# Patient Record
Sex: FEMALE | Race: WHITE | NOT HISPANIC OR LATINO | Employment: OTHER | URBAN - NONMETROPOLITAN AREA
[De-identification: names, ages, dates, MRNs, and addresses within clinical notes are randomized per-mention and may not be internally consistent; named-entity substitution may affect disease eponyms.]

---

## 2018-01-04 ENCOUNTER — OFFICE VISIT CONVERTED (OUTPATIENT)
Dept: FAMILY MEDICINE CLINIC | Age: 73
End: 2018-01-04
Attending: FAMILY MEDICINE

## 2018-01-09 ENCOUNTER — CONVERSION ENCOUNTER (OUTPATIENT)
Dept: OTHER | Facility: HOSPITAL | Age: 73
End: 2018-01-09

## 2018-04-24 ENCOUNTER — OFFICE VISIT CONVERTED (OUTPATIENT)
Dept: FAMILY MEDICINE CLINIC | Age: 73
End: 2018-04-24
Attending: FAMILY MEDICINE

## 2018-05-24 ENCOUNTER — OFFICE VISIT CONVERTED (OUTPATIENT)
Dept: FAMILY MEDICINE CLINIC | Age: 73
End: 2018-05-24
Attending: NURSE PRACTITIONER

## 2018-06-02 ENCOUNTER — OFFICE VISIT CONVERTED (OUTPATIENT)
Dept: FAMILY MEDICINE CLINIC | Age: 73
End: 2018-06-02
Attending: NURSE PRACTITIONER

## 2018-06-08 ENCOUNTER — OFFICE VISIT CONVERTED (OUTPATIENT)
Dept: FAMILY MEDICINE CLINIC | Age: 73
End: 2018-06-08
Attending: NURSE PRACTITIONER

## 2018-07-24 ENCOUNTER — OFFICE VISIT CONVERTED (OUTPATIENT)
Dept: FAMILY MEDICINE CLINIC | Age: 73
End: 2018-07-24
Attending: FAMILY MEDICINE

## 2018-10-30 ENCOUNTER — OFFICE VISIT CONVERTED (OUTPATIENT)
Dept: FAMILY MEDICINE CLINIC | Age: 73
End: 2018-10-30
Attending: FAMILY MEDICINE

## 2018-12-13 ENCOUNTER — OFFICE VISIT CONVERTED (OUTPATIENT)
Dept: FAMILY MEDICINE CLINIC | Age: 73
End: 2018-12-13
Attending: FAMILY MEDICINE

## 2019-01-09 ENCOUNTER — HOSPITAL ENCOUNTER (OUTPATIENT)
Dept: OTHER | Facility: HOSPITAL | Age: 74
Discharge: HOME | End: 2019-01-09
Attending: FAMILY MEDICINE

## 2019-01-09 ENCOUNTER — OFFICE VISIT CONVERTED (OUTPATIENT)
Dept: FAMILY MEDICINE CLINIC | Age: 74
End: 2019-01-09
Attending: FAMILY MEDICINE

## 2019-01-14 LAB
7-AMINOCLONAZEPAM UR: <5 NG/ML
A-OH ALPRAZ UR CFM-MCNC: <5 NG/ML
ALPHA-HYDROXYMIDAZOLAM, URINE: <20 NG/ML
ALPRAZ UR QL: <5 NG/ML
CLONAZEPAM UR QL: <5 NG/ML
CONV CHOLRDIAZEPOXIDE, QN URINE: <20 NG/ML
CONV OXAZEPAM, (TEMAZEPAM) QUANT: 164 NG/ML
DIAZEPAM UR-MCNC: <20 NG/ML
LORAZEPAM UR-MCNC: <20 NG/ML
MIDAZOLAM URINE: <20 NG/ML
NORDIAZEPAM URINE: <20 NG/ML
TEMAZEPAM UR QL CFM: 2694 NG/ML

## 2019-01-23 ENCOUNTER — HOSPITAL ENCOUNTER (OUTPATIENT)
Dept: OTHER | Facility: HOSPITAL | Age: 74
Discharge: HOME | End: 2019-01-23
Attending: FAMILY MEDICINE

## 2019-01-26 LAB — GABAPENTIN UR-MCNC: 457.5 UG/ML

## 2019-02-21 ENCOUNTER — OFFICE VISIT CONVERTED (OUTPATIENT)
Dept: FAMILY MEDICINE CLINIC | Age: 74
End: 2019-02-21
Attending: FAMILY MEDICINE

## 2019-02-21 ENCOUNTER — HOSPITAL ENCOUNTER (OUTPATIENT)
Dept: OTHER | Facility: HOSPITAL | Age: 74
Discharge: HOME | End: 2019-02-21
Attending: FAMILY MEDICINE

## 2019-02-21 LAB
ALBUMIN SERPL-MCNC: 3.8 G/DL (ref 3.5–5)
ALBUMIN/GLOB SERPL: 1.2 {RATIO} (ref 1.4–2.6)
ALP SERPL-CCNC: 53 U/L (ref 43–160)
ALT SERPL-CCNC: <5 U/L (ref 10–40)
ANION GAP SERPL CALC-SCNC: 16 MMOL/L (ref 8–19)
AST SERPL-CCNC: 6 U/L (ref 15–50)
BILIRUB SERPL-MCNC: 0.2 MG/DL (ref 0.2–1.3)
BUN SERPL-MCNC: 10 MG/DL (ref 5–25)
BUN/CREAT SERPL: 12 {RATIO} (ref 6–20)
CALCIUM SERPL-MCNC: 9 MG/DL (ref 8.7–10.4)
CHLORIDE SERPL-SCNC: 105 MMOL/L (ref 99–111)
CONV CO2: 22 MMOL/L (ref 22–32)
CONV TOTAL PROTEIN: 7.1 G/DL (ref 6.3–8.2)
CREAT UR-MCNC: 0.83 MG/DL (ref 0.5–0.9)
ERYTHROCYTE [DISTWIDTH] IN BLOOD BY AUTOMATED COUNT: 12.4 % (ref 11.5–14.5)
GFR SERPLBLD BASED ON 1.73 SQ M-ARVRAT: >60 ML/MIN/{1.73_M2}
GLOBULIN UR ELPH-MCNC: 3.3 G/DL (ref 2–3.5)
GLUCOSE SERPL-MCNC: 96 MG/DL (ref 65–99)
HBA1C MFR BLD: 12.8 G/DL (ref 12–16)
HCT VFR BLD AUTO: 36.9 % (ref 37–47)
MCH RBC QN AUTO: 35.1 PG (ref 27–31)
MCHC RBC AUTO-ENTMCNC: 34.7 G/DL (ref 33–37)
MCV RBC AUTO: 101.1 FL (ref 81–99)
OSMOLALITY SERPL CALC.SUM OF ELEC: 289 MOSM/KG (ref 273–304)
PLATELET # BLD AUTO: 345 10*3/UL (ref 130–400)
PMV BLD AUTO: 9.6 FL (ref 7.4–10.4)
POTASSIUM SERPL-SCNC: 2.8 MMOL/L (ref 3.5–5.3)
RBC # BLD AUTO: 3.65 10*6/UL (ref 4.2–5.4)
SODIUM SERPL-SCNC: 140 MMOL/L (ref 135–147)
WBC # BLD AUTO: 5.97 10*3/UL (ref 4.8–10.8)

## 2019-02-25 LAB
C DIFF TOX B STL QL CT TISS CULT: NEGATIVE
CONV 027 TOXIN: NEGATIVE

## 2019-02-27 LAB
BACTERIA SPEC AEROBE CULT: ABNORMAL
BACTERIA SPEC AEROBE CULT: ABNORMAL

## 2019-03-01 ENCOUNTER — HOSPITAL ENCOUNTER (OUTPATIENT)
Dept: OTHER | Facility: HOSPITAL | Age: 74
Discharge: HOME | End: 2019-03-01
Attending: FAMILY MEDICINE

## 2019-03-01 LAB
ANION GAP SERPL CALC-SCNC: 18 MMOL/L (ref 8–19)
BUN SERPL-MCNC: 10 MG/DL (ref 5–25)
BUN/CREAT SERPL: 9 {RATIO} (ref 6–20)
CALCIUM SERPL-MCNC: 9.5 MG/DL (ref 8.7–10.4)
CHLORIDE SERPL-SCNC: 103 MMOL/L (ref 99–111)
CONV CO2: 24 MMOL/L (ref 22–32)
CREAT UR-MCNC: 1.06 MG/DL (ref 0.5–0.9)
GFR SERPLBLD BASED ON 1.73 SQ M-ARVRAT: 51 ML/MIN/{1.73_M2}
GLUCOSE SERPL-MCNC: 99 MG/DL (ref 65–99)
OSMOLALITY SERPL CALC.SUM OF ELEC: 289 MOSM/KG (ref 273–304)
POTASSIUM SERPL-SCNC: 4.6 MMOL/L (ref 3.5–5.3)
SODIUM SERPL-SCNC: 140 MMOL/L (ref 135–147)

## 2019-03-20 ENCOUNTER — HOSPITAL ENCOUNTER (OUTPATIENT)
Dept: OTHER | Facility: HOSPITAL | Age: 74
Discharge: HOME | End: 2019-03-20
Attending: FAMILY MEDICINE

## 2019-04-22 ENCOUNTER — HOSPITAL ENCOUNTER (OUTPATIENT)
Dept: OTHER | Facility: HOSPITAL | Age: 74
Discharge: HOME | End: 2019-04-22
Attending: FAMILY MEDICINE

## 2019-04-22 ENCOUNTER — OFFICE VISIT CONVERTED (OUTPATIENT)
Dept: FAMILY MEDICINE CLINIC | Age: 74
End: 2019-04-22
Attending: FAMILY MEDICINE

## 2019-05-07 ENCOUNTER — HOSPITAL ENCOUNTER (OUTPATIENT)
Dept: OTHER | Facility: HOSPITAL | Age: 74
Discharge: HOME | End: 2019-05-07
Attending: FAMILY MEDICINE

## 2019-05-08 LAB
ALBUMIN SERPL-MCNC: 4.1 G/DL (ref 3.5–5)
ALBUMIN/GLOB SERPL: 1.3 {RATIO} (ref 1.4–2.6)
ALP SERPL-CCNC: 61 U/L (ref 43–160)
ALT SERPL-CCNC: 6 U/L (ref 10–40)
ANION GAP SERPL CALC-SCNC: 16 MMOL/L (ref 8–19)
AST SERPL-CCNC: 8 U/L (ref 15–50)
BILIRUB SERPL-MCNC: 0.22 MG/DL (ref 0.2–1.3)
BUN SERPL-MCNC: 8 MG/DL (ref 5–25)
BUN/CREAT SERPL: 8 {RATIO} (ref 6–20)
CALCIUM SERPL-MCNC: 9 MG/DL (ref 8.7–10.4)
CHLORIDE SERPL-SCNC: 102 MMOL/L (ref 99–111)
CHOLEST SERPL-MCNC: 197 MG/DL (ref 107–200)
CHOLEST/HDLC SERPL: 4.2 {RATIO} (ref 3–6)
CONV CO2: 29 MMOL/L (ref 22–32)
CONV TOTAL PROTEIN: 7.2 G/DL (ref 6.3–8.2)
CREAT UR-MCNC: 0.99 MG/DL (ref 0.5–0.9)
GFR SERPLBLD BASED ON 1.73 SQ M-ARVRAT: 55 ML/MIN/{1.73_M2}
GLOBULIN UR ELPH-MCNC: 3.1 G/DL (ref 2–3.5)
GLUCOSE SERPL-MCNC: 89 MG/DL (ref 65–99)
HDLC SERPL-MCNC: 47 MG/DL (ref 40–60)
LDLC SERPL CALC-MCNC: 119 MG/DL (ref 70–100)
OSMOLALITY SERPL CALC.SUM OF ELEC: 294 MOSM/KG (ref 273–304)
POTASSIUM SERPL-SCNC: 3.6 MMOL/L (ref 3.5–5.3)
SODIUM SERPL-SCNC: 143 MMOL/L (ref 135–147)
TRIGL SERPL-MCNC: 153 MG/DL (ref 40–150)
TSH SERPL-ACNC: 1.52 M[IU]/L (ref 0.27–4.2)
VLDLC SERPL-MCNC: 31 MG/DL (ref 5–37)

## 2019-05-13 ENCOUNTER — HOSPITAL ENCOUNTER (OUTPATIENT)
Dept: OTHER | Facility: HOSPITAL | Age: 74
Discharge: HOME | End: 2019-05-13
Attending: FAMILY MEDICINE

## 2019-05-15 LAB
BACTERIA SPEC AEROBE CULT: ABNORMAL
BACTERIA SPEC AEROBE CULT: ABNORMAL

## 2019-07-08 ENCOUNTER — HOSPITAL ENCOUNTER (OUTPATIENT)
Dept: OTHER | Facility: HOSPITAL | Age: 74
Discharge: HOME | End: 2019-07-08
Attending: UROLOGY

## 2019-07-08 LAB
CREAT BLD-MCNC: 1 MG/DL (ref 0.6–1.4)
GFR SERPLBLD BASED ON 1.73 SQ M-ARVRAT: 55 ML/MIN/{1.73_M2}

## 2019-07-17 ENCOUNTER — OFFICE VISIT CONVERTED (OUTPATIENT)
Dept: FAMILY MEDICINE CLINIC | Age: 74
End: 2019-07-17
Attending: FAMILY MEDICINE

## 2019-08-28 ENCOUNTER — OFFICE VISIT CONVERTED (OUTPATIENT)
Dept: UROLOGY | Facility: CLINIC | Age: 74
End: 2019-08-28
Attending: UROLOGY

## 2019-10-22 ENCOUNTER — HOSPITAL ENCOUNTER (OUTPATIENT)
Dept: OTHER | Facility: HOSPITAL | Age: 74
Discharge: HOME | End: 2019-10-22
Attending: FAMILY MEDICINE

## 2019-10-22 ENCOUNTER — OFFICE VISIT CONVERTED (OUTPATIENT)
Dept: FAMILY MEDICINE CLINIC | Age: 74
End: 2019-10-22
Attending: FAMILY MEDICINE

## 2019-10-22 LAB
ALBUMIN SERPL-MCNC: 4.5 G/DL (ref 3.5–5)
ALBUMIN/GLOB SERPL: 1.7 {RATIO} (ref 1.4–2.6)
ALP SERPL-CCNC: 59 U/L (ref 43–160)
ALT SERPL-CCNC: 7 U/L (ref 10–40)
ANION GAP SERPL CALC-SCNC: 19 MMOL/L (ref 8–19)
AST SERPL-CCNC: 7 U/L (ref 15–50)
BILIRUB SERPL-MCNC: 0.32 MG/DL (ref 0.2–1.3)
BUN SERPL-MCNC: 10 MG/DL (ref 5–25)
BUN/CREAT SERPL: 11 {RATIO} (ref 6–20)
CALCIUM SERPL-MCNC: 9.7 MG/DL (ref 8.7–10.4)
CHLORIDE SERPL-SCNC: 99 MMOL/L (ref 99–111)
CHOLEST SERPL-MCNC: 210 MG/DL (ref 107–200)
CHOLEST/HDLC SERPL: 3.8 {RATIO} (ref 3–6)
CONV CO2: 24 MMOL/L (ref 22–32)
CONV TOTAL PROTEIN: 7.2 G/DL (ref 6.3–8.2)
CREAT UR-MCNC: 0.89 MG/DL (ref 0.5–0.9)
GFR SERPLBLD BASED ON 1.73 SQ M-ARVRAT: >60 ML/MIN/{1.73_M2}
GLOBULIN UR ELPH-MCNC: 2.7 G/DL (ref 2–3.5)
GLUCOSE SERPL-MCNC: 91 MG/DL (ref 65–99)
HDLC SERPL-MCNC: 55 MG/DL (ref 40–60)
LDLC SERPL CALC-MCNC: 122 MG/DL (ref 70–100)
OSMOLALITY SERPL CALC.SUM OF ELEC: 285 MOSM/KG (ref 273–304)
POTASSIUM SERPL-SCNC: 4.1 MMOL/L (ref 3.5–5.3)
SODIUM SERPL-SCNC: 138 MMOL/L (ref 135–147)
TRIGL SERPL-MCNC: 163 MG/DL (ref 40–150)
TSH SERPL-ACNC: 1.64 M[IU]/L (ref 0.27–4.2)
VLDLC SERPL-MCNC: 33 MG/DL (ref 5–37)

## 2019-10-28 ENCOUNTER — OFFICE VISIT CONVERTED (OUTPATIENT)
Dept: FAMILY MEDICINE CLINIC | Age: 74
End: 2019-10-28
Attending: FAMILY MEDICINE

## 2019-11-11 ENCOUNTER — OFFICE VISIT CONVERTED (OUTPATIENT)
Dept: FAMILY MEDICINE CLINIC | Age: 74
End: 2019-11-11
Attending: NURSE PRACTITIONER

## 2019-12-10 ENCOUNTER — OFFICE VISIT CONVERTED (OUTPATIENT)
Dept: FAMILY MEDICINE CLINIC | Age: 74
End: 2019-12-10
Attending: FAMILY MEDICINE

## 2019-12-16 ENCOUNTER — OFFICE VISIT CONVERTED (OUTPATIENT)
Dept: FAMILY MEDICINE CLINIC | Age: 74
End: 2019-12-16
Attending: FAMILY MEDICINE

## 2019-12-18 ENCOUNTER — HOSPITAL ENCOUNTER (OUTPATIENT)
Dept: OTHER | Facility: HOSPITAL | Age: 74
Discharge: HOME | End: 2019-12-18
Attending: FAMILY MEDICINE

## 2019-12-18 LAB
ANION GAP SERPL CALC-SCNC: 16 MMOL/L (ref 8–19)
BUN SERPL-MCNC: 19 MG/DL (ref 5–25)
BUN/CREAT SERPL: 18 {RATIO} (ref 6–20)
CALCIUM SERPL-MCNC: 8.8 MG/DL (ref 8.7–10.4)
CHLORIDE SERPL-SCNC: 93 MMOL/L (ref 99–111)
CONV CO2: 25 MMOL/L (ref 22–32)
CREAT UR-MCNC: 1.08 MG/DL (ref 0.5–0.9)
GFR SERPLBLD BASED ON 1.73 SQ M-ARVRAT: 50 ML/MIN/{1.73_M2}
GLUCOSE SERPL-MCNC: 94 MG/DL (ref 65–99)
OSMOLALITY SERPL CALC.SUM OF ELEC: 272 MOSM/KG (ref 273–304)
POTASSIUM SERPL-SCNC: 4.1 MMOL/L (ref 3.5–5.3)
SODIUM SERPL-SCNC: 130 MMOL/L (ref 135–147)

## 2019-12-23 ENCOUNTER — HOSPITAL ENCOUNTER (OUTPATIENT)
Dept: OTHER | Facility: HOSPITAL | Age: 74
Discharge: HOME | End: 2019-12-23
Attending: FAMILY MEDICINE

## 2019-12-30 ENCOUNTER — HOSPITAL ENCOUNTER (OUTPATIENT)
Dept: OTHER | Facility: HOSPITAL | Age: 74
Discharge: HOME | End: 2019-12-30
Attending: FAMILY MEDICINE

## 2020-01-15 ENCOUNTER — OFFICE VISIT CONVERTED (OUTPATIENT)
Dept: FAMILY MEDICINE CLINIC | Age: 75
End: 2020-01-15
Attending: FAMILY MEDICINE

## 2020-01-15 ENCOUNTER — HOSPITAL ENCOUNTER (OUTPATIENT)
Dept: OTHER | Facility: HOSPITAL | Age: 75
Discharge: HOME | End: 2020-01-15
Attending: FAMILY MEDICINE

## 2020-01-15 LAB
ALBUMIN SERPL-MCNC: 4.1 G/DL (ref 3.5–5)
ALBUMIN/GLOB SERPL: 1.5 {RATIO} (ref 1.4–2.6)
ALP SERPL-CCNC: 60 U/L (ref 43–160)
ALT SERPL-CCNC: 10 U/L (ref 10–40)
ANION GAP SERPL CALC-SCNC: 17 MMOL/L (ref 8–19)
AST SERPL-CCNC: 8 U/L (ref 15–50)
BASOPHILS # BLD AUTO: 0.07 10*3/UL (ref 0–0.2)
BASOPHILS NFR BLD AUTO: 0.9 % (ref 0–3)
BILIRUB SERPL-MCNC: <0.15 MG/DL (ref 0.2–1.3)
BNP SERPL-MCNC: 955 PG/ML (ref 0–1800)
BUN SERPL-MCNC: 13 MG/DL (ref 5–25)
BUN/CREAT SERPL: 14 {RATIO} (ref 6–20)
CALCIUM SERPL-MCNC: 9.2 MG/DL (ref 8.7–10.4)
CHLORIDE SERPL-SCNC: 102 MMOL/L (ref 99–111)
CONV ABS IMM GRAN: 0.04 10*3/UL (ref 0–0.2)
CONV CO2: 26 MMOL/L (ref 22–32)
CONV IMMATURE GRAN: 0.5 % (ref 0–1.8)
CONV TOTAL PROTEIN: 6.9 G/DL (ref 6.3–8.2)
CREAT UR-MCNC: 0.92 MG/DL (ref 0.5–0.9)
DEPRECATED RDW RBC AUTO: 52 FL (ref 36.4–46.3)
EOSINOPHIL # BLD AUTO: 0.25 10*3/UL (ref 0–0.7)
EOSINOPHIL # BLD AUTO: 3.4 % (ref 0–7)
ERYTHROCYTE [DISTWIDTH] IN BLOOD BY AUTOMATED COUNT: 13.5 % (ref 11.7–14.4)
GFR SERPLBLD BASED ON 1.73 SQ M-ARVRAT: >60 ML/MIN/{1.73_M2}
GLOBULIN UR ELPH-MCNC: 2.8 G/DL (ref 2–3.5)
GLUCOSE SERPL-MCNC: 93 MG/DL (ref 65–99)
HCT VFR BLD AUTO: 36 % (ref 37–47)
HGB BLD-MCNC: 11.7 G/DL (ref 12–16)
LYMPHOCYTES # BLD AUTO: 2.73 10*3/UL (ref 1–5)
LYMPHOCYTES NFR BLD AUTO: 36.6 % (ref 20–45)
MCH RBC QN AUTO: 34.2 PG (ref 27–31)
MCHC RBC AUTO-ENTMCNC: 32.5 G/DL (ref 33–37)
MCV RBC AUTO: 105.3 FL (ref 81–99)
MONOCYTES # BLD AUTO: 0.8 10*3/UL (ref 0.2–1.2)
MONOCYTES NFR BLD AUTO: 10.7 % (ref 3–10)
NEUTROPHILS # BLD AUTO: 3.56 10*3/UL (ref 2–8)
NEUTROPHILS NFR BLD AUTO: 47.9 % (ref 30–85)
NRBC CBCN: 0 % (ref 0–0.7)
OSMOLALITY SERPL CALC.SUM OF ELEC: 290 MOSM/KG (ref 273–304)
PLATELET # BLD AUTO: 316 10*3/UL (ref 130–400)
PMV BLD AUTO: 10.5 FL (ref 9.4–12.3)
POTASSIUM SERPL-SCNC: 4.6 MMOL/L (ref 3.5–5.3)
RBC # BLD AUTO: 3.42 10*6/UL (ref 4.2–5.4)
SODIUM SERPL-SCNC: 140 MMOL/L (ref 135–147)
WBC # BLD AUTO: 7.45 10*3/UL (ref 4.8–10.8)

## 2020-02-20 ENCOUNTER — HOSPITAL ENCOUNTER (OUTPATIENT)
Dept: OTHER | Facility: HOSPITAL | Age: 75
Discharge: HOME | End: 2020-02-20
Attending: FAMILY MEDICINE

## 2020-04-01 ENCOUNTER — OFFICE VISIT CONVERTED (OUTPATIENT)
Dept: FAMILY MEDICINE CLINIC | Age: 75
End: 2020-04-01
Attending: FAMILY MEDICINE

## 2020-04-02 ENCOUNTER — HOSPITAL ENCOUNTER (OUTPATIENT)
Dept: OTHER | Facility: HOSPITAL | Age: 75
Discharge: HOME | End: 2020-04-02
Attending: FAMILY MEDICINE

## 2020-04-17 ENCOUNTER — OFFICE VISIT CONVERTED (OUTPATIENT)
Dept: FAMILY MEDICINE CLINIC | Age: 75
End: 2020-04-17
Attending: NURSE PRACTITIONER

## 2020-04-28 ENCOUNTER — OFFICE VISIT CONVERTED (OUTPATIENT)
Dept: FAMILY MEDICINE CLINIC | Age: 75
End: 2020-04-28
Attending: FAMILY MEDICINE

## 2020-07-16 ENCOUNTER — OFFICE VISIT CONVERTED (OUTPATIENT)
Dept: FAMILY MEDICINE CLINIC | Age: 75
End: 2020-07-16
Attending: FAMILY MEDICINE

## 2020-07-16 ENCOUNTER — HOSPITAL ENCOUNTER (OUTPATIENT)
Dept: OTHER | Facility: HOSPITAL | Age: 75
Discharge: HOME | End: 2020-07-16
Attending: FAMILY MEDICINE

## 2020-07-16 LAB
ALBUMIN SERPL-MCNC: 4 G/DL (ref 3.5–5)
ALBUMIN/GLOB SERPL: 1.4 {RATIO} (ref 1.4–2.6)
ALP SERPL-CCNC: 49 U/L (ref 43–160)
ALT SERPL-CCNC: 7 U/L (ref 10–40)
ANION GAP SERPL CALC-SCNC: 20 MMOL/L (ref 8–19)
AST SERPL-CCNC: 8 U/L (ref 15–50)
BILIRUB SERPL-MCNC: 0.41 MG/DL (ref 0.2–1.3)
BUN SERPL-MCNC: 11 MG/DL (ref 5–25)
BUN/CREAT SERPL: 9 {RATIO} (ref 6–20)
CALCIUM SERPL-MCNC: 9.4 MG/DL (ref 8.7–10.4)
CHLORIDE SERPL-SCNC: 88 MMOL/L (ref 99–111)
CHOLEST SERPL-MCNC: 204 MG/DL (ref 107–200)
CHOLEST/HDLC SERPL: 4.4 {RATIO} (ref 3–6)
CONV CO2: 25 MMOL/L (ref 22–32)
CONV TOTAL PROTEIN: 6.9 G/DL (ref 6.3–8.2)
CREAT UR-MCNC: 1.22 MG/DL (ref 0.5–0.9)
GFR SERPLBLD BASED ON 1.73 SQ M-ARVRAT: 43 ML/MIN/{1.73_M2}
GLOBULIN UR ELPH-MCNC: 2.9 G/DL (ref 2–3.5)
GLUCOSE SERPL-MCNC: 100 MG/DL (ref 65–99)
HDLC SERPL-MCNC: 46 MG/DL (ref 40–60)
LDLC SERPL CALC-MCNC: 122 MG/DL (ref 70–100)
OSMOLALITY SERPL CALC.SUM OF ELEC: 267 MOSM/KG (ref 273–304)
POTASSIUM SERPL-SCNC: 3.6 MMOL/L (ref 3.5–5.3)
SODIUM SERPL-SCNC: 129 MMOL/L (ref 135–147)
T4 FREE SERPL-MCNC: 1.4 NG/DL (ref 0.9–1.8)
TRIGL SERPL-MCNC: 178 MG/DL (ref 40–150)
TSH SERPL-ACNC: 3.09 M[IU]/L (ref 0.27–4.2)
VLDLC SERPL-MCNC: 36 MG/DL (ref 5–37)

## 2020-07-23 ENCOUNTER — HOSPITAL ENCOUNTER (OUTPATIENT)
Dept: OTHER | Facility: HOSPITAL | Age: 75
Discharge: HOME | End: 2020-07-23
Attending: FAMILY MEDICINE

## 2020-07-23 LAB
ANION GAP SERPL CALC-SCNC: 19 MMOL/L (ref 8–19)
BUN SERPL-MCNC: 10 MG/DL (ref 5–25)
BUN/CREAT SERPL: 9 {RATIO} (ref 6–20)
CALCIUM SERPL-MCNC: 9.3 MG/DL (ref 8.7–10.4)
CHLORIDE SERPL-SCNC: 96 MMOL/L (ref 99–111)
CONV CO2: 26 MMOL/L (ref 22–32)
CREAT UR-MCNC: 1.07 MG/DL (ref 0.5–0.9)
GFR SERPLBLD BASED ON 1.73 SQ M-ARVRAT: 50 ML/MIN/{1.73_M2}
GLUCOSE SERPL-MCNC: 97 MG/DL (ref 65–99)
OSMOLALITY SERPL CALC.SUM OF ELEC: 281 MOSM/KG (ref 273–304)
OSMOLALITY SERPL: 277 MOSM/KG (ref 280–295)
OSMOLALITY UR: 235 MOSM/KG (ref 50–1400)
POTASSIUM SERPL-SCNC: 4.5 MMOL/L (ref 3.5–5.3)
SODIUM SERPL-SCNC: 136 MMOL/L (ref 135–147)
SODIUM UR-SCNC: 57 MMOL/L

## 2020-08-13 ENCOUNTER — OFFICE VISIT CONVERTED (OUTPATIENT)
Dept: FAMILY MEDICINE CLINIC | Age: 75
End: 2020-08-13
Attending: NURSE PRACTITIONER

## 2020-08-22 ENCOUNTER — HOSPITAL ENCOUNTER (OUTPATIENT)
Dept: OTHER | Facility: HOSPITAL | Age: 75
Discharge: HOME | End: 2020-08-22
Attending: FAMILY MEDICINE

## 2020-08-22 LAB
ANION GAP SERPL CALC-SCNC: 21 MMOL/L (ref 8–19)
BUN SERPL-MCNC: 12 MG/DL (ref 5–25)
BUN/CREAT SERPL: 9 {RATIO} (ref 6–20)
CALCIUM SERPL-MCNC: 9.4 MG/DL (ref 8.7–10.4)
CHLORIDE SERPL-SCNC: 98 MMOL/L (ref 99–111)
CONV CO2: 23 MMOL/L (ref 22–32)
CREAT UR-MCNC: 1.36 MG/DL (ref 0.5–0.9)
GFR SERPLBLD BASED ON 1.73 SQ M-ARVRAT: 37 ML/MIN/{1.73_M2}
GLUCOSE SERPL-MCNC: 114 MG/DL (ref 65–99)
OSMOLALITY SERPL CALC.SUM OF ELEC: 287 MOSM/KG (ref 273–304)
POTASSIUM SERPL-SCNC: 3.7 MMOL/L (ref 3.5–5.3)
SODIUM SERPL-SCNC: 138 MMOL/L (ref 135–147)

## 2020-08-25 ENCOUNTER — OFFICE VISIT CONVERTED (OUTPATIENT)
Dept: FAMILY MEDICINE CLINIC | Age: 75
End: 2020-08-25
Attending: FAMILY MEDICINE

## 2020-09-08 ENCOUNTER — HOSPITAL ENCOUNTER (OUTPATIENT)
Dept: OTHER | Facility: HOSPITAL | Age: 75
Discharge: HOME | End: 2020-09-08
Attending: FAMILY MEDICINE

## 2020-09-08 ENCOUNTER — OFFICE VISIT CONVERTED (OUTPATIENT)
Dept: FAMILY MEDICINE CLINIC | Age: 75
End: 2020-09-08
Attending: FAMILY MEDICINE

## 2020-09-08 LAB
ALBUMIN SERPL-MCNC: 4.3 G/DL (ref 3.5–5)
ALBUMIN/GLOB SERPL: 1.3 {RATIO} (ref 1.4–2.6)
ALP SERPL-CCNC: 61 U/L (ref 43–160)
ALT SERPL-CCNC: 8 U/L (ref 10–40)
ANION GAP SERPL CALC-SCNC: 21 MMOL/L (ref 8–19)
AST SERPL-CCNC: 9 U/L (ref 15–50)
BILIRUB SERPL-MCNC: 0.3 MG/DL (ref 0.2–1.3)
BUN SERPL-MCNC: 17 MG/DL (ref 5–25)
BUN/CREAT SERPL: 11 {RATIO} (ref 6–20)
CALCIUM SERPL-MCNC: 9.9 MG/DL (ref 8.7–10.4)
CHLORIDE SERPL-SCNC: 99 MMOL/L (ref 99–111)
CONV CO2: 23 MMOL/L (ref 22–32)
CONV TOTAL PROTEIN: 7.7 G/DL (ref 6.3–8.2)
CREAT UR-MCNC: 1.6 MG/DL (ref 0.5–0.9)
ERYTHROCYTE [DISTWIDTH] IN BLOOD BY AUTOMATED COUNT: 13 % (ref 11.5–14.5)
ERYTHROCYTE [SEDIMENTATION RATE] IN BLOOD: 35 MM/H (ref 0–30)
GFR SERPLBLD BASED ON 1.73 SQ M-ARVRAT: 31 ML/MIN/{1.73_M2}
GLOBULIN UR ELPH-MCNC: 3.4 G/DL (ref 2–3.5)
GLUCOSE SERPL-MCNC: 116 MG/DL (ref 65–99)
HBA1C MFR BLD: 15 G/DL (ref 12–16)
HCT VFR BLD AUTO: 44.3 % (ref 37–47)
MCH RBC QN AUTO: 33.8 PG (ref 27–31)
MCHC RBC AUTO-ENTMCNC: 33.9 G/DL (ref 33–37)
MCV RBC AUTO: 99.8 FL (ref 81–99)
OSMOLALITY SERPL CALC.SUM OF ELEC: 291 MOSM/KG (ref 273–304)
PLATELET # BLD AUTO: 299 10*3/UL (ref 130–400)
PMV BLD AUTO: 10.3 FL (ref 7.4–10.4)
POTASSIUM SERPL-SCNC: 3.6 MMOL/L (ref 3.5–5.3)
RBC # BLD AUTO: 4.44 10*6/UL (ref 4.2–5.4)
SODIUM SERPL-SCNC: 139 MMOL/L (ref 135–147)
TSH SERPL-ACNC: 0.45 M[IU]/L (ref 0.27–4.2)
WBC # BLD AUTO: 6.93 10*3/UL (ref 4.8–10.8)

## 2020-09-09 LAB
C DIFF TOX B STL QL CT TISS CULT: NEGATIVE
CONV 027 TOXIN: NEGATIVE

## 2020-09-11 LAB — BACTERIA SPEC AEROBE CULT: NORMAL

## 2020-09-22 ENCOUNTER — OFFICE VISIT CONVERTED (OUTPATIENT)
Dept: FAMILY MEDICINE CLINIC | Age: 75
End: 2020-09-22
Attending: FAMILY MEDICINE

## 2020-09-22 ENCOUNTER — HOSPITAL ENCOUNTER (OUTPATIENT)
Dept: OTHER | Facility: HOSPITAL | Age: 75
Discharge: HOME | End: 2020-09-22
Attending: FAMILY MEDICINE

## 2020-09-22 LAB
ANION GAP SERPL CALC-SCNC: 17 MMOL/L (ref 8–19)
BUN SERPL-MCNC: 14 MG/DL (ref 5–25)
BUN/CREAT SERPL: 12 {RATIO} (ref 6–20)
CALCIUM SERPL-MCNC: 8.7 MG/DL (ref 8.7–10.4)
CHLORIDE SERPL-SCNC: 103 MMOL/L (ref 99–111)
CONV CO2: 18 MMOL/L (ref 22–32)
CREAT UR-MCNC: 1.16 MG/DL (ref 0.5–0.9)
ERYTHROCYTE [DISTWIDTH] IN BLOOD BY AUTOMATED COUNT: 13.2 % (ref 11.5–14.5)
GFR SERPLBLD BASED ON 1.73 SQ M-ARVRAT: 45 ML/MIN/{1.73_M2}
GLUCOSE SERPL-MCNC: 96 MG/DL (ref 65–99)
HBA1C MFR BLD: 13.2 G/DL (ref 12–16)
HCT VFR BLD AUTO: 36.8 % (ref 37–47)
MCH RBC QN AUTO: 33.5 PG (ref 27–31)
MCHC RBC AUTO-ENTMCNC: 35.9 G/DL (ref 33–37)
MCV RBC AUTO: 93.4 FL (ref 81–99)
OSMOLALITY SERPL CALC.SUM OF ELEC: 280 MOSM/KG (ref 273–304)
PLATELET # BLD AUTO: 387 10*3/UL (ref 130–400)
PMV BLD AUTO: 10.5 FL (ref 7.4–10.4)
POTASSIUM SERPL-SCNC: 2.8 MMOL/L (ref 3.5–5.3)
RBC # BLD AUTO: 3.94 10*6/UL (ref 4.2–5.4)
SODIUM SERPL-SCNC: 135 MMOL/L (ref 135–147)
WBC # BLD AUTO: 8.78 10*3/UL (ref 4.8–10.8)

## 2020-09-28 ENCOUNTER — HOSPITAL ENCOUNTER (OUTPATIENT)
Dept: OTHER | Facility: HOSPITAL | Age: 75
Discharge: HOME | End: 2020-09-28
Attending: NURSE PRACTITIONER

## 2020-09-28 ENCOUNTER — OFFICE VISIT CONVERTED (OUTPATIENT)
Dept: FAMILY MEDICINE CLINIC | Age: 75
End: 2020-09-28
Attending: NURSE PRACTITIONER

## 2020-09-28 LAB
ALBUMIN SERPL-MCNC: 3.9 G/DL (ref 3.5–5)
ALBUMIN/GLOB SERPL: 1.2 {RATIO} (ref 1.4–2.6)
ALP SERPL-CCNC: 66 U/L (ref 43–160)
ALT SERPL-CCNC: 8 U/L (ref 10–40)
ANION GAP SERPL CALC-SCNC: 15 MMOL/L (ref 8–19)
AST SERPL-CCNC: 8 U/L (ref 15–50)
BASOPHILS # BLD MANUAL: 0.04 10*3/UL (ref 0–0.2)
BASOPHILS NFR BLD MANUAL: 0.5 % (ref 0–3)
BILIRUB SERPL-MCNC: 0.25 MG/DL (ref 0.2–1.3)
BUN SERPL-MCNC: 14 MG/DL (ref 5–25)
BUN/CREAT SERPL: 11 {RATIO} (ref 6–20)
CALCIUM SERPL-MCNC: 9.2 MG/DL (ref 8.7–10.4)
CHLORIDE SERPL-SCNC: 102 MMOL/L (ref 99–111)
CONV CO2: 24 MMOL/L (ref 22–32)
CONV TOTAL PROTEIN: 7.1 G/DL (ref 6.3–8.2)
CREAT UR-MCNC: 1.32 MG/DL (ref 0.5–0.9)
DEPRECATED RDW RBC AUTO: 44.7 FL
EOSINOPHIL # BLD MANUAL: 0.06 10*3/UL (ref 0–0.7)
EOSINOPHIL NFR BLD MANUAL: 0.8 % (ref 0–7)
ERYTHROCYTE [DISTWIDTH] IN BLOOD BY AUTOMATED COUNT: 13.1 % (ref 11.5–14.5)
GFR SERPLBLD BASED ON 1.73 SQ M-ARVRAT: 39 ML/MIN/{1.73_M2}
GLOBULIN UR ELPH-MCNC: 3.2 G/DL (ref 2–3.5)
GLUCOSE SERPL-MCNC: 110 MG/DL (ref 65–99)
GRANS (ABSOLUTE): 4.6 10*3/UL (ref 2–8)
GRANS: 63 % (ref 30–85)
HBA1C MFR BLD: 14 G/DL (ref 12–16)
HCT VFR BLD AUTO: 39.3 % (ref 37–47)
IMM GRANULOCYTES # BLD: 0.01 10*3/UL (ref 0–0.54)
IMM GRANULOCYTES NFR BLD: 0.1 % (ref 0–0.43)
LYMPHOCYTES # BLD MANUAL: 1.82 10*3/UL (ref 1–5)
LYMPHOCYTES NFR BLD MANUAL: 10.7 % (ref 3–10)
MCH RBC QN AUTO: 33 PG (ref 27–31)
MCHC RBC AUTO-ENTMCNC: 35.6 G/DL (ref 33–37)
MCV RBC AUTO: 92.7 FL (ref 81–99)
MONOCYTES # BLD AUTO: 0.78 10*3/UL (ref 0.2–1.2)
OSMOLALITY SERPL CALC.SUM OF ELEC: 287 MOSM/KG (ref 273–304)
PLATELET # BLD AUTO: 427 10*3/UL (ref 130–400)
PMV BLD AUTO: 9.9 FL (ref 7.4–10.4)
POTASSIUM SERPL-SCNC: 2.6 MMOL/L (ref 3.5–5.3)
RBC # BLD AUTO: 4.24 10*6/UL (ref 4.2–5.4)
SODIUM SERPL-SCNC: 138 MMOL/L (ref 135–147)
TSH SERPL-ACNC: 0.39 M[IU]/L (ref 0.27–4.2)
VARIANT LYMPHS NFR BLD MANUAL: 24.9 % (ref 20–45)
WBC # BLD AUTO: 7.31 10*3/UL (ref 4.8–10.8)

## 2020-09-30 LAB — SARS-COV-2 RNA SPEC QL NAA+PROBE: NOT DETECTED

## 2020-10-02 ENCOUNTER — HOSPITAL ENCOUNTER (OUTPATIENT)
Dept: OTHER | Facility: HOSPITAL | Age: 75
Discharge: HOME | End: 2020-10-02
Attending: FAMILY MEDICINE

## 2020-10-02 LAB
ALBUMIN SERPL-MCNC: 3.8 G/DL (ref 3.5–5)
ALBUMIN/GLOB SERPL: 1.2 {RATIO} (ref 1.4–2.6)
ALP SERPL-CCNC: 77 U/L (ref 43–160)
ALT SERPL-CCNC: 20 U/L (ref 10–40)
ANION GAP SERPL CALC-SCNC: 16 MMOL/L (ref 8–19)
AST SERPL-CCNC: 10 U/L (ref 15–50)
BILIRUB SERPL-MCNC: 0.18 MG/DL (ref 0.2–1.3)
BUN SERPL-MCNC: 11 MG/DL (ref 5–25)
BUN/CREAT SERPL: 9 {RATIO} (ref 6–20)
CALCIUM SERPL-MCNC: 9.7 MG/DL (ref 8.7–10.4)
CHLORIDE SERPL-SCNC: 109 MMOL/L (ref 99–111)
CONV CO2: 21 MMOL/L (ref 22–32)
CONV TOTAL PROTEIN: 6.9 G/DL (ref 6.3–8.2)
CREAT UR-MCNC: 1.27 MG/DL (ref 0.5–0.9)
GFR SERPLBLD BASED ON 1.73 SQ M-ARVRAT: 40 ML/MIN/{1.73_M2}
GLOBULIN UR ELPH-MCNC: 3.1 G/DL (ref 2–3.5)
GLUCOSE SERPL-MCNC: 99 MG/DL (ref 65–99)
OSMOLALITY SERPL CALC.SUM OF ELEC: 291 MOSM/KG (ref 273–304)
POTASSIUM SERPL-SCNC: 4.7 MMOL/L (ref 3.5–5.3)
SODIUM SERPL-SCNC: 141 MMOL/L (ref 135–147)

## 2020-10-15 ENCOUNTER — OFFICE VISIT CONVERTED (OUTPATIENT)
Dept: FAMILY MEDICINE CLINIC | Age: 75
End: 2020-10-15
Attending: FAMILY MEDICINE

## 2020-11-05 ENCOUNTER — OFFICE VISIT CONVERTED (OUTPATIENT)
Dept: FAMILY MEDICINE CLINIC | Age: 75
End: 2020-11-05
Attending: FAMILY MEDICINE

## 2021-01-22 ENCOUNTER — HOSPITAL ENCOUNTER (OUTPATIENT)
Dept: OTHER | Facility: HOSPITAL | Age: 76
Discharge: HOME | End: 2021-01-22
Attending: NURSE PRACTITIONER

## 2021-01-22 ENCOUNTER — OFFICE VISIT CONVERTED (OUTPATIENT)
Dept: FAMILY MEDICINE CLINIC | Age: 76
End: 2021-01-22
Attending: NURSE PRACTITIONER

## 2021-01-22 LAB
ALBUMIN SERPL-MCNC: 3.8 G/DL (ref 3.5–5)
ALBUMIN/GLOB SERPL: 1.2 {RATIO} (ref 1.4–2.6)
ALP SERPL-CCNC: 64 U/L (ref 43–160)
ALT SERPL-CCNC: <5 U/L (ref 10–40)
ANION GAP SERPL CALC-SCNC: 15 MMOL/L (ref 8–19)
AST SERPL-CCNC: 7 U/L (ref 15–50)
BASOPHILS # BLD MANUAL: 0.03 10*3/UL (ref 0–0.2)
BASOPHILS NFR BLD MANUAL: 0.6 % (ref 0–3)
BILIRUB SERPL-MCNC: <0.15 MG/DL (ref 0.2–1.3)
BUN SERPL-MCNC: 12 MG/DL (ref 5–25)
BUN/CREAT SERPL: 10 {RATIO} (ref 6–20)
CALCIUM SERPL-MCNC: 9.4 MG/DL (ref 8.7–10.4)
CHLORIDE SERPL-SCNC: 100 MMOL/L (ref 99–111)
CONV CO2: 24 MMOL/L (ref 22–32)
CONV TOTAL PROTEIN: 7.1 G/DL (ref 6.3–8.2)
CREAT UR-MCNC: 1.17 MG/DL (ref 0.5–0.9)
DEPRECATED RDW RBC AUTO: 48.5 FL
EOSINOPHIL # BLD MANUAL: 0.11 10*3/UL (ref 0–0.7)
EOSINOPHIL NFR BLD MANUAL: 2.1 % (ref 0–7)
ERYTHROCYTE [DISTWIDTH] IN BLOOD BY AUTOMATED COUNT: 12.7 % (ref 11.5–14.5)
GFR SERPLBLD BASED ON 1.73 SQ M-ARVRAT: 45 ML/MIN/{1.73_M2}
GLOBULIN UR ELPH-MCNC: 3.3 G/DL (ref 2–3.5)
GLUCOSE SERPL-MCNC: 98 MG/DL (ref 65–99)
GRANS (ABSOLUTE): 2.83 10*3/UL (ref 2–8)
GRANS: 52.9 % (ref 30–85)
HBA1C MFR BLD: 13.1 G/DL (ref 12–16)
HCT VFR BLD AUTO: 40.5 % (ref 37–47)
IMM GRANULOCYTES # BLD: 0.01 10*3/UL (ref 0–0.54)
IMM GRANULOCYTES NFR BLD: 0.2 % (ref 0–0.43)
LYMPHOCYTES # BLD MANUAL: 1.86 10*3/UL (ref 1–5)
LYMPHOCYTES NFR BLD MANUAL: 9.4 % (ref 3–10)
MCH RBC QN AUTO: 33.2 PG (ref 27–31)
MCHC RBC AUTO-ENTMCNC: 32.3 G/DL (ref 33–37)
MCV RBC AUTO: 102.8 FL (ref 81–99)
MONOCYTES # BLD AUTO: 0.5 10*3/UL (ref 0.2–1.2)
OSMOLALITY SERPL CALC.SUM OF ELEC: 280 MOSM/KG (ref 273–304)
PLATELET # BLD AUTO: 368 10*3/UL (ref 130–400)
PMV BLD AUTO: 9.9 FL (ref 7.4–10.4)
POTASSIUM SERPL-SCNC: 4.4 MMOL/L (ref 3.5–5.3)
RBC # BLD AUTO: 3.94 10*6/UL (ref 4.2–5.4)
SODIUM SERPL-SCNC: 135 MMOL/L (ref 135–147)
TSH SERPL-ACNC: 0.51 M[IU]/L (ref 0.27–4.2)
VARIANT LYMPHS NFR BLD MANUAL: 34.8 % (ref 20–45)
WBC # BLD AUTO: 5.34 10*3/UL (ref 4.8–10.8)

## 2021-05-15 VITALS
WEIGHT: 132 LBS | HEIGHT: 62 IN | BODY MASS INDEX: 24.29 KG/M2 | SYSTOLIC BLOOD PRESSURE: 160 MMHG | DIASTOLIC BLOOD PRESSURE: 90 MMHG

## 2021-05-18 ENCOUNTER — OFFICE VISIT CONVERTED (OUTPATIENT)
Dept: FAMILY MEDICINE CLINIC | Age: 76
End: 2021-05-18
Attending: FAMILY MEDICINE

## 2021-05-18 ENCOUNTER — HOSPITAL ENCOUNTER (OUTPATIENT)
Dept: OTHER | Facility: HOSPITAL | Age: 76
Discharge: HOME | End: 2021-05-18
Attending: FAMILY MEDICINE

## 2021-05-18 LAB
ALBUMIN SERPL-MCNC: 4 G/DL (ref 3.5–5)
ALBUMIN/GLOB SERPL: 1.3 {RATIO} (ref 1.4–2.6)
ALP SERPL-CCNC: 60 U/L (ref 43–160)
ALT SERPL-CCNC: <5 U/L (ref 10–40)
ANION GAP SERPL CALC-SCNC: 16 MMOL/L (ref 8–19)
AST SERPL-CCNC: 7 U/L (ref 15–50)
BILIRUB SERPL-MCNC: <0.15 MG/DL (ref 0.2–1.3)
BUN SERPL-MCNC: 16 MG/DL (ref 5–25)
BUN/CREAT SERPL: 14 {RATIO} (ref 6–20)
CALCIUM SERPL-MCNC: 9.3 MG/DL (ref 8.7–10.4)
CHLORIDE SERPL-SCNC: 101 MMOL/L (ref 99–111)
CHOLEST SERPL-MCNC: 212 MG/DL (ref 107–200)
CHOLEST/HDLC SERPL: 4.2 {RATIO} (ref 3–6)
CONV CO2: 27 MMOL/L (ref 22–32)
CONV TOTAL PROTEIN: 7.2 G/DL (ref 6.3–8.2)
CREAT UR-MCNC: 1.11 MG/DL (ref 0.5–0.9)
GFR SERPLBLD BASED ON 1.73 SQ M-ARVRAT: 47 ML/MIN/{1.73_M2}
GLOBULIN UR ELPH-MCNC: 3.2 G/DL (ref 2–3.5)
GLUCOSE SERPL-MCNC: 90 MG/DL (ref 65–99)
HDLC SERPL-MCNC: 51 MG/DL (ref 40–60)
LDLC SERPL CALC-MCNC: 139 MG/DL (ref 70–100)
OSMOLALITY SERPL CALC.SUM OF ELEC: 289 MOSM/KG (ref 273–304)
POTASSIUM SERPL-SCNC: 4.9 MMOL/L (ref 3.5–5.3)
SODIUM SERPL-SCNC: 139 MMOL/L (ref 135–147)
T4 FREE SERPL-MCNC: 1.8 NG/DL (ref 0.9–1.8)
TRIGL SERPL-MCNC: 108 MG/DL (ref 40–150)
TSH SERPL-ACNC: 0.26 M[IU]/L (ref 0.27–4.2)
VLDLC SERPL-MCNC: 22 MG/DL (ref 5–37)

## 2021-05-18 NOTE — PROGRESS NOTES
Amada Bentley  1945     Office/Outpatient Visit    Visit Date: Tue, Aug 25, 2020 02:26 pm    Provider: Maricarmen Harrell MD (Assistant: Meryl Sidhu MA)    Location: [facility]        Electronically signed by Maricarmen Harrell MD on  08/25/2020 04:57:06 PM                             Subjective:        CC: pt states she did not take cephalexin and is not taking losartan and metoprololBP and LE edema follow up    HPI:           Amada Plaza presents with essential (primary) hypertension.  Her current cardiac medication regimen includes an ACE inhibitor.  Compliance with treatment has been good; she takes her medication as directed, maintains her diet and exercise regimen, and follows up as directed.  She is tolerating the medication well without side effects.  Amada Plaza does not check her blood pressure other than at her clinic appointments.  75-year-old female presenting to clinic with family member complaining of a right lower extremity edema and redness with warmth and she was seen in our office and started on lasix which has really helped. BP yesterday 127/72 abd tidat 123/74      acute onset increased knee pain B and L shoulder pain that radiates down her arm and her pain is making her feel like she is weak and cant do anything she enjoys to do, and she cant sleep on her left side, she is in the middle of a move from living by herself to moving in with her daughter.  Her BP have been high but she is finally back under control.  LE edema is now minimal.   She blames the acute LE swelling on the abx treatment cephalexin for her cellulitis.      chronic allergies and she is starting to use her nasal spray again and this is helpful    ROS:     CONSTITUTIONAL:  Negative for chills and fever.      CARDIOVASCULAR:  Negative for chest pain, dizziness and palpitations.      RESPIRATORY:  Positive for chronic cough.   Negative for dyspnea or frequent wheezing.      GASTROINTESTINAL:  Negative for abdominal  pain, constipation, diarrhea, hematochezia, melena, nausea and vomiting.      MUSCULOSKELETAL:  Positive for arthralgias, back pain, joint stiffness, limb pain and myalgias.      INTEGUMENTARY/BREAST:  Negative for rash.      NEUROLOGICAL:  Negative for dizziness, headaches and memory loss.      PSYCHIATRIC:  Negative for crying spells, feelings of stress, mood swings, sadness and suicidal thoughts.          Past Medical History / Family History / Social History:         Last Reviewed on 2020 03:14 PM by Maricarmen Harrell    Past Medical History:                 PAST MEDICAL HISTORY         COPD     hiatal hernia     Pernicious Anemia     Depression: with loss of daughter.;     LBP, nicotine dependencs, HTN, hypothyroid, anxiety, hypercholesterolemia         GYNECOLOGICAL HISTORY:             CURRENT MEDICAL PROVIDERS:    Gastroenterologist: MANI    Ophthalmologist: KACI    Orthopedist: CELIA    Urologist: BRITTNEE         PREVENTIVE HEALTH MAINTENANCE             BONE DENSITY: was last done  with normal results     COLORECTAL CANCER SCREENING: declines colorectal cancer screening, understands reason for testing; 3-18-03     EYE EXAM: was last done 3/2019     MAMMOGRAM: Done within last 2 years and results in are chart was last done 2019 with normal results     PAP SMEAR: was last done Hysterectomy         PAST MEDICAL HISTORY                 ADVANCED DIRECTIVES: None         Surgical History: Abnormal mammogram in March s/p breast bx-everything was ok  2007         Cataract Removal: bilateral;     Appendectomy    Cholecystectomy: laparoscopic;     Hysterectomy: , ;     Foot surgery on both feet secondary bone spurs;     right ear surgery;         Family History:         Positive for Coronary Artery Disease, Hyperlipidemia ( mother ), Hypertension ( mother; brother; sister ), Myocardial Infarction ( mother; brother; sister ) and Sudden Cardiac Death ( brother ).       Positive for Breast Cancer ( daughter ) and Lung Cancer ( father -- , no h/o smoking ).      Positive for COPD ( father -- emphysema; brother ).      Positive for Renal Failure ( niece ).      Positive for Type 1 Diabetes ( mother; brother; sister ).      Son(s): 1 ;   from MVA     Daughter(s): 1 ;  Breast Cancer         Social History:     Occupation: Unemployed     Marital Status:      Children: 3 children         Tobacco/Alcohol/Supplements:     Last Reviewed on 2020 02:36 PM by Meryl Sidhu    Tobacco: Current Smoker: She currently smokes every day, 1 pack per day.  Non-drinker         Substance Abuse History:     Last Reviewed on 2018 11:51 AM by Radha Maddox        Mental Health History:     Last Reviewed on 2018 11:51 AM by Radha Maddox        Communicable Diseases (eg STDs):     Last Reviewed on 2018 11:51 AM by Radha Maddox        Allergies:     Last Reviewed on 2020 02:36 PM by Meryl Sidhu    Pravastatin:   (Adverse Reaction)    Crestor:      Niaspan:   (Adverse Reaction)    Bentyl: Confusion     Fentanyl: itching,shortness of breath  (Adverse Reaction)    TAPE:      BAND AID:      Zocor: pain  (Adverse Reaction)    Morphine Sulfate:          Current Medications:     Last Reviewed on 2020 02:36 PM by Meryl Sidhu    ProAir HFA     cetirizine 10 mg oral tablet [1 tab daily]    Gabapentin 600 mg oral tablet [1 po tid]    HYDROcodone-acetaminophen 7.5-325 mg oral tablet [One PO TID PRN]    Singulair  10 mg oral tablet [Take 1 tablet(s) by mouth each evening]    busPIRone 15 mg oral tablet [1 PO BID]    Restasis 0.05% Ophthalmic Emulsion [Instill 1 drop(s) to each eye bid]    Synthroid 88 mcg oral tablet [Take 1 tablet(s) by mouth daily]    Paroxetine HCl 10 mg oral tablet [1 tab daily ]    metoprolol succinate 100 mg oral Tablet, Extended Release 24 hr [take 1 tablet (100 mg) by oral route once daily]    traZODone 50 mg oral  tablet [take 1 tablet (50 mg) by oral route every hs]    omeprazole 20 mg oral capsule,delayed release (enteric coated) [take 1 capsule (20 mg) by oral route once daily, try to take it QOD]    losartan 100 mg oral tablet [take 1 tablet (100 mg) by oral route once daily]    Breo Ellipta 200-25 mcg/dose Inhalation Blister, With Inhalation Device [inhale 1 puff by inhalation route once daily at the same time each day]    INCRUSE ELPT INH 62.5MCG     umeclidinium 62.5 mcg/actuation Inhalation Blister, With Inhalation Device [inhale 1 puff (62.5 mcg) by inhalation route once daily at the same time each day]    cephALEXin 500 mg oral capsule [take 1 capsule (500 mg) by oral route 3 times per day]    amLODIPine 10 mg oral tablet [take 1 tablet (10 mg) by oral route once daily]    Lasix 40 mg oral tablet [take 1 tablet (40 mg) by oral route daily]    potassium chloride 20 mEq oral tablet, extended release [take 1 tablet (20 meq) by oral route once a day]        Objective:        Vitals:         Current: 8/25/2020 2:38:05 PM    Ht:  5 ft, 2.25 in;  Wt: 127.6 lbs;  BMI: 23.2T: 97.3 F (temporal);  BP: 127/59 mm Hg (right arm, sitting);  P: 122 bpm (right arm (BP Cuff), sitting);  sCr: 1.36 mg/dL;  GFR: 31.75        Repeat:     2:38:41 PM  P:   117bpm (finger clip, sitting)     Exams:     PHYSICAL EXAM:     GENERAL: vital signs recorded - well developed, well nourished;  no apparent distress;     EYES: extraocular movements intact; conjunctiva and cornea are normal; PERRLA;     E/N/T: EARS:  normal external auditory canals and tympanic membranes;  grossly normal hearing; OROPHARYNX:  normal mucosa, dentition, gingiva, and posterior pharynx;     NECK: range of motion is normal; thyroid is non-palpable; supple, no LAD;     RESPIRATORY: normal respiratory rate and pattern with no distress; normal breath sounds with no rales, rhonchi, wheezes or rubs;     CARDIOVASCULAR: normal rate; rhythm is regular;  no systolic murmur; no  edema;     GASTROINTESTINAL: nontender, nondistended; no hepatosplenomegaly or masses; no bruits;     MUSCULOSKELETAL: gait: affected by a limp and slowed;     NEUROLOGIC: mental status: alert and oriented x 3; cranial nerves II-XII grossly intact; Reflexes: knee jerks: 2+;     PSYCHIATRIC:  appropriate affect and demeanor; normal speech pattern; grossly normal memory;         Procedures:     Pain in left knee    1. Kenalog 40 mg given IM in the right hip; administered by ;  lot number [id number]; expires 10/2021             Assessment:         I10   Essential (primary) hypertension       M25.562   Pain in left knee       M25.561   Pain in right knee       M25.512   Pain in left shoulder       T78.49XD   Other allergy, subsequent encounter           ORDERS:         Meds Prescribed:       [Recorded] DULoxetine 60 mg oral capsule,delayed release (enteric coated) [take 1 capsule (60 mg) by oral route once daily]       [Refilled] Lasix 40 mg oral tablet [take 1 tablet (40 mg) by oral route daily], #90 (ninety) tablets, Refills: 0 (zero)       [Refilled] potassium chloride 20 mEq oral tablet, extended release [take 1 tablet (20 meq) by oral route once a day], #90 (ninety) tablets, Refills: 0 (zero)       [Refilled] DULoxetine 60 mg oral capsule,delayed release (enteric coated) [take 1 capsule (60 mg) by oral route once daily], #90 (ninety) capsules, Refills: 0 (zero)         Other Orders:       [id number]  Therapeutic injection  (In-House)              Kenalog, per 10 mg  (x4)                  Plan:         Essential (primary) hypertensionunder great control again, go back down on lasix 40 mg daily        Pain in left kneewill treat her with steroid injection and start her on osteochondroitin/glucosamin, and cymbalta (stop paxil)    Steroids Kenalog 40 mg 1 ml           Prescriptions:       [Recorded] DULoxetine 60 mg oral capsule,delayed release (enteric coated) [take 1 capsule (60 mg) by oral route once daily]        [Refilled] Lasix 40 mg oral tablet [take 1 tablet (40 mg) by oral route daily], #90 (ninety) tablets, Refills: 0 (zero)       [Refilled] potassium chloride 20 mEq oral tablet, extended release [take 1 tablet (20 meq) by oral route once a day], #90 (ninety) tablets, Refills: 0 (zero)       [Refilled] DULoxetine 60 mg oral capsule,delayed release (enteric coated) [take 1 capsule (60 mg) by oral route once daily], #90 (ninety) capsules, Refills: 0 (zero)           Orders:       [id number]  Therapeutic injection  (In-House)              Kenalog, per 10 mg  (x4)          Pain in right kneewill treat her with steroid injection and start her on osteochondroitin/glucosamin, and cymbalta (stop paxil)        Pain in left shoulderwill treat her with steroid injection and start her on osteochondroitin/glucosamin, and cymbalta (stop paxil)        Other allergy, subsequent encounterwill treat with kenolog 40 mg IM and have her continue her cetirizine and nasal spray            Charge Capture:         Primary Diagnosis:     I10  Essential (primary) hypertension           Orders:      [id number]  Office/outpatient visit; established patient, level 4  (In-House)              M25.562  Pain in left knee           Orders:      [id number]  Therapeutic injection  (In-House)              Kenalog, per 10 mg  (x4)          M25.561  Pain in right knee     M25.512  Pain in left shoulder     T78.49XD  Other allergy, subsequent encounter         ADDENDUMS:      ____________________________________    Addendum: 08/31/2020 10:59 AM - Maricarmen Harrell        Remove: T78.49XD  Other allergy, subsequent encounter     Add: J30.9 Other seasonal allergic rhinitis. [facility]                Addendum: 08/31/2020 10:59 AM - Maricarmen Harrell        Remove: T78.49XD  Other allergy, subsequent encounter     Add: J30.9 Other seasonal allergic rhinitis  [facility]

## 2021-05-18 NOTE — PROGRESS NOTES
Amada Bentley  1945     Office/Outpatient Visit    Visit Date: Thu, Aug 13, 2020 03:00 pm    Provider: Lacey Le N.P. (Assistant: Aneta Han, )    Location: [facility]        Electronically signed by Lacey Le N.P. on  2020 07:21:19 AM                             Subjective:        CC: Amada Plaza is a 75 year old White female.  right foot swelling;         HPI: 75-year-old female presenting to clinic with family member complaining of a right lower extremity edema and redness with warmth.  Patient states she has history of cellulitis in right lower extremity.  She states that she was treated approximately at the beginning of the year.  She cannot recall which antibiotic she was on.  Patient has no other acute complaints at this time.  She has been elevating leg and it has not helped with edema.    ROS:     CONSTITUTIONAL:  Negative for fatigue, fever and night sweats.      CARDIOVASCULAR:  Negative for chest pain and palpitations.      RESPIRATORY:  Negative for recent cough and dyspnea.      GASTROINTESTINAL:  Negative for abdominal pain, diarrhea, nausea and vomiting.      MUSCULOSKELETAL:  Negative for myalgias.      INTEGUMENTARY/BREAST:  See HPI     NEUROLOGICAL:  Negative for dizziness, paresthesias and weakness.          Past Medical History / Family History / Social History:         Last Reviewed on 2020 03:23 PM by Lacey Le    Past Medical History:                 PAST MEDICAL HISTORY         COPD     hiatal hernia     Pernicious Anemia     Depression: with loss of daughter.;     LBP, nicotine dependencs, HTN, hypothyroid, anxiety, hypercholesterolemia         GYNECOLOGICAL HISTORY:             CURRENT MEDICAL PROVIDERS:    Gastroenterologist: MANI    Ophthalmologist: KACI    Orthopedist: CELIA    Urologist: BRITTNEE         PREVENTIVE HEALTH MAINTENANCE             BONE DENSITY: was last done  with normal results     COLORECTAL  CANCER SCREENING: declines colorectal cancer screening, understands reason for testing; 3-18-03     EYE EXAM: was last done 3/2019     MAMMOGRAM: Done within last 2 years and results in are chart was last done 2019 with normal results     PAP SMEAR: was last done Hysterectomy         PAST MEDICAL HISTORY                 ADVANCED DIRECTIVES: None         Surgical History: Abnormal mammogram in March s/p breast bx-everything was ok  2007         Cataract Removal: bilateral;     Appendectomy    Cholecystectomy: laparoscopic;     Hysterectomy: , ;     Foot surgery on both feet secondary bone spurs;     right ear surgery;         Family History:         Positive for Coronary Artery Disease, Hyperlipidemia ( mother ), Hypertension ( mother; brother; sister ), Myocardial Infarction ( mother; brother; sister ) and Sudden Cardiac Death ( brother ).      Positive for Breast Cancer ( daughter ) and Lung Cancer ( father -- , no h/o smoking ).      Positive for COPD ( father -- emphysema; brother ).      Positive for Renal Failure ( niece ).      Positive for Type 1 Diabetes ( mother; brother; sister ).      Son(s): 1 ;   from MVA     Daughter(s): 1 ;  Breast Cancer         Social History:     Occupation: Unemployed     Marital Status:      Children: 3 children         Tobacco/Alcohol/Supplements:     Last Reviewed on 2020 03:23 PM by Lacey Le    Tobacco: Current Smoker: She currently smokes every day, 1 pack per day.  Non-drinker         Substance Abuse History:     Last Reviewed on 2018 11:51 AM by Radha Maddox        Mental Health History:     Last Reviewed on 2018 11:51 AM by Radha Maddox        Communicable Diseases (eg STDs):     Last Reviewed on 2018 11:51 AM by Radha Maddox        Immunizations:     Prevnar 13 (Pneumococcal PCV 13) 11/3/2015    Fluzone (3 + years dose) 10/22/2008    Fluzone (3 + years dose) 2011     Fluzone High-Dose pf (>=65 yr) 10/23/2013    Fluzone High-Dose pf (>=65 yr) 11/3/2015    Fluzone High-Dose pf (>=65 yr) 10/17/2016    Fluzone High-Dose pf (>=65 yr) 10/30/2018    Fluzone High-Dose pf (>=65 yr) 10/22/2019    Influenza High-Dose Virus Vaccine pf (>=65 yr) 11/1/2017    PNEUMOVAX 23 (Pneumococcal PPV23) 1/4/2018    Zostavax (Zoster live) 10/2/2014        Allergies:     Last Reviewed on 8/13/2020 03:23 PM by Lacey Le    Pravastatin:   (Adverse Reaction)    Crestor:      Niaspan:   (Adverse Reaction)    Bentyl: Confusion     Fentanyl: itching,shortness of breath  (Adverse Reaction)    TAPE:      BAND AID:      Zocor: pain  (Adverse Reaction)    Morphine Sulfate:          Current Medications:     Last Reviewed on 8/13/2020 03:23 PM by Lacey Le    ProAir HFA     cetirizine 10 mg oral tablet [1 tab daily]    HYDROcodone-acetaminophen 7.5-325 mg oral tablet [One PO TID PRN]    Gabapentin 600 mg oral tablet [1 po tid]    Singulair  10 mg oral tablet [Take 1 tablet(s) by mouth each evening]    busPIRone 15 mg oral tablet [1 PO BID]    Restasis 0.05% Ophthalmic Emulsion [Instill 1 drop(s) to each eye bid]    Synthroid 88 mcg oral tablet [Take 1 tablet(s) by mouth daily]    Paroxetine HCl 10 mg oral tablet [1 tab daily ]    metoprolol succinate 100 mg oral Tablet, Extended Release 24 hr [take 1 tablet (100 mg) by oral route once daily]    traZODone 50 mg oral tablet [take 1 tablet (50 mg) by oral route every hs]    omeprazole 20 mg oral capsule,delayed release (enteric coated) [take 1 capsule (20 mg) by oral route once daily, try to take it QOD]    losartan 100 mg oral tablet [take 1 tablet (100 mg) by oral route once daily]    amLODIPine 5 mg oral tablet [take 1 tablet (5 mg) by oral route once daily]    Breo Ellipta 200-25 mcg/dose Inhalation Blister, With Inhalation Device [inhale 1 puff by inhalation route once daily at the same time each day]    INCRUSE ELPT INH 62.5MCG     furosemide 20 mg  oral tablet [take 1 tablet (20 mg) by oral route 1 times per day]    potassium chloride 10 mEq oral tablet, extended release [take 1 tablet (10 meq) by oral route 1 times per day with food times 7 days]    umeclidinium 62.5 mcg/actuation Inhalation Blister, With Inhalation Device [inhale 1 puff (62.5 mcg) by inhalation route once daily at the same time each day]        Objective:        Vitals:         Current: 8/13/2020 3:07:30 PM    Ht:  5 ft, 2.25 in;  Wt: 135.2 lbs;  BMI: 24.5T: 98.2 F (temporal);  BP: 171/48 mm Hg (right arm, sitting);  P: 58 bpm (right arm (BP Cuff), sitting);  sCr: 1.07 mg/dL;  GFR: 41.35        Exams:     PHYSICAL EXAM:     GENERAL: vital signs recorded - well developed, well nourished;  well groomed;  no apparent distress;     EYES: extraocular movements intact; conjunctiva and cornea are normal; PERRLA;     RESPIRATORY: normal respiratory rate and pattern with no distress; normal breath sounds with no rales, rhonchi, wheezes or rubs;     CARDIOVASCULAR: normal rate; rhythm is regular;  no systolic murmur; Nonpitting to right foot/ankle.  No edema to left lower extremity edema;     BREAST/INTEGUMENT: Erythema to right foot and ankle.  Warmth noted as well.  No drainage noted.;     MUSCULOSKELETAL: gait: Wheelchair;  pain with range of motion in: right ankle flexion and extension;  normal overall tone     NEUROLOGIC: mental status: alert and oriented x 3; Reflexes: brachioradialis: 2+; knee jerks: 2+;     PSYCHIATRIC:  appropriate affect and demeanor; normal speech pattern; grossly normal memory;         Assessment:         L03.115   Cellulitis of right lower limb           ORDERS:         Meds Prescribed:       [New Rx] cephALEXin 500 mg oral capsule [take 1 capsule (500 mg) by oral route 3 times per day], #30 (thirty) capsules, Refills: 0 (zero)                 Plan:         Cellulitis of right lower limbComplete prescribed antibiotic.  Patient to notify clinic with any acute concerns or  issues.  Patient is to elevate Right lower extremity. Monitor for worsening signs of infection.  Patient and family member both verbalized understanding and has no further questions upon discharge.          Prescriptions:       [New Rx] cephALEXin 500 mg oral capsule [take 1 capsule (500 mg) by oral route 3 times per day], #30 (thirty) capsules, Refills: 0 (zero)             Charge Capture:         Primary Diagnosis:     L03.115  Cellulitis of right lower limb           Orders:      [id number]  Office/outpatient visit; established patient, level 3  (In-House)

## 2021-05-18 NOTE — PROGRESS NOTES
Amada Bentley  1945     Office/Outpatient Visit    Visit Date:  05:02 pm    Provider: Noemy Jean N.P. (Assistant: Venus Morrison MA)    Location: [facility]        Electronically signed by Noemy Jean N.P. on  2019 06:51:59 AM                             Subjective:        CC: Amada Plaza is a 74 year old White female.  pt says her bp has been fluctuating, says her bp meds have been making her feel sick;         HPI:           Patient presents with essential (primary) hypertension.  Her current cardiac medication regimen includes a diuretic ( Hydrochlorothiazide (HCTZ) ), a beta-blocker ( Toprol-XL ), an ACE inhibitor ( Lotensin ), and an adrenergic inhibitor ( Catapres ).  Review of her blood pressure log reveals systolics in the 119-191 and diastolics in the 55-88.  She has been experiencing possible adverse medication effects, including headache and hot.  She says she was on ACE 20 mg and that rx was doubled then the HCTZ and beta blocker was added.  She was given the clonidine to take prn but she as not taken it much.Compliance with treatment has been good; she takes her medication as directed.      ROS:     CONSTITUTIONAL:  Positive for fatigue.      CARDIOVASCULAR:  Negative for chest pain.      RESPIRATORY:  Negative for cough, dyspnea, and hemoptysis.      NEUROLOGICAL:  Negative for weakness.          Past Medical History / Family History / Social History:         Last Reviewed on 2019 05:30 PM by Noemy Jean    Past Medical History:                 PAST MEDICAL HISTORY         COPD     hiatal hernia     Pernicious Anemia     Depression: with loss of daughter.;         GYNECOLOGICAL HISTORY:             CURRENT MEDICAL PROVIDERS:    Gastroenterologist: MANI    Ophthalmologist: KACI    Orthopedist: CELIA    Urologist: BRITTNEE         PREVENTIVE HEALTH MAINTENANCE             BONE DENSITY: was last done  with normal  results     COLORECTAL CANCER SCREENING: declines colorectal cancer screening, understands reason for testing; 3-18-03     EYE EXAM: was last done 3/2019     MAMMOGRAM: Done within last 2 years and results in are chart was last done 2019 with normal results     PAP SMEAR: was last done Hysterectomy         PAST MEDICAL HISTORY                 ADVANCED DIRECTIVES: None         Surgical History: Abnormal mammogram in March s/p breast bx-everything was ok  2007         Cataract Removal: bilateral;     Appendectomy    Cholecystectomy: laparoscopic;     Hysterectomy: , ;     Foot surgery on both feet secondary bone spurs;     right ear surgery;         Family History:         Positive for Coronary Artery Disease, Hyperlipidemia ( mother ), Hypertension ( mother; brother; sister ), Myocardial Infarction ( mother; brother; sister ) and Sudden Cardiac Death ( brother ).      Positive for Breast Cancer ( daughter ) and Lung Cancer ( father -- , no h/o smoking ).      Positive for COPD ( father -- emphysema; brother ).      Positive for Renal Failure ( niece ).      Positive for Type 1 Diabetes ( mother; brother; sister ).      Son(s): 1 ;   from MVA     Daughter(s): 1 ;  Breast Cancer         Social History:     Occupation: Unemployed     Marital Status:      Children: 3 children         Immunizations:     Prevnar 13 (Pneumococcal PCV 13) 11/3/2015    Fluzone (3 + years dose) 10/22/2008    Fluzone (3 + years dose) 2011    Fluzone High-Dose pf (>=65 yr) 10/23/2013    Fluzone High-Dose pf (>=65 yr) 11/3/2015    Fluzone High-Dose pf (>=65 yr) 10/17/2016    Fluzone High-Dose pf (>=65 yr) 10/30/2018    Fluzone High-Dose pf (>=65 yr) 10/22/2019    Influenza High-Dose Virus Vaccine pf (>=65 yr) 2017    PNEUMOVAX 23 (Pneumococcal PPV23) 2018    Zostavax (Zoster live) 10/2/2014        Allergies:     Last Reviewed on 10/28/2019 12:41 PM by Conchis Gardiner     Pravastatin:   (Adverse Reaction)    Crestor:      Niaspan:   (Adverse Reaction)    Bentyl: confusion     Fentanyl: itching, shortness of breath  (Adverse Reaction)    Zocor: pain  (Adverse Reaction)    Morphine Sulfate:          Current Medications: I reviewed Rx's today 11-11-19/ab    Last Reviewed on 10/28/2019 12:42 PM by Conchis Gardiner    Spiriva HandiHaler 18mcg/1capsule Capsules for Inhalation [Inhale contents of 1 capsule(s) by Rotahaler by mouth daily thru inhaler prn]    Cetirizine HCl 10mg Tablet [1 tab daily]    Spiriva HandiHaler 18mcg/1capsule Capsules for Inhalation [Inhale contents of 1 capsule(s) by inhaler device by mouth daily thru inhaler.]    Advair Diskus 250mcg/50mcg per 1blister Inhalation Powder [Inhale 1 puff(s) bid]    Gabapentin 600mg Tablet [1 po tid]    Hydrocodone/Acetaminophen 7.5mg/325mg Tablet [One PO TID PRN]    Benazepril HCl 40mg Tablet [Take 1 tablet(s) by mouth daily]    Singulair  10mg Tablet [Take 1 tablet(s) by mouth each evening]    Buspirone HCl 15mg Tablet [1 PO BID]    Diclofenac Sodium 1% Topical Gel [Apply 2 gm(s) to affected area qid to upper extremities.]    Restasis 0.05% Ophthalmic Emulsion [Instill 1 drop(s) to each eye bid]    Naprosyn 500mg Tablet [Take 1 tablet(s) by mouth  daily]    Synthroid 0.088mg Tablet [Take 1 tablet(s) by mouth daily]    ProAir HFA 90mcg/1actuation Oral Inhaler [1 puff daily as needed]    Paroxetine HCl 10mg Tablet [1 tab daily ]    Pepcid 40mg Tablet [Take 1 tablet(s) by mouth bid]    Metoprolol Succinate/Hydrochlorothiazide 25mg/12.5mg Tablets, Extended Release [Take 1 tablet(s) by mouth daily]    Clonidine HCl 0.1mg Tablet [1 po daily prn BP > 180/110, go to ER if BP does not improve in 45-60 min or if signs MI or stroke (facial droop/slurred speech/weakness)]    HYDROCHLOROT CAP 12.5MG        Objective:        Vitals:         Current: 11/11/2019 5:09:45 PM    Ht:  5 ft, 3.75 in;  Wt: 138 lbs;  BMI: 23.9T: 98.3 F (oral);  BP:  206/70 mm Hg (right arm, sitting);  P: 66 bpm (right arm (BP Cuff), sitting);  sCr: 0.89 mg/dL;  GFR: 51.80        Repeat:     5:18:31 PM  BP:   192/82mm Hg (right arm, sitting) 5:48:54 PM  BP:   182/74mm Hg (right arm, sitting)     Exams:     PHYSICAL EXAM:     GENERAL: vital signs recorded - well developed, well nourished;  no apparent distress;     NECK: carotid exam reveals bilateral bruits; R>L    RESPIRATORY: normal respiratory rate and pattern with no distress; normal breath sounds with no rales, rhonchi, wheezes or rubs;     CARDIOVASCULAR: normal rate; rhythm is regular;  a systolic murmur is noted: it is Character soft;  no edema;     PSYCHIATRIC:  appropriate affect and demeanor; normal speech pattern; grossly normal memory;         Assessment:         I10   Essential (primary) hypertension       R01.1   Cardiac murmur, unspecified           Plan:         Essential (primary) hypertensiongave her a log sheet to record her BP, to take her rx as Dr. Harrell recommended, come in with her BP monitor and recheck her monitor with our BP /reviewed her labs from 10-22-19and will consult with Dr. Harrell with any further recommendations        RECOMMENDATIONS given include: perform routine monitoring of blood pressure with home blood pressure cuff.      FOLLOW-UP: recheck BP         Cardiac murmur, unspecifiedreviewed carotid ultrasound 5-7-19 and ECHO 3-29-17            Patient Recommendations:        For  Essential (primary) hypertension:    Begin monitoring your blood pressure by brief nurse visits at our office, a home blood pressure monitor, or by checking on the machines in pharmacies or stores.  Keep a log of the readings.              Charge Capture:         Primary Diagnosis:     I10  Essential (primary) hypertension           Orders:      [id number]  Office/outpatient visit; established patient, level 3  (In-House)              R01.1  Cardiac murmur, unspecified

## 2021-05-18 NOTE — PROGRESS NOTES
Amada Bentley  1945     Office/Outpatient Visit    Visit Date: Mon, Dec 16, 2019 10:58 am    Provider: Maricarmen Harrell MD (Assistant: Conchis Gardiner MA)    Location: [facility]        Electronically signed by Maricarmen Harrell MD on  12/18/2019 01:36:47 PM                             Subjective:        CC: (PT WOULD LIKE SOMETHING FOR NASAL DRYNESS)    HPI:       she has signifant nasal congestion, she is on zyrtec for allergies.  No cough/fever      she is in today concerned about the abrasion on her L arm that is red and she is worried about infection, she is using topical mupiricin onset last Friday.       She didnt sleep all night last night, happened when she took a pain pill and trazodone.  I advise she not do this anymore-she is not to take them together.           Essential (primary) hypertension details; her current cardiac medication regimen includes an ACE inhibitor.  Compliance with treatment has been good; she takes her medication as directed, maintains her diet and exercise regimen, and follows up as directed.  She is tolerating the medication well without side effects.  Amada Plaza does not check her blood pressure other than at her clinic appointments.      ROS:     CONSTITUTIONAL:  Negative for chills and fever.      EYES:  Negative for blurred vision.      E/N/T:  Positive for frequent rhinorrhea.   Negative for ear pain.      CARDIOVASCULAR:  Negative for chest pain, dizziness and palpitations.      RESPIRATORY:  Positive for chronic cough.   Negative for dyspnea or frequent wheezing.      GASTROINTESTINAL:  Negative for abdominal pain, constipation, diarrhea, hematochezia, melena, nausea and vomiting.      MUSCULOSKELETAL:  Positive for arthralgias and back pain.      INTEGUMENTARY/BREAST:  Positive for bruising on arms.      NEUROLOGICAL:  Positive for weakness ( generalized ).   Negative for dizziness, headaches or memory loss.      PSYCHIATRIC:  Positive for insomnia.    Negative for crying spells, feelings of stress, sadness or suicidal thoughts.          Past Medical History / Family History / Social History:         Last Reviewed on 2019 11:49 AM by Maricarmen Harrell    Past Medical History:                 PAST MEDICAL HISTORY         COPD     hiatal hernia     Pernicious Anemia     Depression: with loss of daughter.;         GYNECOLOGICAL HISTORY:             CURRENT MEDICAL PROVIDERS:    Gastroenterologist: MANI    Ophthalmologist: KACI    Orthopedist: CELIA    Urologist: BRITTNEE         PREVENTIVE HEALTH MAINTENANCE             BONE DENSITY: was last done  with normal results     COLORECTAL CANCER SCREENING: declines colorectal cancer screening, understands reason for testing; 3-18-03     EYE EXAM: was last done 3/2019     MAMMOGRAM: Done within last 2 years and results in are chart was last done 2019 with normal results     PAP SMEAR: was last done Hysterectomy         PAST MEDICAL HISTORY                 ADVANCED DIRECTIVES: None         Surgical History: Abnormal mammogram in March s/p breast bx-everything was ok  2007         Cataract Removal: bilateral;     Appendectomy    Cholecystectomy: laparoscopic;     Hysterectomy: 1987;     Foot surgery on both feet secondary bone spurs;     right ear surgery;         Family History:         Positive for Coronary Artery Disease, Hyperlipidemia ( mother ), Hypertension ( mother; brother; sister ), Myocardial Infarction ( mother; brother; sister ) and Sudden Cardiac Death ( brother ).      Positive for Breast Cancer ( daughter ) and Lung Cancer ( father -- , no h/o smoking ).      Positive for COPD ( father -- emphysema; brother ).      Positive for Renal Failure ( niece ).      Positive for Type 1 Diabetes ( mother; brother; sister ).      Son(s): 1 ;   from MVA     Daughter(s): 1 ;  Breast Cancer         Social History:     Occupation: Unemployed     Marital  Status:      Children: 3 children         Tobacco/Alcohol/Supplements:     Last Reviewed on 12/16/2019 11:07 AM by Conchis Gardiner    Tobacco: She has a past history of cigarette smoking; quit date:  11/26/19.  Non-drinker         Substance Abuse History:     Last Reviewed on 6/08/2018 11:51 AM by Radha Maddox        Mental Health History:     Last Reviewed on 6/08/2018 11:51 AM by Radha Maddox        Communicable Diseases (eg STDs):     Last Reviewed on 6/08/2018 11:51 AM by Radha Maddox        Allergies:     Last Reviewed on 12/10/2019 12:10 PM by Conchis Gardiner    Pravastatin:   (Adverse Reaction)    Crestor:      Niaspan:   (Adverse Reaction)    Bentyl: confusion     Fentanyl: itching,shortness of breath  (Adverse Reaction)    Zocor: pain  (Adverse Reaction)    Morphine Sulfate:          Current Medications:     Last Reviewed on 12/16/2019 11:13 AM by Conchis Gardiner    hydroCHLOROthiazide 12.5 mg oral tablet [take 1 tablet (12.5 mg) by oral route 1 time per day]    amLODIPine 5 mg oral tablet [take 1 tablet (5 mg) by oral route once daily]    losartan 100 mg oral tablet [take 1 tablet (100 mg) by oral route once daily]    HYDROcodone-acetaminophen 7.5-325 mg oral tablet [One PO TID PRN]    cetirizine 10 mg oral tablet [1 tab daily]    Spiriva with HandiHaler 18mcg/1capsule Capsules for Inhalation [Inhale contents of 1 capsule(s) by inhaler device by mouth bid thru inhaler.]    Gabapentin 600 mg oral tablet [1 po tid]    Singulair  10mg Tablet [Take 1 tablet(s) by mouth each evening]    Buspirone HCl 15mg Tablet [1 PO BID]    Restasis 0.05% Ophthalmic Emulsion [Instill 1 drop(s) to each eye bid]    Synthroid 0.088mg Tablet [Take 1 tablet(s) by mouth daily]    ProAir HFA 90mcg/1actuation Oral Inhaler [1 puff daily as needed]    Paroxetine HCl 10 mg oral tablet [1 tab daily ]    Pepcid 40 mg oral tablet [Take 1 tablet(s) by mouth bid]    Clonidine HCl 0.1 mg oral tablet [1 po daily prn  BP > 180/110, go to ER if BP does not improve in 45-60 min or if signs MI or stroke (facial droop/slurred speech/weakness)]    predniSONE 20 mg oral tablet [2 po daily for 5 days]    Breo Ellipta 200-25 mcg/dose Inhalation Blister, With Inhalation Device [inhale 1 puff by inhalation route once daily at the same time each day]    metoprolol succinate 100 mg oral Tablet, Extended Release 24 hr [take 1 tablet (100 mg) by oral route once daily]    traZODone 50 mg oral tablet [take 1 tablet (50 mg) by oral route every hs]    mupirocin 2 % Topical Ointment [apply a small amount to the affected area by topical route 3 times per day]        Objective:        Vitals:         Current: 12/16/2019 11:17:41 AM    Ht:  5 ft, 2.25 in;  Wt: 136 lbs;  BMI: 24.7T: 97.9 F (oral);  BP: 166/51 mm Hg (right arm, sitting);  P: 67 bpm (right arm (BP Cuff), sitting);  sCr: 0.89 mg/dL;  GFR: 50.60        Repeat:     11:53:55 AM  BP:   160/48mm Hg (right arm, sitting, hr  66)     Exams:     PHYSICAL EXAM:     GENERAL: vital signs recorded - well developed, well nourished;  no apparent distress;     E/N/T: OROPHARYNX:  normal mucosa, dentition, gingiva, and posterior pharynx;     RESPIRATORY: normal respiratory rate and pattern with no distress; normal breath sounds with no rales, rhonchi, wheezes or rubs;     CARDIOVASCULAR: normal rate; rhythm is regular;  no systolic murmur; no edema;     BREAST/INTEGUMENT: 3 cm x 1 cm abrasion with mild erythema on edges-healing nicely;     MUSCULOSKELETAL: gait: affected by a limp and slowed;     NEUROLOGIC: mental status: alert and oriented x 3; cranial nerves II-XII grossly intact; Reflexes: knee jerks: 2+;         Assessment:         T78.49XS   Other allergy, sequela       S00.01XA   Abrasion of scalp, initial encounter       F51.01   Primary insomnia       I10   Essential (primary) hypertension           ORDERS:         Meds Prescribed:       [Recorded] fluticasone propionate 50 mcg/actuation  Intranasal Spray, Suspension [inhale 1 spray (50 mcg) in each nostril by intranasal route 2 times per day]       [Refilled] fluticasone propionate 50 mcg/actuation Intranasal Spray, Suspension [inhale 1 spray (50 mcg) in each nostril by intranasal route 2 times per day], #1 (one) each, Refills: 0 (zero)                 Plan:         Other allergy, sequelaadd flonase NS and NS spray          Prescriptions:       [Recorded] fluticasone propionate 50 mcg/actuation Intranasal Spray, Suspension [inhale 1 spray (50 mcg) in each nostril by intranasal route 2 times per day]       [Refilled] fluticasone propionate 50 mcg/actuation Intranasal Spray, Suspension [inhale 1 spray (50 mcg) in each nostril by intranasal route 2 times per day], #1 (one) each, Refills: 0 (zero)         Abrasion of scalp, initial encountercont topical bactroban and keep covered.         Primary insomniadont take pain pill with her trazodone,, she is to call if she has ongoing issues        Essential (primary) hypertensioncheck home BP's, she didnt sleep well last night.            Charge Capture:         Primary Diagnosis:     T78.49XS  Other allergy, sequela           Orders:      [id number]  Office/outpatient visit; established patient, level 4  (In-House)              S00.01XA  Abrasion of scalp, initial encounter     F51.01  Primary insomnia     I10  Essential (primary) hypertension         ADDENDUMS:      ____________________________________    Addendum: 02/05/2020 12:12 PM - One, Team         Visit Note Faxed to:        User Entered Recipient; Number [phone]

## 2021-05-18 NOTE — PROGRESS NOTES
Amada Bentley  1945     Office/Outpatient Visit    Visit Date: Tue, Sep 22, 2020 01:32 pm    Provider: Maricarmen Harrell MD (Assistant: Venus Morrison MA)    Location: [facility]        Electronically signed by Maricarmen Harrell MD on  09/24/2020 10:37:25 AM                             Subjective:        CC: Amada Plaza is a 75 year old White female.  She is here today following a transition of care from the emergency department ( [facility] on 9-14-20 for diarrhea ).  PT SAYS SHE WAS TAKEN OFF METOPROLOL; PTS BOTTLE OF AMLODIPINE SAYS 5MG DAILY; ongoing diarrhea        HPI:       Amada Plaza is in today for reevaluation of diarrhea.  She has had loose stool for the last 4 weeks. She has lost 20 #'s.   She has been having several bowel movements per day and they are watery.  Prior to this her BM were normal. She has not been passing blood, no melena.  NO covid exposure.  She has no energy.   She is not having any fever or chills, abdominal or pelvic pain, N/V  Amada Plaza has not recently been taking antibiotics.  She is not aware of history of C. diff previously. She has not had recent colonoscopy. NO recent travel, no eating out, no well water.  Amada Plaza was seen in the ER on 9/14 for diarrhea, and the ER placed her on Kcl 20 meq daily,  but she was already on the KCl 20 meq daily. She states her stools are watery and she has about 3-4 BM a day.  Stool studies positive for white blood cells, neg for c diff/O and P, and normal stool culture.  Normal WBC, normal hgb/HCT.      Abd surgeries:appendectomy, hysterectomy, cholecystectomy.  she is on glucosamine/osteochondroitin and started this med 2 weeks prior to seeing DR Morse, about the same time she started having the diarrhea.      Amada Plaza is having a mild noise in her R ear when she lays down and then when she sits up she feels dizzy, and she has to sit on edge of bed for a few seconds until this passes          Essential (primary) hypertension  details; her current cardiac medication regimen includes an ACE inhibitor.  Compliance with treatment has been good; she takes her medication as directed, maintains her diet and exercise regimen, and follows up as directed.  She is tolerating the medication well without side effects.  Amada Plaza does not check her blood pressure other than at her clinic appointments.      ROS:     CONSTITUTIONAL:  Negative for chills and fever.      CARDIOVASCULAR:  Negative for chest pain, dizziness and palpitations.      RESPIRATORY:  Positive for chronic cough.   Negative for dyspnea or frequent wheezing.      GASTROINTESTINAL:  Positive for diarrhea.   Negative for abdominal pain, constipation, hematochezia, melena, nausea or vomiting.      INTEGUMENTARY/BREAST:  Negative for rash.      NEUROLOGICAL:  Negative for dizziness, headaches and memory loss.      PSYCHIATRIC:  Negative for crying spells, feelings of stress, mood swings, sadness and suicidal thoughts.          Past Medical History / Family History / Social History:         Last Reviewed on 2020 02:09 PM by Maricarmen Harrell    Past Medical History:                 PAST MEDICAL HISTORY         COPD     hiatal hernia     Pernicious Anemia     Depression: with loss of daughter.;     LBP, nicotine dependencs, HTN, hypothyroid, anxiety, hypercholesterolemia         GYNECOLOGICAL HISTORY:             CURRENT MEDICAL PROVIDERS:    Gastroenterologist: MANI    Ophthalmologist: KACI    Orthopedist: CELIA    Urologist: BRITTNEE         PREVENTIVE HEALTH MAINTENANCE             BONE DENSITY: was last done  with normal results     COLORECTAL CANCER SCREENING: declines colorectal cancer screening, understands reason for testing; 3-18-03     EYE EXAM: was last done 3/2019     MAMMOGRAM: Done within last 2 years and results in are chart was last done 20 with normal results     PAP SMEAR: was last done Hysterectomy         PAST MEDICAL HISTORY                  ADVANCED DIRECTIVES: None         Surgical History: Abnormal mammogram in March s/p breast bx-everything was ok  2007         Cataract Removal: bilateral;     Appendectomy    Cholecystectomy: laparoscopic;     Hysterectomy: , ;     Foot surgery on both feet secondary bone spurs;     right ear surgery;         Family History:         Positive for Coronary Artery Disease, Hyperlipidemia ( mother ), Hypertension ( mother; brother; sister ), Myocardial Infarction ( mother; brother; sister ) and Sudden Cardiac Death ( brother ).      Positive for Breast Cancer ( daughter ) and Lung Cancer ( father -- , no h/o smoking ).      Positive for COPD ( father -- emphysema; brother ).      Positive for Renal Failure ( niece ).      Positive for Type 1 Diabetes ( mother; brother; sister ).      Son(s): 1 ;   from MVA     Daughter(s): 1 ;  Breast Cancer         Social History:     Occupation: Unemployed     Marital Status:      Children: 3 children         Tobacco/Alcohol/Supplements:     Last Reviewed on 2020 01:40 PM by Venus Morrison    Tobacco: She has a past history of cigarette smoking; quit date:  aug, 27th 2020.  Non-drinker         Substance Abuse History:     Last Reviewed on 2020 01:19 PM by Osmar Morse        Mental Health History:     Last Reviewed on 2020 01:19 PM by Osmar Morse        Communicable Diseases (eg STDs):     Last Reviewed on 2020 01:19 PM by Osmar Morse        Allergies:     Last Reviewed on 2020 01:19 PM by Osmar Morse    Pravastatin:   (Adverse Reaction)    Crestor:      Niaspan:   (Adverse Reaction)    Bentyl: Confusion     Fentanyl: itching,shortness of breath  (Adverse Reaction)    TAPE:      BAND AID:      Zocor: pain  (Adverse Reaction)    Morphine Sulfate:          Current Medications:     Last Reviewed on 2020 01:19 PM by Osmar Morse    ProAir HFA      Glucosamine Chondroitin     Gabapentin 600 mg oral tablet [1 po tid]    HYDROcodone-acetaminophen 7.5-325 mg oral tablet [One PO TID PRN]    cetirizine 10 mg oral tablet [1 tab daily]    Singulair  10 mg oral tablet [Take 1 tablet(s) by mouth each evening]    busPIRone 15 mg oral tablet [1 PO BID]    Restasis 0.05% Ophthalmic Emulsion [Instill 1 drop(s) to each eye bid]    Synthroid 88 mcg oral tablet [Take 1 tablet(s) by mouth daily]    metoprolol succinate 100 mg oral Tablet, Extended Release 24 hr [take 1 tablet (100 mg) by oral route once daily]    traZODone 50 mg oral tablet [take 1 tablet (50 mg) by oral route every hs]    omeprazole 20 mg oral capsule,delayed release (enteric coated) [take 1 capsule (20 mg) by oral route once daily, try to take it QOD]    losartan 100 mg oral tablet [take 1 tablet (100 mg) by oral route once daily]    Breo Ellipta 200-25 mcg/dose Inhalation Blister, With Inhalation Device [inhale 1 puff by inhalation route once daily at the same time each day]    INCRUSE ELPT INH 62.5MCG     umeclidinium 62.5 mcg/actuation Inhalation Blister, With Inhalation Device [inhale 1 puff (62.5 mcg) by inhalation route once daily at the same time each day]    amLODIPine 10 mg oral tablet [take 1 tablet (10 mg) by oral route once daily]    potassium chloride 20 mEq oral tablet, extended release [take 1 tablet (20 meq) by oral route once a day]    DULoxetine 60 mg oral capsule,delayed release (enteric coated) [take 1 capsule (60 mg) by oral route once daily]    furosemide 20 mg oral tablet [take 1 tablet (20 mg) by oral route once daily]    colestipoL 1 gram oral tablet [take 1 tablet (1 gram) by oral route 2 times per day swallowing whole with any liquid. Do not crush, chew and/or divide.]        Objective:        Vitals:         Current: 9/22/2020 1:41:40 PM    Ht:  5 ft, 2.25 in;  Wt: 120.2 lbs;  BMI: 21.8T: 97.4 F (temporal);  BP: 109/65 mm Hg (right arm, sitting);  P: 106 bpm (right arm (BP Cuff),  sitting);  sCr: 1.6 mg/dL;  GFR: 26.31        Repeat:     2:28:1 PM  BP:   116/68mm Hg (right arm, lying) 2:29:30 PM  BP:   128/76mm Hg (right arm, sitting) 2:31:12 PM  BP:   111/69mm Hg (right arm, standing) 2:28:18 PM  P:   97bpm (right arm (BP Cuff), lying) 2:29:40 PM  P:   104bpm (right arm (BP Cuff), sitting) 2:31:42 PM  P:   107bpm (right arm (BP Cuff), standing)     Exams:     PHYSICAL EXAM:     GENERAL: vital signs recorded - well developed, well nourished;  no apparent distress;     EYES: extraocular movements intact; conjunctiva and cornea are normal; PERRLA;     E/N/T: EARS:  normal external auditory canals and tympanic membranes;  grossly normal hearing; OROPHARYNX: tongue coated with thrush;  posterior pharynx, including tonsils, tongue, and uvula are normal;     NECK: range of motion is normal; thyroid is non-palpable; supple, no LAD;     RESPIRATORY: normal respiratory rate and pattern with no distress; normal breath sounds with no rales, rhonchi, wheezes or rubs;     CARDIOVASCULAR: normal rate; rhythm is regular;  no systolic murmur; no edema;     GASTROINTESTINAL: nontender, nondistended; no hepatosplenomegaly or masses; no bruits;     MUSCULOSKELETAL: gait: affected by a limp and slowed;     NEUROLOGIC: mental status: alert and oriented x 3; cranial nerves II-XII grossly intact; Reflexes: knee jerks: 2+;     PSYCHIATRIC:  appropriate affect and demeanor; normal speech pattern; grossly normal memory;         Assessment:         R19.7   Diarrhea, unspecified       R13.12   Dysphagia, oropharyngeal phase       R42   Dizziness and giddiness       I10   Essential (primary) hypertension           ORDERS:         Lab Orders:       ORTHO  Orthostatic blood pressure  (In-House)            [id number]  [facility] Basic Metabolic Panel  (Send-Out)            [id number]  [address] CBC w/o diff  (Send-Out)              Other Orders:       [id number]  Barium swallow test ordered (GERD)  (Send-Out)                      Plan:          Diarrhea, unspecifiedshe is on glucosamine/osteochondroitin and started this med 2 weeks prior to seeing DR Morse, about the same time she started having the diarrhea. so she is to STOP GLUCOSAMINE/CHONDROITIN, and if no improvement I will check her for colonoscopy with Dr Grider    LABORATORY:  Labs ordered to be performed today include basic metabolic panel and CBC W/O DIFF.            Orders:       [id number]  [facility] Basic Metabolic Panel  (Send-Out)            [id number]  BDCB2 - [facility] CBC w/o diff  (Send-Out)              Dysphagia, oropharyngeal phasewill set her up for barium swallow study-she may need an egd, food and pills are getting stuck          Orders:       [id number]  Barium swallow test ordered (GERD)  (Send-Out)              Dizziness and giddinessBP running low, stop amlodipine          Orders:       ORTHO  Orthostatic blood pressure  (In-House)              Essential (primary) hypertensionlow today-stop amlodipine            Charge Capture:         Primary Diagnosis:     R19.7  Diarrhea, unspecified           Orders:      [id number]  Office/outpatient visit; established patient, level 4  (In-House)              R13.12  Dysphagia, oropharyngeal phase     R42  Dizziness and giddiness           Orders:      ORTHO  Orthostatic blood pressure  (In-House)              I10  Essential (primary) hypertension

## 2021-05-18 NOTE — PROGRESS NOTES
"Amada Bentley  1945     Office/Outpatient Visit    Visit Date:  12:59 pm    Provider: Kanika Linton N.P. (Assistant: Anne Marie Saldana MA)    Location: [facility]        Electronically signed by Kanika Linton N.P. on  2020 01:41:33 PM                             Subjective:        CC: Amada Plaza is a 75 year old White female.  Patient presents today with complaints of swelling in right foot X \"long time\";         HPI: Amada Plaza presents with c/o right lower extremity swelling. She reports that this has been going on for the past 1-2 months. She saw Dr Harrell on 2020 and had complained of some swelling at that time. Venous doppler was performed which was negative for DVT. She denies increased shortness of breath. She notes that it it worse at the end of the day and is tender.    ROS:     CONSTITUTIONAL:  Negative for chills and fever.      EYES:  Negative for eye drainage and eye pain.      E/N/T:  Negative for ear pain and sore throat.      CARDIOVASCULAR:  Positive for pedal edema ( right ).   Negative for chest pain or palpitations.      RESPIRATORY:  Negative for dyspnea and frequent wheezing.          Past Medical History / Family History / Social History:         Last Reviewed on 2020 02:24 PM by Maricarmen Harrell    Past Medical History:                 PAST MEDICAL HISTORY         COPD     hiatal hernia     Pernicious Anemia     Depression: with loss of daughter.;         GYNECOLOGICAL HISTORY:             CURRENT MEDICAL PROVIDERS:    Gastroenterologist: MANI    Ophthalmologist: KACI    Orthopedist: CELIA    Urologist: BRITTNEE         PREVENTIVE HEALTH MAINTENANCE             BONE DENSITY: was last done  with normal results     COLORECTAL CANCER SCREENING: declines colorectal cancer screening, understands reason for testing; 3-18-03     EYE EXAM: was last done 3/2019     MAMMOGRAM: Done within last 2 years and results in are chart was last done " 2019 with normal results     PAP SMEAR: was last done Hysterectomy         PAST MEDICAL HISTORY                 ADVANCED DIRECTIVES: None         Surgical History: Abnormal mammogram in March s/p breast bx-everything was ok  2007         Cataract Removal: bilateral;     Appendectomy    Cholecystectomy: laparoscopic;     Hysterectomy: , ;     Foot surgery on both feet secondary bone spurs;     right ear surgery;         Family History:         Positive for Coronary Artery Disease, Hyperlipidemia ( mother ), Hypertension ( mother; brother; sister ), Myocardial Infarction ( mother; brother; sister ) and Sudden Cardiac Death ( brother ).      Positive for Breast Cancer ( daughter ) and Lung Cancer ( father -- , no h/o smoking ).      Positive for COPD ( father -- emphysema; brother ).      Positive for Renal Failure ( niece ).      Positive for Type 1 Diabetes ( mother; brother; sister ).      Son(s): 1 ;   from MVA     Daughter(s): 1 ;  Breast Cancer         Social History:     Occupation: Unemployed     Marital Status:      Children: 3 children         Tobacco/Alcohol/Supplements:     Last Reviewed on 1/15/2020 03:12 PM by Conchis Gardiner    Tobacco: She has a past history of cigarette smoking; quit date:  19.  Non-drinker         Substance Abuse History:     Last Reviewed on 2018 11:51 AM by Radha Maddox        Mental Health History:     Last Reviewed on 2018 11:51 AM by Radha Maddox        Communicable Diseases (eg STDs):     Last Reviewed on 2018 11:51 AM by Radha Maddox        Current Problems:     Last Reviewed on 2018 11:51 AM by Radha Maddox    Other specified hypothyroidism    Low back pain    Essential (primary) hypertension    Pure hypercholesterolemia    Long term (current) use of opiate analgesic    History of falling    Generalized anxiety disorder    Chronic obstructive pulmonary disease with (acute)  exacerbation    Constipation, unspecified    Other long term (current) drug therapy    Gastro-esophageal reflux disease without esophagitis    Disorientation, unspecified    Major depressive disorder, recurrent, moderate    Pain in right hip    Primary insomnia    Pure hypercholesterolemia, unspecified    Other specified disorders of kidney and ureter    Cardiac murmur, unspecified    Chronic kidney disease, stage 2 (mild)    Encounter for screening mammogram for malignant neoplasm of breast    Unspecified fall, initial encounter    Cellulitis of right upper limb    Nicotine dependence, other tobacco product, uncomplicated    Diarrhea, unspecified    Encounter for follow-up examination after completed treatment for conditions other than malignant neoplasm    Contusion of left upper arm, subsequent encounter    Muscle weakness (generalized)    Other insomnia    Hypokalemia    Influenza due to identified novel influenza A virus with pneumonia    Other allergy, sequela    Abrasion of scalp, initial encounter    Other pneumonia, unspecified organism    Influenza due to other identified influenza virus with unspecified type of pneumonia    Acute nasopharyngitis [common cold]    Candidal stomatitis    Shortness of breath    Effusion, right foot    Effusion, left foot    Encounter for screening for depression    Localized swelling, mass and lump, right lower limb    Encounter for other screening for malignant neoplasm of breast        Immunizations:     Prevnar 13 (Pneumococcal PCV 13) 11/3/2015    Fluzone (3 + years dose) 10/22/2008    Fluzone (3 + years dose) 9/23/2011    Fluzone High-Dose pf (>=65 yr) 10/23/2013    Fluzone High-Dose pf (>=65 yr) 11/3/2015    Fluzone High-Dose pf (>=65 yr) 10/17/2016    Fluzone High-Dose pf (>=65 yr) 10/30/2018    Fluzone High-Dose pf (>=65 yr) 10/22/2019    Influenza High-Dose Virus Vaccine pf (>=65 yr) 11/1/2017    PNEUMOVAX 23 (Pneumococcal PPV23) 1/4/2018    Zostavax (Zoster live)  10/2/2014        Allergies:     Last Reviewed on 4/17/2020 01:02 PM by Anne Marie Saldana    Pravastatin:   (Adverse Reaction)    Crestor:      Niaspan:   (Adverse Reaction)    Bentyl: Confusion     Fentanyl: itching,shortness of breath  (Adverse Reaction)    TAPE:      BAND AID:      Zocor: pain  (Adverse Reaction)    Morphine Sulfate:          Current Medications:     Last Reviewed on 4/17/2020 01:02 PM by Anne Marie Saldana    Gabapentin 600 mg oral tablet [1 po tid]    HYDROcodone-acetaminophen 7.5-325 mg oral tablet [One PO TID PRN]    Spiriva with HandiHaler 18 mcg Inhalation Capsule, With Inhalation Device [Inhale contents of 1 capsule(s) by inhaler device by mouth once daily thru inhaler.]    cetirizine 10 mg oral tablet [1 tab daily]    busPIRone 15 mg oral tablet [1 PO BID]    Singulair  10 mg oral tablet [Take 1 tablet(s) by mouth each evening]    Restasis 0.05% Ophthalmic Emulsion [Instill 1 drop(s) to each eye bid]    Synthroid 88 mcg oral tablet [Take 1 tablet(s) by mouth daily]    ProAir HFA 90mcg/1actuation Oral Inhaler [1 puff daily as needed]    Paroxetine HCl 10 mg oral tablet [1 tab daily ]    metoprolol succinate 100 mg oral Tablet, Extended Release 24 hr [take 1 tablet (100 mg) by oral route once daily]    traZODone 50 mg oral tablet [take 1 tablet (50 mg) by oral route every hs]    mupirocin 2 % Topical Ointment [apply a small amount to the affected area by topical route 3 times per day]    fluticasone propionate 50 mcg/actuation Intranasal Spray, Suspension [inhale 1 spray (50 mcg) in each nostril by intranasal route 2 times per day]    omeprazole 20 mg oral capsule,delayed release (enteric coated) [take 1 capsule (20 mg) by oral route once daily]    amLODIPine 5 mg oral tablet [take 1 tablet (5 mg) by oral route once daily]    losartan 100 mg oral tablet [take 1 tablet (100 mg) by oral route once daily]    Breo Ellipta 200-25 mcg/dose Inhalation Blister, With Inhalation Device [inhale 1 puff by  inhalation route once daily at the same time each day]    nystatin 100,000 unit/mL oral Suspension [take 10 milliliters (400,000 unit) by oral route 4 times per day]    hydroCHLOROthiazide 12.5 mg oral tablet [take 1 tablet (12.5 mg) by oral route 1 time per day]    umeclidinium 62.5 mcg/actuation Inhalation Blister, With Inhalation Device [inhale 1 puff (62.5 mcg) by inhalation route once daily at the same time each day]        Objective:        Vitals:         Historical:     1/15/2020  BP:   149/43 mm Hg ( (right arm, , sitting, );) 12/16/2019  BP:   160/48 mm Hg ( (right arm, , sitting, );) 1/15/2020  P:   54bpm ( (right arm (BP Cuff), , sitting, );) 1/15/2020  Wt:   141lbs    Current: 4/17/2020 1:03:09 PM    Ht:  5 ft, 2.25 in;  Wt: 144.8 lbs;  BMI: 26.3T: 97.5 F (oral);  BP: 149/63 mm Hg (right arm, sitting);  P: 69 bpm (right arm (BP Cuff), sitting);  sCr: 0.92 mg/dL;  GFR: 49.52        Repeat:     1:4:46 PM  BP:   190/71mm Hg (left arm, sitting, HR: 65)     Exams:     PHYSICAL EXAM:     GENERAL: well developed, well nourished;  no apparent distress;     RESPIRATORY: normal respiratory rate and pattern with no distress; normal breath sounds with no rales, rhonchi, wheezes or rubs;     CARDIOVASCULAR: normal rate; rhythm is regular;     BREAST/INTEGUMENT: mild right LE edema noted, increased redness to that lower extremity as well consistent with cellulitis;     MUSCULOSKELETAL: gait: uses a walker;     NEUROLOGIC: mental status: alert and oriented x 3;     PSYCHIATRIC: appropriate affect and demeanor;         Assessment:         L03.115   Cellulitis of right lower limb           ORDERS:         Meds Prescribed:       [New Rx] doxycycline monohydrate 100 mg oral tablet [take 1 tablet (100 mg) by oral route 2 times per day], #20 (twenty) tablets, Refills: 0 (zero)                 Plan:         Cellulitis of right lower limbVenous doppler was negative for DVT. With increased redness will treat for cellulitis  with antibiotics. Advised to keep that extremity elevated. She has appointment scheduled later this month with her PCP and can be reassessed. Also discussed differential diagnosis of venous insufficiency. ER precautions were given.           Prescriptions:       [New Rx] doxycycline monohydrate 100 mg oral tablet [take 1 tablet (100 mg) by oral route 2 times per day], #20 (twenty) tablets, Refills: 0 (zero)             Charge Capture:         Primary Diagnosis:     L03.115  Cellulitis of right lower limb           Orders:      [id number]  Office/outpatient visit; established patient, level 3  (In-House)

## 2021-05-18 NOTE — PROGRESS NOTES
Amada Bentley  1945     Office/Outpatient Visit    Visit Date: Thu, Oct 15, 2020 12:42 pm    Provider: Maricarmen Harrell MD (Assistant: Meryl Sidhu MA)    Location: [facility]        Electronically signed by Maricarmen Harrell MD on  10/16/2020 12:24:03 PM                             Subjective:        CC: pt states she is not taking metoprolol, losartan, amlodipine, furosemidemedicare wellness exam    HPI:           Amada Plaza is here for a Medicare wellness visit.          Self-Assessment of Health: She rates her health as poor. She rates her confidence of being able to control/manage most of her health problems as somewhat confident. Her physical/emotional health has limited her social activites moderately.  A review of possible cognitive impairment was performed and the following was noted: she is not having trouble driving;  memory changes are noted;  she is not having trouble with her finances A review of functional ability and level of safety was performed and the following was noted: Bathing ( Performs with assistance ) Falls Risk: Has fallen 2 or more times or had one fall with injury in the past year.  In regard to hearing, she reports having trouble hearing the TV/radio when others do not and having to strain to hear or understand conversations, but not wearing hearing aid(s).  Concerning home safety, she reports that at home she DOES have adequate lighting, handrails on stairs and functioning smoke alarms, but not a skid resistant shower/tub, grab bars in the bath or absence of throw rugs.  Physical Activity: She exercises but less than 20 minutes 3 days per week; Type of diet patient normally eats is described as well-balanced with fruits and vegetables Tobacco: She has a past history of cigarette smoking; quit date:  8/27/20.  Preventative Health updated today           PHQ-9 Depression Screening: Completed form scanned and in chart; Total Score 5         Amada Plaza has ongoing diarrhea  with wt loss, she has seen general surgeon and is now scheduled for a swallow study and a colonoscopy.              Essential (primary) hypertension details; she is not currently taking an antihypertensive.  Compliance with treatment has been good; she takes her medication as directed, maintains her diet and exercise regimen, and follows up as directed.  She is tolerating the medication well without side effects.  Amada Plaza does not check her blood pressure other than at her clinic appointments.        chronic LBP and knee pain B and L shoulder pain that radiates down her arm her pain is daily and she is on gabapentin and hydrocodone prn and this helps her to be functional, she is weak in her LE and trying to walk more to strengthen her LE.  No signs of abuse or diversion    ROS:     CONSTITUTIONAL:  Negative for chills and fever.      E/N/T:  Positive for nasal congestion.   Negative for ear pain or sore throat.      CARDIOVASCULAR:  Negative for chest pain, dizziness and pedal edema.      RESPIRATORY:  Negative for recent cough and dyspnea.      GASTROINTESTINAL:  Positive for diarrhea.   Negative for abdominal pain, hematochezia, melena, nausea or vomiting.      MUSCULOSKELETAL:  Negative for arthralgias, back pain and myalgias.      INTEGUMENTARY/BREAST:  Negative for rash.      NEUROLOGICAL:  Positive for dizziness and weakness.   Negative for fainting, headaches or paresthesias.      PSYCHIATRIC:  Positive for anxiety.   Negative for depression or suicidal thoughts.          Past Medical History / Family History / Social History:         Last Reviewed on 10/15/2020 01:32 PM by Maricarmen Harrell    Past Medical History:                 PAST MEDICAL HISTORY         COPD     hiatal hernia     Pernicious Anemia     Depression: with loss of daughter.;     LBP, nicotine dependencs, HTN, hypothyroid, anxiety, hypercholesterolemia         GYNECOLOGICAL HISTORY:             CURRENT MEDICAL PROVIDERS:     Gastroenterologist: Aston    Orthopedist: CELIA    Urologist: BRITTNEE         PREVENTIVE HEALTH MAINTENANCE             BONE DENSITY: was last done  with normal results     COLORECTAL CANCER SCREENING: Up to date (colonoscopy q10y; sigmoidoscopy q5y; Cologuard q3y) was last done 2013; gastritis     EYE EXAM: was last done 3/2019     MAMMOGRAM: Done within last 2 years and results in are chart was last done 20 with normal results     PAP SMEAR: was last done Hysterectomy         PAST MEDICAL HISTORY                 ADVANCED DIRECTIVES: None         Surgical History: Abnormal mammogram in March s/p breast bx-everything was ok  2007         Cataract Removal: bilateral;     Appendectomy    Cholecystectomy: laparoscopic;     Hysterectomy: , ;     Foot surgery on both feet secondary bone spurs;     right ear surgery;         Family History:         Positive for Coronary Artery Disease, Hyperlipidemia ( mother ), Hypertension ( mother; brother; sister ), Myocardial Infarction ( mother; brother; sister ) and Sudden Cardiac Death ( brother ).      Positive for Breast Cancer ( daughter ) and Lung Cancer ( father -- , no h/o smoking ).      Positive for COPD ( father -- emphysema; brother ).      Positive for Renal Failure ( niece ).      Positive for Type 1 Diabetes ( mother; brother; sister ).      Son(s): 1 ;   from MVA     Daughter(s): 1 ;  Breast Cancer         Social History:     Occupation: Unemployed     Marital Status:      Children: 3 children         Tobacco/Alcohol/Supplements:     Last Reviewed on 10/15/2020 12:50 PM by Meryl Sidhu    Tobacco: She has a past history of cigarette smoking; quit date:  aug, 27th 2020.  Non-drinker         Substance Abuse History:     Last Reviewed on 2020 01:19 PM by Osmar Morse        Mental Health History:     Last Reviewed on 2020 01:19 PM by Osmar Morse        Communicable Diseases  (eg STDs):     Last Reviewed on 9/08/2020 01:19 PM by Osmar Morse        Allergies:     Last Reviewed on 10/15/2020 12:43 PM by Meryl Sidhu    Pravastatin:   (Adverse Reaction)    Crestor:      Niaspan:   (Adverse Reaction)    Bentyl: Confusion     Fentanyl: itching,shortness of breath  (Adverse Reaction)    TAPE:      BAND AID:      Zocor: pain  (Adverse Reaction)    Morphine Sulfate:          Current Medications:     Last Reviewed on 10/15/2020 12:50 PM by Meryl Sidhu    ProAir HFA     Glucosamine Chondroitin     Gabapentin 600 mg oral tablet [1 po tid]    HYDROcodone-acetaminophen 7.5-325 mg oral tablet [One PO TID PRN]    cetirizine 10 mg oral tablet [1 tab daily]    busPIRone 15 mg oral tablet [1 PO BID]    Singulair  10 mg oral tablet [Take 1 tablet(s) by mouth each evening]    Restasis 0.05% Ophthalmic Emulsion [Instill 1 drop(s) to each eye bid]    Synthroid 88 mcg oral tablet [Take 1 tablet(s) by mouth daily]    traZODone 50 mg oral tablet [take 1 tablet (50 mg) by oral route every hs]    omeprazole 20 mg oral capsule,delayed release (enteric coated) [take 1 capsule (20 mg) by oral route once daily, try to take it QOD]    Breo Ellipta 200-25 mcg/dose Inhalation Blister, With Inhalation Device [inhale 1 puff by inhalation route once daily at the same time each day]    INCRUSE ELPT INH 62.5MCG     umeclidinium 62.5 mcg/actuation Inhalation Blister, With Inhalation Device [inhale 1 puff (62.5 mcg) by inhalation route once daily at the same time each day]    potassium chloride 20 mEq oral tablet, extended release [take 1 tablet (20 meq) by oral route once a day]    DULoxetine 60 mg oral capsule,delayed release (enteric coated) [take 1 capsule (60 mg) by oral route once daily]    colestipoL 1 gram oral tablet [take 1 tablet (1 gram) by oral route 2 times per day swallowing whole with any liquid. Do not crush, chew and/or divide.]    Lactinex 1 million cell oral tablet,chewable [2 tabs with meals  up to 3 times per day]        Objective:        Vitals:         Current: 10/15/2020 12:52:40 PM    Ht:  5 ft, 2.25 in;  Wt: 119.4 lbs;  BMI: 21.7T: 97.2 F (temporal);  BP: 130/54 mm Hg (right arm, sitting);  P: 88 bpm (right arm (BP Cuff), sitting);  sCr: 1.27 mg/dL;  GFR: 33.05        Exams:     PHYSICAL EXAM:     GENERAL: vital signs recorded - well developed, well nourished;  no apparent distress;     EYES: extraocular movements intact; PERRL;     E/N/T: EARS: external auditory canal normal;  bilateral TMs are normal;  NOSE:  normal nasal mucosa, septum, turbinates, and sinuses; OROPHARYNX:  normal mucosa, dentition, gingiva, and posterior pharynx;     NECK: range of motion is normal;     RESPIRATORY: normal appearance and symmetric expansion of chest wall; normal respiratory rate and pattern with no distress; normal breath sounds with no rales, rhonchi, wheezes or rubs;     CARDIOVASCULAR: normal rate; rhythm is regular;  no edema;     GASTROINTESTINAL: nontender; normal bowel sounds; no organomegaly;     BREAST/INTEGUMENT: BREASTS: breast exam is normal without masses, skin changes, or nipple discharge;     MUSCULOSKELETAL: normal gait; normal range of motion of all major muscle groups; no limb or joint pain with range of motion;     NEUROLOGIC: mental status: alert and oriented x 3;     PSYCHIATRIC: appropriate affect and demeanor; normal speech pattern; normal thought and perception;         Lab/Test Results:         Amphetamines Screen, Urin: Negative (10/15/2020),     BAR-Barbiturates Screen, Urin: Negative (10/15/2020),     Buprenorphine: Negative (10/15/2020),     BZO-Benzodiazepines Screen,Ur: Negative (10/15/2020),     Cocaine(Metab.)Screen, Ur: Negative (10/15/2020),     MDMA-Ecstasy: Negative (10/15/2020),     Met-Methamphetamine: Negative (10/15/2020),     MTD-Methadone Screen, Urine: Negative (10/15/2020),     Opiate Screen, Urine: Positive (10/15/2020),     OXY-Oxycodone: Negative (10/15/2020),      PCP-Phencyclidine Screen, Uri: Negative (10/15/2020),     THC Cannabinoids Screen, Urin: Negative (10/15/2020),     Urine temperature: confirmed (10/15/2020),     Date and time of last pill: Hydrocodone and Gabapentin 10/15/2020 0930 (10/15/2020),     Performed by: atc (10/15/2020),     Collection Time: 1401 (10/15/2020),             Assessment:         Z00.00   Encounter for general adult medical examination without abnormal findings       Z13.31   Encounter for screening for depression       R19.7   Diarrhea, unspecified       E87.6   Hypokalemia       I10   Essential (primary) hypertension       M54.5   Low back pain       Z79.899   Other long term (current) drug therapy           ORDERS:         Meds Prescribed:       [Refilled] Gabapentin 600 mg oral tablet [1 po tid], #90 (ninety) tablets, Refills: 2 (two)       [Refilled] HYDROcodone-acetaminophen 7.5-325 mg oral tablet [One PO TID PRN], #90 (ninety) tablets, Refills: 0 (zero)       [Refilled] busPIRone 15 mg oral tablet [1 PO BID], #180 (one hundred and eighty) tablets, Refills: 0 (zero)       [Refilled] Singulair  10 mg oral tablet [Take 1 tablet(s) by mouth each evening], #90 (ninety) tablets, Refills: 1 (one)         Lab Orders:       [id number]  Drug test prsmv read direct optical obs pr date  (In-House)              Procedures Ordered:         Annual wellness visit, includes a PPPS, subsequent visit  (In-House)              Other Orders:         Depression screen positive and follow up plan documented  (In-House)            1101F  Pt screen for fall risk; document no falls in past year or only 1 fall w/o injury in past year (NATASHA)  (In-House)                      Plan:         Encounter for general adult medical examination without abnormal findingsMEDICARE WELLNESS EXAM-SHE IS UTD on MAMMOGRAM, DUE FOR COLONOSCOPY AND DEXA, DONE WITH PAP AND PELVIC EXAM, UTD ON SHINGLES/FLU/PREVNAR/PNEUMOVAX, DEFERS TETANUS, NO FALLS IN PAST 6 MONTHS-USING A  ROLLATOR, NO MEMORY ISSUES,  DEPRESSION IS WELL CONTROLLED, SHE LIVES WITH HER DAUGHTER,  SHE IS NO LONGER ABLE TO DRIVE BUT SHE CAN PERFORM ADL'S/FINANCES INDEPENDENTLY, HEARING IS POOR IN R EAR-H/O SURGERY-SHE STATES SHE CANT AFFORD HEARING AIDS, SHE WAS GIVEN A HA ON  A LIVING WILL/SAFETY HA AND A PREV SERVICES HANDOUT AND SAFETY HANDOUT WERE GIVEN TO HER.  CURRENT DOCTOR LIST UPDATED.  SAFETY ISSUES REVIEWED WITH HER. RECOMMEND YEARLY EYE EXAMS    ADVANCED DIRECTIVES: None               Orders:         Annual wellness visit, includes a PPPS, subsequent visit  (In-House)              Encounter for screening for depression    MIPS PHQ-9 Depression Screening: Completed form scanned and in chart; Total Score 5 Positive Depression Screen: Stable on medications. No suicidal ideation.            Orders:         Depression screen positive and follow up plan documented  (In-House)            1101F  Pt screen for fall risk; document no falls in past year or only 1 fall w/o injury in past year (NATASHA)  (In-House)              Diarrhea, unspecifiedimproving on colestipol, colonoscopy exam pending        Hypokalemiacont KCL 20 meq bid as long as she has diarrhea        Essential (primary) hypertensionstable off her meds          Prescriptions:       [Refilled] busPIRone 15 mg oral tablet [1 PO BID], #180 (one hundred and eighty) tablets, Refills: 0 (zero)       [Refilled] Singulair  10 mg oral tablet [Take 1 tablet(s) by mouth each evening], #90 (ninety) tablets, Refills: 1 (one)         Low back painstable on pain meds        Other long term (current) drug therapy        RECOMMENDATIONS given include: darline reviewed, drug screen performed and appropriate, consent is reviewed and signed and on the chart, she is aware of risk of addiction on this medication and understands that she will need to follow up for a review every 3 months and her medications will be adjusted or decreased as deemed appropriate at each visit.  " No personal history of drug or alcohol abuse.  No concerns about diversion or abuse.  She denies side effects related to the medication.  She is  aware that she may be called in for pill counts..            Prescriptions:       [Refilled] Gabapentin 600 mg oral tablet [1 po tid], #90 (ninety) tablets, Refills: 2 (two)       [Refilled] HYDROcodone-acetaminophen 7.5-325 mg oral tablet [One PO TID PRN], #90 (ninety) tablets, Refills: 0 (zero)           Orders:       [id number]  Drug test prsmv read direct optical obs pr date  (In-House)                  Charge Capture:         Primary Diagnosis:     Z00.00  Encounter for general adult medical examination without abnormal findings           Orders:      [id number]  Preventive medicine, established patient, age 65+ years  (In-House)              Annual wellness visit, includes a PPPS, subsequent visit  (In-House)              Z13.31  Encounter for screening for depression           Orders:      [id number]  Office/outpatient visit; established patient, level 4  (In-House)              Depression screen positive and follow up plan documented  (In-House)            1101F  Pt screen for fall risk; document no falls in past year or only 1 fall w/o injury in past year (NATASHA)  (In-House)              R19.7  Diarrhea, unspecified     E87.6  Hypokalemia     I10  Essential (primary) hypertension     M54.5  Low back pain     Z79.899  Other long term (current) drug therapy           Orders:      [id number]  Drug test prsmv read direct optical obs pr date  (In-House)                  ADDENDUMS:      ____________________________________    Addendum: 10/20/2020 03:12 PM - Maricarmen Harrell            HPI states:     she is not currently taking an antihypertensive.  Compliance with treatment has     been good; ADDENDUM-PLEASE REMOVE \"she takes her medication as directed\"    AND CONTINUE she maintains her diet and exercise regimen, and follows up as directed, avoids salt.  She is tolerating " the medication well without side effects M

## 2021-05-18 NOTE — PROGRESS NOTES
Amada Bentley. 1945     Office/Outpatient Visit    Visit Date: Tue, Oct 22, 2019 03:57 pm    Provider: Maricarmen Harrell MD (Assistant: Meryl Sidhu MA)    Location: [facility]        Electronically signed by Maricarmen Harrell MD on  10/23/2019 09:29:41 AM                             SUBJECTIVE:        HPI:         With regard to the acquired hypothyroidism, she is currently taking Synthroid, 88 mcg daily.  TSH was last checked 6 months ago.  The result was reported as normal.  She denies any related symptoms.  She reports no symptoms suggestive of adverse medication effect.          Hypercholesterolemia details; date of diagnosis 2005.  Current treatment includes OFF COLESTIPOL due to constipation and a low cholesterol/low fat diet.  Compliance with treatment has been good; she takes her medication as directed, maintains her low cholesterol diet, and follows up as directed.  Most recent lab tests include TSH:  1.520 (mIU/L) (05/07/2019), Creatinine, Serum:  0.99 (mg/dl) (05/07/2019), Glom Filt Rate, Est:  55 (ml/min/1.73m2) (05/07/2019), Alkaline Phosphatase, Serum:  61 (U/L) (05/07/2019), ALT (SGPT):  6 (U/L) (05/07/2019), AST (SGOT):  8 (U/L) (05/07/2019), Total Cholesterol:  197 (mg/dL) (05/07/2019), HDL:  47 (mg/dL) (05/07/2019), Triglycerides:  153 (mg/dL) (05/07/2019), LDL:  119 (mg/dL) (05/07/2019).          In regard to the hypertension, her current cardiac medication regimen includes an ACE inhibitor.  Amada Plaza does not check her blood pressure other than at her clinic appointments.  She is tolerating the medication well without side effects.  Compliance with treatment has been good; she takes her medication as directed, maintains her diet and exercise regimen, and follows up as directed.      Denae is in for F2F for her chronic pain med, she is on gabapentin and hydrocodone 7.5 mg tid prn pain, which is daily and moderate severity.  Pain is radicular down R LE and she is functional on  medication.   MRI showed severe canal stenosis and bilateral foraminal narrowing. She has seen neurosurgeon Dr Swartz and s/p back surgery of L spine in past and told her she needed surgery again and she declines surgery. She also uses topical voltaren     ROS:     CONSTITUTIONAL:  Negative for chills and fever.      EYES:  Negative for blurred vision.      CARDIOVASCULAR:  Negative for chest pain, dizziness and palpitations.      RESPIRATORY:  Positive for chronic cough.   Negative for dyspnea or frequent wheezing.      GASTROINTESTINAL:  Negative for abdominal pain, constipation, diarrhea, hematochezia, melena, nausea and vomiting.      NEUROLOGICAL:  Positive for headaches.   Negative for memory loss, paresthesias, seizures or weakness.      PSYCHIATRIC:  Positive for going to bed too late.   Negative for crying spells, feelings of stress, sadness or suicidal thoughts.          PMH/FMH/SH:     Last Reviewed on 10/22/2019 04:36 PM by Maricarmen Harrell    Past Medical History:                 PAST MEDICAL HISTORY         COPD     hiatal hernia     Pernicious Anemia     Depression: with loss of daughter.;         GYNECOLOGICAL HISTORY:             CURRENT MEDICAL PROVIDERS:    Gastroenterologist: MANI    Ophthalmologist: KACI    Orthopedist: CELIA    Urologist: BRITTNEE         PREVENTIVE HEALTH MAINTENANCE             BONE DENSITY: was last done  with normal results     COLORECTAL CANCER SCREENING: declines colorectal cancer screening, understands reason for testing; 3-18-03     EYE EXAM: was last done 3/2019     MAMMOGRAM: Done within last 2 years and results in are chart was last done 2019 with normal results     PAP SMEAR: was last done Hysterectomy         PAST MEDICAL HISTORY                 ADVANCED DIRECTIVES: None         Surgical History: Abnormal mammogram in March s/p breast bx-everything was ok  2007         Cataract Removal: bilateral;      Appendectomy     Cholecystectomy: laparoscopic;     Hysterectomy: , ;     Foot surgery on both feet secondary bone spurs;      right ear surgery;         Family History:         Positive for Coronary Artery Disease, Hyperlipidemia ( mother ), Hypertension ( mother; brother; sister ), Myocardial Infarction ( mother; brother; sister ) and Sudden Cardiac Death ( brother ).      Positive for Breast Cancer ( daughter ) and Lung Cancer ( father -- , no h/o smoking ).      Positive for COPD ( father -- emphysema; brother ).      Positive for Renal Failure ( niece ).      Positive for Type 1 Diabetes ( mother; brother; sister ).      Son(s): 1 ;   from MVA     Daughter(s): 1 ;  Breast Cancer         Social History:     Occupation: Unemployed     Marital Status:      Children: 3 children         Tobacco/Alcohol/Supplements:     Last Reviewed on 10/22/2019 04:37 PM by Maricarmen Harrell    Tobacco: Current Smoker: She currently smokes every day, 1-1/2 packs per day.  Non-drinker         Substance Abuse History:     Last Reviewed on 2018 11:51 AM by Radha Maddox        Mental Health History:     Last Reviewed on 2018 11:51 AM by Radha Maddox        Communicable Diseases (eg STDs):     Last Reviewed on 2018 11:51 AM by Radha Maddox            Allergies:     Last Reviewed on 2019 11:48 AM by Conchis Gardiner    Pravastatin: (Adverse Reaction)    Crestor:    Niaspan: (Adverse Reaction)    Bentyl: confusion    Fentanyl: itching, shortness of breath (Adverse Reaction)    Zocor: pain (Adverse Reaction)    Morphine Sulfate:        Current Medications:     Last Reviewed on 2019 11:53 AM by Conchis Gardiner    Restasis 0.05% Ophthalmic Emulsion Instill 1 drop(s) to each eye bid     Advair Diskus 250mcg/50mcg per 1blister Inhalation Powder Inhale 1 puff(s) bid     Benazepril HCl 20mg Tablet Take 1 tablet(s) by mouth daily     Singulair  10mg Tablet Take 1 tablet(s) by  mouth each evening     Hydrocodone/Acetaminophen 7.5mg/325mg Tablet One PO TID PRN     Gabapentin 600mg Tablet 1 po tid     Spiriva HandiHaler 18mcg/1capsule Capsules for Inhalation Inhale contents of 1 capsule(s) by inhaler device by mouth daily thru inhaler.     Aspirin (ASA) 81mg Tablets, Enteric Coated Take 1 tablet(s) by mouth qam     Cetirizine HCl 10mg Tablet 1 tab daily     Paroxetine HCl 10mg Tablet 1 tab daily     Ranitidine 150mg Tablet 1 tab bid     Synthroid 0.088mg Tablet Take 1 tablet(s) by mouth daily     Buspirone HCl 15mg Tablet 1 PO BID     Naprosyn 500mg Tablet Take 1 tablet(s) by mouth  daily     ProAir HFA 90mcg/1actuation Oral Inhaler 1 puff daily as needed     Bepreve 1.5% Ophthalmic Solution Instill 1 drop(s) to affected eye(s) bid         OBJECTIVE:        Vitals:         Current: 10/22/2019 4:18:57 PM    Ht:  5 ft, 3.75 in;  Wt: 134.6 lbs;  BMI: 23.3    T: 98.1 F (oral);  BP: 187/71 mm Hg (right arm, sitting);  P: 89 bpm (right arm (BP Cuff), sitting);  sCr: 0.99 mg/dL;  GFR: 46.08        Repeat:     4:19:12 PM     BP:   182/69mm Hg (right arm, sitting, P-78)         Exams:     PHYSICAL EXAM:     GENERAL: vital signs recorded - well developed, well nourished;  no apparent distress;     EYES: extraocular movements intact; conjunctiva and cornea are normal; PERRLA;     E/N/T:  normal EACs, TMs, nasal/oral mucosa, teeth, gingiva, and oropharynx;     NECK: range of motion is normal; thyroid is non-palpable; supple, no LAD;     RESPIRATORY: normal respiratory rate and pattern with no distress; normal breath sounds with no rales, rhonchi, wheezes or rubs;     CARDIOVASCULAR: normal rate; rhythm is regular;  no systolic murmur; no edema;     GASTROINTESTINAL: nontender, nondistended; no hepatosplenomegaly or masses; no bruits;     MUSCULOSKELETAL: gait: affected by a limp and slowed;     NEUROLOGIC: mental status: alert and oriented x 3; cranial nerves II-XII grossly intact; Reflexes: knee jerks:  2+;     PSYCHIATRIC:  appropriate affect and demeanor; normal speech pattern; grossly normal memory;         Lab/Test Results:             Amphetamines Screen, Urin:  Negative (10/22/2019),     BAR-Barbiturates Screen, Urin:  Negative (10/22/2019),     Buprenorphine:  Negative (10/22/2019),     BZO-Benzodiazepines Screen,Ur:  Negative (10/22/2019),     Cocaine(Metab.)Screen, Ur:  Negative (10/22/2019),     MDMA-Ecstasy:  Negative (10/22/2019),     Met-Methamphetamine:  Negative (10/22/2019),     MTD-Methadone Screen, Urine:  Negative (10/22/2019),     Opiate Screen, Urine:  Positive (10/22/2019),     OXY-Oxycodone:  Negative (10/22/2019),     PCP-Phencyclidine Screen, Uri:  Negative (10/22/2019),     THC Cannabinoids Screen, Urin:  Negative (10/22/2019),     Urine temperature:  confirmed (10/22/2019),     Date and time of last pill:  hydrocodone & gabapentin 10/22/19 @ 1600  /mnp (10/22/2019),     Performed by:  mnp (10/22/2019),     Collection Time:  1730 (10/22/2019),             Procedures:     Vaccination against other viral diseases, Influenza     1. Influenza high dose 0.5 ml unit dose, ABN signed given IM in the right upper arm; administered by ael;  lot number [id number]; expires 5-26-20 Regarding contraindications to an Influenza vaccine: Denies moderate/severe illness with/without fever; serious reaction to eggs, egg proteins, gentamicin, gelatin, arginine, neomycin or polymixin; serious reaction after recieving previous influenza vaccines; and history of Guillain-Ubly Syndrome.          Chronic low back pain     1. Kenalog 40 mg given IM in the right hip; administered by mnp;  lot number [id number]; expires 4/2021             ASSESSMENT           V04.81   Z23  Vaccination against other viral diseases, Influenza              DDx:     491.21   J44.1  Decompensated chronic obstructive pulmonary disease (COPD)              DDx:     300.02   F41.1  Generalized anxiety disorder              DDx:     307.42   F51.01   Chronic insomnia              DDx:     244.8   E03.8  Acquired hypothyroidism              DDx:     272.0   E78.00  Hypercholesterolemia              DDx:     585.2   N18.2  Chronic renal insufficiency, stage 2              DDx:     401.1   I10  Hypertension              DDx:     530.81   K21.9  GERD              DDx:     724.2   M54.5  Chronic low back pain              DDx:     V58.69   Z79.899  Use of high risk medications              DDx:         ORDERS:         Meds Prescribed:       Refill of: Benazepril HCl 40mg Tablet Take 1 tablet(s) by mouth bid  #180 (One [place] and Eighty) tablet(s) Refills: 1       Refill of: Hydrocodone/Acetaminophen 7.5mg/325mg Tablet One PO TID PRN  #90 (Ninety) tablet(s) Refills: 0       Refill of: Gabapentin 600mg Tablet 1 po tid  #90 (Ninety) tablet(s) Refills: 2       Pepcid (Famotidine)  40mg Tablet Take 1 tablet(s) by mouth bid  #180 (One [place] and Eighty) tablet(s) Refills: 1         Lab Orders:       [id number]  TSH - [facility] TSH  (Send-Out)         36062  HTNLP - [facility] CMP AND LIPID: [id number]  (Send-Out)         33357  Drug test prsmv read direct optical obs pr date  (In-House)           Procedures Ordered:       [id number]  Fluzone High Dose  (In-House)         REFER  Referral to Specialist or Other Facility  (Send-Out)           Other Orders:       [id number]  Therapeutic injection  (In-House)           Administration of influenza virus vaccine (x1)         Kenalog, per 10 mg (x4)                 PLAN:          Vaccination against other viral diseases, Influenza           Orders:       [id number]  Fluzone High Dose  (In-House)                     Administration of influenza virus vaccine (x1)          Decompensated chronic obstructive pulmonary disease (COPD) stable, doing well          Generalized anxiety disorder stable          Chronic insomnia stable          Acquired hypothyroidism     LABORATORY:  Labs ordered to be performed today include TSH.            Orders:        [id number]  TSH - [facility] TSH  (Send-Out)            Hypercholesterolemia     LABORATORY:  Labs ordered to be performed today include HTN/Lipid Panel: CMP, Lipid.            Orders:       [id number]  HTN - [facility] CMP AND LIPID: [id number]  (Send-Out)            Chronic renal insufficiency, stage 2 due for labs          Hypertension high  will increase her benazapril to 40 mg           Prescriptions:       Refill of: Benazepril HCl 40mg Tablet Take 1 tablet(s) by mouth bid  #180 (One [place] and Eighty) tablet(s) Refills: 1       Refill of: Hydrocodone/Acetaminophen 7.5mg/325mg Tablet One PO TID PRN  #90 (Ninety) tablet(s) Refills: 0       Refill of: Gabapentin 600mg Tablet 1 po tid  #90 (Ninety) tablet(s) Refills: 2          GERD will change zantac to pepcid           Prescriptions:       Pepcid (Famotidine)  40mg Tablet Take 1 tablet(s) by mouth bid  #180 (One [place] and Eighty) tablet(s) Refills: 1          Chronic low back pain stable and functional on medication         REFERRALS:  Referral initiated to a chronic pain specialist ( [facility] ).  Steroids Kenalog 40 mg 1 ml           Orders:       REFER  Referral to Specialist or Other Facility  (Send-Out)         50772  Therapeutic injection  (In-House)                     Kenalog, per 10 mg (x4)          Use of high risk medications         RECOMMENDATIONS given include: darline reviewed, drug screen performed and appropriate, consent is reviewed and signed and on the chart, she is aware of risk of addiction on this medication and understands that she will need to follow up for a review every 3 months and her medications will be adjusted or decreased as deemed appropriate at each visit.  No personal history of drug or alcohol abuse.  No concerns about diversion or abuse.  She denies side effects related to the medication.  She is  aware that she may be called in for pill counts..            Orders:       58384  Drug test prsmv read direct optical obs  pr date  (In-House)               CHARGE CAPTURE           **Please note: ICD descriptions below are intended for billing purposes only and may not represent clinical diagnoses**        Primary Diagnosis:         V04.81 Vaccination against other viral diseases, Influenza            Z23    Encounter for immunization              Orders:          76045   Office/outpatient visit; established patient, level 4  (In-House)             20853   Fluzone High Dose  (In-House)                                           Administration of influenza virus vaccine (x1)         491.21 Decompensated chronic obstructive pulmonary disease (COPD)            J44.1    Chronic obstructive pulmonary disease with (acute) exacerbation    300.02 Generalized anxiety disorder            F41.1    Generalized anxiety disorder    307.42 Chronic insomnia            F51.01    Primary insomnia    244.8 Acquired hypothyroidism            E03.8    Other specified hypothyroidism    272.0 Hypercholesterolemia            E78.00    Pure hypercholesterolemia, unspecified    585.2 Chronic renal insufficiency, stage 2            N18.2    Chronic kidney disease, stage 2 (mild)    401.1 Hypertension            I10    Essential (primary) hypertension    530.81 GERD            K21.9    Gastro-esophageal reflux disease without esophagitis    724.2 Chronic low back pain            M54.5    Low back pain              Orders:          69816   Therapeutic injection  (In-House)                                           Kenalog, per 10 mg (x4)         V58.69 Use of high risk medications            Z79.899    Other long term (current) drug therapy              Orders:          78015   Drug test prsmv read direct optical obs pr date  (In-House)

## 2021-05-18 NOTE — PROGRESS NOTES
"Amada Bentley. 1945     Office/Outpatient Visit    Visit Date: Thu, Dec 13, 2018 09:35 am    Provider: Maricarmen Harrell MD (Assistant: Conchis Gardiner MA)    Location: [facility]        Electronically signed by Maricarmen Harrell MD on  12/20/2018 05:49:31 PM                             SUBJECTIVE:        CC: (PT WAS SWITCH OFF OF RESTORIL)     Amada Plaza is a 73 year old White female.  Pt was recently switched on medication, she has not been her self, has had diarrhea & \"out of body experiences\";         HPI:         Amada Plaza presents with hypertension.  Her current cardiac medication regimen includes an ACE inhibitor.  Amada Plaza does not check her blood pressure other than at her clinic appointments.  She is tolerating the medication well without side effects.  Compliance with treatment has been good; she takes her medication as directed, maintains her diet and exercise regimen, and follows up as directed.      Amada Plaza is in today and states she feels miserable with her nervousness and anxiety, she is having nightmares on the trazodone 100 mg, she is having thumping in her ears and shoulder pain and not sleeping well since I weaned her off the temazepam.  She states she is tolerating weaning down on the hydrocodone to tid dosing with tylenol in middle of the day.  She is falling and so I emphasized risks of benzo's rehan with pain meds, she is willing to try a lower dose of pain meds and wants to restart her night meds so she can sleep again.  She is also trying to quit smoking and is willing to restart the nicotine patch     ROS:     CONSTITUTIONAL:  Negative for chills and fever.      EYES:  Negative for blurred vision.      E/N/T:  Positive for ear pain and nasal congestion.   Negative for frequent rhinorrhea or sore throat.      CARDIOVASCULAR:  Negative for chest pain, palpitations, tachycardia, orthopnea, and edema.      RESPIRATORY:  Negative for dyspnea and frequent wheezing.      NEUROLOGICAL:  Positive " for headaches.   Negative for memory loss, paresthesias, seizures or weakness.      PSYCHIATRIC:  Negative for anxiety, crying spells and feelings of stress.          PMH/FMH/SH:     Last Reviewed on 2018 10:13 AM by Maricarmen Harrell    Past Medical History:                 PAST MEDICAL HISTORY         COPD     hiatal hernia     2 bulding disc and a pinched nerve     Pernicious Anemia     Depression: with loss of daughter.;         GYNECOLOGICAL HISTORY:             PREVENTIVE HEALTH MAINTENANCE             BONE DENSITY: was last done  with normal results     COLORECTAL CANCER SCREENING: declines colorectal cancer screening, understands reason for testing; 3-18-03     EYE EXAM: was last done summer 2017     MAMMOGRAM: Done within last 2 years and results in are chart was last done 1-10-18 with normal results         PAST MEDICAL HISTORY                 ADVANCED DIRECTIVES: None         Surgical History: Abnormal mammogram in March s/p breast bx-everything was ok  2007         Cataract Removal: bilateral;      Appendectomy    Cholecystectomy: laparoscopic;     Hysterectomy: , ;     Foot surgery on both feet secondary bone spurs;      right ear surgery;         Family History:         Positive for Coronary Artery Disease, Hyperlipidemia ( mother ), Hypertension ( mother; brother; sister ), Myocardial Infarction ( mother; brother; sister ) and Sudden Cardiac Death ( brother ).      Positive for Breast Cancer ( daughter ) and Lung Cancer ( father -- , no h/o smoking ).      Positive for COPD ( father -- emphysema; brother ).      Positive for Renal Failure ( niece ).      Positive for Type 1 Diabetes ( mother; brother; sister ).      Son(s): 1 ;   from MVA     Daughter(s): 1 ;  Breast Cancer         Social History:     Occupation: Unemployed     Marital Status:      Children: 3 children         Tobacco/Alcohol/Supplements:     Last Reviewed on  12/13/2018 10:13 AM by Maricarmen Harrell    Tobacco: Current Smoker: She currently smokes every day, 1 pack per day.  Non-drinker         Substance Abuse History:     Last Reviewed on 6/08/2018 11:51 AM by Radha Maddox        Mental Health History:     Last Reviewed on 6/08/2018 11:51 AM by Radha Maddox        Communicable Diseases (eg STDs):     Last Reviewed on 6/08/2018 11:51 AM by Radha Maddox            Allergies:     Last Reviewed on 10/30/2018 11:44 AM by Tomeka Thorne    Pravastatin: (Adverse Reaction)    Crestor:    Niaspan: (Adverse Reaction)    Bentyl: confusion    Fentanyl: itching, shortness of breath (Adverse Reaction)    Zocor: pain (Adverse Reaction)    Morphine Sulfate:        Current Medications:     Last Reviewed on 10/30/2018 11:53 AM by Tomeka Thorne    Naprosyn 500mg Tablet Take 1 tablet(s) by mouth  daily     Buspirone HCl 15mg Tablet Take 1 tablet(s) by mouth bid     Cetirizine HCl 10mg Tablet 1 tab daily     Docusate Sodium 100mg Capsules 1 bid     Spiriva HandiHaler 18mcg/1capsule Capsules for Inhalation Inhale contents of 1 capsule(s) by Rotahaler by mouth daily thru inhaler prn     Restasis 0.05% Ophthalmic Emulsion Instill 1 drop(s) to each eye bid     Aspirin (ASA) 81mg Tablets, Enteric Coated Take 1 tablet(s) by mouth qam     Benazepril HCl 10mg Tablet Take 1 tablet(s) by mouth daily     Ranitidine 150mg Tablet 1 tab bid     Singulair  10mg Tablet Take 1 tablet(s) by mouth each evening     Synthroid 0.088mg Tablet Take 1 tablet(s) by mouth daily     Advair Diskus 250mcg/50mcg per 1blister Inhalation Powder Inhale 1 puff(s) bid     Gabapentin 300mg Capsules 1 capsule qid     Trazodone HCl 50mg Tablet 1 - 2  @ HS prn     Alrex 0.2% Ophthalmic Suspension 2 gtts BID     Bepreve 1.5% Ophthalmic Solution Instill 1 drop(s) to affected eye(s) bid     ProAir HFA 90mcg/1actuation Oral Inhaler 1 puff daily as needed         OBJECTIVE:        Vitals:         Current:  12/13/2018 9:44:43 AM    Ht:  5 ft, 3.75 in;  Wt: 132.8 lbs;  BMI: 23.0    T: 98.1 F (oral);  BP: 157/78 mm Hg (left arm, standing);  P: 93 bpm (left arm (BP Cuff), sitting);  sCr: 0.88 mg/dL;  GFR: 52.32    O2 Sat: 96 % (room air)        Exams:     PHYSICAL EXAM:     GENERAL: vital signs recorded - well developed, well nourished;  no apparent distress;     EYES: extraocular movements intact; conjunctiva and cornea are normal; PERRLA;     E/N/T: EARS: left TM is normal and the right TM is has fluid behind it, red, and retracted;  OROPHARYNX:  normal mucosa, dentition, gingiva, and posterior pharynx;     NECK: range of motion is normal; thyroid is non-palpable; supple, no LAD;     RESPIRATORY: CTA B WITHOUT WHEEZING/RALES OR RHONCHI, NORMAL RESP. EFFORT     CARDIOVASCULAR: normal rate; rhythm is regular;  no systolic murmur; no edema;     MUSCULOSKELETAL: gait: affected by a limp and slowed;     NEUROLOGIC: mental status: alert and oriented x 3; cranial nerves II-XII grossly intact; Reflexes: knee jerks: 2+;     PSYCHIATRIC:  appropriate affect and demeanor; normal speech pattern; grossly normal memory;         ASSESSMENT           401.1   I10  Hypertension              DDx:     Persistent insomnia    307.42   F51.01  Chronic insomnia              DDx:     300.02   F41.1  Generalized anxiety disorder              DDx:     305.1   F17.290  Tobacco dependence              DDx:     724.2   M54.5  Chronic low back pain              DDx:     381.01   H65.01  Acute serous otitis media              DDx:         ORDERS:         Meds Prescribed:       Refill of: Advair Diskus (Fluticasone/Salmeterol) 250mcg/50mcg per 1blister Inhalation Powder Inhale 1 puff(s) bid  #1 (One) device Refills: 5       Refill of: Spiriva HandiHaler (Tiotropium Bromide) 18mcg/1capsule Capsules for Inhalation Inhale contents of 1 capsule(s) by inhaler device by mouth daily thru inhaler.  #30 (Thirty) capsule(s) Refills: 5       Refill of: ProAir HFA  (Albuterol) 90mcg/1actuation Oral Inhaler 1 puff daily as needed  #8.5 (Eight point Five) gm Refills: 5       Refill of: Hydrocodone/Acetaminophen 7.5mg/325mg Tablet 1/2-1 PO BID PRN  #60 (Sixty) tablet(s) Refills: 0       Refill of: Restoril (Temazepam) 15mg Capsules Take 1 capsule QHS  #30 (Thirty) capsule(s) Refills: 0       Cefdinir 300mg Capsules 1 bid  #14 (Fourteen) capsule(s) Refills: 0                 PLAN:          Hypertension due to her anxiety          Chronic insomnia ok to restart temazepam, we are working to wean her down on her hydrocodone           Prescriptions:       Refill of: Advair Diskus (Fluticasone/Salmeterol) 250mcg/50mcg per 1blister Inhalation Powder Inhale 1 puff(s) bid  #1 (One) device Refills: 5       Refill of: Spiriva HandiHaler (Tiotropium Bromide) 18mcg/1capsule Capsules for Inhalation Inhale contents of 1 capsule(s) by inhaler device by mouth daily thru inhaler.  #30 (Thirty) capsule(s) Refills: 5       Refill of: ProAir HFA (Albuterol) 90mcg/1actuation Oral Inhaler 1 puff daily as needed  #8.5 (Eight point Five) gm Refills: 5       Refill of: Hydrocodone/Acetaminophen 7.5mg/325mg Tablet 1/2-1 PO BID PRN  #60 (Sixty) tablet(s) Refills: 0       Refill of: Restoril (Temazepam) 15mg Capsules Take 1 capsule QHS  #30 (Thirty) capsule(s) Refills: 0          Generalized anxiety disorder restart temazepam          Tobacco dependence restart the nicotine patch          Chronic low back pain she uses a rollator-given order for handicap sticker          Acute serous otitis media           Prescriptions:       Cefdinir 300mg Capsules 1 bid  #14 (Fourteen) capsule(s) Refills: 0             CHARGE CAPTURE           **Please note: ICD descriptions below are intended for billing purposes only and may not represent clinical diagnoses**        Primary Diagnosis:         401.1 Hypertension            I10    Essential (primary) hypertension              Orders:          [id number]   Office/outpatient  visit; established patient, level 4  (In-House)           Persistent insomnia    307.42 Chronic insomnia            F51.01    Primary insomnia    300.02 Generalized anxiety disorder            F41.1    Generalized anxiety disorder    305.1 Tobacco dependence            F17.290    Nicotine dependence, other tobacco product, uncomplicated    724.2 Chronic low back pain            M54.5    Low back pain    381.01 Acute serous otitis media            H65.01    Acute serous otitis media, right ear

## 2021-05-18 NOTE — PROGRESS NOTES
Amada Bentley. 1945     Office/Outpatient Visit    Visit Date: Tue, Jul 24, 2018 02:39 pm    Provider: Maricarmen Harrell MD (Assistant: Milana Ramos MA)    Location: [facility]        Electronically signed by Maricarmen Harrell MD on  07/27/2018 10:41:32 AM                             SUBJECTIVE:        CC: LBP         HPI:     Denae is following up for her face to face for her chronic  pain meds hydrocodone and gabapentin  for her R hip pain and chronic LBP-radicular down R LE.   MRI showed severe canal stenosis and bilateral foraminal narrowing. She has seen neurosurgeon Dr Swartz and s/p back surgery of L spine in past and told her she needed surgery again and she is not willing at this time and she is scared about the major surgery involving rods.  She is  a smoker and I once again counseled her on quitting and how this could decrease her pain level.  She is functional on gabapentin and hydrocodone.  Tolerates meds well, denies SE of confusion, weakness or sedation on meds.  No sign of diversion or abuse. she has failed the fentanyl pain patch         With regard to the acquired hypothyroidism, she is currently taking Synthroid, 88 mcg daily.  TSH was last checked 6 months ago.  The result was reported as normal.  She denies any related symptoms.  She reports no symptoms suggestive of adverse medication effect.          Concerning hypercholesterolemia, date of diagnosis 2005.  Current treatment includes OFF COLESTIPOL due to constipation and a low cholesterol/low fat diet.  Compliance with treatment has been good; she takes her medication as directed, maintains her low cholesterol diet, and follows up as directed.  She denies experiencing any hypercholesterolemia related symptoms.  Most recent lab tests include Total Cholesterol:  178 (mg/dL) (01/25/2018), HDL:  58 (mg/dL) (01/25/2018), Triglycerides:  81 (mg/dL) (01/25/2018), LDL:  104 (mg/dL) (01/25/2018), TSH:  1.470 (mIU/L) (01/25/2018),  Creatinine, Serum:  0.88 (mg/dl) (2018), Glom Filt Rate, Est:  >60 (ml/min/1.73m2) (2018), Alkaline Phosphatase, Serum:  57 (U/L) (2018), ALT (SGPT):  8 (U/L) (2018), AST (SGOT):  8 (U/L) (2018).          Additionally, she presents with history of hypertension.  her current cardiac medication regimen includes an ACE inhibitor.  Amada Plaza does not check her blood pressure other than at her clinic appointments.  She is tolerating the medication well without side effects.  Compliance with treatment has been good; she takes her medication as directed, maintains her diet and exercise regimen, and follows up as directed.      ROS:     CONSTITUTIONAL:  Positive for fatigue.   Negative for chills or fever.      EYES:  Negative for blurred vision.      CARDIOVASCULAR:  Negative for chest pain, palpitations, tachycardia, orthopnea, and edema.      RESPIRATORY:  Negative for dyspnea and frequent wheezing.      GASTROINTESTINAL:  Positive for constipation ( better back on colace ).   Negative for abdominal pain, diarrhea, nausea or vomiting.      INTEGUMENTARY/BREAST:  Negative for rash.      NEUROLOGICAL:  Positive for headaches.   Negative for memory loss, paresthesias, seizures or weakness.      PSYCHIATRIC:  Negative for anxiety, crying spells and feelings of stress.          PMH/FMH/SH:     Last Reviewed on 2018 03:09 PM by Maricarmen Harrell    Past Medical History:                 PAST MEDICAL HISTORY         COPD     hiatal hernia     2 bulding disc and a pinched nerve     Pernicious Anemia     Depression: with loss of daughter.;         GYNECOLOGICAL HISTORY:             PREVENTIVE HEALTH MAINTENANCE             BONE DENSITY: was last done  with normal results     COLORECTAL CANCER SCREENING: declines colorectal cancer screening, understands reason for testing; 3-18-03     EYE EXAM: was last done summer 2017     MAMMOGRAM: Done within last 2 years and results in are chart was  last done 1-10-18 with normal results         PAST MEDICAL HISTORY                 ADVANCED DIRECTIVES: None         Surgical History: Abnormal mammogram in March s/p breast bx-everything was ok  2007         Cataract Removal: bilateral;      Appendectomy    Cholecystectomy: laparoscopic;     Hysterectomy: , ;     Foot surgery on both feet secondary bone spurs;      right ear surgery;         Family History:         Positive for Coronary Artery Disease, Hyperlipidemia ( mother ), Hypertension ( mother; brother; sister ), Myocardial Infarction ( mother; brother; sister ) and Sudden Cardiac Death ( brother ).      Positive for Breast Cancer ( daughter ) and Lung Cancer ( father -- , no h/o smoking ).      Positive for COPD ( father -- emphysema; brother ).      Positive for Renal Failure ( niece ).      Positive for Type 1 Diabetes ( mother; brother; sister ).      Son(s): 1 ;   from MVA     Daughter(s): 1 ;  Breast Cancer         Social History:     Occupation: Unemployed     Marital Status:      Children: 3 children         Tobacco/Alcohol/Supplements:     Last Reviewed on 2018 03:09 PM by Maricarmen Harrell    Tobacco: Current Smoker: She currently smokes every day, 1 pack per day; 1-1/2 packs per day.  Non-drinker         Substance Abuse History:     Last Reviewed on 2018 11:51 AM by Radha Maddox        Mental Health History:     Last Reviewed on 2018 11:51 AM by Radha Maddox        Communicable Diseases (eg STDs):     Last Reviewed on 2018 11:51 AM by Radha Maddox            Allergies:     Last Reviewed on 2018 02:45 PM by Milana Ramos    Pravastatin: (Adverse Reaction)    Crestor:    Niaspan: (Adverse Reaction)    Bentyl: confusion    Fentanyl: itching, shortness of breath (Adverse Reaction)    Zocor: pain (Adverse Reaction)    Morphine Sulfate:        Current Medications:     Last Reviewed on 2018 02:50 PM by Rachel  Milana    Buspirone HCl 15mg Tablet Take 1 tablet(s) by mouth bid     Cetirizine HCl 10mg Tablet 1 tab daily     Citalopram Hydrobromide 40mg Tablet 1 tab daily     Advair Diskus 250mcg/50mcg per 1blister Inhalation Powder Inhale 1 puff(s) bid     Spiriva HandiHaler 18mcg/1capsule Capsules for Inhalation Inhale contents of 1 capsule(s) by Rotahaler by mouth daily thru inhaler prn     Gabapentin 600mg Tablet 1 tab QID     Hydroxyzine HCl 25mg Tablet 1-2 tablets qhs     Restasis 0.05% Ophthalmic Emulsion Instill 1 drop(s) to each eye bid     Aspirin (ASA) 81mg Tablets, Enteric Coated Take 1 tablet(s) by mouth qam     Benazepril HCl 10mg Tablet Take 1 tablet(s) by mouth daily     Naprosyn 250mg Tablet 1 a day prn     Ranitidine 150mg Tablet 1 tab bid     Singulair  10mg Tablet Take 1 tablet(s) by mouth each evening     Synthroid 0.088mg Tablet Take 1 tablet(s) by mouth daily     Hydrocodone/Acetaminophen 7.5mg/325mg Tablet 1 tab PO QID prn pain     Docusate Sodium 100mg Capsules 1 bid     Restoril 30mg Capsules Take 1 capsule(s) by mouth at bedtime prn for insomnia     ProAir HFA 90mcg/1actuation Oral Inhaler 1 puff daily as needed     Livalo 1mg Tablet Take 1 tablet(s) by mouth daily         OBJECTIVE:        Vitals:         Current: 7/24/2018 2:43:14 PM    Ht:  5 ft, 3.75 in;  Wt: 132.5 lbs;  BMI: 22.9    T: 97.7 F (oral);  BP: 154/66 mm Hg (left arm, standing);  P: 66 bpm (left arm (BP Cuff), sitting);  sCr: 0.88 mg/dL;  GFR: 52.27        Exams:     PHYSICAL EXAM:     GENERAL: vital signs recorded - well developed, well nourished;  no apparent distress;     EYES: extraocular movements intact; conjunctiva and cornea are normal; PERRLA;     E/N/T: OROPHARYNX:  normal mucosa, dentition, gingiva, and posterior pharynx;     NECK: range of motion is normal; thyroid is non-palpable; supple, no LAD;     RESPIRATORY: CTA B WITHOUT WHEEZING/RALES OR RHONCHI, NORMAL RESP. EFFORT     CARDIOVASCULAR: normal rate; rhythm is  regular;  no systolic murmur; no edema;     MUSCULOSKELETAL: gait: affected by a limp and slowed;     NEUROLOGIC: mental status: alert and oriented x 3; cranial nerves II-XII grossly intact; Reflexes: knee jerks: 2+;     PSYCHIATRIC:  appropriate affect and demeanor; normal speech pattern; grossly normal memory;         Lab/Test Results:             Amphetamines Screen, Urin:  Negative (07/24/2018),     BAR-Barbiturates Screen, Urin:  Negative (07/24/2018),     Buprenorphine:  Negative (07/24/2018),     BZO-Benzodiazepines Screen,Ur:  Positive (07/24/2018),     Cocaine(Metab.)Screen, Ur:  Negative (07/24/2018),     MDMA-Ecstasy:  Negative (07/24/2018),     Met-Methamphetamine:  Negative (07/24/2018),     MTD-Methadone Screen, Urine:  Negative (07/24/2018),     Opiate Screen, Urine:  Positive (07/24/2018),     OXY-Oxycodone:  Negative (07/24/2018),     PCP-Phencyclidine Screen, Uri:  Negative (07/24/2018),     THC Cannabinoids Screen, Urin:  Negative (07/24/2018),     Urine temperature:  confirmed (07/24/2018),     Date and time of last pill:  Gabapentin 7-24-18 @ 2:30pm, Hydrocodone 7-24-18 @ 3:00pm (07/24/2018),     Performed by:   (07/24/2018),     Collection Time:  1533 (07/24/2018),             ASSESSMENT           724.2   M54.5  Low back pain              DDx:     719.45   M25.551  Hip pain              DDx:     V58.69   Z79.891   Z79.899  Patient visit for long term (current) use of other drugs              DDx:     244.8   E03.8  Acquired hypothyroidism              DDx:     272.0   E78.00  Hypercholesterolemia              DDx:     585.2   N18.2  Chronic renal insufficiency, stage 2              DDx:     401.1   I10  Hypertension              DDx:         ORDERS:         Meds Prescribed:       Refill of: Gabapentin 600mg Tablet 1 tab QID  #120 (One [place] and Twenty) tablet(s) Refills: 2       Refill of: Aspirin (ASA) 81mg Tablets, Enteric Coated Take 1 tablet(s) by mouth qam  #100 (One [place]) tablet(s)  Refills: 1       Refill of: Benazepril HCl 10mg Tablet Take 1 tablet(s) by mouth daily  #90 (Ninety) tablet(s) Refills: 1       Refill of: Ranitidine 150mg Tablet 1 tab bid  #180 (One [place] and [place]) tablet(s) Refills: 1       Refill of: Singulair  (Montelukast Sodium) 10mg Tablet Take 1 tablet(s) by mouth each evening  #90 (Ninety) tablet(s) Refills: 1       Refill of: Synthroid (Levothyroxine Sodium) 0.088mg Tablet Take 1 tablet(s) by mouth daily  #90 (Ninety) tablet(s) Refills: 1       Refill of: Livalo (Pitavastatin) 1mg Tablet Take 1 tablet(s) by mouth daily  #90 (Ninety) tablet(s) Refills: 1         Lab Orders:       [id number]  Drug test prsmv read direct optical obs pr date  (In-House)         26365  TSH - [facility] TSH  (Send-Out)         50391  LPDP [facility] Lipid Panel  (Send-Out)         66044  NEURU [facility] [place] CONFIRMATION  (Send-Out)                   PLAN:          Low back pain stable on meds, didnt tolerate long acting pain meds           Prescriptions:       Refill of: Gabapentin 600mg Tablet 1 tab QID  #120 (One [place] [address] Twenty) tablet(s) Refills: 2       Refill of: Aspirin (ASA) 81mg Tablets, Enteric Coated Take 1 tablet(s) by mouth qam  #100 (One [place]) tablet(s) Refills: 1       Refill of: Benazepril HCl 10mg Tablet Take 1 tablet(s) by mouth daily  #90 (Ninety) tablet(s) Refills: 1       Refill of: Ranitidine 150mg Tablet 1 tab bid  #180 (One [place] [address]) tablet(s) Refills: 1       Refill of: Singulair  (Montelukast Sodium) 10mg Tablet Take 1 tablet(s) by mouth each evening  #90 (Ninety) tablet(s) Refills: 1       Refill of: Synthroid (Levothyroxine Sodium) 0.088mg Tablet Take 1 tablet(s) by mouth daily  #90 (Ninety) tablet(s) Refills: 1       Refill of: Livalo (Pitavastatin) 1mg Tablet Take 1 tablet(s) by mouth daily  #90 (Ninety) tablet(s) Refills: 1          Hip pain  stable on meds          Patient visit for long term (current) use of other drugs     LABORATORY:  Labs ordered to be  performed today include Drug Screen Urine [facility] Confirmation [place].      RECOMMENDATIONS given include: darline reviewed, drug screen performed and appropriate, consent is reviewed and signed and on the chart, she is aware of risk of addiction on this medication and understands that she will need to follow up for a review every 3 months and her medications will be adjusted or decreased as deemed appropriate at each visit.  No personal history of drug or alcohol abuse.  No concerns about diversion or abuse.  She denies side effects related to the medication.  She is  aware that she may be called in for pill counts..            Orders:       [id number]  Drug test prsmv read direct optical obs pr date  (In-House)         36115  NEURU - [facility] [place] CONFIRMATION  (Send-Out)            Acquired hypothyroidism due for labs, stable on meds     LABORATORY:  Labs ordered to be performed today include TSH.            Orders:       [id number]  TSH - [facility] TSH  (Send-Out)            Hypercholesterolemia due for labs, stable on meds     LABORATORY:  Labs ordered to be performed today include lipid panel.            Orders:       [id number]  LPDP - [facility] Lipid Panel  (Send-Out)            Chronic renal insufficiency, stage 2 stable, CMP normal Cr in June 20 2018          Hypertension stable on meds             CHARGE CAPTURE           **Please note: ICD descriptions below are intended for billing purposes only and may not represent clinical diagnoses**        Primary Diagnosis:         724.2 Low back pain            M54.5    Low back pain              Orders:          [id number]   Office/outpatient visit; established patient, level 4  (In-House)           719.45 Hip pain            M25.551    Pain in right hip    V58.69 Patient visit for long term (current) use of other drugs            Z79.891    Long term (current) use of opiate analgesic           Z79.899    Other long term (current) drug therapy              Orders:          [id number]   Drug test  prsmv read direct optical obs pr date  (In-House)           244.8 Acquired hypothyroidism            E03.8    Other specified hypothyroidism    272.0 Hypercholesterolemia            E78.00    Pure hypercholesterolemia, unspecified    585.2 Chronic renal insufficiency, stage 2            N18.2    Chronic kidney disease, stage 2 (mild)    401.1 Hypertension            I10    Essential (primary) hypertension

## 2021-05-18 NOTE — PROGRESS NOTES
Amada Bentley  1945     Office/Outpatient Visit    Visit Date: Tue, Sep 8, 2020 01:06 pm    Provider: Osmar Morse MD (Assistant: Meryl Sidhu MA)    Location: [facility]        Electronically signed by Osmar Morse MD on  09/09/2020 06:21:57 AM                             Subjective:        CC: 'I was pooping myself to death'    HPI:       Amada Plaza is in today for evaluation of diarrhea.  She has had loose stool for the last couple of weeks.  She has not had this today, but her appetite has not been good.  She has been having several bowel movements per day.  She says that her stool is fairly normal, but the stool itself is smaller.  She says that it will splatter.  She has not been passing blood.  She has had some irritation in the area though.  She has felt pretty weak.  She did sleep quite a bit yesterday afternoon.  She wanted to be seen as a precaution.  She is not aware of any exposure to COVID-19.  She is not having any fever or chills, but she has been coughing some.  She did stop smoking and says that she is 'coughing that crappy stuff up'.  Amada Plaza has not recently been taking antibiotics.  She is not aware of history of C. diff previously.  It is of note that she has had a weight loss of over 20 pounds this calendar year.  She has not had recent colonoscopy.          PHQ-9 Depression Screening: Completed form scanned and in chart; Total Score 10     ROS:     CONSTITUTIONAL:  Negative for chills and fever.      CARDIOVASCULAR:  Negative for chest pain and palpitations.      RESPIRATORY:  Positive for chronic cough.   Negative for recent cough or dyspnea.      GASTROINTESTINAL:  Positive for abdominal pain ( epigastric ).   Negative for nausea or vomiting.      GENITOURINARY:  Negative for dysuria and hematuria.      INTEGUMENTARY/BREAST:  Negative for atypical mole(s) and rash.          Past Medical History / Family History / Social History:         Last Reviewed on  2020 01:19 PM by Osmar Morse    Past Medical History:                 PAST MEDICAL HISTORY         COPD     hiatal hernia     Pernicious Anemia     Depression: with loss of daughter.;     LBP, nicotine dependencs, HTN, hypothyroid, anxiety, hypercholesterolemia         GYNECOLOGICAL HISTORY:             CURRENT MEDICAL PROVIDERS:    Gastroenterologist: MANI    Ophthalmologist: KACI    Orthopedist: CELIA    Urologist: BRITTNEE         PREVENTIVE HEALTH MAINTENANCE             BONE DENSITY: was last done  with normal results     COLORECTAL CANCER SCREENING: declines colorectal cancer screening, understands reason for testing; 3-18-03     EYE EXAM: was last done 3/2019     MAMMOGRAM: Done within last 2 years and results in are chart was last done 2019 with normal results     PAP SMEAR: was last done Hysterectomy         PAST MEDICAL HISTORY                 ADVANCED DIRECTIVES: None         Surgical History: Abnormal mammogram in March s/p breast bx-everything was ok  2007         Cataract Removal: bilateral;     Appendectomy    Cholecystectomy: laparoscopic;     Hysterectomy: , ;     Foot surgery on both feet secondary bone spurs;     right ear surgery;         Family History:         Positive for Coronary Artery Disease, Hyperlipidemia ( mother ), Hypertension ( mother; brother; sister ), Myocardial Infarction ( mother; brother; sister ) and Sudden Cardiac Death ( brother ).      Positive for Breast Cancer ( daughter ) and Lung Cancer ( father -- , no h/o smoking ).      Positive for COPD ( father -- emphysema; brother ).      Positive for Renal Failure ( niece ).      Positive for Type 1 Diabetes ( mother; brother; sister ).      Son(s): 1 ;   from MVA     Daughter(s): 1 ;  Breast Cancer         Social History:     Occupation: Unemployed     Marital Status:      Children: 3 children         Tobacco/Alcohol/Supplements:     Last  Reviewed on 9/08/2020 01:19 PM by Osmar Morse    Tobacco: She has a past history of cigarette smoking; quit date:  aug, 27th 2020.  Non-drinker         Substance Abuse History:     Last Reviewed on 9/08/2020 01:19 PM by Osmar Morse        Mental Health History:     Last Reviewed on 9/08/2020 01:19 PM by Osmar Morse        Communicable Diseases (eg STDs):     Last Reviewed on 9/08/2020 01:19 PM by Osmar Morse        Current Problems:     Last Reviewed on 9/08/2020 01:19 PM by Osmar Morse    Other specified hypothyroidism    Low back pain    Essential (primary) hypertension    Pure hypercholesterolemia    Long term (current) use of opiate analgesic    History of falling    Generalized anxiety disorder    Chronic obstructive pulmonary disease with (acute) exacerbation    Constipation, unspecified    Other long term (current) drug therapy    Gastro-esophageal reflux disease without esophagitis    Disorientation, unspecified    Major depressive disorder, recurrent, moderate    Pain in right hip    Primary insomnia    Pure hypercholesterolemia, unspecified    Other specified disorders of kidney and ureter    Cardiac murmur, unspecified    Chronic kidney disease, stage 2 (mild)    Encounter for screening mammogram for malignant neoplasm of breast    Unspecified fall, initial encounter    Cellulitis of right upper limb    Nicotine dependence, other tobacco product, uncomplicated    Diarrhea, unspecified    Encounter for follow-up examination after completed treatment for conditions other than malignant neoplasm    Contusion of left upper arm, subsequent encounter    Muscle weakness (generalized)    Other insomnia    Hypokalemia    Influenza due to identified novel influenza A virus with pneumonia    Abrasion of scalp, initial encounter    Other allergy, sequela    Other pneumonia, unspecified organism    Influenza due to other identified influenza virus with unspecified  type of pneumonia    Acute nasopharyngitis [common cold]    Candidal stomatitis    Shortness of breath    Effusion, right foot    Effusion, left foot    Encounter for screening for depression    Localized swelling, mass and lump, right lower limb    Cellulitis of right lower limb    Encounter for other screening for malignant neoplasm of breast    Pruritus, unspecified    Nicotine dependence, unspecified, uncomplicated    Pain in left shoulder    Pain in right knee    Pain in left knee    Other allergy, subsequent encounter        Immunizations:     Prevnar 13 (Pneumococcal PCV 13) 11/3/2015    Fluzone (3 + years dose) 10/22/2008    Fluzone (3 + years dose) 9/23/2011    Fluzone High-Dose pf (>=65 yr) 10/23/2013    Fluzone High-Dose pf (>=65 yr) 11/3/2015    Fluzone High-Dose pf (>=65 yr) 10/17/2016    Fluzone High-Dose pf (>=65 yr) 10/30/2018    Fluzone High-Dose pf (>=65 yr) 10/22/2019    Influenza High-Dose Virus Vaccine pf (>=65 yr) 11/1/2017    PNEUMOVAX 23 (Pneumococcal PPV23) 1/4/2018    Zostavax (Zoster live) 10/2/2014        Allergies:     Last Reviewed on 9/08/2020 01:19 PM by Osmar Morse    Pravastatin:   (Adverse Reaction)    Crestor:      Niaspan:   (Adverse Reaction)    Bentyl: Confusion     Fentanyl: itching,shortness of breath  (Adverse Reaction)    TAPE:      BAND AID:      Zocor: pain  (Adverse Reaction)    Morphine Sulfate:          Current Medications:     Last Reviewed on 9/08/2020 01:19 PM by Osmar Morse    ProAir HFA     Gabapentin 600 mg oral tablet [1 po tid]    HYDROcodone-acetaminophen 7.5-325 mg oral tablet [One PO TID PRN]    cetirizine 10 mg oral tablet [1 tab daily]    Singulair  10 mg oral tablet [Take 1 tablet(s) by mouth each evening]    busPIRone 15 mg oral tablet [1 PO BID]    Restasis 0.05% Ophthalmic Emulsion [Instill 1 drop(s) to each eye bid]    Synthroid 88 mcg oral tablet [Take 1 tablet(s) by mouth daily]    metoprolol succinate 100 mg oral Tablet,  Extended Release 24 hr [take 1 tablet (100 mg) by oral route once daily]    traZODone 50 mg oral tablet [take 1 tablet (50 mg) by oral route every hs]    omeprazole 20 mg oral capsule,delayed release (enteric coated) [take 1 capsule (20 mg) by oral route once daily, try to take it QOD]    losartan 100 mg oral tablet [take 1 tablet (100 mg) by oral route once daily]    Breo Ellipta 200-25 mcg/dose Inhalation Blister, With Inhalation Device [inhale 1 puff by inhalation route once daily at the same time each day]    INCRUSE ELPT INH 62.5MCG     umeclidinium 62.5 mcg/actuation Inhalation Blister, With Inhalation Device [inhale 1 puff (62.5 mcg) by inhalation route once daily at the same time each day]    amLODIPine 10 mg oral tablet [take 1 tablet (10 mg) by oral route once daily]    Lasix 40 mg oral tablet [take 1 tablet (40 mg) by oral route daily]    potassium chloride 20 mEq oral tablet, extended release [take 1 tablet (20 meq) by oral route once a day]    DULoxetine 60 mg oral capsule,delayed release (enteric coated) [take 1 capsule (60 mg) by oral route once daily]    Glucosamine Chondroitin         Objective:        Vitals:         Current: 9/8/2020 1:15:18 PM    Ht:  5 ft, 2.25 in;  Wt: 122 lbs;  BMI: 22.1T: 96.9 F (temporal);  BP: 138/64 mm Hg (right arm, sitting);  P: 109 bpm (right arm (BP Cuff), sitting);  sCr: 1.36 mg/dL;  GFR: 31.15        Exams:     PHYSICAL EXAM:     GENERAL: vital signs recorded - well developed, well nourished;  no apparent distress;     EYES: extraocular movements intact; conjunctiva and cornea are normal; PERRL;     E/N/T:  normal EACs, TMs, nasal/oral mucosa, teeth, gingiva, and oropharynx;     NECK: range of motion is normal; thyroid is non-palpable;     RESPIRATORY: normal respiratory rate and pattern with no distress; normal breath sounds with no rales, rhonchi, wheezes or rubs;     CARDIOVASCULAR: normal rate; rhythm is regular;  no systolic murmur; no edema;      GASTROINTESTINAL: nontender; normal bowel sounds; no organomegaly;     LYMPHATIC: no enlargement of cervical or facial nodes; no supraclavicular nodes;     BREAST/INTEGUMENT: SKIN: no significant rashes or lesions; no suspicious moles;     NEUROLOGIC: mental status: alert and oriented x 3; cranial nerves II-XII grossly intact;     PSYCHIATRIC: appropriate affect and demeanor; normal psychomotor function;         Assessment:         R19.7   Diarrhea, unspecified       E03.8   Other specified hypothyroidism       I10   Essential (primary) hypertension       R05   Cough       R63.4   Abnormal weight loss           ORDERS:         Radiology/Test Orders:       [id number]  Radiologic exam chest 2 views  (Send-Out)            3017F  Colorectal CA screen results documented and reviewed (PV)  (In-House)              Lab Orders:       [id number]  BDCB2 - [facility] CBC w/o diff  (Send-Out)            77888  SED - [facility] Sedimentation rate, non-automated ESR  (Send-Out)            33985  TSH - [facility] TSH  (Send-Out)            39395  COMP - [facility] Comp. Metabolic Panel  (Send-Out)            33160  CDTEX - [facility] Clostridium Difficile Toxins A & B; dna probe  (Send-Out)            54675  STLC - [facility] Stool Culture  (Send-Out)            44136  LACFE - [facility] White Blood Cells (WBC), stool  (Send-Out)            02418  GIACR - [facility] Giardia/Cryptosporidium detection  (Send-Out)              Other Orders:         Depression screen positive and follow up plan documented  (In-House)            1100F  Pt screen for fall risk; document 2+ falls in the past yr or any fall w/injury in past year (NATASHA)  (In-House)            1124F  Advance Care Planning discussed and doc in MR; no surrogate named or advance care plan provided  (Send-Out)                      Plan:         Diarrhea, unspecified    LABORATORY:  Labs ordered to be performed today include CBC W/O DIFF, Diarrhea testing C diff Stool culture WBC Stool, ESR, and Stool Giardia\Crypto.      RECOMMENDATIONS  given include: Today, we have reviewed Amada Plaza's situation.  Her history does not sound like COVID to me.  I am going to recommend additional testing as noted below.  I am concerned with the change in her stool and with the weight loss that she has had this year.  We will see what this testing shows and then consider how to move forward.  We discussed COVID testing, but she does not have obvious symptoms that would suggest that.  I'm more concerned about the potential for her to have an occult malignancy or other issue present..  MIPS PHQ-9 Depression Screening: Completed form scanned and in chart; Total Score 10 Positive Depression Screen: Stable on medications. No suicidal ideation.            Orders:       [id number]  BDCB2 - [facility] CBC w/o diff  (Send-Out)            12421  SED - [facility] Sedimentation rate, non-automated ESR  (Send-Out)            82053  CDTEX - [facility] Clostridium Difficile Toxins A & B; dna probe  (Send-Out)            05748  STLC - [facility] Stool Culture  (Send-Out)            54383  LACFE - [facility] White Blood Cells (WBC), stool  (Send-Out)            44799  GIACR - [facility] Giardia/Cryptosporidium detection  (Send-Out)              Depression screen positive and follow up plan documented  (In-House)            1100F  Pt screen for fall risk; document 2+ falls in the past yr or any fall w/injury in past year (NATASHA)  (In-House)            3017F  Colorectal CA screen results documented and reviewed (PV)  (In-House)            1124F  Advance Care Planning discussed and doc in MR; no surrogate named or advance care plan provided  (Send-Out)              Other specified hypothyroidism    LABORATORY:  Labs ordered to be performed today include TSH.            Orders:       [id number]  TSH - [facility] TSH  (Send-Out)              Essential (primary) hypertension    LABORATORY:  Labs ordered to be performed today include Comprehensive metabolic panel.            Orders:       [id number]  COMP - [facility] Comp. Metabolic Panel  (Send-Out)               Cough        RADIOLOGY:  I have ordered a chest x-ray (PA and lateral) to be done today.            Orders:       [id number]  Radiologic exam chest 2 views  (Send-Out)              Abnormal weight lossAs above.            Charge Capture:         Primary Diagnosis:     R19.7  Diarrhea, unspecified           Orders:      [id number]  Office/outpatient visit; established patient, level 4  (In-House)              Depression screen positive and follow up plan documented  (In-House)            1100F  Pt screen for fall risk; document 2+ falls in the past yr or any fall w/injury in past year (NATASHA)  (In-House)            3017F  Colorectal CA screen results documented and reviewed (PV)  (In-House)              E03.8  Other specified hypothyroidism     I10  Essential (primary) hypertension     R05  Cough     R63.4  Abnormal weight loss

## 2021-05-18 NOTE — PROGRESS NOTES
Amada Bentley  1945     Office/Outpatient Visit    Visit Date: Tue, Apr 28, 2020 12:08 pm    Provider: Maricarmen Harrell MD (Assistant: Jax Dan, )    Location: [facility]        Electronically signed by Maricarmen Harrell MD on  05/04/2020 02:43:04 PM                             Subjective:        CC: (NOT TAKING SPIRIVA)PHONE CALL [phone] follow up, med refills    HPI:           Patient presents with other specified hypothyroidism.  She is currently taking Synthroid, 88 mcg daily.  TSH was last checked 6 months ago.  The result was reported as normal.  She denies any related symptoms.  She reports no symptoms suggestive of adverse medication effect.            With regard to the essential (primary) hypertension, her current cardiac medication regimen includes an ACE inhibitor.  Compliance with treatment has been good; she takes her medication as directed, maintains her diet and exercise regimen, and follows up as directed.  She is tolerating the medication well without side effects.  Amada Plaza does not check her blood pressure other than at her clinic appointments.         Amada Plaza presents with c/o right lower extremity swelling. She reports that this has been going on for the past 1-2 months. She saw Dr Harrell on 4/1/2020 and had complained of some swelling at that time. Venous doppler was performed which was negative for DVT. She denies increased shortness of breath. She notes that it it worse at the end of the day and is tender.  She saw Kanika Linton who treated her for cellulitis and she was on doxycyline and her leg is better but she still has edema of the foot with moderate itching.      ROS:     CONSTITUTIONAL:  Negative for chills and fever.      CARDIOVASCULAR:  Negative for chest pain, dizziness and palpitations.      RESPIRATORY:  Positive for chronic cough.   Negative for dyspnea or frequent wheezing.      GASTROINTESTINAL:  Negative for abdominal pain, constipation, diarrhea,  hematochezia, melena, nausea and vomiting.      INTEGUMENTARY/BREAST:  Negative for rash.      NEUROLOGICAL:  Negative for dizziness, headaches and memory loss.      PSYCHIATRIC:  Negative for crying spells, feelings of stress, mood swings, sadness and suicidal thoughts.          Past Medical History / Family History / Social History:         Last Reviewed on 2020 01:39 PM by Maricarmen Harrell    Past Medical History:                 PAST MEDICAL HISTORY         COPD     hiatal hernia     Pernicious Anemia     Depression: with loss of daughter.;         GYNECOLOGICAL HISTORY:             CURRENT MEDICAL PROVIDERS:    Gastroenterologist: MANI    Ophthalmologist: KACI    Orthopedist: CELIA    Urologist: BRITTNEE         PREVENTIVE HEALTH MAINTENANCE             BONE DENSITY: was last done  with normal results     COLORECTAL CANCER SCREENING: declines colorectal cancer screening, understands reason for testing; 3-18-03     EYE EXAM: was last done 3/2019     MAMMOGRAM: Done within last 2 years and results in are chart was last done 2019 with normal results     PAP SMEAR: was last done Hysterectomy         PAST MEDICAL HISTORY                 ADVANCED DIRECTIVES: None         Surgical History: Abnormal mammogram in March s/p breast bx-everything was ok  2007         Cataract Removal: bilateral;     Appendectomy    Cholecystectomy: laparoscopic;     Hysterectomy: , ;     Foot surgery on both feet secondary bone spurs;     right ear surgery;         Family History:         Positive for Coronary Artery Disease, Hyperlipidemia ( mother ), Hypertension ( mother; brother; sister ), Myocardial Infarction ( mother; brother; sister ) and Sudden Cardiac Death ( brother ).      Positive for Breast Cancer ( daughter ) and Lung Cancer ( father -- , no h/o smoking ).      Positive for COPD ( father -- emphysema; brother ).      Positive for Renal Failure ( niece ).      Positive for Type  1 Diabetes ( mother; brother; sister ).      Son(s): 1 ;   from MVA     Daughter(s): 1 ;  Breast Cancer         Social History:     Occupation: Unemployed     Marital Status:      Children: 3 children         Tobacco/Alcohol/Supplements:     Last Reviewed on 2020 12:09 PM by Jax Dan    Tobacco: Current Smoker: She currently smokes every day, 1/2 pack per day.  Non-drinker         Substance Abuse History:     Last Reviewed on 2018 11:51 AM by Radha Maddox        Mental Health History:     Last Reviewed on 2018 11:51 AM by Radha Maddox        Communicable Diseases (eg STDs):     Last Reviewed on 2018 11:51 AM by Radha Maddox        Allergies:     Last Reviewed on 2020 01:02 PM by Anne Marie Saldana    Pravastatin:   (Adverse Reaction)    Crestor:      Niaspan:   (Adverse Reaction)    Bentyl: Confusion     Fentanyl: itching,shortness of breath  (Adverse Reaction)    TAPE:      BAND AID:      Zocor: pain  (Adverse Reaction)    Morphine Sulfate:          Current Medications:     Last Reviewed on 2020 01:02 PM by Anne Marie Saldana    Gabapentin 600 mg oral tablet [1 po tid]    Spiriva with HandiHaler 18 mcg Inhalation Capsule, With Inhalation Device [Inhale contents of 1 capsule(s) by inhaler device by mouth once daily thru inhaler.]    cetirizine 10 mg oral tablet [1 tab daily]    HYDROcodone-acetaminophen 7.5-325 mg oral tablet [One PO TID PRN]    busPIRone 15 mg oral tablet [1 PO BID]    Singulair  10 mg oral tablet [Take 1 tablet(s) by mouth each evening]    Restasis 0.05% Ophthalmic Emulsion [Instill 1 drop(s) to each eye bid]    Synthroid 88 mcg oral tablet [Take 1 tablet(s) by mouth daily]    ProAir HFA 90mcg/1actuation Oral Inhaler [1 puff daily as needed]    Paroxetine HCl 10 mg oral tablet [1 tab daily ]    metoprolol succinate 100 mg oral Tablet, Extended Release 24 hr [take 1 tablet (100 mg) by oral route once daily]    traZODone 50  mg oral tablet [take 1 tablet (50 mg) by oral route every hs]    mupirocin 2 % Topical Ointment [apply a small amount to the affected area by topical route 3 times per day]    fluticasone propionate 50 mcg/actuation Intranasal Spray, Suspension [inhale 1 spray (50 mcg) in each nostril by intranasal route 2 times per day]    omeprazole 20 mg oral capsule,delayed release (enteric coated) [take 1 capsule (20 mg) by oral route once daily]    amLODIPine 5 mg oral tablet [take 1 tablet (5 mg) by oral route once daily]    losartan 100 mg oral tablet [take 1 tablet (100 mg) by oral route once daily]    Breo Ellipta 200-25 mcg/dose Inhalation Blister, With Inhalation Device [inhale 1 puff by inhalation route once daily at the same time each day]    nystatin 100,000 unit/mL oral Suspension [take 10 milliliters (400,000 unit) by oral route 4 times per day]    hydroCHLOROthiazide 12.5 mg oral tablet [take 1 tablet (12.5 mg) by oral route 1 time per day]    umeclidinium 62.5 mcg/actuation Inhalation Blister, With Inhalation Device [inhale 1 puff (62.5 mcg) by inhalation route once daily at the same time each day]    doxycycline monohydrate 100 mg oral tablet [take 1 tablet (100 mg) by oral route 2 times per day]        Objective:        Vitals:         Current: 4/28/2020 1:47:37 PM    Ht:  5 ft, 2.25 inT: 98 F (oral);  BP: 139/64 mm Hg (left arm, sitting);  R: 18 bpm;  sCr: 0.92 mg/dL;  GFR: 51.26        Exams:     PHYSICAL EXAM:     GENERAL: anxious;     NEUROLOGIC: mental status: oriented to person, place, and time;     PSYCHIATRIC: affect/demeanor: anxious;  normal thought and perception;         Lab/Test Results:         Creatinine, Serum: 0.92 (mg/dl) (01/15/2020),     Glom Filt Rate, Est: >60 (ml/min/1.73m2) (01/15/2020),     Alkaline Phosphatase, Serum: 60 (U/L) (01/15/2020),     ALT (SGPT): 10 (U/L) (01/15/2020),     AST (SGOT): 8 (U/L) (01/15/2020),     Hematocrit: 36.0 (%) (01/15/2020),     TSH: 1.640 (mIU/L)  (10/22/2019),             Assessment:         E03.8   Other specified hypothyroidism       I10   Essential (primary) hypertension       M54.5   Low back pain       Z79.891   Long term (current) use of opiate analgesic       L03.115   Cellulitis of right lower limb       L29.9   Pruritus, unspecified           ORDERS:         Meds Prescribed:       [Recorded] sulfamethoxazole-trimethoprim 800-160 mg oral tablet [take 1 tablet by oral route 4 times per day]       [Refilled] hydroCHLOROthiazide 12.5 mg oral tablet [take 1 tablet (12.5 mg) by oral route 1 time per day], #30 (thirty) tablets, Refills: 0 (zero)       [Refilled] cetirizine 10 mg oral tablet [1 tab daily], #90 (ninety) tablets, Refills: 0 (zero)       [Refilled] omeprazole 20 mg oral capsule,delayed release (enteric coated) [take 1 capsule (20 mg) by oral route once daily], #90 (ninety) capsules, Refills: 0 (zero)       [Recorded] clobetasoL 0.05 % Topical Cream [apply a thin layer to right LE by topical route 2 times per day]       [Refilled] clobetasoL 0.05 % Topical Cream [apply a thin layer to right LE by topical route 2 times per day], #30 (thirty) grams, Refills: 0 (zero)       [Refilled] sulfamethoxazole-trimethoprim 800-160 mg oral tablet [take 1 tablet by oral route 2 times per day], #14 (fourteen) tablets, Refills: 0 (zero)         Lab Orders:       [id number]  TSH - [facility] TSH  (Send-Out)            [id number]  COMP - [facility] Comp. Metabolic Panel  (Send-Out)            28040  Drug test prsmv read direct optical obs pr date  (Send-Out)                      Plan:         Other specified hypothyroidism    LABORATORY:  Labs ordered to be performed today include TSH.  Telehealth: Verbal consent obtained for visit to occur via phone call; Staff, other than provider, present during telephone visit include DAVID Sheth; Total time spent was 14 minutes; [id number]--[id number] E/M 11-20 minutes           Orders:       [id number]  TSH - [facility] TSH  (Send-Out)              Essential  (primary) hypertensionmildly elevated due to her recent infection, she will continue to check her BP's at home and call if >140/90        Low back painchronic, today is her F2 F, no signs of abuse or diversion        Long term (current) use of opiate analgesicon chronic pain pills for her LBP, she takes 1-3 X a day, more recently with her foot pain.          RECOMMENDATIONS given include: darline reviewed, drug screen not performed due to COVID pandemic, consent is reviewed and signed and on the chart, she is aware of risk of addiction on this medication and understands that she will need to follow up for a review every 3 months and her medications will be adjusted or decreased as deemed appropriate at each visit.  No personal history of drug or alcohol abuse.  No concerns about diversion or abuse.  She denies side effects related to the medication.  She is  aware that she may be called in for pill counts..            Orders:       [id number]  Drug test prsmv read direct optical obs pr date  (Send-Out)              Cellulitis of right lower limb          Prescriptions:       [Recorded] sulfamethoxazole-trimethoprim 800-160 mg oral tablet [take 1 tablet by oral route 4 times per day]       [Refilled] hydroCHLOROthiazide 12.5 mg oral tablet [take 1 tablet (12.5 mg) by oral route 1 time per day], #30 (thirty) tablets, Refills: 0 (zero)       [Refilled] cetirizine 10 mg oral tablet [1 tab daily], #90 (ninety) tablets, Refills: 0 (zero)       [Refilled] omeprazole 20 mg oral capsule,delayed release (enteric coated) [take 1 capsule (20 mg) by oral route once daily], #90 (ninety) capsules, Refills: 0 (zero)       [Recorded] clobetasoL 0.05 % Topical Cream [apply a thin layer to right LE by topical route 2 times per day]       [Refilled] clobetasoL 0.05 % Topical Cream [apply a thin layer to right LE by topical route 2 times per day], #30 (thirty) grams, Refills: 0 (zero)       [Refilled] sulfamethoxazole-trimethoprim 800-160 mg  oral tablet [take 1 tablet by oral route 2 times per day], #14 (fourteen) tablets, Refills: 0 (zero)         Pruritus, unspecifiedacute itching all over, I will check her liver function, she is to use a perfume and dye free detergents and soaps, start aveeno lotion    LABORATORY:  Labs ordered to be performed today include Comprehensive metabolic panel.            Orders:       [id number]  COMP - [facility] Comp. Metabolic Panel  (Send-Out)                  Charge Capture:         Primary Diagnosis:     E03.8  Other specified hypothyroidism           Orders:      [id number]  Phys/QHP telephone evaluation 11-20 minutes  (In-House)              I10  Essential (primary) hypertension     M54.5  Low back pain     Z79.891  Long term (current) use of opiate analgesic     L03.115  Cellulitis of right lower limb     L29.9  Pruritus, unspecified

## 2021-05-18 NOTE — PROGRESS NOTES
Amada BentleyDimitri 1945     Office/Outpatient Visit    Visit Date: Tue, Oct 30, 2018 11:43 am    Provider: Maricarmen Harrell MD (Assistant: Tomeka Thorne)    Location: [facility]        Electronically signed by Maricarmen Harrell MD on  10/31/2018 10:32:00 AM                             SUBJECTIVE:        CC: LBP face to face         HPI:     Denae is in today for her chronic back pain and insomnia, this is her 3 month face to face for her chroni  pain meds hydrocodone and gabapentin for her R hip pain/LBP with radiculopathy to R LE.   MRI showed severe canal stenosis and bilateral foraminal narrowing. She has seen neurosurgeon Dr Swartz and s/p back surgery of L spine in past and told her she needed surgery again and she is not willing at this time and she is scared about the major surgery involving rods.  She is functional on gabapentin and hydrocodone but having confusion so I am going to attempt to wean her off these meds.  No sign of diversion or abuse.         Dx with acquired hypothyroidism; she is currently taking Synthroid, 88 mcg daily.  TSH was last checked 6 months ago.  The result was reported as normal.  She denies any related symptoms.  She reports no symptoms suggestive of adverse medication effect.          Additionally, she presents with history of hypercholesterolemia.  date of diagnosis 2005.  Current treatment includes OFF COLESTIPOL due to constipation and a low cholesterol/low fat diet.  Compliance with treatment has been good; she takes her medication as directed, maintains her low cholesterol diet, and follows up as directed.  She reports muscle pain.  Most recent lab tests include Creatinine, Serum:  0.88 (mg/dl) (06/20/2018), Glom Filt Rate, Est:  >60 (ml/min/1.73m2) (06/20/2018), TSH:  1.710 (mIU/L) (07/24/2018), Total Cholesterol:  195 (mg/dL) (07/24/2018), HDL:  67 (mg/dL) (07/24/2018), Triglycerides:  138 (mg/dL) (07/24/2018), LDL:  100 (mg/dL) (07/24/2018), Alkaline  Phosphatase, Serum:  57 (U/L) (2018), ALT (SGPT):  8 (U/L) (2018), AST (SGOT):  8 (U/L) (2018).          Concerning hypertension, her current cardiac medication regimen includes an ACE inhibitor.  Amada Plaza does not check her blood pressure other than at her clinic appointments.  She is tolerating the medication well without side effects.  Compliance with treatment has been good; she takes her medication as directed, maintains her diet and exercise regimen, and follows up as directed.      ROS:     CONSTITUTIONAL:  Negative for chills and fever.      EYES:  Negative for blurred vision.      CARDIOVASCULAR:  Negative for chest pain, palpitations, tachycardia, orthopnea, and edema.      RESPIRATORY:  Negative for dyspnea and frequent wheezing.      GASTROINTESTINAL:  Positive for constipation ( better back on colace ).   Negative for abdominal pain, diarrhea, nausea or vomiting.      INTEGUMENTARY/BREAST:  Negative for rash.      NEUROLOGICAL:  Positive for headaches.   Negative for memory loss, paresthesias, seizures or weakness.      PSYCHIATRIC:  Negative for anxiety, crying spells and feelings of stress.          PMH/FMH/SH:     Last Reviewed on 10/30/2018 12:33 PM by Maricarmen Harrell    Past Medical History:                 PAST MEDICAL HISTORY         COPD     hiatal hernia     2 bulding disc and a pinched nerve     Pernicious Anemia     Depression: with loss of daughter.;         GYNECOLOGICAL HISTORY:             PREVENTIVE HEALTH MAINTENANCE             BONE DENSITY: was last done  with normal results     COLORECTAL CANCER SCREENING: declines colorectal cancer screening, understands reason for testing; 3-18-03     EYE EXAM: was last done summer 2017     MAMMOGRAM: Done within last 2 years and results in are chart was last done 1-10-18 with normal results         PAST MEDICAL HISTORY                 ADVANCED DIRECTIVES: None         Surgical History: Abnormal mammogram in March s/p  breast bx-everything was ok  2007         Cataract Removal: bilateral;      Appendectomy    Cholecystectomy: laparoscopic;     Hysterectomy: , ;     Foot surgery on both feet secondary bone spurs;      right ear surgery;         Family History:         Positive for Coronary Artery Disease, Hyperlipidemia ( mother ), Hypertension ( mother; brother; sister ), Myocardial Infarction ( mother; brother; sister ) and Sudden Cardiac Death ( brother ).      Positive for Breast Cancer ( daughter ) and Lung Cancer ( father -- , no h/o smoking ).      Positive for COPD ( father -- emphysema; brother ).      Positive for Renal Failure ( niece ).      Positive for Type 1 Diabetes ( mother; brother; sister ).      Son(s): 1 ;   from MVA     Daughter(s): 1 ;  Breast Cancer         Social History:     Occupation: Unemployed     Marital Status:      Children: 3 children         Tobacco/Alcohol/Supplements:     Last Reviewed on 10/30/2018 12:33 PM by Maricarmen Harrell    Tobacco: Current Smoker: She currently smokes every day, 1 pack per day; 1-1/2 packs per day.  Non-drinker         Substance Abuse History:     Last Reviewed on 2018 11:51 AM by Radha Maddox        Mental Health History:     Last Reviewed on 2018 11:51 AM by Radha Maddox        Communicable Diseases (eg STDs):     Last Reviewed on 2018 11:51 AM by Radha Maddox            Allergies:     Last Reviewed on 10/30/2018 11:44 AM by Tomeka Thorne    Pravastatin: (Adverse Reaction)    Crestor:    Niaspan: (Adverse Reaction)    Bentyl: confusion    Fentanyl: itching, shortness of breath (Adverse Reaction)    Zocor: pain (Adverse Reaction)    Morphine Sulfate:        Current Medications:     Last Reviewed on 10/30/2018 11:53 AM by Tomeka Thorne    Buspirone HCl 15mg Tablet Take 1 tablet(s) by mouth bid     Cetirizine HCl 10mg Tablet 1 tab daily     Citalopram Hydrobromide 40mg Tablet 1  tab daily     Docusate Sodium 100mg Capsules 1 bid     Spiriva HandiHaler 18mcg/1capsule Capsules for Inhalation Inhale contents of 1 capsule(s) by Rotahaler by mouth daily thru inhaler prn     Restasis 0.05% Ophthalmic Emulsion Instill 1 drop(s) to each eye bid     Aspirin (ASA) 81mg Tablets, Enteric Coated Take 1 tablet(s) by mouth qam     Benazepril HCl 10mg Tablet Take 1 tablet(s) by mouth daily     Gabapentin 600mg Tablet 1 tab QID     Livalo 1mg Tablet Take 1 tablet(s) by mouth daily     Ranitidine 150mg Tablet 1 tab bid     Singulair  10mg Tablet Take 1 tablet(s) by mouth each evening     Synthroid 0.088mg Tablet Take 1 tablet(s) by mouth daily     Advair Diskus 250mcg/50mcg per 1blister Inhalation Powder Inhale 1 puff(s) bid     Hydrocodone/Acetaminophen 7.5mg/325mg Tablet 1 tab PO QID prn pain     Restoril 30mg Capsules Take 1 capsule(s) by mouth at bedtime prn for insomnia     ProAir HFA 90mcg/1actuation Oral Inhaler 1 puff daily as needed     Alrex 0.2% Ophthalmic Suspension 2 gtts BID     Bepreve 1.5% Ophthalmic Solution Instill 1 drop(s) to affected eye(s) bid         OBJECTIVE:        Vitals:         Current: 10/30/2018 11:48:51 AM    Ht:  5 ft, 3.75 in;  Wt: 131.8 lbs;  BMI: 22.8    T: 97.9 F (oral);  BP: 141/50 mm Hg (left arm, standing);  P: 65 bpm (left arm (BP Cuff), sitting);  sCr: 0.88 mg/dL;  GFR: 52.15    O2 Sat: 95 % (room air)        Exams:     PHYSICAL EXAM:     GENERAL: vital signs recorded - well developed, well nourished;  no apparent distress;     EYES: extraocular movements intact; conjunctiva and cornea are normal; PERRLA;     E/N/T: OROPHARYNX:  normal mucosa, dentition, gingiva, and posterior pharynx;     NECK: range of motion is normal; thyroid is non-palpable; supple, no LAD;     RESPIRATORY: normal respiratory rate and pattern with no distress; normal breath sounds with no rales, rhonchi, wheezes or rubs;     CARDIOVASCULAR: normal rate; rhythm is regular;  no systolic murmur; no  edema;     MUSCULOSKELETAL: gait: affected by a limp and slowed;     NEUROLOGIC: mental status: alert and oriented x 3; cranial nerves II-XII grossly intact; Reflexes: knee jerks: 2+;     PSYCHIATRIC:  appropriate affect and demeanor; normal speech pattern; grossly normal memory;         Lab/Test Results:             Amphetamines Screen, Urin:  Negative (10/30/2018),     BAR-Barbiturates Screen, Urin:  Negative (10/30/2018),     Buprenorphine:  Negative (10/30/2018),     BZO-Benzodiazepines Screen,Ur:  Positive (10/30/2018),     Cocaine(Metab.)Screen, Ur:  Negative (10/30/2018),     MDMA-Ecstasy:  Negative (10/30/2018),     Met-Methamphetamine:  Negative (10/30/2018),     MTD-Methadone Screen, Urine:  Negative (10/30/2018),     Opiate Screen, Urine:  Positive (10/30/2018),     OXY-Oxycodone:  Negative (10/30/2018),     PCP-Phencyclidine Screen, Uri:  Negative (10/30/2018),     THC Cannabinoids Screen, Urin:  Negative (10/30/2018),     Urine temperature:  confirmed (10/30/2018),     Date and time of last pill:  Gabapentin 10/30/18 9am, Hydrocodone 10/30/18 9am, Trazodone 10/29/18 11pm (10/30/2018),     Performed by:  tls (10/30/2018),     Collection Time:  1255 (10/30/2018),             Procedures:     Vaccination against other viral diseases, Influenza     1. Influenza high dose 0.5 ml unit dose, AMB, ABN signed given IM in the left upper arm; administered by AMB;  lot number [id number]; expires 6/4/19; ABN signed Regarding contraindications to an Influenza vaccine:        No contraindications were noted.              ASSESSMENT           724.2   M54.5  Low back pain              DDx:     V58.69   Z79.891   Z79.899  Patient visit for long term (current) use of other drugs              DDx:     244.8   E03.8  Acquired hypothyroidism              DDx:     272.0   E78.00  Hypercholesterolemia              DDx:     585.2   N18.2  Chronic renal insufficiency, stage 2              DDx:     401.1   I10  Hypertension               DDx:     305.1   F17.290  Tobacco dependence              DDx:     V04.81   Z23  Vaccination against other viral diseases, Influenza              DDx:         ORDERS:         Meds Prescribed:       Refill of: Gabapentin 300mg Tablet Take 1 tablet(s) by mouth qid  #120 (One [place] and Twenty) tablet(s) Refills: 2       Refill of: Hydrocodone/Acetaminophen 7.5mg/325mg Tablet 1 tid  #90 (Ninety) tablet(s) Refills: 0       Refill of: Restoril (Temazepam) 15mg Capsules Take 1 capsule QHS  #14 (Fourteen) capsule(s) Refills: 0       Duloxetine HCl 60mg Capsules, Delayed Release 1 tab q am  #90 (Ninety) capsule(s) Refills: 0       Trazodone HCl 50mg Tablet 1 - 2  @ HS prn  #100 (One [place]) tablet(s) Refills: 0         Lab Orders:       [id number]  Drug test prsmv read direct optical obs pr date  (In-House)         44879  TSH - [facility] TSH  (Send-Out)           Procedures Ordered:       [id number]  Fluzone High Dose  (In-House)           Other Orders:         Administration of influenza virus vaccine (x1)                 PLAN:          Low back pain stable on meds, I d/w her weaning her off the pain meds and will start by decreasing her hydrocodones to 7.5 mg tid from qid and gabapentin to 300 mg qid, will stop livalo due to myalgias, will change citalopram to duloxetine and temazepam to 15 mg qhs for 2 weeks then stop and start trazodone 50 mg nightly.           Prescriptions:       Refill of: Gabapentin 300mg Tablet Take 1 tablet(s) by mouth qid  #120 (One [place] and Twenty) tablet(s) Refills: 2       Refill of: Hydrocodone/Acetaminophen 7.5mg/325mg Tablet 1 tid  #90 (Ninety) tablet(s) Refills: 0       Refill of: Restoril (Temazepam) 15mg Capsules Take 1 capsule QHS  #14 (Fourteen) capsule(s) Refills: 0       Duloxetine HCl 60mg Capsules, Delayed Release 1 tab q am  #90 (Ninety) capsule(s) Refills: 0       Trazodone HCl 50mg Tablet 1 - 2  @ HS prn  #100 (One [place]) tablet(s) Refills: 0          Patient visit for long term  (current) use of other drugs         RECOMMENDATIONS given include: darline reviewed, drug screen performed and appropriate, consent is reviewed and signed and on the chart, she is aware of risk of addiction on this medication and understands that she will need to follow up for a review every 3 months and her medications will be adjusted or decreased as deemed appropriate at each visit.  No personal history of drug or alcohol abuse.  No concerns about diversion or abuse.  She denies side effects related to the medication.  She is  aware that she may be called in for pill counts..            Orders:       [id number]  Drug test prsmv read direct optical obs pr date  (In-House)            Acquired hypothyroidism due for labs, stable on meds     LABORATORY:  Labs ordered to be performed today include TSH.            Orders:       [id number]  TSH - [facility] TSH  (Send-Out)            Hypercholesterolemia off statins due to myalgias, will stop checking lipids          Chronic renal insufficiency, stage 2 due for lab recheck          Hypertension stable on meds          Tobacco dependence counseled on cessation she is already on buspar for anxiety          Vaccination against other viral diseases, Influenza           Orders:       [id number]  Fluzone High Dose  (In-House)                     Administration of influenza virus vaccine (x1)             CHARGE CAPTURE           **Please note: ICD descriptions below are intended for billing purposes only and may not represent clinical diagnoses**        Primary Diagnosis:         724.2 Low back pain            M54.5    Low back pain              Orders:          04721   Office/outpatient visit; established patient, level 4  (In-House)           V58.69 Patient visit for long term (current) use of other drugs            Z79.891    Long term (current) use of opiate analgesic           Z79.899    Other long term (current) drug therapy              Orders:          [id number]   Drug test prsmv read  direct optical obs pr date  (In-House)           244.8 Acquired hypothyroidism            E03.8    Other specified hypothyroidism    272.0 Hypercholesterolemia            E78.00    Pure hypercholesterolemia, unspecified    585.2 Chronic renal insufficiency, stage 2            N18.2    Chronic kidney disease, stage 2 (mild)    401.1 Hypertension            I10    Essential (primary) hypertension    305.1 Tobacco dependence            F17.290    Nicotine dependence, other tobacco product, uncomplicated    V04.81 Vaccination against other viral diseases, Influenza            Z23    Encounter for immunization              Orders:          [id number]   Fluzone High Dose  (In-House)                                           Administration of influenza virus vaccine (x1)

## 2021-05-18 NOTE — PROGRESS NOTES
Amada Bentley  1945     Office/Outpatient Visit    Visit Date: Wed, Apr 1, 2020 01:06 pm    Provider: Maricarmen Harrell MD (Assistant: Conchis Gardiner MA)    Location: [facility]        Electronically signed by Maricarmen Harrell MD on  04/07/2020 02:29:41 PM                             Subjective:        CC: TELEHEALTH- CONSENT BY MNP, MED REFILLSRLE leg edema and pain    HPI:           Patient presents with essential (primary) hypertension.  Her current cardiac medication regimen includes a diuretic ( Hydrochlorothiazide (HCTZ) ), a beta-blocker ( Toprol-XL ), an ACE inhibitor ( Lotensin ), a calcium channel blocker ( Norvasc ), and an angiotensin receptor blocker ( Cozaar ).  Review of her blood pressure log reveals systolics in the 119-191 and diastolics in the 55-88.  She has been experiencing possible adverse medication effects, including headache and hot.  Compliance with treatment has been good; she takes her medication as directed.            Other specified hypothyroidism details; she is currently taking Synthroid, 88 mcg daily.  TSH was last checked 6 months ago.  The result was reported as normal.  She denies any related symptoms.  She reports no symptoms suggestive of adverse medication effect.        chronic anxiety and she is doing well despite her self isolation, she is on paxil and buspar and tolerating meds and feels good, she denies suicidal ideation, she is sleeping well on the trazodone.  Her GERD is well controlled on omeprazole.  Her chronic allergies are stable on cetirizine/singulair and flonase and her COPD is under control with her spiriva/breo and umeclidinium.      acute onset of R LE, she has been confined to her home due to covid pandemic and has been sitting a lot more than usual and one week ago she started having R LE edema and lower leg pain, mild to moderate during the day but becomes severe at night, no h/o PE or DVT, she has no SOA    ROS:      CONSTITUTIONAL:  Negative for chills and fever.      CARDIOVASCULAR:  Negative for chest pain, dizziness and palpitations.      RESPIRATORY:  Positive for chronic cough.   Negative for dyspnea or frequent wheezing.      GASTROINTESTINAL:  Negative for abdominal pain, constipation, diarrhea, hematochezia, melena, nausea and vomiting.      INTEGUMENTARY/BREAST:  Negative for rash.      NEUROLOGICAL:  Positive for weakness ( generalized ).   Negative for dizziness, headaches or memory loss.      PSYCHIATRIC:  Negative for crying spells, feelings of stress, mood swings, sadness and suicidal thoughts.          Past Medical History / Family History / Social History:         Last Reviewed on 2020 02:24 PM by Maricarmen Harrell    Past Medical History:                 PAST MEDICAL HISTORY         COPD     hiatal hernia     Pernicious Anemia     Depression: with loss of daughter.;         GYNECOLOGICAL HISTORY:             CURRENT MEDICAL PROVIDERS:    Gastroenterologist: MANI    Ophthalmologist: KACI    Orthopedist: CELIA    Urologist: BRITTNEE         PREVENTIVE HEALTH MAINTENANCE             BONE DENSITY: was last done  with normal results     COLORECTAL CANCER SCREENING: declines colorectal cancer screening, understands reason for testing; 3-18-03     EYE EXAM: was last done 3/2019     MAMMOGRAM: Done within last 2 years and results in are chart was last done 2019 with normal results     PAP SMEAR: was last done Hysterectomy         PAST MEDICAL HISTORY                 ADVANCED DIRECTIVES: None         Surgical History: Abnormal mammogram in March s/p breast bx-everything was ok  2007         Cataract Removal: bilateral;     Appendectomy    Cholecystectomy: laparoscopic;     Hysterectomy: , ;     Foot surgery on both feet secondary bone spurs;     right ear surgery;         Family History:         Positive for Coronary Artery Disease, Hyperlipidemia ( mother ), Hypertension (  mother; brother; sister ), Myocardial Infarction ( mother; brother; sister ) and Sudden Cardiac Death ( brother ).      Positive for Breast Cancer ( daughter ) and Lung Cancer ( father -- , no h/o smoking ).      Positive for COPD ( father -- emphysema; brother ).      Positive for Renal Failure ( niece ).      Positive for Type 1 Diabetes ( mother; brother; sister ).      Son(s): 1 ;   from MVA     Daughter(s): 1 ;  Breast Cancer         Social History:     Occupation: Unemployed     Marital Status:      Children: 3 children         Tobacco/Alcohol/Supplements:     Last Reviewed on 1/15/2020 03:12 PM by Conchis Gardiner    Tobacco: She has a past history of cigarette smoking; quit date:  19.  Non-drinker         Substance Abuse History:     Last Reviewed on 2018 11:51 AM by Radha Maddox        Mental Health History:     Last Reviewed on 2018 11:51 AM by Radha Maddox        Communicable Diseases (eg STDs):     Last Reviewed on 2018 11:51 AM by Radha Maddox        Allergies:     Last Reviewed on 1/15/2020 03:12 PM by Cnochis Gardiner    Pravastatin:   (Adverse Reaction)    Crestor:      Niaspan:   (Adverse Reaction)    Bentyl: Confusion     Fentanyl: itching,shortness of breath  (Adverse Reaction)    TAPE:      BAND AID:      Zocor: pain  (Adverse Reaction)    Morphine Sulfate:          Current Medications:     Last Reviewed on 1/15/2020 03:41 PM by Maricarmen Harrell    cetirizine 10 mg oral tablet [1 tab daily]    Gabapentin 600 mg oral tablet [1 po tid]    Spiriva with HandiHaler 18 mcg Inhalation Capsule, With Inhalation Device [Inhale contents of 1 capsule(s) by inhaler device by mouth once daily thru inhaler.]    HYDROcodone-acetaminophen 7.5-325 mg oral tablet [One PO TID PRN]    Singulair  10 mg oral tablet [Take 1 tablet(s) by mouth each evening]    Buspirone HCl 15mg Tablet [1 PO BID]    Restasis 0.05% Ophthalmic Emulsion [Instill 1  drop(s) to each eye bid]    Synthroid 88 mcg oral tablet [Take 1 tablet(s) by mouth daily]    ProAir HFA 90mcg/1actuation Oral Inhaler [1 puff daily as needed]    Paroxetine HCl 10 mg oral tablet [1 tab daily ]    Breo Ellipta 200-25 mcg/dose Inhalation Blister, With Inhalation Device [inhale 1 puff by inhalation route once daily at the same time each day]    metoprolol succinate 100 mg oral Tablet, Extended Release 24 hr [take 1 tablet (100 mg) by oral route once daily]    traZODone 50 mg oral tablet [take 1 tablet (50 mg) by oral route every hs]    mupirocin 2 % Topical Ointment [apply a small amount to the affected area by topical route 3 times per day]    fluticasone propionate 50 mcg/actuation Intranasal Spray, Suspension [inhale 1 spray (50 mcg) in each nostril by intranasal route 2 times per day]    omeprazole 20 mg oral capsule,delayed release (enteric coated) [take 1 capsule (20 mg) by oral route once daily]    amLODIPine 5 mg oral tablet [take 1 tablet (5 mg) by oral route once daily]    losartan 100 mg oral tablet [take 1 tablet (100 mg) by oral route once daily]    Breo Ellipta 200-25 mcg/dose Inhalation Blister, With Inhalation Device [inhale 1 puff by inhalation route once daily at the same time each day]    Medrol (Caleb) 4 mg oral Tablet, Dose Pack [as directed]    nystatin 100,000 unit/mL oral Suspension [take 10 milliliters (400,000 unit) by oral route 4 times per day]    Tamiflu 75 mg oral capsule [take 1 capsule (75 mg) by oral route 2 times per day]    hydroCHLOROthiazide 12.5 mg oral tablet [take 1 tablet (12.5 mg) by oral route 1 time per day]    umeclidinium 62.5 mcg/actuation Inhalation Blister, With Inhalation Device [inhale 1 puff (62.5 mcg) by inhalation route once daily at the same time each day]    oseltamivir 75 mg oral capsule [take 1 capsule (75 mg) by oral route once per day for 10 days]        Objective:        Lab/Test Results:         HDL: 55 (mg/dL) (10/22/2019),     LDL: 122  (mg/dL) (10/22/2019),     Total Cholesterol: 210 (mg/dL) (10/22/2019),     Triglycerides: 163 (mg/dL) (10/22/2019),     Creatinine, Serum: 0.92 (mg/dl) (01/15/2020),     Glom Filt Rate, Est: >60 (ml/min/1.73m2) (01/15/2020),     Alkaline Phosphatase, Serum: 60 (U/L) (01/15/2020),     ALT (SGPT): 10 (U/L) (01/15/2020),     AST (SGOT): 8 (U/L) (01/15/2020),     Bilirubin, Total: <0.15 (mg/dL) (01/15/2020),     TSH: 1.640 (mIU/L) (10/22/2019),             Assessment:         I10   Essential (primary) hypertension       E78.0   Pure hypercholesterolemia       E03.8   Other specified hypothyroidism       F41.1   Generalized anxiety disorder       K21.9   Gastro-esophageal reflux disease without esophagitis       N18.2   Chronic kidney disease, stage 2 (mild)       R22.41   Localized swelling, mass and lump, right lower limb           ORDERS:         Radiology/Test Orders:       [id number]  Venous doppler right extremity  (Send-Out)            [id number]  Venous doppler right extremity  (Send-Out; Stat)                      Plan:         Essential (primary) hypertensionstable per pt    Telehealth: Verbal consent obtained for visit to occur via phone call; Total time spent was multiple phone calls > 15 min total minutes; [id number]--[id number] E/M 11-20 minutes         Pure hypercholesterolemiastable        Other specified hypothyroidismstable, due for labs end of April        Generalized anxiety disorderwell controlled on meds        Gastro-esophageal reflux disease without esophagitisstable on meds        RECOMMENDATIONS given include: patient is aware of increased risk of kidney failure, osteoporosis and alzheimers with daily use of this med.          Chronic kidney disease, stage 2 (mild)stable on Jan labs        Localized swelling, mass and lump, right lower limb        RADIOLOGY:  I have ordered Venous doppler right leg to be done today.            Orders:       [id number]  Venous doppler right extremity  (Send-Out; Stat)                   Other Orders      [id number]  Venous doppler right extremity  (Send-Out)              Charge Capture:         Primary Diagnosis:     I10  Essential (primary) hypertension           Orders:      [id number]  Phys/QHP telephone evaluation 11-20 minutes  (In-House)              E78.0  Pure hypercholesterolemia     E03.8  Other specified hypothyroidism     F41.1  Generalized anxiety disorder     K21.9  Gastro-esophageal reflux disease without esophagitis     N18.2  Chronic kidney disease, stage 2 (mild)     R22.41  Localized swelling, mass and lump, right lower limb

## 2021-05-18 NOTE — PROGRESS NOTES
Amada Bentley. 1945     Office/Outpatient Visit    Visit Date: Wed, Jul 17, 2019 11:44 am    Provider: Maricarmen Harrell MD (Assistant: Conchis Gardiner MA)    Location: [facility]        Electronically signed by Maricarmen Harrell MD on  07/18/2019 04:16:03 PM                             SUBJECTIVE:        CC:     Amada Plaza is a 74 year old White female.  This is a follow-up visit.          HPI:     she is worried, acutely noticed a large hematoma down her L shin-onset Sunday when she got out of the shower         Additionally, she presents with history of hypertension.  her current cardiac medication regimen includes an ACE inhibitor.  Amada Plaza does not check her blood pressure other than at her clinic appointments.  She is tolerating the medication well without side effects.  Compliance with treatment has been good; she takes her medication as directed, maintains her diet and exercise regimen, and follows up as directed.      acute onset of L UE arm pain that onset months ago, no known injury, pain is aching, with decreased strength in her arms B, poor  hands B.   Pain radiates from upper L arm to her neck.     ROS:     CONSTITUTIONAL:  Negative for chills and fever.      CARDIOVASCULAR:  Negative for chest pain, dizziness and palpitations.      RESPIRATORY:  Positive for chronic cough.   Negative for dyspnea or frequent wheezing.      GASTROINTESTINAL:  Negative for abdominal pain, constipation, diarrhea, hematochezia, melena, nausea and vomiting.      NEUROLOGICAL:  Positive for weakness ( right upper extremity; left upper extremity ).   Negative for dizziness, memory loss or paresthesias.      PSYCHIATRIC:  Positive for anxiety and sleep disturbance.   Negative for crying spells, feelings of stress, sadness or suicidal thoughts.          PMH/FMH/SH:     Last Reviewed on 4/22/2019 02:49 PM by Maricarmen Harrell    Past Medical History:                 PAST MEDICAL HISTORY         COPD     hiatal  hernia     Pernicious Anemia     Depression: with loss of daughter.;         GYNECOLOGICAL HISTORY:             CURRENT MEDICAL PROVIDERS:    Gastroenterologist: MANI    Ophthalmologist: KACI    Orthopedist: CELIA    Urologist: BRITTNEE         PREVENTIVE HEALTH MAINTENANCE             BONE DENSITY: was last done  with normal results     COLORECTAL CANCER SCREENING: declines colorectal cancer screening, understands reason for testing; 3-18-03     EYE EXAM: was last done 3/2019     MAMMOGRAM: Done within last 2 years and results in are chart was last done 2019 with normal results     PAP SMEAR: was last done Hysterectomy         PAST MEDICAL HISTORY                 ADVANCED DIRECTIVES: None         Surgical History: Abnormal mammogram in March s/p breast bx-everything was ok  2007         Cataract Removal: bilateral;      Appendectomy    Cholecystectomy: laparoscopic;     Hysterectomy: , ;     Foot surgery on both feet secondary bone spurs;      right ear surgery;         Family History:         Positive for Coronary Artery Disease, Hyperlipidemia ( mother ), Hypertension ( mother; brother; sister ), Myocardial Infarction ( mother; brother; sister ) and Sudden Cardiac Death ( brother ).      Positive for Breast Cancer ( daughter ) and Lung Cancer ( father -- , no h/o smoking ).      Positive for COPD ( father -- emphysema; brother ).      Positive for Renal Failure ( niece ).      Positive for Type 1 Diabetes ( mother; brother; sister ).      Son(s): 1 ;   from MVA     Daughter(s): 1 ;  Breast Cancer         Social History:     Occupation: Unemployed     Marital Status:      Children: 3 children         Tobacco/Alcohol/Supplements:     Last Reviewed on 2019 02:49 PM by Maricarmen Harrell    Tobacco: Current Smoker: She currently smokes every day, 1 pack per day.  Non-drinker         Substance Abuse History:     Last Reviewed on 2018  11:51 AM by Radha Maddox        Mental Health History:     Last Reviewed on 6/08/2018 11:51 AM by Radha Maddox        Communicable Diseases (eg STDs):     Last Reviewed on 6/08/2018 11:51 AM by Radha Maddox            Allergies:     Last Reviewed on 4/22/2019 02:11 PM by Leanne Tim    Pravastatin: (Adverse Reaction)    Crestor:    Niaspan: (Adverse Reaction)    Bentyl: confusion    Fentanyl: itching, shortness of breath (Adverse Reaction)    Zocor: pain (Adverse Reaction)    Morphine Sulfate:        Current Medications:     Last Reviewed on 4/22/2019 02:11 PM by Leanne Tim    Advair Diskus 250mcg/50mcg per 1blister Inhalation Powder Inhale 1 puff(s) bid     Benazepril HCl 20mg Tablet Take 1 tablet(s) by mouth daily     Spiriva HandiHaler 18mcg/1capsule Capsules for Inhalation Inhale contents of 1 capsule(s) by inhaler device by mouth daily thru inhaler.     Hydrocodone/Acetaminophen 7.5mg/325mg Tablet One PO TID PRN     Aspirin (ASA) 81mg Tablets, Enteric Coated Take 1 tablet(s) by mouth qam     Cetirizine HCl 10mg Tablet 1 tab daily     Ranitidine 150mg Tablet 1 tab bid     Synthroid 0.088mg Tablet Take 1 tablet(s) by mouth daily     Singulair  10mg Tablet Take 1 tablet(s) by mouth each evening     Naprosyn 500mg Tablet Take 1 tablet(s) by mouth  daily     Restasis 0.05% Ophthalmic Emulsion Instill 1 drop(s) to each eye bid     Gabapentin 600mg Tablet 1 po tid     Melatonin* 5 mg 4 tab q hs     8HR PAIN RELIEF PRN     TYLENOL PM  PRN     Alrex 0.2% Ophthalmic Suspension 2 gtts BID     Bepreve 1.5% Ophthalmic Solution Instill 1 drop(s) to affected eye(s) bid         OBJECTIVE:        Vitals:         Current: 7/17/2019 11:56:06 AM    Ht:  5 ft, 3.75 in;  Wt: 1302 lbs;  BMI: 225.2    T: 98.2 F (oral);  BP: 148/55 mm Hg (right arm, sitting);  P: 70 bpm (right arm (BP Cuff), sitting);  sCr: 0.99 mg/dL;  GFR: 120.89    O2 Sat: 97 % (room air)        Exams:     PHYSICAL EXAM:     GENERAL:  vital signs recorded - well developed, well nourished;  no apparent distress;     RESPIRATORY: normal respiratory rate and pattern with no distress; normal breath sounds with no rales, rhonchi, wheezes or rubs;     CARDIOVASCULAR: normal rate; rhythm is regular;  no systolic murmur; no edema;     BREAST/INTEGUMENT: 15 cm scratch with bruising under the L skin;     MUSCULOSKELETAL: gait: affected by a limp and slowed;  L shoulder-FROM with pain over biceps insertion, and SITS weakness-diffuse, decreased hand  L>R Neck-decreased ROM, pain with ROM     NEUROLOGIC: mental status: alert and oriented x 3; GROSSLY INTACT     PSYCHIATRIC:  appropriate affect and demeanor; normal speech pattern; grossly normal memory;         ASSESSMENT           V79.0   Z13.89  Screening for depression              DDx:     782.7   R23.3  Excessive bruising              DDx:     959.7   S80.12XA  Traumatic injury to leg              DDx:     401.1   I10  Hypertension              DDx:     729.5   M25.512   M79.602  Arm pain              DDx:     723.1   M54.2  Cervicalgia              DDx:         ORDERS:         Meds Prescribed:       Diclofenac Sodium 1% Topical Gel Apply 2 gm(s) to affected area qid to upper extremities.  #100 ([facility] [place]) gm Refills: 2         Procedures Ordered:       REFER  Referral to Specialist or Other Facility  (Send-Out)           Other Orders:         Depression screen negative  (In-House)           Negative EtOH screen  (In-House)                   PLAN:          Screening for depression     MIPS PHQ-9 Depression Screening: Completed form scanned and in chart; Total Score 4/27; Negative Depression Screen Negative alcohol screen           Orders:         Depression screen negative  (In-House)           Negative EtOH screen  (In-House)            Excessive bruising stop aspirin, no h/o CVA or CAD          Traumatic injury to leg 15 cm scratch with bruising under the skin-mildly traumatic           Hypertension elevated, due to pain, will have home health check her BP at home          Arm pain due to biceps tendonitis-will get home health to come out to [facility] for PT for her chronic neck pain with DDD and L biceps tendonitis.           Prescriptions:       Diclofenac Sodium 1% Topical Gel Apply 2 gm(s) to affected area qid to upper extremities.  #100 ([facility] [place]) gm Refills: 2          Cervicalgia         REFERRALS:  Referral initiated to physical therapy.            Orders:       REFER  Referral to Specialist or Other Facility  (Send-Out)               CHARGE CAPTURE           **Please note: ICD descriptions below are intended for billing purposes only and may not represent clinical diagnoses**        Primary Diagnosis:         V79.0 Screening for depression            Z13.89    Encounter for screening for other disorder              Orders:          [id number]   Office/outpatient visit; established patient, level 4  (In-House)                Depression screen negative  (In-House)                Negative EtOH screen  (In-House)           782.7 Excessive bruising            R23.3    Spontaneous ecchymoses    959.7 Traumatic injury to leg            S80.12XA    Contusion of left lower leg, initial encounter    401.1 Hypertension            I10    Essential (primary) hypertension    729.5 Arm pain            M25.512    Pain in left shoulder           M79.602    Pain in left arm    723.1 Cervicalgia            M54.2    Cervicalgia

## 2021-05-18 NOTE — PROGRESS NOTES
Amada BentleyDimitri 1945     Office/Outpatient Visit    Visit Date: Sat, 2018 10:38 am    Provider: Radha Maddox N.P. (Assistant: Milana Ramos MA)    Location: [facility]        Electronically signed by Radha Maddox N.P. on  2018 09:12:24 AM                             SUBJECTIVE:        CC:     Amada Plaza is a 73 year old White female.  open wound on right upper arm with redness;         HPI:         Cellulitis noted.  Pt states falling x 2 weeks ago. She was on the toilet going to stand up, she fell and hit the bathtub. She was seen and given doxycycline. States no improvement. She has also been using mupirocin. States still with some drainage when she pulls off her bandage.      ROS:     CONSTITUTIONAL:  Negative for chills, fatigue, fever, and weight change.      EYES:  Negative for blurred vision.      CARDIOVASCULAR:  Negative for chest pain, orthopnea, paroxysmal nocturnal dyspnea and pedal edema.      RESPIRATORY:  Negative for dyspnea.      GASTROINTESTINAL:  Negative for abdominal pain, constipation, diarrhea, nausea and vomiting.      NEUROLOGICAL:  Negative for dizziness, headaches, paresthesias, and weakness.      PSYCHIATRIC:  Negative for anxiety, depression, and sleep disturbances.          PMH/FMH/SH:     Last Reviewed on 2018 10:45 AM by Radha Maddox    Past Medical History:                 PAST MEDICAL HISTORY         COPD     hiatal hernia     2 bulding disc and a pinched nerve     Pernicious Anemia     Depression: with loss of daughter.;         GYNECOLOGICAL HISTORY:             PREVENTIVE HEALTH MAINTENANCE             BONE DENSITY: was last done  with normal results     COLORECTAL CANCER SCREENING: declines colorectal cancer screening, understands reason for testing; 3-18-03     EYE EXAM: was last done summer 2017     MAMMOGRAM: Done within last 2 years and results in are chart was last done 1-10-18 with normal results         PAST  MEDICAL HISTORY                 ADVANCED DIRECTIVES: None         Surgical History: Abnormal mammogram in March s/p breast bx-everything was ok  2007         Cataract Removal: bilateral;      Appendectomy    Cholecystectomy: laparoscopic;     Hysterectomy: , ;     Foot surgery on both feet secondary bone spurs;      right ear surgery;         Family History:         Positive for Coronary Artery Disease, Hyperlipidemia ( mother ), Hypertension ( mother; brother; sister ), Myocardial Infarction ( mother; brother; sister ) and Sudden Cardiac Death ( brother ).      Positive for Breast Cancer ( daughter ) and Lung Cancer ( father -- , no h/o smoking ).      Positive for COPD ( father -- emphysema; brother ).      Positive for Renal Failure ( niece ).      Positive for Type 1 Diabetes ( mother; brother; sister ).      Son(s): 1 ;   from MVA     Daughter(s): 1 ;  Breast Cancer         Social History:     Occupation: Unemployed     Marital Status:      Children: 3 children         Tobacco/Alcohol/Supplements:     Last Reviewed on 2018 10:45 AM by Radha Maddox    Tobacco: Current Smoker: She currently smokes every day, 1 pack per day.  Non-drinker         Substance Abuse History:     Last Reviewed on 2018 10:45 AM by Radha Maddox        Mental Health History:     Last Reviewed on 2018 10:45 AM by Radha Maddox        Communicable Diseases (eg STDs):     Last Reviewed on 2018 10:45 AM by Radha Maddox            Current Problems:     Last Reviewed on 2018 10:45 AM by Radha Maddox    Cellulitis     History of fall     Unspecified fall     Chronic insomnia     Use of high risk medications     Chronic bronchitis, obstructive, with (acute) exacerbation     Hip pain     Heart murmur, undiagnosed     Constipation     Chronic renal insufficiency, stage 2     Hypercholesterolemia     Kidney mass     Essential hypercholesterolemia     Low  back pain     Chronic low back pain     Depression with anxiety     Acute confusional state     GERD     Decompensated chronic obstructive pulmonary disease (COPD)     Generalized anxiety disorder     Irritable bowel syndrome     Patient visit for long term (current) use of other drugs     Late effect of adverse effect of medicinal substance     Hypertension     Acquired hypothyroidism     Memory loss     Post-menopausal HRT     Depression     Pernicious anemia     History of tobacco abuse     Dry eye syndrome     Tobacco dependence     Insomnia, unspecified         Immunizations:     Prevnar 13 (Pneumococcal PCV 13) 11/3/2015     Fluzone (3 + years dose) 10/22/2008     Fluzone (3 + years dose) 9/23/2011     Fluzone High-Dose pf (>=65 yr) 10/23/2013     Fluzone High-Dose pf (>=65 yr) 11/3/2015     Fluzone High-Dose pf (>=65 yr) 10/17/2016     Influenza High-Dose Virus Vaccine pf (>=65 yr) 11/1/2017     PNEUMOVAX 23 (Pneumococcal PPV23) 1/4/2018     Zostavax (Zoster) 10/2/2014         Allergies:     Last Reviewed on 6/02/2018 10:45 AM by Radha Maddox    Pravastatin: (Adverse Reaction)    Crestor:    Niaspan: (Adverse Reaction)    Bentyl: confusion    Fentanyl: itching, shortness of breath (Adverse Reaction)    Zocor: pain (Adverse Reaction)    Morphine Sulfate:        Current Medications:     Last Reviewed on 6/02/2018 10:45 AM by Radha Maddox    Restasis 0.05% Ophthalmic Emulsion Instill 1 drop(s) to each eye bid     Advair Diskus 250mcg/50mcg per 1blister Inhalation Powder Inhale 1 puff(s) bid     Aspirin (ASA) 81mg Tablets, Enteric Coated Take 1 tablet(s) by mouth qam     Benazepril HCl 10mg Tablet Take 1 tablet(s) by mouth daily     Buspirone HCl 15mg Tablet Take 1 tablet(s) by mouth bid     Cetirizine HCl 10mg Tablet 1 tab daily     Citalopram Hydrobromide 40mg Tablet 1 tab daily     Lovaza 1gm Capsules Take 2 capsule(s) by mouth bid     Naprosyn 250mg Tablet 1 a day prn     Ranitidine 150mg Tablet 1  tab bid     Singulair  10mg Tablet Take 1 tablet(s) by mouth each evening     Spiriva HandiHaler 18mcg/1capsule Capsules for Inhalation Inhale contents of 1 capsule(s) by Rotahaler by mouth daily thru inhaler prn     Synthroid 0.088mg Tablet Take 1 tablet(s) by mouth daily     Doxycycline Monohydrate 100mg Tablet Take 1 tablet(s) by mouth bid     Mupirocin 2% Topical Ointment apply affected area bid     Hydrocodone/Acetaminophen 7.5mg/325mg Tablet 1 tab PO QID prn pain     Restoril 30mg Capsules Take 1 capsule(s) by mouth at bedtime prn for insomnia     Docusate Sodium 100mg Capsules 1 bid     ProAir HFA 90mcg/1actuation Oral Inhaler 1 puff daily as needed     Gabapentin 600mg Tablet 1 tab QID     Hydroxyzine HCl 25mg Tablet 1-2 tablets qhs         OBJECTIVE:        Vitals:         Current: 6/2/2018 10:41:22 AM    Ht:  5 ft, 3.75 in;  Wt: 127.1 lbs;  BMI: 22.0    T: 97.7 F (oral);  BP: 129/70 mm Hg (left arm, standing);  P: 86 bpm (left arm (BP Cuff), sitting);  sCr: 0.95 mg/dL;  GFR: 47.57        Exams:     PHYSICAL EXAM:     GENERAL: vital signs recorded - well developed, well nourished;  no apparent distress;     EYES: extraocular movements intact; conjunctiva and cornea are normal; PERRLA;     NECK: range of motion is normal; thyroid is non-palpable;     RESPIRATORY: normal respiratory rate and pattern with no distress; normal breath sounds with no rales, rhonchi, wheezes or rubs;     CARDIOVASCULAR: normal rate; rhythm is regular;  no systolic murmur; no edema;     GASTROINTESTINAL: nontender; normal bowel sounds; no organomegaly;     BREAST/INTEGUMENT: R uppper arm with approx 6cm x 6cm abrasion with erythema.;     NEUROLOGICAL:  cranial nerves, motor and sensory function, reflexes, gait and coordination are all intact;     PSYCHIATRIC:  appropriate affect and demeanor; normal speech pattern; grossly normal memory;         ASSESSMENT:           682.9   L03.113  Cellulitis              DDx:         ORDERS:          Meds Prescribed:       Bactrim DS (Trimethoprim/Sulfamethoxazole ) Tablet Take 1 tablet(s) by mouth q12h for 10 days  #20 (Twenty) tablet(s) Refills: 0         Lab Orders:       APPTO  Appointment need  (In-House)                   PLAN:          Cellulitis         FOLLOW-UP: Advised to call if there is no improvement. Schedule a follow-up visit in 3 months..   appointment to be scheduled with [facility] visit follow up           Prescriptions:       Bactrim DS (Trimethoprim/Sulfamethoxazole ) Tablet Take 1 tablet(s) by mouth q12h for 10 days  #20 (Twenty) tablet(s) Refills: 0           Orders:       APPTO  Appointment need  (In-House)             Patient Education Handouts:       Cellulitis              Patient Recommendations:        For  Cellulitis:     Schedule a follow-up visit in 3 months.                APPOINTMENT INFORMATION:        Monday Tuesday Wednesday Thursday Friday Saturday Sunday            Time:___________________AM  PM   Date:_____________________             CHARGE CAPTURE:           Primary Diagnosis:     682.9 Cellulitis            L03.113    Cellulitis of right upper limb              Orders:          17245   Office/outpatient visit; established patient, level 3  (In-House)             APPTO   Appointment need  (In-House)

## 2021-05-18 NOTE — PROGRESS NOTES
Amada BentleyDimitri 1945     Office/Outpatient Visit    Visit Date: Tue, Apr 24, 2018 01:58 pm    Provider: Maricarmen Harrell MD (Assistant: Kay Jean MA)    Location: [facility]        Electronically signed by Maricarmen Harrell MD on  04/25/2018 02:09:21 PM                             SUBJECTIVE:        CC: LBP         HPI:     Denae is in today for the 3 month face to face for her chronic controlled substance hydrocodone/gabapentin med refill she takes for chronic LBP and hip pain.  She is done with f/u with Dr Swartz and s/p back surgery of L spine in past and told her she needed surgery again  but it would be major with rods-so she is too scared to follow thru with this.  Her pain remains 2-3/10 on pain meds and  8-10/10 off meds.  Her pain is worse with ambulation and she is limited on distance.  Pain radiates to R hip/thigh and she has R LE weakness.   MRI showed severe canal stenosis and bilateral foraminal narrowing.  Denae tolerates the meds well, denies SE of confusion or sedation, and she shows no sign of diversion or abuse. Meds refilled today she has failed the fentanyl pain patch     ROS:     CONSTITUTIONAL:  Positive for fatigue.   Negative for chills or fever.      E/N/T:  Positive for ear pain ( right ), nasal congestion and frequent rhinorrhea.      CARDIOVASCULAR:  Negative for chest pain, palpitations, tachycardia, orthopnea, and edema.      RESPIRATORY:  Negative for dyspnea and frequent wheezing.      GASTROINTESTINAL:  Positive for constipation.   Negative for abdominal pain, diarrhea, nausea or vomiting.      MUSCULOSKELETAL:  Positive for back pain.      NEUROLOGICAL:  Positive for headaches.   Negative for memory loss, paresthesias, seizures or weakness.      PSYCHIATRIC:  Negative for anxiety, crying spells and feelings of stress.          PMH/FMH/SH:     Last Reviewed on 4/24/2018 02:49 PM by Maricarmen Harrell    Past Medical History:                 PAST MEDICAL HISTORY          COPD     hiatal hernia     2 bulding disc and a pinched nerve     Pernicious Anemia     Depression: with loss of daughter.;         GYNECOLOGICAL HISTORY:             PREVENTIVE HEALTH MAINTENANCE             BONE DENSITY: was last done  with normal results     COLORECTAL CANCER SCREENING: declines colorectal cancer screening, understands reason for testing; 3-18-03     EYE EXAM: was last done summer 2017     MAMMOGRAM: Done within last 2 years and results in are chart was last done 1-10-18 with normal results         PAST MEDICAL HISTORY                 ADVANCED DIRECTIVES: None         Surgical History: Abnormal mammogram in March s/p breast bx-everything was ok  2007         Cataract Removal: bilateral;      Appendectomy    Cholecystectomy: laparoscopic;     Hysterectomy: , ;     Foot surgery on both feet secondary bone spurs;      right ear surgery;         Family History:         Positive for Coronary Artery Disease, Hyperlipidemia ( mother ), Hypertension ( mother; brother; sister ), Myocardial Infarction ( mother; brother; sister ) and Sudden Cardiac Death ( brother ).      Positive for Breast Cancer ( daughter ) and Lung Cancer ( father -- , no h/o smoking ).      Positive for COPD ( father -- emphysema; brother ).      Positive for Renal Failure ( niece ).      Positive for Type 1 Diabetes ( mother; brother; sister ).      Son(s): 1 ;   from MVA     Daughter(s): 1 ;  Breast Cancer         Social History:     Occupation: Unemployed     Marital Status:      Children: 3 children         Tobacco/Alcohol/Supplements:     Last Reviewed on 2018 02:50 PM by Maricarmen Harrell    Tobacco: Current Smoker: She currently smokes every day, 1 pack per day.  Non-drinker         Substance Abuse History:     Last Reviewed on 3/15/2017 11:38 AM by Maggie Lackey        Mental Health History:     Last Reviewed on 3/15/2017 11:38 AM by Maggie Lackey         Communicable Diseases (eg STDs):     Last Reviewed on 3/15/2017 11:38 AM by Maggie Lackey            Allergies:     Last Reviewed on 4/24/2018 01:58 PM by Kay Jean    Pravastatin: (Adverse Reaction)    Crestor:    Niaspan: (Adverse Reaction)    Bentyl: confusion    Fentanyl: itching, shortness of breath (Adverse Reaction)    Zocor: pain (Adverse Reaction)    Morphine Sulfate:        Current Medications:     Last Reviewed on 4/24/2018 01:58 PM by Kay Jean    Hydrocodone/Acetaminophen 7.5mg/325mg Tablet 1 tab PO QID prn pain     Advair Diskus 250mcg/50mcg per 1blister Inhalation Powder Inhale 1 puff(s) bid     Aspirin (ASA) 81mg Tablets, Enteric Coated Take 1 tablet(s) by mouth qam     Benazepril HCl 10mg Tablet Take 1 tablet(s) by mouth daily     Buspirone HCl 15mg Tablet Take 1 tablet(s) by mouth bid     Cetirizine HCl 10mg Tablet 1 tab daily     Citalopram Hydrobromide 40mg Tablet 1 tab daily     Lovaza 1gm Capsules Take 2 capsule(s) by mouth bid     Naprosyn 250mg Tablet 1 a day prn     Pitavastatin 1mg Tablet Take 1 tablet(s) by mouth daily     Ranitidine 150mg Tablet 1 tab bid     Singulair  10mg Tablet Take 1 tablet(s) by mouth each evening     Spiriva HandiHaler 18mcg/1capsule Capsules for Inhalation Inhale contents of 1 capsule(s) by Rotahaler by mouth daily thru inhaler prn     Synthroid 0.088mg Tablet Take 1 tablet(s) by mouth daily     Restasis 0.05% Ophthalmic Emulsion Instill 1 drop(s) each eye QID     Gabapentin 600mg Tablet 1 po bid     Restoril 30mg Capsules Take 1 capsule(s) by mouth at bedtime prn for insomnia     Ventolin HFA 90mcg/1actuation Oral Inhaler 1 puff every 4-6 hours prn SOB/cough *may substitute for insurance preference*         OBJECTIVE:        Vitals:         Current: 4/24/2018 2:00:24 PM    Ht:  5 ft, 3.75 in;  Wt: 131.8 lbs;  BMI: 22.8    T: 97.7 F (oral);  BP: 128/79 mm Hg (left arm, standing);  P: 86 bpm (left arm (BP Cuff), sitting);  sCr: 0.95 mg/dL;   GFR: 48.31        Exams:     PHYSICAL EXAM:     GENERAL: vital signs recorded - well developed, well nourished;  no apparent distress;     EYES: extraocular movements intact; conjunctiva and cornea are normal; PERRLA;     E/N/T: OROPHARYNX:  normal mucosa, dentition, gingiva, and posterior pharynx;     NECK: range of motion is normal; thyroid is non-palpable;     RESPIRATORY: CTA B WITHOUT WHEEZING/RALES OR RHONCHI, NORMAL RESP. EFFORT     CARDIOVASCULAR: normal rate; rhythm is regular;  no systolic murmur; no edema;     MUSCULOSKELETAL: gait: affected by a limp and slowed;     NEUROLOGIC: mental status: alert and oriented x 3; cranial nerves II-XII grossly intact; Reflexes: knee jerks: 2+;     PSYCHIATRIC:  appropriate affect and demeanor; normal speech pattern; grossly normal memory;         Lab/Test Results:             Amphetamines Screen, Urin:  Negative (04/24/2018),     BAR-Barbiturates Screen, Urin:  Negative (04/24/2018),     Buprenorphine:  Negative (04/24/2018),     BZO-Benzodiazepines Screen,Ur:  Positive (04/24/2018),     Cocaine(Metab.)Screen, Ur:  Negative (04/24/2018),     MDMA-Ecstasy:  Negative (04/24/2018),     Met-Methamphetamine:  Negative (04/24/2018),     MTD-Methadone Screen, Urine:  Negative (04/24/2018),     Opiate Screen, Urine:  Negative (04/24/2018),     OXY-Oxycodone:  Negative (04/24/2018),     PCP-Phencyclidine Screen, Uri:  Negative (04/24/2018),     THC Cannabinoids Screen, Urin:  Negative (04/24/2018),     Urine temperature:  confirmed (04/24/2018),     Date and time of last pill:  hydrocodone and gabapentin 4/24/18 @ 1pm (04/24/2018),     Performed by:  Esther (04/24/2018),     Collection Time:  3:11 (04/24/2018),             ASSESSMENT           724.2   M54.5  Low back pain              DDx:     719.45   M25.551  Hip pain              DDx:     V58.69   Z79.891   Z79.899  Patient visit for long term (current) use of other drugs              DDx:     305.1   F17.290  Tobacco  dependence              DDx:     307.42   F51.01  Chronic insomnia              DDx:     375.15   H04.129  Dry eye syndrome              DDx:         ORDERS:         Meds Prescribed:       Refill of: Gabapentin 600mg Tablet 1 po bid  #60 (Sixty) tablet(s) Refills: 2       Morphine Sulfate 15mg Tablets, Extended Release One PO BID  #60 (Sixty) tablet(s) Refills: 0       Hydroxyzine HCl 25mg Tablet 1-2 tablets qhs  #60 (Sixty) tablet(s) Refills: 0       Refill of: Hydrocodone/Acetaminophen 5mg/325mg Tablet 1 BID PRN  #20 (Twenty) tablet(s) Refills: 0       Refill of: Restasis (Cyclosporine) 0.05% Ophthalmic Emulsion Instill 1 drop(s) each eye QID  #60 (Sixty) vial(s) Refills: 5         Lab Orders:       [id number]  Drug test prsmv read direct optical obs pr date  (In-House)         [address]73  [facility] [id number] OPIATES  (Send-Out)           Other Orders:       [id number]  Pt scrnd tobacco use rcvd tobacco cessation talk  (In-House)                   PLAN:          Low back pain she is willing to try long acting pain meds           Prescriptions:       Refill of: Gabapentin 600mg Tablet 1 po bid  #60 (Sixty) tablet(s) Refills: 2       Morphine Sulfate 15mg Tablets, Extended Release One PO BID  #60 (Sixty) tablet(s) Refills: 0       Hydroxyzine HCl 25mg Tablet 1-2 tablets qhs  #60 (Sixty) tablet(s) Refills: 0       Refill of: Hydrocodone/Acetaminophen 5mg/325mg Tablet 1 BID PRN  #20 (Twenty) tablet(s) Refills: 0          Hip pain As above          Patient visit for long term (current) use of other drugs     LABORATORY:  Labs ordered to be performed today include Drug Screen Urine [facility] Confirmation [facility].      RECOMMENDATIONS given include: darline reviewed, drug screen performed and appropriate, consent is reviewed and signed and on the chart, she is aware of risk of addiction on this medication and understands that she will need to follow up for a review every 3 months and her medications will be adjusted or decreased as deemed  appropriate at each visit.  No personal history of drug or alcohol abuse.  No concerns about diversion or abuse.  She denies side effects related to the medication.  She is  aware that she may be called in for pill counts..            Orders:       [id number]  Drug test prsmv read direct optical obs pr date  (In-House)         68230  [facility] [id number] OPIATES  (Send-Out)            Tobacco dependence     MIPS Smoking cessation encouraged. Counseling for less than 3 minutes.            Orders:       4004F  Pt scrnd tobacco use rcvd tobacco cessation talk  (In-House)            Chronic insomnia change temazepam to hydroxyzine qhs          Dry eye syndrome           Prescriptions:       Refill of: Restasis (Cyclosporine) 0.05% Ophthalmic Emulsion Instill 1 drop(s) each eye QID  #60 (Sixty) vial(s) Refills: 5             CHARGE CAPTURE           **Please note: ICD descriptions below are intended for billing purposes only and may not represent clinical diagnoses**        Primary Diagnosis:         724.2 Low back pain            M54.5    Low back pain              Orders:          [id number]   Office/outpatient visit; established patient, level 4  (In-House)           719.45 Hip pain            M25.551    Pain in right hip    V58.69 Patient visit for long term (current) use of other drugs            Z79.891    Long term (current) use of opiate analgesic           Z79.899    Other long term (current) drug therapy              Orders:          [id number]   Drug test prsmv read direct optical obs pr date  (In-House)           305.1 Tobacco dependence            F17.290    Nicotine dependence, other tobacco product, uncomplicated              Orders:          4004F   Pt scrnd tobacco use rcvd tobacco cessation talk  (In-House)           307.42 Chronic insomnia            F51.01    Primary insomnia    375.15 Dry eye syndrome            H04.129    Dry eye syndrome of unspecified lacrimal gland

## 2021-05-18 NOTE — PROGRESS NOTES
Amada Bentley. 1945     Office/Outpatient Visit    Visit Date: Mon, Apr 22, 2019 02:05 pm    Provider: Maricarmen Harrell MD (Assistant: Leanne Tim MA)    Location: [facility]        Electronically signed by Maricarmen Harrell MD on  04/26/2019 09:38:27 AM                             SUBJECTIVE:        CC:     Amada Plaza is a 74 year old White female.  MCW; (PATIENT DOES NOT TYLNEOL OR POTASSIUM)         HPI:         Amada Plaza is here for a Medicare wellness visit.  ADVANCED DIRECTIVES: None     Returning to health checkup, the required HRA questions are integrated within this visit note. Family medical history and individual medical/surgical history were reviewed and updated.  A current height, weight, BMI, blood pressure, and pulse were recorded in the vitals section of the note and have been reviewed. Patient's medications, including supplements, were recorded in the chart and reviewed.  Current providers and suppliers were reviewed and updated.          Self-Assessment of Health: She rates her health as fair. She rates her confidence of being able to control/manage most of her health problems as somewhat confident. Her physical/emotional health has limited her social activites moderately.  Falls Risk: Has not had any falls or only one fall without injury in the past year.  In regard to hearing, she reports having trouble hearing the TV/radio when others do not and having to strain to hear or understand conversations, but not wearing hearing aid(s).  Concerning home safety, she reports that at home she DOES have adequate lighting, a skid resistant shower/tub, grab bars in the bath and functioning smoke alarms, but not absence of throw rugs.  Physical Activity: She never excercises.; Type of diet patient normally eats is described as well-balanced with fruits and vegetables Tobacco: Current Smoker: She currently smokes every day, 1 pack per day.  Preventative Health updated today         PHQ-9 Depression  Screening: Completed form scanned and in chart; Total Score 4 Alcohol Consumption Screening: Completed form scanned and in chart; Total Score 0     she still has intermittent loose stools, with normal BM in between.     chronic insomnia and she is on melatonin, she is on melatonin 10 mg only at night and it is not working, she is having trouble falling asleep.         Acquired hypothyroidism details; she is currently taking Synthroid, 88 mcg daily.  TSH was last checked 6 months ago.  The result was reported as normal.  She denies any related symptoms.  She reports no symptoms suggestive of adverse medication effect.          Additionally, she presents with history of hypercholesterolemia.  date of diagnosis 2005.  Current treatment includes OFF COLESTIPOL due to constipation and a low cholesterol/low fat diet.  Compliance with treatment has been good; she takes her medication as directed, maintains her low cholesterol diet, and follows up as directed.  Most recent lab tests include Creatinine, Serum:  1.06 (mg/dl) (03/01/2019), Glom Filt Rate, Est:  51 (ml/min/1.73m2) (03/01/2019), Alkaline Phosphatase, Serum:  53 (U/L) (02/21/2019), ALT (SGPT):  <5 (U/L) (02/21/2019), AST (SGOT):  6 (U/L) (02/21/2019), TSH:  1.400 (mIU/L) (10/30/2018), Total Cholesterol:  195 (mg/dL) (07/24/2018), HDL:  67 (mg/dL) (07/24/2018), Triglycerides:  138 (mg/dL) (07/24/2018), LDL:  100 (mg/dL) (07/24/2018), WBC count:  5.97 (K/ul) (02/21/2019), Hemoglobin:  12.80 (gm/dl) (02/21/2019), Hematocrit:  36.9 (%) (02/21/2019).      Denae is in for her face to face for her chronic pain med, she is on hydrocodone 7.5 mg tid and she states she is still not functional with moderate to severe LBP with any activity, despite being on the 7.5 mg hydrocodone.  She also suffers from R hip pain and lower back, it is radicular down R LE.   MRI showed severe canal stenosis and bilateral foraminal narrowing. She has seen neurosurgeon Dr Swartz and s/p back  surgery of L spine in past and told her she needed surgery again and she declines surgery. She is also on naproxyn and  gabapentin         Concerning hypertension, her current cardiac medication regimen includes an ACE inhibitor.  Amada Plaza does not check her blood pressure other than at her clinic appointments.  She is tolerating the medication well without side effects.  Compliance with treatment has been good; she takes her medication as directed, maintains her diet and exercise regimen, and follows up as directed.      ROS:     CONSTITUTIONAL:  Negative for chills and fever.      EYES:  Negative for blurred vision.      E/N/T:  Positive for ear pain ( Left ear-try mineral oil OTC ) and nasal congestion.   Negative for frequent rhinorrhea or sore throat.      CARDIOVASCULAR:  Negative for chest pain, dizziness and palpitations.      RESPIRATORY:  Positive for persistent cough ( typically dry ).   Negative for dyspnea or frequent wheezing.      GASTROINTESTINAL:  Positive for diarrhea.   Negative for abdominal pain, constipation, hematochezia, melena, nausea or vomiting.      INTEGUMENTARY/BREAST:  Negative for rash.      NEUROLOGICAL:  Positive for headaches.   Negative for dizziness, memory loss, paresthesias or weakness.      PSYCHIATRIC:  Positive for insomnia.   Negative for anxiety, crying spells, feelings of stress or suicidal thoughts.          PMH/FMH/SH:     Last Reviewed on 2019 02:49 PM by Maricarmen Harrell    Past Medical History:                 PAST MEDICAL HISTORY         COPD     hiatal hernia     Pernicious Anemia     Depression: with loss of daughter.;         GYNECOLOGICAL HISTORY:             CURRENT MEDICAL PROVIDERS:    Gastroenterologist: MANI    Ophthalmologist: KACI    Orthopedist: CELIA    Urologist: BRITTNEE         PREVENTIVE HEALTH MAINTENANCE             BONE DENSITY: was last done  with normal results     COLORECTAL CANCER SCREENING: declines colorectal cancer  screening, understands reason for testing; 3-18-03     EYE EXAM: was last done 3/2019     MAMMOGRAM: Done within last 2 years and results in are chart was last done 2019     PAP SMEAR: was last done Hysterectomy         PAST MEDICAL HISTORY                 ADVANCED DIRECTIVES: None         Surgical History: Abnormal mammogram in March s/p breast bx-everything was ok  2007         Cataract Removal: bilateral;      Appendectomy    Cholecystectomy: laparoscopic;     Hysterectomy: , ;     Foot surgery on both feet secondary bone spurs;      right ear surgery;         Family History:         Positive for Coronary Artery Disease, Hyperlipidemia ( mother ), Hypertension ( mother; brother; sister ), Myocardial Infarction ( mother; brother; sister ) and Sudden Cardiac Death ( brother ).      Positive for Breast Cancer ( daughter ) and Lung Cancer ( father -- , no h/o smoking ).      Positive for COPD ( father -- emphysema; brother ).      Positive for Renal Failure ( niece ).      Positive for Type 1 Diabetes ( mother; brother; sister ).      Son(s): 1 ;   from MVA     Daughter(s): 1 ;  Breast Cancer         Social History:     Occupation: Unemployed     Marital Status:      Children: 3 children         Tobacco/Alcohol/Supplements:     Last Reviewed on 2019 02:49 PM by Maricarmen Harrell    Tobacco: Current Smoker: She currently smokes every day, 1 pack per day.  Non-drinker         Substance Abuse History:     Last Reviewed on 2018 11:51 AM by Radha Maddox        Mental Health History:     Last Reviewed on 2018 11:51 AM by Radha Maddox        Communicable Diseases (eg STDs):     Last Reviewed on 2018 11:51 AM by Radha Maddox            Immunizations:     Prevnar 13 (Pneumococcal PCV 13) 11/3/2015     Fluzone (3 + years dose) 10/22/2008     Fluzone (3 + years dose) 2011     Fluzone High-Dose pf (>=65 yr) 10/23/2013     Fluzone  High-Dose pf (>=65 yr) 11/3/2015     Fluzone High-Dose pf (>=65 yr) 10/17/2016     Fluzone High-Dose pf (>=65 yr) 10/30/2018     Influenza High-Dose Virus Vaccine pf (>=65 yr) 11/1/2017     PNEUMOVAX 23 (Pneumococcal PPV23) 1/4/2018     Zostavax (Zoster live) 10/2/2014         Allergies:     Last Reviewed on 4/22/2019 02:11 PM by Leanne Tim    Pravastatin: (Adverse Reaction)    Crestor:    Niaspan: (Adverse Reaction)    Bentyl: confusion    Fentanyl: itching, shortness of breath (Adverse Reaction)    Zocor: pain (Adverse Reaction)    Morphine Sulfate:        Current Medications:     Last Reviewed on 4/22/2019 02:11 PM by Leanne Tim    Hydrocodone/Acetaminophen 7.5mg/325mg Tablet One PO TID PRN     Singulair  10mg Tablet Take 1 tablet(s) by mouth each evening     Ranitidine 150mg Tablet 1 tab bid     Spiriva HandiHaler 18mcg/1capsule Capsules for Inhalation Inhale contents of 1 capsule(s) by inhaler device by mouth daily thru inhaler.     Advair Diskus 250mcg/50mcg per 1blister Inhalation Powder Inhale 1 puff(s) bid     Benazepril HCl 10mg Tablet Take 1 tablet(s) by mouth daily     Cetirizine HCl 10mg Tablet 1 tab daily     Synthroid 0.088mg Tablet Take 1 tablet(s) by mouth daily     Naprosyn 500mg Tablet Take 1 tablet(s) by mouth  daily     Restasis 0.05% Ophthalmic Emulsion Instill 1 drop(s) to each eye bid     Aspirin (ASA) 81mg Tablets, Enteric Coated Take 1 tablet(s) by mouth qam     Buspirone HCl 15mg Tablet 1 PO BID     ProAir HFA 90mcg/1actuation Oral Inhaler 1 puff daily as needed     Melatonin* 5 mg 4 tab q hs     8HR PAIN RELIEF PRN     TYLENOL PM  PRN     Alrex 0.2% Ophthalmic Suspension 2 gtts BID     Bepreve 1.5% Ophthalmic Solution Instill 1 drop(s) to affected eye(s) bid     Gabapentin 600mg Tablet 1 tab tid         OBJECTIVE:        Vitals:         Current: 4/22/2019 2:12:45 PM    Ht:  5 ft, 3.75 in;  Wt: 131.8 lbs;  BMI: 22.8    T: 98.6 F (oral);  BP: 150/71 mm Hg (left arm, sitting);   P: 80 bpm (left arm (BP Cuff), sitting);  sCr: 1.06 mg/dL;  GFR: 42.66    VA: 20/100 OD, 20/30 OS (with correction)        Exams:     PHYSICAL EXAM:     GENERAL: vital signs recorded - well developed, well nourished;  no apparent distress;     EYES: extraocular movements intact; conjunctiva and cornea are normal; PERRLA;     E/N/T: EARS: left TM is normal and the right TM is distorted, has fluid behind it, red, and retracted;  OROPHARYNX:  normal mucosa, dentition, gingiva, and posterior pharynx;     NECK: range of motion is normal; thyroid is non-palpable; carotid exam reveals bilateral bruits;  supple, no LAD;     RESPIRATORY: normal respiratory rate and pattern with no distress; normal breath sounds with no rales, rhonchi, wheezes or rubs;     CARDIOVASCULAR: normal rate; rhythm is regular;  a systolic murmur is noted: it is grade 2/6;  no edema;     GASTROINTESTINAL: nontender, nondistended; no hepatosplenomegaly or masses; no bruits;     BREAST/INTEGUMENT: BREASTS: breast exam is normal without masses, skin changes, or nipple discharge;     MUSCULOSKELETAL: gait: affected by a limp and slowed;     NEUROLOGIC: mental status: alert and oriented x 3; cranial nerves II-XII grossly intact; Reflexes: knee jerks: 2+;     PSYCHIATRIC:  appropriate affect and demeanor; normal speech pattern; grossly normal memory;         Lab/Test Results:             Amphetamines Screen, Urin:  Negative (04/22/2019),     BAR-Barbiturates Screen, Urin:  Negative (04/22/2019),     Buprenorphine:  Negative (04/22/2019),     BZO-Benzodiazepines Screen,Ur:  Negative (04/22/2019),     Cocaine(Metab.)Screen, Ur:  Negative (04/22/2019),     MDMA-Ecstasy:  Negative (04/22/2019),     Met-Methamphetamine:  Negative (04/22/2019),     MTD-Methadone Screen, Urine:  Negative (04/22/2019),     Opiate Screen, Urine:  Positive (04/22/2019),     OXY-Oxycodone:  Negative (04/22/2019),     PCP-Phencyclidine Screen, Uri:  Negative (04/22/2019),     THC  Cannabinoids Screen, Urin:  Negative (04/22/2019),     Urine temperature:  confirmed (04/22/2019),     Date and time of last pill:  Hydrocodone & Gabapentin 4/22/19 @ 9am (04/22/2019),     Performed by:  isaac (04/22/2019),     Collection Time:  1521 (04/22/2019),             ASSESSMENT           V70.0   Z00.00  Health checkup              DDx:     V79.0   Z13.89  Screening for depression              DDx:     787.91   R19.7  Diarrhea              DDx:     307.42   F51.01  Chronic insomnia              DDx:     244.8   E03.8  Acquired hypothyroidism              DDx:     272.0   E78.00  Hypercholesterolemia              DDx:     585.2   N18.2  Chronic renal insufficiency, stage 2              DDx:     V58.69   Z79.891  Patient visit for long term (current) use of other drugs              DDx:     724.2   M54.5  Chronic low back pain              DDx:     401.1   I10  Hypertension              DDx:     785.2   R01.1  Heart murmur, undiagnosed              DDx:     785.9   I65.23  Carotid artery stenosis              DDx:     382.00   H66.009  Acute suppurative otitis media              DDx:         ORDERS:         Meds Prescribed:       Refill of: Ranitidine 150mg Tablet 1 tab bid  #180 (One [place] and Eighty) tablet(s) Refills: 1       Refill of: Cetirizine HCl 10mg Tablet 1 tab daily  #90 (Ninety) tablet(s) Refills: 1       Refill of: Synthroid (Levothyroxine Sodium) 0.088mg Tablet Take 1 tablet(s) by mouth daily  #90 (Ninety) tablet(s) Refills: 1       Refill of: Aspirin (ASA) 81mg Tablets, Enteric Coated Take 1 tablet(s) by mouth qam  #100 (One [place]) tablet(s) Refills: 1       Paroxetine HCl 10mg Tablet 1 tab daily  #90 (Ninety) tablet(s) Refills: 1       Keflex (Cephalexin) 500mg Capsules Take 1 capsule(s) by mouth q12h for 10 days  #20 (Twenty) capsule(s) Refills: 0       Refill of: Benazepril HCl (Benazepril HCl)  20mg Tablet Take 1 tablet(s) by mouth daily  #90 (Ninety) tablet(s) Refills: 1          Radiology/Test Orders:       3014F  Screening mammography results documented and reviewed (PV)1  (In-House)         3017F  Colorectal CA screen results documented and reviewed (PV)  (In-House)         25093  Duplex scan of extracranial arteries; complete bilateral study  (Send-Out)           Lab Orders:       [id number]  STLC - [facility] Stool Culture  (Send-Out)         69966  TSH - [facility] TSH  (Send-Out)         01856  HTNLP - [facility] CMP AND LIPID: [id number]  (Send-Out)         59386  Drug test prsmv read direct optical obs pr date  (In-House)           Procedures Ordered:       REFER  Referral to Specialist or Other Facility  (Send-Out)         REFER  Referral to Specialist or Other Facility  (Send-Out)           Annual wellness visit, includes a PPPS, subsequent visit  (In-House)           Other Orders:         Depression screen positive and follow up plan documented  (In-House)         1101F  Pt screen for fall risk; document no falls in past year or only 1 fall w/o injury in past year (NATASHA)  (In-House)         4004F  Pt scrnd tobacco use rcvd tobacco cessation talk  (In-House)           Negative EtOH screen  (In-House)           Calculated BMI above the upper parameter and a follow-up plan was documented in the medical record  (In-House)                   PLAN:          Health checkup  MEDICARE WELLNESS EXAM-SHE IS UTD on MAMMOGRAM, DEXA, and COLOGUARD, DONE WITH PAP AND PELVIC EXAM, UTD ON SHINGLES, FLU AND PREVNAR/PNEUMOVAX, DEFERS TETANUS, NO FALLS IN PAST YEAR-USING A ROLLATOR, NO MEMORY ISSUES,  DEPRESSION IS WELL CONTROLLED, SHE LIVES ALONE AND HAS GOOD FAMILY SUPPORT WITH HER GRANDDAUGHTER, SHE IS NO LONGER ABLE TO DRIVE BUT SHE CAN PERFORM ADL'S/FINANCES INDEPENDENTLY, HEARING IS POOR IN R EAR-H/O SURGERY-SHE STATES SHE CANT AFFORD HEARING AIDS, SHE WAS GIVEN A HA ON  A LIVING WILL/SAFETY HA AND A PREV SERVICES HANDOUT AND SAFETY HANDOUT WERE GIVEN TO HER.  CURRENT DOCTOR LIST UPDATED.  SAFETY  ISSUES REVIEWED WITH HER. RECOMMEND YEARLY EYE EXAMS           Orders:         Annual wellness visit, includes a PPPS, subsequent visit  (In-House)            Screening for depression     MIPS Positive Depression Screen: stable and well controlled with medication   Smoking cessation encouraged. Counseling for less than 3 minutes.  Negative alcohol screen     MAMMOGRAM: Done within last 2 years and results in are chart     COLORECTAL CANCER SCREENING: Results are in chart     BMI Elevated - Follow-Up Plan: She was provided education on weight loss strategies           Orders:         Depression screen positive and follow up plan documented  (In-House)         1101F  Pt screen for fall risk; document no falls in past year or only 1 fall w/o injury in past year (NATASHA)  (In-House)         4004F  Pt scrnd tobacco use rcvd tobacco cessation talk  (In-House)           Negative EtOH screen  (In-House)         3014F  Screening mammography results documented and reviewed (PV)1  (In-House)         3017F  Colorectal CA screen results documented and reviewed (PV)  (In-House)           Calculated BMI above the upper parameter and a follow-up plan was documented in the medical record  (In-House)            Diarrhea intermittent loose stools still, recent treatment for campylobacter infection, only 1 normal stool this past week, will recheck her stool culture     LABORATORY:  Labs ordered to be performed today include stool culture.            Orders:       [id number]  [facility] Stool Culture  (Send-Out)            Chronic insomnia           Prescriptions:       Refill of: Ranitidine 150mg Tablet 1 tab bid  #180 (One [place] and Eighty) tablet(s) Refills: 1       Refill of: Cetirizine HCl 10mg Tablet 1 tab daily  #90 (Ninety) tablet(s) Refills: 1       Refill of: Synthroid (Levothyroxine Sodium) 0.088mg Tablet Take 1 tablet(s) by mouth daily  #90 (Ninety) tablet(s) Refills: 1       Refill of: Aspirin (ASA) 81mg  Tablets, Enteric Coated Take 1 tablet(s) by mouth qam  #100 (One [place]) tablet(s) Refills: 1       Paroxetine HCl 10mg Tablet 1 tab daily  #90 (Ninety) tablet(s) Refills: 1       Refill of: Benazepril HCl (Benazepril HCl)  20mg Tablet Take 1 tablet(s) by mouth daily  #90 (Ninety) tablet(s) Refills: 1          Acquired hypothyroidism     LABORATORY:  Labs ordered to be performed today include TSH.            Orders:       [id number]  TSH - [facility] TSH  (Send-Out)            Hypercholesterolemia     LABORATORY:  Labs ordered to be performed today include HTN/Lipid Panel: CMP, Lipid.            Orders:       14183  HTNLP - [facility] CMP AND LIPID: [id number]  (Send-Out)            Chronic renal insufficiency, stage 2 due for labs          Patient visit for long term (current) use of other drugs         RECOMMENDATIONS given include: darline reviewed, drug screen performed and appropriate, consent is reviewed and signed and on the chart, she is aware of risk of addiction on this medication and understands that she will need to follow up for a review every 3 months and her medications will be adjusted or decreased as deemed appropriate at each visit.  No personal history of drug or alcohol abuse.  No concerns about diversion or abuse.  She denies side effects related to the medication.  She is  aware that she may be called in for pill counts..            Orders:       04796  Drug test prsmv read direct optical obs pr date  (In-House)            Chronic low back pain referal to pain management         REFERRALS:  Referral initiated to a chronic pain specialist ( [facility] ).            Orders:       REFER  Referral to Specialist or Other Facility  (Send-Out)            Hypertension elevated          Heart murmur, undiagnosed mild calcification aortic and mitral valves echo 2017          Carotid artery stenosis         TESTS/PROCEDURES:  Will proceed with Carotid Ultrasound to be performed/scheduled now.             Orders:       19750  Duplex scan of extracranial arteries; complete bilateral study  (Send-Out)            Acute suppurative otitis media         REFERRALS:  Referral initiated to an E/N/T ( [facility] ).            Prescriptions:       Keflex (Cephalexin) 500mg Capsules Take 1 capsule(s) by mouth q12h for 10 days  #20 (Twenty) capsule(s) Refills: 0           Orders:       REFER  Referral to Specialist or Other Facility  (Send-Out)               CHARGE CAPTURE           **Please note: ICD descriptions below are intended for billing purposes only and may not represent clinical diagnoses**        Primary Diagnosis:         V70.0 Health checkup            Z00.00    Encounter for general adult medical examination without abnormal findings              Orders:          26873   Preventive medicine, established patient, age 65+ years  (In-House)                Annual wellness visit, includes a PPPS, subsequent visit  (In-House)           V79.0 Screening for depression            Z13.89    Encounter for screening for other disorder              Orders:          87110 -25  Office/outpatient visit; established patient, level 4  (In-House)                Depression screen positive and follow up plan documented  (In-House)             1101F   Pt screen for fall risk; document no falls in past year or only 1 fall w/o injury in past year (NATASHA)  (In-House)             4004F   Pt scrnd tobacco use rcvd tobacco cessation talk  (In-House)                Negative EtOH screen  (In-House)             3014F   Screening mammography results documented and reviewed (PV)1  (In-House)             3017F   Colorectal CA screen results documented and reviewed (PV)  (In-House)                Calculated BMI above the upper parameter and a follow-up plan was documented in the medical record  (In-House)           787.91 Diarrhea            R19.7    Diarrhea, unspecified    307.42 Chronic insomnia            F51.01    Primary  insomnia    244.8 Acquired hypothyroidism            E03.8    Other specified hypothyroidism    272.0 Hypercholesterolemia            E78.00    Pure hypercholesterolemia, unspecified    585.2 Chronic renal insufficiency, stage 2            N18.2    Chronic kidney disease, stage 2 (mild)    V58.69 Patient visit for long term (current) use of other drugs            Z79.891    Long term (current) use of opiate analgesic              Orders:          53976   Drug test prsmv read direct optical obs pr date  (In-House)           724.2 Chronic low back pain            M54.5    Low back pain    401.1 Hypertension            I10    Essential (primary) hypertension    785.2 Heart murmur, undiagnosed            R01.1    Cardiac murmur, unspecified    785.9 Carotid artery stenosis            I65.23    Occlusion and stenosis of bilateral carotid arteries    382.00 Acute suppurative otitis media            H66.009    Acute suppurative otitis media without spontaneous rupture of ear drum, unspecified ear

## 2021-05-18 NOTE — PROGRESS NOTES
Amada BentleyDimitri 1945     Office/Outpatient Visit    Visit Date: Mon, Oct 28, 2019 12:37 pm    Provider: Maricarmen Harrell MD (Assistant: Conchis Gardiner MA)    Location: [facility]        Electronically signed by Maricarmen Harrell MD on  10/29/2019 09:14:10 AM                             SUBJECTIVE:        CC:     Amada Plaza is a 74 year old White female.  She is here today following a transition of care from the emergency department ( [place] for elevated BP ).          HPI:         Patient presents with hypertension.  Her current cardiac medication regimen includes an ACE inhibitor.  Amada Plaza does not check her blood pressure other than at her clinic appointments.  She is tolerating the medication well without side effects.  Compliance with treatment has been good; she takes her medication as directed, maintains her diet and exercise regimen, and follows up as directed.      ROS:     CONSTITUTIONAL:  Negative for chills and fever.      EYES:  Negative for blurred vision.      CARDIOVASCULAR:  Negative for chest pain, dizziness and palpitations.      RESPIRATORY:  Positive for chronic cough.   Negative for dyspnea or frequent wheezing.      GASTROINTESTINAL:  Negative for abdominal pain, constipation, diarrhea, hematochezia, melena, nausea and vomiting.      NEUROLOGICAL:  Positive for headaches.   Negative for memory loss, paresthesias, seizures or weakness.      PSYCHIATRIC:  Positive for going to bed too late.   Negative for crying spells, feelings of stress, sadness or suicidal thoughts.          PMH/FMH/SH:     Last Reviewed on 10/28/2019 01:22 PM by Maricarmen Harrell    Past Medical History:                 PAST MEDICAL HISTORY         COPD     hiatal hernia     Pernicious Anemia     Depression: with loss of daughter.;         GYNECOLOGICAL HISTORY:             CURRENT MEDICAL PROVIDERS:    Gastroenterologist: MANI    Ophthalmologist: KACI    Orthopedist: CELIA    Urologist: BRITTNEE          PREVENTIVE HEALTH MAINTENANCE             BONE DENSITY: was last done  with normal results     COLORECTAL CANCER SCREENING: declines colorectal cancer screening, understands reason for testing; 3-18-03     EYE EXAM: was last done 3/2019     MAMMOGRAM: Done within last 2 years and results in are chart was last done 2019 with normal results     PAP SMEAR: was last done Hysterectomy         PAST MEDICAL HISTORY                 ADVANCED DIRECTIVES: None         Surgical History: Abnormal mammogram in March s/p breast bx-everything was ok  2007         Cataract Removal: bilateral;      Appendectomy    Cholecystectomy: laparoscopic;     Hysterectomy: , ;     Foot surgery on both feet secondary bone spurs;      right ear surgery;         Family History:         Positive for Coronary Artery Disease, Hyperlipidemia ( mother ), Hypertension ( mother; brother; sister ), Myocardial Infarction ( mother; brother; sister ) and Sudden Cardiac Death ( brother ).      Positive for Breast Cancer ( daughter ) and Lung Cancer ( father -- , no h/o smoking ).      Positive for COPD ( father -- emphysema; brother ).      Positive for Renal Failure ( niece ).      Positive for Type 1 Diabetes ( mother; brother; sister ).      Son(s): 1 ;   from MVA     Daughter(s): 1 ;  Breast Cancer         Social History:     Occupation: Unemployed     Marital Status:      Children: 3 children         Tobacco/Alcohol/Supplements:     Last Reviewed on 10/28/2019 01:22 PM by Maricarmen Harrell    Tobacco: Current Smoker: She currently smokes every day, 1-1/2 packs per day.  Non-drinker         Substance Abuse History:     Last Reviewed on 2018 11:51 AM by Radha Maddox        Mental Health History:     Last Reviewed on 2018 11:51 AM by Radha Maddox        Communicable Diseases (eg STDs):     Last Reviewed on 2018 11:51 AM by Radha Maddox            Allergies:     Last  Reviewed on 10/22/2019 03:58 PM by Meryl Sidhu    Pravastatin: (Adverse Reaction)    Crestor:    Niaspan: (Adverse Reaction)    Bentyl: confusion    Fentanyl: itching, shortness of breath (Adverse Reaction)    Zocor: pain (Adverse Reaction)    Morphine Sulfate:        Current Medications:     Last Reviewed on 10/22/2019 03:57 PM by Meryl Sidhu    Synthroid 0.088mg Tablet Take 1 tablet(s) by mouth daily     Benazepril HCl 40mg Tablet Take 1 tablet(s) by mouth daily     Gabapentin 600mg Tablet 1 po tid     Hydrocodone/Acetaminophen 7.5mg/325mg Tablet One PO TID PRN     Restasis 0.05% Ophthalmic Emulsion Instill 1 drop(s) to each eye bid     Advair Diskus 250mcg/50mcg per 1blister Inhalation Powder Inhale 1 puff(s) bid     Singulair  10mg Tablet Take 1 tablet(s) by mouth each evening     Spiriva HandiHaler 18mcg/1capsule Capsules for Inhalation Inhale contents of 1 capsule(s) by inhaler device by mouth daily thru inhaler.     Aspirin (ASA) 81mg Tablets, Enteric Coated Take 1 tablet(s) by mouth qam     Cetirizine HCl 10mg Tablet 1 tab daily     Paroxetine HCl 10mg Tablet 1 tab daily     Buspirone HCl 15mg Tablet 1 PO BID     Naprosyn 500mg Tablet Take 1 tablet(s) by mouth  daily     Pepcid 40mg Tablet Take 1 tablet(s) by mouth bid     ProAir HFA 90mcg/1actuation Oral Inhaler 1 puff daily as needed     Bepreve 1.5% Ophthalmic Solution Instill 1 drop(s) to affected eye(s) bid         OBJECTIVE:        Vitals:         Current: 10/28/2019 12:44:59 PM    Ht:  5 ft, 3.75 in;  Wt: 134.8 lbs;  BMI: 23.3    T: 98.5 F (oral);  BP: 185/77 mm Hg (right arm, sitting);  P: 90 bpm (right arm (BP Cuff), sitting);  sCr: 0.89 mg/dL;  GFR: 51.29        Repeat:     1:14:37 PM     BP:   191/76mm Hg (right arm, sitting, HR)     1:17:17 PM     BP:   188/88mm Hg (right arm, sitting)     2:02:38 PM     BP:   178/56mm Hg (right arm, sitting, HR   67)         Exams:     PHYSICAL EXAM:     GENERAL: vital signs recorded - well developed, well  nourished;  no apparent distress;     EYES: extraocular movements intact; conjunctiva and cornea are normal; PERRLA;     E/N/T:  normal EACs, TMs, nasal/oral mucosa, teeth, gingiva, and oropharynx;     NECK: range of motion is normal; thyroid is non-palpable; supple, no LAD;     RESPIRATORY: normal respiratory rate and pattern with no distress; normal breath sounds with no rales, rhonchi, wheezes or rubs;     CARDIOVASCULAR: normal rate; rhythm is regular;  no systolic murmur; no edema;     GASTROINTESTINAL: nontender, nondistended; no hepatosplenomegaly or masses; no bruits;     MUSCULOSKELETAL: gait: affected by a limp and slowed;     NEUROLOGIC: mental status: alert and oriented x 3; cranial nerves II-XII grossly intact; Reflexes: knee jerks: 2+;     PSYCHIATRIC:  appropriate affect and demeanor; normal speech pattern; grossly normal memory;         Procedures:     Hypertensive urgency    1. clonidine 0.1mg  @ 1259 given PO; administered by mnp;  lot number [id number]; expires 07/2020             ASSESSMENT           401.0   I10  Hypertensive urgency              DDx:         ORDERS:         Meds Prescribed:       Metoprolol Succinate/Hydrochlorothiazide 25mg/12.5mg Tablets, Extended Release Take 1 tablet(s) by mouth daily  #30 (Thirty) tablet(s) Refills: 2       Clonidine HCl 0.1mg Tablet 1 po daily prn BP > 180/110, go to ER if BP does not improve in 45-60 min or if signs MI or stroke (facial droop/slurred speech/weakness)  #10 (Ten) tablet(s) Refills: 0                 PLAN:          Hypertensive urgencygiven clonidine for prn use for HTN urgency will start her on toprol with hctz,           Prescriptions:       Metoprolol Succinate/Hydrochlorothiazide 25mg/12.5mg Tablets, Extended Release Take 1 tablet(s) by mouth daily  #30 (Thirty) tablet(s) Refills: 2       Clonidine HCl 0.1mg Tablet 1 po daily prn BP > 180/110, go to ER if BP does not improve in 45-60 min or if signs MI or stroke (facial droop/slurred  speech/weakness)  #10 (Ten) tablet(s) Refills: 0             CHARGE CAPTURE           **Please note: ICD descriptions below are intended for billing purposes only and may not represent clinical diagnoses**        Primary Diagnosis:         401.0 Hypertensive urgency            I10    Essential (primary) hypertension              Orders:          [id number]   Office/outpatient visit; established patient, level 4  (In-House)

## 2021-05-18 NOTE — PROGRESS NOTES
"Amada Bentley  1945     Office/Outpatient Visit    Visit Date: Thu, Nov 5, 2020 02:30 pm    Provider: Maricarmen Harrell MD (Assistant: Venus Morrison MA)    Location: [facility]        Electronically signed by Maricarmen Harrell MD on  11/09/2020 04:33:14 PM                             Subjective:        CC: dox video [id number] Mela is a 75 year old White female.  She is here today following a transition of care from the emergency department ( [facility] on 11-4-20 for low bp ).          HPI:       Denae has had diarrhea for 3 months, and her bowel movements are several per day and they are watery/mushy/mucousy.  NO blood/melena.  Prior to this her BM were normal.   She has no energy.  NO covid exposure.     She is not having any fever or chills, abdominal or pelvic pain, N/V  Amada Plaza has not recently been taking antibiotics.  She is not aware of history of C. diff previously. NO recent travel, no eating out, no well water.  She was seen in the ER on 9/14 for diarrhea, and the ER placed her on Kcl 20 meq daily.   Stool studies positive for white blood cells, neg for c diff, neg O and P, and normal stool culture.  Normal WBC, normal hgb/HCT.  She is now seeing the GI doctor, and was seen yesterday on 11/04 and her BP was low (70/40) so she was sent to the ER for eval.  Her labs were all ok, her EKG was ok and her BP was normal (140s) in ER so she was d/c home        On 10/26 she had an EGD and colonoscopy by Dr Richard Simpson and it showed polyps and a \"functional disorder\" of the colon causing diarrhea.  No medication tried        Abd surgeries:appendectomy, hysterectomy, cholecystectomy.  she is on glucosamine/osteochondroitin and started this med 2 weeks prior to seeing DR Morse, about the same time she started having the diarrhea.    ROS:     CONSTITUTIONAL:  Positive for fatigue.   Negative for chills or fever.      E/N/T:  Positive for nasal congestion.   Negative for ear pain or sore " throat.      CARDIOVASCULAR:  Negative for chest pain, dizziness and pedal edema.      RESPIRATORY:  Negative for recent cough and dyspnea.      GASTROINTESTINAL:  Positive for diarrhea.   Negative for abdominal pain, hematochezia, melena, nausea or vomiting.      MUSCULOSKELETAL:  Negative for arthralgias, back pain and myalgias.      INTEGUMENTARY/BREAST:  Negative for rash.      NEUROLOGICAL:  Positive for dizziness and weakness.   Negative for fainting, headaches or paresthesias.      PSYCHIATRIC:  Positive for anxiety.   Negative for depression or suicidal thoughts.          Past Medical History / Family History / Social History:         Last Reviewed on 10/15/2020 01:32 PM by Maricarmen Harrell    Past Medical History:                 PAST MEDICAL HISTORY         COPD     hiatal hernia     Pernicious Anemia     Depression: with loss of daughter.;     LBP, nicotine dependencs, HTN, hypothyroid, anxiety, hypercholesterolemia         GYNECOLOGICAL HISTORY:             CURRENT MEDICAL PROVIDERS:    Gastroenterologist: Maricarmen    Orthopedist: CELIA    Urologist: BRITTNEE         PREVENTIVE HEALTH MAINTENANCE             BONE DENSITY: was last done  with normal results     COLORECTAL CANCER SCREENING: Up to date (colonoscopy q10y; sigmoidoscopy q5y; Cologuard q3y) was last done 2013; gastritis     EYE EXAM: was last done 3/2019     MAMMOGRAM: Done within last 2 years and results in are chart was last done 20 with normal results     PAP SMEAR: was last done Hysterectomy         PAST MEDICAL HISTORY                 ADVANCED DIRECTIVES: None         Surgical History: Abnormal mammogram in March s/p breast bx-everything was ok  2007         Cataract Removal: bilateral;     Appendectomy    Cholecystectomy: laparoscopic;     Hysterectomy: , ;     Foot surgery on both feet secondary bone spurs;     right ear surgery;         Family History:         Positive for Coronary Artery Disease,  Hyperlipidemia ( mother ), Hypertension ( mother; brother; sister ), Myocardial Infarction ( mother; brother; sister ) and Sudden Cardiac Death ( brother ).      Positive for Breast Cancer ( daughter ) and Lung Cancer ( father -- , no h/o smoking ).      Positive for COPD ( father -- emphysema; brother ).      Positive for Renal Failure ( niece ).      Positive for Type 1 Diabetes ( mother; brother; sister ).      Son(s): 1 ;   from MVA     Daughter(s): 1 ;  Breast Cancer         Social History:     Occupation: Unemployed     Marital Status:      Children: 3 children         Tobacco/Alcohol/Supplements:     Last Reviewed on 10/15/2020 12:50 PM by Meryl Sidhu    Tobacco: She has a past history of cigarette smoking; quit date:  aug, 27th 2020.  Non-drinker         Substance Abuse History:     Last Reviewed on 2020 01:19 PM by Osmar Morse        Mental Health History:     Last Reviewed on 2020 01:19 PM by Osmar Morse        Communicable Diseases (eg STDs):     Last Reviewed on 2020 01:19 PM by Osmar Morse        Allergies:     Last Reviewed on 10/15/2020 12:50 PM by Meryl Sidhu    Pravastatin:   (Adverse Reaction)    Crestor:      Niaspan:   (Adverse Reaction)    Bentyl: Confusion     Fentanyl: itching,shortness of breath  (Adverse Reaction)    TAPE:      BAND AID:      Zocor: pain  (Adverse Reaction)    Morphine Sulfate:          Current Medications:     Last Reviewed on 10/15/2020 12:50 PM by Meryl Sidhu    ProAir HFA     cetirizine 10 mg oral tablet [1 tab daily]    Gabapentin 600 mg oral tablet [1 po tid]    HYDROcodone-acetaminophen 7.5-325 mg oral tablet [One PO TID PRN]    busPIRone 15 mg oral tablet [1 PO BID]    Singulair  10 mg oral tablet [Take 1 tablet(s) by mouth each evening]    Restasis 0.05% Ophthalmic Emulsion [Instill 1 drop(s) to each eye bid]    Synthroid 88 mcg oral tablet [Take 1 tablet(s) by mouth daily]     traZODone 50 mg oral tablet [take 1 tablet (50 mg) by oral route every hs]    omeprazole 20 mg oral capsule,delayed release (enteric coated) [take 1 capsule (20 mg) by oral route once daily, try to take it QOD]    Breo Ellipta 200-25 mcg/dose Inhalation Blister, With Inhalation Device [inhale 1 puff by inhalation route once daily at the same time each day]    INCRUSE ELPT INH 62.5MCG     umeclidinium 62.5 mcg/actuation Inhalation Blister, With Inhalation Device [inhale 1 puff (62.5 mcg) by inhalation route once daily at the same time each day]    potassium chloride 20 mEq oral tablet, extended release [take 1 tablet (20 meq) by oral route once a day]    DULoxetine 60 mg oral capsule,delayed release (enteric coated) [take 1 capsule (60 mg) by oral route once daily]    colestipoL 1 gram oral tablet [take 1 tablet (1 gram) by oral route 2 times per day swallowing whole with any liquid. Do not crush, chew and/or divide.]    Lactinex 1 million cell oral tablet,chewable [2 tabs with meals up to 3 times per day]        Objective:        Vitals:         Current: 11/5/2020 4:25:52 PM    Ht:  5 ft, 2.25 in;  Wt: 113 lbs;  BMI: 20.5T: 98 F (temporal);  sCr: 1.27 mg/dL;  GFR: 32.28        Exams:     PHYSICAL EXAM:     GENERAL:  well developed and nourished; appropriately groomed; in no apparent distress;     EYES: extraocular movements intact; conjunctiva and cornea are normal;     RESPIRATORY: normal respiratory rate and pattern with no distress;     MUSCULOSKELETAL: normal overall tone     NEUROLOGIC: mental status: alert and oriented x 3;     PSYCHIATRIC: appropriate affect and demeanor; normal psychomotor function; normal speech pattern;         Assessment:         R19.7   Diarrhea, unspecified       I95.89   Other hypotension           Plan:         Diarrhea, unspecifiedincrease colestipol to 1 gram bid and if she has diarrhea tomorrow, she can take 2 immodium, she is to f/u with her GI doctor next week to go over her dx  of functional diarrhea, she is off all OTC supplements, she is on lactinex    Telehealth: Verbal consent obtained for visit to occur via televideo conferencing; Staff, other than provider, present during telephone visit include only Dr Betts was present during the telehealth OV with patient         Other hypotensionquestion if her low BP at Dr Peters was real-her BP was normal at ER, hgb 36,  K was normal, Na.             Charge Capture:         Primary Diagnosis:     R19.7  Diarrhea, unspecified           Orders:      [id number]  Office/outpatient visit; established patient, level 4  (In-House)              I95.89  Other hypotension         ADDENDUMS:      ____________________________________    Date: 11/06/2020 01:30 PM    Author: [employer]         Visit Note Faxed to:        [facility]; Number [phone]            Addendum: 11/12/2020 01:25 PM - One, Team         Visit Note Faxed to:        [facility]; Number [phone]

## 2021-05-18 NOTE — PROGRESS NOTES
Amada Bentley. 1945     Office/Outpatient Visit    Visit Date: Thu, Jan 4, 2018 12:49 pm    Provider: Maricarmen Harrell MD (Assistant: Sabine Sellers MA)    Location: [facility]        Electronically signed by Maricarmen Harrell MD on  01/09/2018 01:32:59 PM                             SUBJECTIVE:        CC:     Amada Plaza is a 72 year old White female.  The patient is accompanied into the exam room by her grandaughter.  MCW;         HPI:         Amada Plaza is here for a Medicare wellness visit.  A list of current providers and suppliers reviewed and updated A current height, weight, BMI, blood pressure, and pulse were recorded in the vitals section of the note and have been reviewed A review of possible cognitive impairment was performed and the following was noted: she is having difficulty driving;  memory changes are noted;  she is having trouble with her finances A review of functional ability and level of safety was performed and was negative In regard to hearing, she reports having trouble hearing the TV/radio when others do not and having to strain to hear or understand conversations, but not wearing hearing aid(s).  Concerning home safety, she reports that at home she DOES have poor lighting, but not throw rugs, a slippery bath or shower, grab bars in the bath or functioning smoke alarms.      Immunization Status: Up to date;     Physical Activity: ** Exercises infrequently; Has not had any falls or only one fall without injury in the past year.  Advance Care Directive updated today in [facility] Preventative Health updated today     Denae is in for her 3 month face to face for her chronic LBP which is stable and manageable on percocet 7.5 mg qid and gabapentin.  She is currently seeing Dr Swartz and s/p back surgery of L spine.  Her pain remains 2-3/10 on pain meds and  8-10/10 off medication, worse with ambulation and she is limited on distance.  Pain radiates to R thigh and she has R LE weakness.   MRI showed  severe canal stenosis and bilateral foraminal narrowing.  Denae tolerated her meds well, denies SE of confusion or sedation, and she shows no sign of diversion or abuse         Dx with acquired hypothyroidism; she is currently taking Synthroid, 88 mcg daily.  TSH was last checked 6 months ago.  The result was reported as normal.  She denies any related symptoms.  She reports no symptoms suggestive of adverse medication effect.          Concerning hypertension, current nonpharmacologic treatment includes low sodium diet.  Her current cardiac medication regimen includes an ACE inhibitor.  Amada Plaza does not check her blood pressure other than at her clinic appointments.  She is tolerating the medication well without side effects.  Compliance with treatment has been good; she takes her medication as directed, maintains her diet and exercise regimen, and follows up as directed.          Additionally, she presents with history of hypercholesterolemia.  date of diagnosis 2005.  Current treatment includes OFF COLESTIPOL due to constipation and a low cholesterol/low fat diet.  Compliance with treatment has been good; she takes her medication as directed, maintains her low cholesterol diet, and follows up as directed.  She denies experiencing any hypercholesterolemia related symptoms.  Most recent lab tests include Creatinine, Serum:  1.04 (mg/dl) (07/05/2017), Glom Filt Rate, Est:  53 (ml/min/1.73m2) (07/05/2017), Alkaline Phosphatase:  46 (U/L) (07/05/2017), ALT (SGPT):  <5 (U/L) (07/05/2017), AST (SGOT):  7 (U/L) (07/05/2017), HDL:  65 (mg/dL) (07/05/2017), LDL:  127 (mg/dL) (07/05/2017), Total Cholesterol:  211 (mg/dL) (07/05/2017), Triglycerides:  95 (mg/dL) (07/05/2017), Alkaline Phosphatase, Serum:  46 (U/L) (07/05/2017), TSH:  1.450 (mIU/L) (07/05/2017).          PHQ-9 Depression Screening: Completed form scanned and in chart; Total Score 5/27     ROS:     CONSTITUTIONAL:  Positive for fatigue.   Negative for chills or  fever.      E/N/T:  Positive for ear pain ( right ), nasal congestion and frequent rhinorrhea.      CARDIOVASCULAR:  Negative for chest pain, palpitations, tachycardia, orthopnea, and edema.      RESPIRATORY:  Negative for dyspnea and frequent wheezing.      GASTROINTESTINAL:  Positive for constipation.   Negative for abdominal pain, diarrhea, nausea or vomiting.      INTEGUMENTARY/BREAST:  Negative for rash.      NEUROLOGICAL:  Positive for headaches.   Negative for memory loss, paresthesias, seizures or weakness.      PSYCHIATRIC:  Negative for anxiety, crying spells and feelings of stress.          PMH/FMH/SH:     Last Reviewed on 2018 01:42 PM by Maricarmen Harrell    Past Medical History:                 PAST MEDICAL HISTORY         COPD     hiatal hernia     2 bulding disc and a pinched nerve     Pernicious Anemia     Depression: with loss of daughter.;         GYNECOLOGICAL HISTORY:             PREVENTIVE HEALTH MAINTENANCE             BONE DENSITY: was last done 11/16/15 with normal results     COLORECTAL CANCER SCREENING: declines colorectal cancer screening, understands reason for testing; 3-18-03     EYE EXAM: was last done summer 2017     MAMMOGRAM: was last done 16         PAST MEDICAL HISTORY                 ADVANCED DIRECTIVES: None         Surgical History: Abnormal mammogram in March s/p breast bx-everything was ok  2007         Cataract Removal: bilateral;      Appendectomy    Cholecystectomy: laparoscopic;     Hysterectomy: , ;     Foot surgery on both feet secondary bone spurs;      right ear surgery;         Family History:         Positive for Coronary Artery Disease, Hyperlipidemia ( mother ), Hypertension ( mother; brother; sister ), Myocardial Infarction ( mother; brother; sister ) and Sudden Cardiac Death ( brother ).      Positive for Breast Cancer ( daughter ) and Lung Cancer ( father -- , no h/o smoking ).      Positive for COPD ( father -- emphysema;  brother ).      Positive for Renal Failure ( niece ).      Positive for Type 1 Diabetes ( mother; brother; sister ).      Son(s): 1 ;   from MVA     Daughter(s): 1 ;  Breast Cancer         Social History:     Occupation: Unemployed     Marital Status:      Children: 3 children         Tobacco/Alcohol/Supplements:     Last Reviewed on 2018 01:42 PM by Maricarmen Harrell    Tobacco: Current Smoker: She currently smokes every day, 1 pack per day.  Non-drinker         Substance Abuse History:     Last Reviewed on 3/15/2017 11:38 AM by Maggie Lackey        Mental Health History:     Last Reviewed on 3/15/2017 11:38 AM by Maggie Lackey        Communicable Diseases (eg STDs):     Last Reviewed on 3/15/2017 11:38 AM by Maggie Lackey            Allergies:     Last Reviewed on 2017 10:50 AM by Venus Morrison    Pravastatin: (Adverse Reaction)    Crestor:    Niaspan: (Adverse Reaction)    Bentyl: confusion    Fentanyl: itching, shortness of breath (Adverse Reaction)    Zocor: pain (Adverse Reaction)    Morphine Sulfate:        Current Medications:     Last Reviewed on 2017 10:52 AM by Venus Morrison    Naprosyn 250mg Tablet 1 a day prn     Gabapentin 600mg Tablet 1 tab QID     Lovaza 1gm Capsules Take 2 capsule(s) by mouth bid     Restoril 30mg Capsules Take 1 capsule(s) by mouth at bedtime prn for insomnia     Livalo 1mg Tablet Take 1 tablet(s) by mouth daily     Advair Diskus 250mcg/50mcg per 1blister Inhalation Powder Inhale 1 puff(s) bid     Benazepril HCl 10mg Tablet Take 1 tablet(s) by mouth daily     Buspirone HCl 15mg Tablet Take 1 tablet(s) by mouth bid     Cetirizine HCl 10mg Tablet 1 tab daily     Citalopram Hydrobromide 40mg Tablet 1 tab daily     Ranitidine 150mg Tablet 1 tab bid     Singulair  10mg Tablet Take 1 tablet(s) by mouth each evening     Spiriva HandiHaler 18mcg/1capsule Capsules for Inhalation Inhale contents of 1 capsule(s) by  Rotahaler by mouth daily thru inhaler prn     Synthroid 0.088mg Tablet Take 1 tablet(s) by mouth daily     Aspirin (ASA) 81mg Tablets, Enteric Coated Take 1 tablet(s) by mouth qam     Restasis 0.05% Ophthalmic Emulsion Instill 1 drop(s) each eye QID     Docusate Sodium 100mg Capsules 1 cap po bid         OBJECTIVE:        Vitals:         Current: 1/4/2018 12:51:09 PM    Ht:  5 ft, 3.75 in;  Wt: 123.7 lbs;  BMI: 21.4    T: 97.7 F (oral);  BP: 111/56 mm Hg (left arm, standing);  P: 86 bpm (left arm (BP Cuff), sitting);  sCr: 1.04 mg/dL;  GFR: 43.59    VA: 20/25 OD, 20/25 OS (near, with correction)        Exams:     PHYSICAL EXAM:     GENERAL: vital signs recorded - well developed, well nourished;  no apparent distress;     EYES: extraocular movements intact; conjunctiva and cornea are normal; PERRLA;     E/N/T: OROPHARYNX:  normal mucosa, dentition, gingiva, and posterior pharynx;     NECK: range of motion is normal; thyroid is non-palpable;     RESPIRATORY: normal respiratory rate and pattern with no distress; normal breath sounds with no rales, rhonchi, wheezes or rubs; productive cough;     CARDIOVASCULAR: normal rate; rhythm is regular;  no systolic murmur; no edema;     GASTROINTESTINAL: nontender, nondistended; no hepatosplenomegaly or masses; no bruits; rectal exam: defers;     GENITOURINARY: defers exam;     BREAST/INTEGUMENT: BREASTS: breast exam is normal without masses, skin changes, or nipple discharge;     MUSCULOSKELETAL: gait: affected by a limp and slowed;     NEUROLOGIC: mental status: alert and oriented x 3; cranial nerves II-XII grossly intact; Reflexes: knee jerks: 2+;     PSYCHIATRIC:  appropriate affect and demeanor; normal speech pattern; grossly normal memory;         Lab/Test Results:             BZO-Benzodiazepines Screen,Ur:  Positive (01/04/2018),     BAR-Barbiturates Screen, Urin:  Negative (01/04/2018),     Cocaine(Metab.)Screen, Ur:  Negative (01/04/2018),     THC Cannabinoids Screen,  Urin:  Negative (01/04/2018),     Met-Methamphetamine:  Negative (01/04/2018),     Opiate Screen, Urine:  Positive (01/04/2018),     MTD-Methadone Screen, Urine:  Negative (01/04/2018),     TCA-Tricyclic Antidepressants:  Negative (01/04/2018),     OXY-Oxycodone:  Negative (01/04/2018),     PCP-Phencyclidine Screen, Uri:  Negative (01/04/2018),     Amphetamines Screen, Urin:  Negative (01/04/2018),     MDMA-Ecstasy:  Negative (01/04/2018),     Urine temperature:  confirmed (01/04/2018),     Date and time of last pill:  Hydrocodone 1/4/18@9am Hydrocodone 1/4/18@9am Tramadol 1/4/18@11am (01/04/2018),     Performed by:  tls (01/04/2018),     Collection Time:  1340 (01/04/2018),             Procedures:     Pneumovax administration     1. Pneumovax (pneumococcal PPSV23): 0.5 ml unit dose given IM in the left upper arm; administered by ;  lot number [id number]; expires 21APR2019             ASSESSMENT           V70.0   Z00.00  Health checkup              DDx:     724.2   M54.5  Chronic low back pain              DDx:     491.21   J44.1  Decompensated chronic obstructive pulmonary disease (COPD)              DDx:     244.8   E03.8  Acquired hypothyroidism              DDx:     401.1   I10  Hypertension              DDx:     272.0   E78.00  Hypercholesterolemia              DDx:     585.2   N18.2  Chronic renal insufficiency, stage 2              DDx:     V58.69   Z79.899  Use of high risk medications              DDx:     307.42   F51.01  Chronic insomnia              DDx:     V79.0   Z13.89  Screening for depression              DDx:     V76.49   Z12.12  Screening for colorectal cancer              DDx:     V76.12   Z12.31  Screening mammogram - other              DDx:     627.2   Z13.820  Post-menopausal state              DDx:     V03.82   Z23  Pneumovax administration              DDx:         ORDERS:         Meds Prescribed:       Refill of: Hydrocodone/Acetaminophen 7.5mg/325mg Tablet 1 tab PO QID prn pain  #120 (One  [place] and Twenty) tablet(s) Refills: 0       Refill of: Naprosyn (Naproxen) 250mg Tablet 1 a day prn  #90 (Ninety) tablet(s) Refills: 1       Refill of: Gabapentin 600mg Tablet 1 tab QID  #180 (One [place] and Eighty) tablet(s) Refills: 1       Refill of: Lovaza (Omega-3 acid ethyl esters) 1gm Capsules Take 2 capsule(s) by mouth bid  #360 (Three [place] and Sixty) capsule(s) Refills: 0       Refill of: Restoril (Temazepam) 15mg Capsules Take 1 capsule(s) by mouth at bedtime prn for insomnia  #90 (Ninety) capsule(s) Refills: 0       Refill of: Advair Diskus (Fluticasone/Salmeterol) 250mcg/50mcg per 1blister Inhalation Powder Inhale 1 puff(s) bid  #3 (Three) device Refills: 1       Refill of: Benazepril HCl 10mg Tablet Take 1 tablet(s) by mouth daily  #90 (Ninety) tablet(s) Refills: 1       Refill of: Buspirone HCl 15mg Tablet Take 1 tablet(s) by mouth bid  #180 (One [place] and Eighty) tablet(s) Refills: 1       Refill of: Cetirizine HCl 10mg Tablet 1 tab daily  #90 (Ninety) tablet(s) Refills: 1       Refill of: Citalopram Hydrobromide 40mg Tablet 1 tab daily  #90 (Ninety) tablet(s) Refills: 1       Refill of: Pitavastatin 1mg Tablet Take 1 tablet(s) by mouth daily  #90 (Ninety) tablet(s) Refills: 1       Refill of: Ranitidine 150mg Tablet 1 tab bid  #180 (One [place] and Eighty) tablet(s) Refills: 1       Refill of: Singulair  (Montelukast Sodium) 10mg Tablet Take 1 tablet(s) by mouth each evening  #90 (Ninety) tablet(s) Refills: 1       Refill of: Spiriva HandiHaler (Tiotropium Bromide) 18mcg/1capsule Capsules for Inhalation Inhale contents of 1 capsule(s) by Rotahaler by mouth daily thru inhaler prn  #90 (Ninety) capsule(s) Refills: 1       Refill of: Synthroid (Levothyroxine Sodium) 0.088mg Tablet Take 1 tablet(s) by mouth daily  #90 (Ninety) tablet(s) Refills: 1       Refill of: Aspirin (ASA) 81mg Tablets, Enteric Coated Take 1 tablet(s) by mouth qam  #100 (One [place]) tablet(s) Refills: 0       Refill of:  Docusate Sodium 100mg Capsules 1 cap po bid  #180 (One [place] and Eighty) capsule(s) Refills: 1       Refill of: Ventolin HFA (Albuterol) 90mcg/1actuation Oral Inhaler 1 puff every 4-6 hours prn SOB/cough *may substitute for insurance preference*  #1 (One) inhaler(s) Refills: 5         Radiology/Test Orders:       [id number]  Screening mammography bi 2-view inc CAD  (Send-Out)         22077  DXA, bone density study, 1 or more sites; axial skeleton (eg hips, pelvis, spine)  (Send-Out)         3014F  Screening mammography results documented and reviewed (PV)1  (In-House)           Lab Orders:       10961  Drug test prsmv qual dir optical obs per day  (In-House)         27428  [facility] PHYSICAL: CMP, CBC, TSH, LIPID: [id number], [id number], [id number], [id number]  (Send-Out)           Procedures Ordered:         Annual wellness visit, includes a PPPS, subsequent visit  (In-House)         REFER  Referral to Specialist or Other Facility  (Send-Out)         05457  PNEUMOVAX 23  (In-House)           Other Orders:         Colorectal cancer screening; fecal-occult blood test, immunoassay, 1-3 simultaneous determinations  (Send-Out)         56754  Behav assmt w/score & docd/stand instrument  (In-House)         1101F  Pt screen for fall risk; document no falls in past year or only 1 fall w/o injury in past year (NATASHA)  (In-House)         4004F  Pt scrnd tobacco use rcvd tobacco cessation talk  (In-House)           Negative EtOH screen  (In-House)           Calculated BMI above the upper parameter and a follow-up plan was documented in the medical record  (In-House)           Administration of pneumococcal vaccine (x1)                 PLAN:          Health checkup MEDICARE WELLNESS EXAM-SHE IS DUE FOR MAMMOGRAM, DEXA, and COLONOSCOPY (defers referal for colonoscopy), DONE WITH PAP AND PELVIC EXAM, UTD ON SHINGLES, FLU AND PREVNAR/pneumovax, DEFERS TETANUS, POSITIVE FALL RISK DUE TO HER BACK, BUT NO FALLS IN PAST YEAR-told to  use her rollator, NO MEMORY ISSUES,  DEPRESSION IS WELL CONTROLLED, SHE LIVES ALONE AND IS MOVING TO A BIGGER APARTMENT IN [place], HAS OK FAMILY SUPPORT WITH HER DAUGHTER WHO IS AT HER OV TODAY, SHE IS NO LONGER ABLE TO DRIVE BUT SHE CAN PERFORM ADL'S/FINANCES INDEPENDENTLY, HEARING IS POOR IN R EAR-H/O SURGERY-SHE STATES SHE CANT AFFORD HEARING AIDS, SHE WAS GIVEN A HA ON  A LIVING WILL/SAFETY HA AND A PREV SERVICES HANDOUT AND SAFETY HANDOUT WERE GIVEN TO HER.  CURRENT DOCTOR LIST UPDATED.  SAFETY ISSUES REVIEWED WITH HER.     LABORATORY:  Labs ordered to be performed today include PHYSICAL PANEL; CMP, CBC, TSH, LIPID.            Prescriptions:       Refill of: Hydrocodone/Acetaminophen 7.5mg/325mg Tablet 1 tab PO QID prn pain  #120 (One [place] and Twenty) tablet(s) Refills: 0       Refill of: Naprosyn (Naproxen) 250mg Tablet 1 a day prn  #90 (Ninety) tablet(s) Refills: 1       Refill of: Gabapentin 600mg Tablet 1 tab QID  #180 (One [place] and Eighty) tablet(s) Refills: 1       Refill of: Lovaza (Omega-3 acid ethyl esters) 1gm Capsules Take 2 capsule(s) by mouth bid  #360 (Three [place] and Sixty) capsule(s) Refills: 0       Refill of: Restoril (Temazepam) 15mg Capsules Take 1 capsule(s) by mouth at bedtime prn for insomnia  #90 (Ninety) capsule(s) Refills: 0       Refill of: Advair Diskus (Fluticasone/Salmeterol) 250mcg/50mcg per 1blister Inhalation Powder Inhale 1 puff(s) bid  #3 (Three) device Refills: 1       Refill of: Benazepril HCl 10mg Tablet Take 1 tablet(s) by mouth daily  #90 (Ninety) tablet(s) Refills: 1       Refill of: Buspirone HCl 15mg Tablet Take 1 tablet(s) by mouth bid  #180 (One [place] and Eighty) tablet(s) Refills: 1       Refill of: Cetirizine HCl 10mg Tablet 1 tab daily  #90 (Ninety) tablet(s) Refills: 1       Refill of: Citalopram Hydrobromide 40mg Tablet 1 tab daily  #90 (Ninety) tablet(s) Refills: 1       Refill of: Pitavastatin 1mg Tablet Take 1 tablet(s) by mouth daily  #90  (Ninety) tablet(s) Refills: 1       Refill of: Ranitidine 150mg Tablet 1 tab bid  #180 (One [place] and Eighty) tablet(s) Refills: 1       Refill of: Singulair  (Montelukast Sodium) 10mg Tablet Take 1 tablet(s) by mouth each evening  #90 (Ninety) tablet(s) Refills: 1       Refill of: Spiriva HandiHaler (Tiotropium Bromide) 18mcg/1capsule Capsules for Inhalation Inhale contents of 1 capsule(s) by Rotahaler by mouth daily thru inhaler prn  #90 (Ninety) capsule(s) Refills: 1       Refill of: Synthroid (Levothyroxine Sodium) 0.088mg Tablet Take 1 tablet(s) by mouth daily  #90 (Ninety) tablet(s) Refills: 1       Refill of: Aspirin (ASA) 81mg Tablets, Enteric Coated Take 1 tablet(s) by mouth qam  #100 (One [place]) tablet(s) Refills: 0       Refill of: Docusate Sodium 100mg Capsules 1 cap po bid  #180 (One [place] and Eighty) capsule(s) Refills: 1       Refill of: Ventolin HFA (Albuterol) 90mcg/1actuation Oral Inhaler 1 puff every 4-6 hours prn SOB/cough *may substitute for insurance preference*  #1 (One) inhaler(s) Refills: 5           Orders:         Annual wellness visit, includes a PPPS, subsequent visit  (In-House)         68959  [facility] PHYSICAL: CMP, CBC, TSH, LIPID: [id number], [id number], [id number], [id number]  (Send-Out)            Chronic low back pain stable on meds, today is her 3 month  face to face referal to pain management to [facility] trying long term pain meds and spinal steroid injections         REFERRALS:  Referral initiated to a chronic pain specialist ( [facility] ).            Orders:       REFER  Referral to Specialist or Other Facility  (Send-Out)            Decompensated chronic obstructive pulmonary disease (COPD) stable          Acquired hypothyroidism stable on meds, due for labs          Hypertension stable on meds, due for labs          Hypercholesterolemia diet controlled, due for labs          Chronic renal insufficiency, stage 2 due for labs          Use of high risk medications          RECOMMENDATIONS given include: darline reviewed, drug screen performed and appropriate, consent is reviewed and signed and on the chart, she is aware of risk of addiction on this medication and understands that she will need to follow up for a review every 3 months and her medications will be adjusted or decreased as deemed appropriate at each visit.  No personal history of drug or alcohol abuse.  No concerns about diversion or abuse.  She denies side effects related to the medication.  She is  aware that she may be called in for pill counts..            Orders:       51981  Drug test prsmv qual dir optical obs per day  (In-House)            Chronic insomnia stable so I will decrease her temazepam to 1/2 dose          Screening for depression     MIPS Current diagnosis of depression and/or bipolar disorder   Smoking cessation encouraged. Counseling for less than 3 minutes.  Negative alcohol screen     MAMMOGRAM: Done within last 2 years and results in are chart     BMI Elevated - Follow-Up Plan: She was provided education on weight loss strategies           Orders:       51756  Behav assmt w/score & docd/stand instrument  (In-House)         1101F  Pt screen for fall risk; document no falls in past year or only 1 fall w/o injury in past year (NATASHA)  (In-House)         4004F  Pt scrnd tobacco use rcvd tobacco cessation talk  (In-House)           Negative EtOH screen  (In-House)         3014F  Screening mammography results documented and reviewed (PV)1  (In-House)           Calculated BMI above the upper parameter and a follow-up plan was documented in the medical record  (In-House)            Screening for colorectal cancer DEFERS COLONOSCOPY REFERRAL TODAY           Orders:         Colorectal cancer screening; fecal-occult blood test, immunoassay, 1-3 simultaneous determinations  (Send-Out)            Screening mammogram - other           Orders:       62157  Screening mammography bi 2-view inc CAD   (Send-Out)            Post-menopausal state           Orders:       56372  DXA, bone density study, 1 or more sites; axial skeleton (eg hips, pelvis, spine)  (Send-Out)            Pneumovax administration           Orders:       86608  PNEUMOVAX 23  (In-House)                     Administration of pneumococcal vaccine (x1)             CHARGE CAPTURE           **Please note: ICD descriptions below are intended for billing purposes only and may not represent clinical diagnoses**        Primary Diagnosis:         V70.0 Health checkup            Z00.00    Encounter for general adult medical examination without abnormal findings              Orders:          17303   Office/outpatient visit; established patient, level 4  (In-House)                Annual wellness visit, includes a PPPS, subsequent visit  (In-House)           724.2 Chronic low back pain            M54.5    Low back pain    491.21 Decompensated chronic obstructive pulmonary disease (COPD)            J44.1    Chronic obstructive pulmonary disease with (acute) exacerbation    244.8 Acquired hypothyroidism            E03.8    Other specified hypothyroidism    401.1 Hypertension            I10    Essential (primary) hypertension    272.0 Hypercholesterolemia            E78.00    Pure hypercholesterolemia, unspecified    585.2 Chronic renal insufficiency, stage 2            N18.2    Chronic kidney disease, stage 2 (mild)    V58.69 Use of high risk medications            Z79.899    Other long term (current) drug therapy              Orders:          34417   Drug test prsmv qual dir optical obs per day  (In-House)           307.42 Chronic insomnia            F51.01    Primary insomnia    V79.0 Screening for depression            Z13.89    Encounter for screening for other disorder              Orders:          51443   Behav assmt w/score & docd/stand instrument  (In-House)             1101F   Pt screen for fall risk; document no falls in past year or only  1 fall w/o injury in past year (NATASHA)  (In-House)             4004F   Pt scrnd tobacco use rcvd tobacco cessation talk  (In-House)                Negative EtOH screen  (In-House)             3014F   Screening mammography results documented and reviewed (PV)1  (In-House)                Calculated BMI above the upper parameter and a follow-up plan was documented in the medical record  (In-House)           V76.49 Screening for colorectal cancer            Z12.12    Encounter for screening for malignant neoplasm of rectum    V76.12 Screening mammogram - other            Z12.31    Encounter for screening mammogram for malignant neoplasm of breast    627.2 Post-menopausal state            Z13.820    Encounter for screening for osteoporosis    V03.82 Pneumovax administration            Z23    Encounter for immunization              Orders:          [id number]   PNEUMOVAX 23  (In-House)                                           Administration of pneumococcal vaccine (x1)             ADDENDUMS:      ____________________________________    Addendum: 01/05/2018 03:28 PM - Krystin Villanueva         Visit Note Faxed to:        [facility] ; Number [phone]     Health Summary Faxed to:        [facility] ; Number [phone]

## 2021-05-18 NOTE — PROGRESS NOTES
Amada Bentley  1945     Office/Outpatient Visit    Visit Date: Tue, Dec 10, 2019 12:05 pm    Provider: Maricarmen Harrell MD (Assistant: Conchis Gardiner MA)    Location: [facility]        Electronically signed by Maricarmen Harrell MD on  12/11/2019 10:49:55 AM                             Subjective:        CC: Amada Plaza is a 74 year old White female.  She is here today following a transition of care from an inpatient hospital: [facility]. The patient was admitted on 11/26/19 & d/c 12/4/19 for Pneumonia, flu, SOA. Our office called the patient within 48 hours of discharge and scheduled the follow-up appointment.. During the patient's hospital stay the patient was treated by Dr. Garcia  & Dr Mane.          HPI:       Samson was admitted on 11/26 with influenza pneumonia.  She went to the ER after feeling weak and SOA with severe productive cough, onset about 3 weeks prior.  She was admitted to TCU and given anti flu medication and was started on IV vancomycin, rocephing, azithromycin and tamiflu, oxygen  NC 2 liters, and IV steroids.  She was weaned off the oxygen 2 days prior to discharge.  She was also treated for hypertension, given K  with hypokalemia, She had PT for weakness and d/c home with home health.  She still feels weak.  She is eating well and her BM are OK.  D/c meds include tapering steroids and increase in her metoprolol to 25 mg bid.    ROS:     CONSTITUTIONAL:  Negative for chills and fever.      EYES:  Negative for blurred vision.      E/N/T:  Positive for frequent rhinorrhea.   Negative for ear pain.      CARDIOVASCULAR:  Negative for chest pain, dizziness and palpitations.      RESPIRATORY:  Positive for chronic cough.   Negative for dyspnea or frequent wheezing.      GASTROINTESTINAL:  Negative for abdominal pain, constipation, diarrhea, hematochezia, melena, nausea and vomiting.      MUSCULOSKELETAL:  Positive for arthralgias and back pain.      INTEGUMENTARY/BREAST:  Positive for  bruising on arms.      NEUROLOGICAL:  Positive for weakness ( generalized ).   Negative for dizziness, headaches or memory loss.      PSYCHIATRIC:  Positive for insomnia.   Negative for crying spells, feelings of stress, sadness or suicidal thoughts.          Past Medical History / Family History / Social History:         Last Reviewed on 12/10/2019 01:03 PM by Maricarmen Harrell    Past Medical History:                 PAST MEDICAL HISTORY         COPD     hiatal hernia     Pernicious Anemia     Depression: with loss of daughter.;         GYNECOLOGICAL HISTORY:             CURRENT MEDICAL PROVIDERS:    Gastroenterologist: MANI    Ophthalmologist: KACI    Orthopedist: CELIA    Urologist: BRITTNEE         PREVENTIVE HEALTH MAINTENANCE             BONE DENSITY: was last done  with normal results     COLORECTAL CANCER SCREENING: declines colorectal cancer screening, understands reason for testing; 3-18-03     EYE EXAM: was last done 3/2019     MAMMOGRAM: Done within last 2 years and results in are chart was last done 2019 with normal results     PAP SMEAR: was last done Hysterectomy         PAST MEDICAL HISTORY                 ADVANCED DIRECTIVES: None         Surgical History: Abnormal mammogram in March s/p breast bx-everything was ok  2007         Cataract Removal: bilateral;     Appendectomy    Cholecystectomy: laparoscopic;     Hysterectomy: , ;     Foot surgery on both feet secondary bone spurs;     right ear surgery;         Family History:         Positive for Coronary Artery Disease, Hyperlipidemia ( mother ), Hypertension ( mother; brother; sister ), Myocardial Infarction ( mother; brother; sister ) and Sudden Cardiac Death ( brother ).      Positive for Breast Cancer ( daughter ) and Lung Cancer ( father -- , no h/o smoking ).      Positive for COPD ( father -- emphysema; brother ).      Positive for Renal Failure ( niece ).      Positive for Type 1 Diabetes (  mother; brother; sister ).      Son(s): 1 ;   from MVA     Daughter(s): 1 ;  Breast Cancer         Social History:     Occupation: Unemployed     Marital Status:      Children: 3 children         Tobacco/Alcohol/Supplements:     Last Reviewed on 10/28/2019 01:22 PM by Maricarmen Harrell    Tobacco: She has a past history of cigarette smoking; quit date:  19.  Non-drinker         Substance Abuse History:     Last Reviewed on 2018 11:51 AM by Radha Maddox        Mental Health History:     Last Reviewed on 2018 11:51 AM by Radha Maddox        Communicable Diseases (eg STDs):     Last Reviewed on 2018 11:51 AM by Radha Maddox        Allergies:     Last Reviewed on 10/28/2019 12:41 PM by Conchis Gardiner    Pravastatin:   (Adverse Reaction)    Crestor:      Niaspan:   (Adverse Reaction)    Bentyl: confusion     Fentanyl: itching,shortness of breath  (Adverse Reaction)    Zocor: pain  (Adverse Reaction)    Morphine Sulfate:          Current Medications:     Last Reviewed on 12/10/2019 12:39 PM by Maricarmen Harrell    hydroCHLOROthiazide 12.5 mg oral tablet [take 1 tablet (12.5 mg) by oral route 1 time per day]    metoprolol tartrate 25 mg oral tablet [take 1 tablet (25 mg) by oral route 1 times per day]    amLODIPine 5 mg oral tablet [take 1 tablet (5 mg) by oral route once daily]    losartan 100 mg oral tablet [take 1 tablet (100 mg) by oral route once daily]    HYDROcodone-acetaminophen 7.5-325 mg oral tablet [One PO TID PRN]    cetirizine 10 mg oral tablet [1 tab daily]    Spiriva with HandiHaler 18mcg/1capsule Capsules for Inhalation [Inhale contents of 1 capsule(s) by inhaler device by mouth bid thru inhaler.]    Gabapentin 600 mg oral tablet [1 po tid]    Singulair  10mg Tablet [Take 1 tablet(s) by mouth each evening]    Buspirone HCl 15mg Tablet [1 PO BID]    Restasis 0.05% Ophthalmic Emulsion [Instill 1 drop(s) to each eye bid]    Naprosyn  500mg Tablet [Take 1 tablet(s) by mouth  daily]    Synthroid 0.088mg Tablet [Take 1 tablet(s) by mouth daily]    ProAir HFA 90mcg/1actuation Oral Inhaler [1 puff daily as needed]    Paroxetine HCl 10 mg oral tablet [1 tab daily ]    Pepcid 40 mg oral tablet [Take 1 tablet(s) by mouth bid]    Clonidine HCl 0.1 mg oral tablet [1 po daily prn BP > 180/110, go to ER if BP does not improve in 45-60 min or if signs MI or stroke (facial droop/slurred speech/weakness)]        Objective:        Vitals:         Current: 12/10/2019 12:13:42 PM    Ht:  5 ft, 3.75 in;  Wt: 131.4 lbs;  BMI: 22.7T: 97.9 F (oral);  BP: 154/53 mm Hg (right arm, sitting);  P: 71 bpm (right arm (BP Cuff), sitting);  sCr: 0.89 mg/dL;  GFR: 50.74O2 Sat: 96 % (room air)        Exams:     PHYSICAL EXAM:     GENERAL: vital signs recorded - well developed, well nourished;  no apparent distress;     EYES: extraocular movements intact; conjunctiva and cornea are normal; PERRLA;     E/N/T:  normal EACs, TMs, nasal/oral mucosa, teeth, gingiva, and oropharynx;     NECK: range of motion is normal; thyroid is non-palpable; supple, no LAD;     RESPIRATORY: normal respiratory rate and pattern with no distress; normal breath sounds with no rales, rhonchi, wheezes or rubs;     CARDIOVASCULAR: normal rate; rhythm is regular;  no systolic murmur; no edema;     GASTROINTESTINAL: nontender, nondistended; no hepatosplenomegaly or masses; no bruits;     MUSCULOSKELETAL: gait: affected by a limp and slowed;     NEUROLOGIC: mental status: alert and oriented x 3; cranial nerves II-XII grossly intact; Reflexes: knee jerks: 2+;     PSYCHIATRIC:  appropriate affect and demeanor; normal speech pattern; grossly normal memory;         Assessment:         J09.X1   Influenza due to identified novel influenza A virus with pneumonia       S40.022D   Contusion of left upper arm, subsequent encounter       I10   Essential (primary) hypertension       F41.1   Generalized anxiety disorder        J44.1   Chronic obstructive pulmonary disease with (acute) exacerbation       K21.9   Gastro-esophageal reflux disease without esophagitis       N18.2   Chronic kidney disease, stage 2 (mild)       M62.81   Muscle weakness (generalized)       G47.09   Other insomnia       E87.6   Hypokalemia           ORDERS:         Meds Prescribed:       [Recorded] traZODone 50 mg oral tablet [take 1 tablet (50 mg) by oral route 3 times per day]       [Recorded] Breo Ellipta 200-25 mcg/dose Inhalation Blister, With Inhalation Device [inhale 1 puff by inhalation route once daily at the same time each day]       [Recorded] predniSONE 20 mg oral tablet [take 0.5 mg/kg by oral route once daily]       [Refilled] predniSONE 20 mg oral tablet [2 po daily for 5 days], #10 (ten) tablets, Refills: 0 (zero)       [Refilled] Breo Ellipta 200-25 mcg/dose Inhalation Blister, With Inhalation Device [inhale 1 puff by inhalation route once daily at the same time each day], #1 (one) each, Refills: 0 (zero)       [Recorded] metoprolol succinate 100 mg oral Tablet, Extended Release 24 hr [take 1 tablet (100 mg) by oral route once daily]       [Refilled] metoprolol succinate 100 mg oral Tablet, Extended Release 24 hr [take 1 tablet (100 mg) by oral route once daily], #90 (ninety) tablets, Refills: 1 (one)         Lab Orders:       FUTURE  Future order to be done at patients convenience  (Send-Out)            [id number]  [facility] Basic Metabolic Panel  (Send-Out)                      Plan:         Influenza due to identified novel influenza A virus with pneumoniaShe had PT for weakness and d/c home with home health.  She still feels weak.  She is eating well and her BM are OK.  D/c meds include tapering steroids and increase in her metoprolol to 100 mg dailyShe had PT for weakness and d/c home with home health.  She still feels weak.  She is eating well and her BM are OK.  D/c meds include tapering steroids and increase in her metoprolol to 25 mg  bid.        Contusion of left upper arm, subsequent encounterhealing, no signs of infection, cont topical antibiotic        Essential (primary) hypertensionelevated, increase metoprolol to 100 mg daily (she is on 25 mg bid)        Generalized anxiety disorderstable        Chronic obstructive pulmonary disease with (acute) exacerbationimproving, will have home health  follow and cont prednisone for 5 more days        Gastro-esophageal reflux disease without esophagitisstable        Chronic kidney disease, stage 2 (mild)will recheck labs next week        Muscle weakness (generalized)start home health PT        Other insomniatry trazodone for sleep short term          Prescriptions:       [Recorded] traZODone 50 mg oral tablet [take 1 tablet (50 mg) by oral route 3 times per day]       [Recorded] Breo Ellipta 200-25 mcg/dose Inhalation Blister, With Inhalation Device [inhale 1 puff by inhalation route once daily at the same time each day]       [Recorded] predniSONE 20 mg oral tablet [take 0.5 mg/kg by oral route once daily]       [Refilled] predniSONE 20 mg oral tablet [2 po daily for 5 days], #10 (ten) tablets, Refills: 0 (zero)       [Refilled] Breo Ellipta 200-25 mcg/dose Inhalation Blister, With Inhalation Device [inhale 1 puff by inhalation route once daily at the same time each day], #1 (one) each, Refills: 0 (zero)       [Recorded] metoprolol succinate 100 mg oral Tablet, Extended Release 24 hr [take 1 tablet (100 mg) by oral route once daily]       [Refilled] metoprolol succinate 100 mg oral Tablet, Extended Release 24 hr [take 1 tablet (100 mg) by oral route once daily], #90 (ninety) tablets, Refills: 1 (one)         Hypokalemia        FOLLOW-UP TESTING #1: FOLLOW-UP LABORATORY:  Labs to be scheduled in the future include [facility].            Orders:       FUTURE  Future order to be done at patients convenience  (Send-Out)            [address]  [facility] Basic Metabolic Panel  (Send-Out)                  Patient  Recommendations:        For  Hypokalemia:            The following laboratory testing has been ordered: metabolic panel, basic             Charge Capture:         Primary Diagnosis:     J09.X1  Influenza due to identified novel influenza A virus with pneumonia           Orders:      [id number]  Transitional care manage service 14 day discharge  (In-House)              S40.022D  Contusion of left upper arm, subsequent encounter     I10  Essential (primary) hypertension     F41.1  Generalized anxiety disorder     J44.1  Chronic obstructive pulmonary disease with (acute) exacerbation     K21.9  Gastro-esophageal reflux disease without esophagitis     N18.2  Chronic kidney disease, stage 2 (mild)     M62.81  Muscle weakness (generalized)     G47.09  Other insomnia     E87.6  Hypokalemia         ADDENDUMS:      ____________________________________    Addendum: 02/05/2020 12:12 PM - One, Team         Visit Note Faxed to:        User Entered Recipient; Number [phone]

## 2021-05-18 NOTE — PROGRESS NOTES
Amada Bentley. 1945     Office/Outpatient Visit    Visit Date: Wed, Jan 9, 2019 12:13 pm    Provider: Maricarmen Harrell MD (Assistant: Conchis Gardiner MA)    Location: [facility]        Electronically signed by Maricarmen Harrell MD on  01/09/2019 05:49:33 PM                             SUBJECTIVE:        CC:     Amada Plaza is a 73 year old White female.  anxiety and severe LBP;         HPI:         Patient to be evaluated for hypertension.  Her current cardiac medication regimen includes an ACE inhibitor.  Amada Plaza does not check her blood pressure other than at her clinic appointments.  She is tolerating the medication well without side effects.  Compliance with treatment has been good; she takes her medication as directed, maintains her diet and exercise regimen, and follows up as directed.      Denae is in to discuss her pain meds, she ran out meds due to her increasing her pain med hydrocodone back to tid dosing on her own, so she has gone 7 days without her pain meds, her pain is severe today and is severe unless she takes her meds tid, she hasnt slept in 2 days due to severe pain.  She is also on gabapentin.  Her pain is in her R hip pain and lower back, it is radicular down R LE.   MRI showed severe canal stenosis and bilateral foraminal narrowing. She has seen neurosurgeon Dr Swartz and s/p back surgery of L spine in past and told her she needed surgery again and she declines surgery. She is  a smoker and I once again counseled her on quitting and how this could decrease her pain level.  She is functional on gabapentin and hydrocodone tid.  She attempted to wean down to bid dosing and just doesnt tolerate the lower dose.   No sign of diversion or abuse.  In addition, her anxiety is worse, and she wants to go back on celexa, it has worked well in past for her anxiety.  She denies depression, no suicidal ideation.  Temazepam isnt helping her insomnia         Additionally, she presents with history of  acquired hypothyroidism.  she is currently taking Synthroid, 88 mcg daily.  TSH was last checked 6 months ago.  The result was reported as normal.  She denies any related symptoms.  She reports no symptoms suggestive of adverse medication effect.          In regard to the hypercholesterolemia, date of diagnosis 2005.  Current treatment includes OFF COLESTIPOL due to constipation and a low cholesterol/low fat diet.  Compliance with treatment has been good; she takes her medication as directed, maintains her low cholesterol diet, and follows up as directed.  She reports muscle pain.  Most recent lab tests include Creatinine, Serum:  0.88 (mg/dl) (06/20/2018), Glom Filt Rate, Est:  >60 (ml/min/1.73m2) (06/20/2018), Alkaline Phosphatase, Serum:  57 (U/L) (06/08/2018), ALT (SGPT):  8 (U/L) (06/08/2018), AST (SGOT):  8 (U/L) (06/08/2018), TSH:  1.710 (mIU/L) (07/24/2018), Total Cholesterol:  195 (mg/dL) (07/24/2018), HDL:  67 (mg/dL) (07/24/2018), Triglycerides:  138 (mg/dL) (07/24/2018), LDL:  100 (mg/dL) (07/24/2018).          Additionally, she presents with history of hypertension.  her current cardiac medication regimen includes an ACE inhibitor.  Amada Plaza does not check her blood pressure other than at her clinic appointments.  She is tolerating the medication well without side effects.  Compliance with treatment has been good; she takes her medication as directed, maintains her diet and exercise regimen, and follows up as directed.      ROS:     CONSTITUTIONAL:  Negative for chills and fever.      EYES:  Negative for blurred vision.      E/N/T:  Positive for ear pain ( Left ear-try mineral oil OTC ) and nasal congestion.   Negative for frequent rhinorrhea or sore throat.      CARDIOVASCULAR:  Negative for chest pain, dizziness and palpitations.      RESPIRATORY:  Positive for chronic cough.   Negative for dyspnea or frequent wheezing.      GASTROINTESTINAL:  Negative for abdominal pain, constipation, diarrhea,  hematochezia, melena, nausea and vomiting.      NEUROLOGICAL:  Positive for headaches.   Negative for memory loss, paresthesias, seizures or weakness.      PSYCHIATRIC:  Positive for anxiety, mood swings and sleep disturbance.   Negative for crying spells, feelings of stress, sadness or suicidal thoughts.          PMH/FMH/SH:     Last Reviewed on 2019 12:48 PM by Maricarmen Harrell    Past Medical History:                 PAST MEDICAL HISTORY         COPD     hiatal hernia     2 bulding disc and a pinched nerve     Pernicious Anemia     Depression: with loss of daughter.;         GYNECOLOGICAL HISTORY:             PREVENTIVE HEALTH MAINTENANCE             BONE DENSITY: was last done  with normal results     COLORECTAL CANCER SCREENING: declines colorectal cancer screening, understands reason for testing; 3-18-03     EYE EXAM: was last done summer 2017     MAMMOGRAM: Done within last 2 years and results in are chart was last done 1-10-18 with normal results         PAST MEDICAL HISTORY                 ADVANCED DIRECTIVES: None         Surgical History: Abnormal mammogram in March s/p breast bx-everything was ok  2007         Cataract Removal: bilateral;      Appendectomy    Cholecystectomy: laparoscopic;     Hysterectomy: , ;     Foot surgery on both feet secondary bone spurs;      right ear surgery;         Family History:         Positive for Coronary Artery Disease, Hyperlipidemia ( mother ), Hypertension ( mother; brother; sister ), Myocardial Infarction ( mother; brother; sister ) and Sudden Cardiac Death ( brother ).      Positive for Breast Cancer ( daughter ) and Lung Cancer ( father -- , no h/o smoking ).      Positive for COPD ( father -- emphysema; brother ).      Positive for Renal Failure ( niece ).      Positive for Type 1 Diabetes ( mother; brother; sister ).      Son(s): 1 ;   from MVA     Daughter(s): 1 ;  Breast Cancer         Social  History:     Occupation: Unemployed     Marital Status:      Children: 3 children         Tobacco/Alcohol/Supplements:     Last Reviewed on 1/09/2019 12:17 PM by Conchis Gardiner    Tobacco: Current Smoker: She currently smokes every day, 1 pack per day.  Non-drinker         Substance Abuse History:     Last Reviewed on 6/08/2018 11:51 AM by Radha Maddox        Mental Health History:     Last Reviewed on 6/08/2018 11:51 AM by Radha Maddox        Communicable Diseases (eg STDs):     Last Reviewed on 6/08/2018 11:51 AM by Radha Maddox            Allergies:     Last Reviewed on 12/13/2018 09:38 AM by Conchis Gardiner    Pravastatin: (Adverse Reaction)    Crestor:    Niaspan: (Adverse Reaction)    Bentyl: confusion    Fentanyl: itching, shortness of breath (Adverse Reaction)    Zocor: pain (Adverse Reaction)    Morphine Sulfate:        Current Medications:     Last Reviewed on 10/30/2018 11:53 AM by Tomeka Thorne    Spiriva HandiHaler 18mcg/1capsule Capsules for Inhalation Inhale contents of 1 capsule(s) by inhaler device by mouth daily thru inhaler.     Naprosyn 500mg Tablet Take 1 tablet(s) by mouth  daily     Buspirone HCl 15mg Tablet Take 1 tablet(s) by mouth bid     Cetirizine HCl 10mg Tablet 1 tab daily     Docusate Sodium 100mg Capsules 1 bid     Restasis 0.05% Ophthalmic Emulsion Instill 1 drop(s) to each eye bid     Aspirin (ASA) 81mg Tablets, Enteric Coated Take 1 tablet(s) by mouth qam     Benazepril HCl 10mg Tablet Take 1 tablet(s) by mouth daily     Ranitidine 150mg Tablet 1 tab bid     Singulair  10mg Tablet Take 1 tablet(s) by mouth each evening     Synthroid 0.088mg Tablet Take 1 tablet(s) by mouth daily     Advair Diskus 250mcg/50mcg per 1blister Inhalation Powder Inhale 1 puff(s) bid     Cefdinir 300mg Capsules 1 bid     Hydrocodone/Acetaminophen 7.5mg/325mg Tablet 1/2-1 PO BID PRN     ProAir HFA 90mcg/1actuation Oral Inhaler 1 puff daily as needed     Restoril 15mg  Capsules Take 1 capsule QHS     Gabapentin 300mg Capsules 1 capsule qid     Trazodone HCl 50mg Tablet 1 - 2  @ HS prn     Alrex 0.2% Ophthalmic Suspension 2 gtts BID     Bepreve 1.5% Ophthalmic Solution Instill 1 drop(s) to affected eye(s) bid         OBJECTIVE:        Vitals:         Current: 1/9/2019 12:24:21 PM    Ht:  5 ft, 3.75 in;  Wt: 135 lbs;  BMI: 23.4    T: 97.9 F (oral);  BP: 156/63 mm Hg (left arm, standing);  P: 82 bpm (left arm (BP Cuff), sitting);  sCr: 0.88 mg/dL;  GFR: 52.69    O2 Sat: 99 % (room air)        Exams:     PHYSICAL EXAM:     GENERAL: vital signs recorded - well developed, well nourished;  no apparent distress;     EYES: extraocular movements intact; conjunctiva and cornea are normal; PERRLA;     E/N/T: EARS: R ear canal-canal is mildly erthematous, no edema; OROPHARYNX:  normal mucosa, dentition, gingiva, and posterior pharynx;     NECK: range of motion is normal; thyroid is non-palpable; supple, no LAD;     RESPIRATORY: normal respiratory rate and pattern with no distress; normal breath sounds with no rales, rhonchi, wheezes or rubs;     CARDIOVASCULAR: normal rate; rhythm is regular;  no systolic murmur; no edema;     GASTROINTESTINAL: nontender, nondistended; no hepatosplenomegaly or masses; no bruits;     MUSCULOSKELETAL: gait: affected by a limp and slowed;     NEUROLOGIC: mental status: alert and oriented x 3; cranial nerves II-XII grossly intact; Reflexes: knee jerks: 2+;     PSYCHIATRIC:  appropriate affect and demeanor; normal speech pattern; grossly normal memory;         Lab/Test Results:             Amphetamines Screen, Urin:  Negative (01/09/2019),     BAR-Barbiturates Screen, Urin:  Negative (01/09/2019),     Buprenorphine:  Negative (01/09/2019),     BZO-Benzodiazepines Screen,Ur:  Positive (01/09/2019),     Cocaine(Metab.)Screen, Ur:  Negative (01/09/2019),     MDMA-Ecstasy:  Negative (01/09/2019),     Met-Methamphetamine:  Negative (01/09/2019),     MTD-Methadone  Screen, Urine:  Negative (01/09/2019),     Opiate Screen, Urine:  Negative (01/09/2019),     OXY-Oxycodone:  Negative (01/09/2019),     PCP-Phencyclidine Screen, Uri:  Negative (01/09/2019),     THC Cannabinoids Screen, Urin:  Negative (01/09/2019),     Urine temperature:  confirmed (01/09/2019),     Date and time of last pill:  gabapentin 1-8-19 @ night and hydrocodone, one week ago. (01/09/2019),     Performed by:  tls (01/09/2019),     Collection Time:  1314 (01/09/2019),             ASSESSMENT           724.2   M54.5  Chronic low back pain              DDx:     401.1   I10  Hypertension              DDx:     Persistent insomnia    307.42   F51.01  Chronic insomnia              DDx:     300.02   F41.1  Generalized anxiety disorder              DDx:     305.1   F17.290  Tobacco dependence              DDx:     V58.69   Z79.899  Use of high risk medications              DDx:     244.8   E03.8  Acquired hypothyroidism              DDx:     272.0   E78.00  Hypercholesterolemia              DDx:     585.2   N18.2  Chronic renal insufficiency, stage 2              DDx:     401.1   I10  Hypertension              DDx:         ORDERS:         Meds Prescribed:       Refill of: Hydrocodone/Acetaminophen 7.5mg/325mg Tablet One PO TID PRN  #90 (Ninety) tablet(s) Refills: 0       Citalopram Hydrobromide 10mg Tablet 1 tab daily  #30 (Thirty) tablet(s) Refills: 2       Refill of: Cetirizine HCl 10mg Tablet 1 tab daily  #90 (Ninety) tablet(s) Refills: 1       Refill of: Docusate Sodium 100mg Capsules 1 bid  #180 (One [place] and Eighty) capsule(s) Refills: 1       Refill of: Spiriva HandiHaler (Tiotropium Bromide) 18mcg/1capsule Capsules for Inhalation Inhale contents of 1 capsule(s) by Rotahaler by mouth daily thru inhaler prn  #90 (Ninety) capsule(s) Refills: 1       Refill of: Benazepril HCl 10mg Tablet Take 1 tablet(s) by mouth daily  #90 (Ninety) tablet(s) Refills: 1       Refill of: Synthroid (Levothyroxine Sodium) 0.088mg  Tablet Take 1 tablet(s) by mouth daily  #90 (Ninety) tablet(s) Refills: 1       Refill of: Advair Diskus (Fluticasone/Salmeterol) 250mcg/50mcg per 1blister Inhalation Powder Inhale 1 puff(s) bid  #3 (Three) device Refills: 1         Lab Orders:       [id number]  Drug test prsmv read direct optical obs pr date  (In-House)   send out for confirmation of restoril and gabapentin per Md Choe./km          19958  COMP - [facility] Comp. Metabolic Panel  (Send-Out)         [id number]  BENCU [facility] [address] BENZODIAZEPINES  (Send-Out)         78778  NEURU [facility] GAPAPENTIN CONFIRMATION  (Send-Out)                   PLAN:          Chronic low back pain will increase her meds to tid for hydrocodone          Hypertension due to her anxiety          Chronic insomnia will try remeron for sleep and anxiety          Generalized anxiety disorder try celexa           Prescriptions:       Refill of: Hydrocodone/Acetaminophen 7.5mg/325mg Tablet One PO TID PRN  #90 (Ninety) tablet(s) Refills: 0       Citalopram Hydrobromide 10mg Tablet 1 tab daily  #30 (Thirty) tablet(s) Refills: 2          Tobacco dependence working on quitting, she uses the nicotine patch          Use of high risk medications     LABORATORY:  Labs ordered to be performed today include Drug Screen Urine [facility] Confirmation BENZODIAZEPINES GAPABENTIN.      RECOMMENDATIONS given include: darline reviewed, drug screen performed and appropriate, consent is reviewed and signed and on the chart, she is aware of risk of addiction on this medication and understands that she will need to follow up for a review every 3 months and her medications will be adjusted or decreased as deemed appropriate at each visit.  No personal history of drug or alcohol abuse.  No concerns about diversion or abuse.  She denies side effects related to the medication.  She is  aware that she may be called in for pill counts..            Orders:       [id number]  Drug test prsmv read direct optical obs pr date  (In-House)          43813  BEN - [facility] [address] BENZODIAZEPINES  (Send-Out)         14713  NEURU [facility] GAPAPENTIN CONFIRMATION  (Send-Out)            Acquired hypothyroidism stable on meds          Hypercholesterolemia cant tolerate meds, will stop checking lipids          Chronic renal insufficiency, stage 2 due for labs          Hypertension     LABORATORY:  Labs ordered to be performed today include Comprehensive metabolic panel.            Prescriptions:       Refill of: Cetirizine HCl 10mg Tablet 1 tab daily  #90 (Ninety) tablet(s) Refills: 1       Refill of: Docusate Sodium 100mg Capsules 1 bid  #180 (One [place] and Eighty) capsule(s) Refills: 1       Refill of: Spiriva HandiHaler (Tiotropium Bromide) 18mcg/1capsule Capsules for Inhalation Inhale contents of 1 capsule(s) by Rotahaler by mouth daily thru inhaler prn  #90 (Ninety) capsule(s) Refills: 1       Refill of: Benazepril HCl 10mg Tablet Take 1 tablet(s) by mouth daily  #90 (Ninety) tablet(s) Refills: 1       Refill of: Synthroid (Levothyroxine Sodium) 0.088mg Tablet Take 1 tablet(s) by mouth daily  #90 (Ninety) tablet(s) Refills: 1       Refill of: Advair Diskus (Fluticasone/Salmeterol) 250mcg/50mcg per 1blister Inhalation Powder Inhale 1 puff(s) bid  #3 (Three) device Refills: 1           Orders:       [id number]  COMP - [facility] Comp. Metabolic Panel  (Send-Out)               CHARGE CAPTURE           **Please note: ICD descriptions below are intended for billing purposes only and may not represent clinical diagnoses**        Primary Diagnosis:         724.2 Chronic low back pain            M54.5    Low back pain              Orders:          [id number]   Office/outpatient visit; established patient, level 4  (In-House)           401.1 Hypertension            I10    Essential (primary) hypertension    Persistent insomnia    307.42 Chronic insomnia            F51.01    Primary insomnia    300.02 Generalized anxiety disorder            F41.1    Generalized anxiety disorder    305.1 Tobacco  dependence            F17.290    Nicotine dependence, other tobacco product, uncomplicated    V58.69 Use of high risk medications            Z79.899    Other long term (current) drug therapy              Orders:          [id number]   Drug test prsmv read direct optical obs pr date  (In-House)           244.8 Acquired hypothyroidism            E03.8    Other specified hypothyroidism    272.0 Hypercholesterolemia            E78.00    Pure hypercholesterolemia, unspecified    585.2 Chronic renal insufficiency, stage 2            N18.2    Chronic kidney disease, stage 2 (mild)    401.1 Hypertension            I10    Essential (primary) hypertension

## 2021-05-18 NOTE — PROGRESS NOTES
"Amada BentleyDimitri 1945     Office/Outpatient Visit    Visit Date: Thu, Feb 21, 2019 11:07 am    Provider: Maricarmen Harrell MD (Assistant: Conchis Gardiner MA)    Location: [facility]        Electronically signed by Maricarmen Harrell MD on  02/22/2019 12:06:59 PM                             SUBJECTIVE:        CC: diarrhea         HPI:     acute onset liquid stools that onset after I changed her meds last OV-only changes I made was I started her on celexa.  She states this is not helping her anxiety, which is worse and she is itching all over and she stopped her buspar \"because it was not refilled so I thought I should stop it\".  She has known IBS and was on stool softeners but she did stop these and she is not on a stool softener.  She has lost 6 #'s in past 6 weeks. h/o GB removal and FH of liver disease in brothers        She is sleeping well on melatonin 20 mg and I told her 10 was the highest dose I recommend        She also states she is in miserable pain for the past month.  Of note,  I decreased her pain meds to tid dosing hydrocodone 7.5 mg tid and she had been doing well on this lower dose.  She quit smoking and restarted smoking due to her anxiety and pain     ROS:     CONSTITUTIONAL:  Negative for chills and fever.      CARDIOVASCULAR:  Negative for chest pain, dizziness and palpitations.      RESPIRATORY:  Positive for chronic cough.   Negative for dyspnea or frequent wheezing.      GASTROINTESTINAL:  Negative for abdominal pain, constipation, diarrhea, hematochezia, melena, nausea and vomiting.      INTEGUMENTARY/BREAST:  Positive for itching.   Negative for rash.      NEUROLOGICAL:  Positive for headaches.   Negative for memory loss, paresthesias, seizures or weakness.      PSYCHIATRIC:  Positive for anxiety and sleep disturbance.   Negative for crying spells, feelings of stress, sadness or suicidal thoughts.          PMH/FMH/SH:     Last Reviewed on 2/21/2019 11:50 AM by Maricarmen Harrell   "  Past Medical History:                 PAST MEDICAL HISTORY         COPD     hiatal hernia     2 bulding disc and a pinched nerve     Pernicious Anemia     Depression: with loss of daughter.;         GYNECOLOGICAL HISTORY:             PREVENTIVE HEALTH MAINTENANCE             BONE DENSITY: was last done  with normal results     COLORECTAL CANCER SCREENING: declines colorectal cancer screening, understands reason for testing; 3-18-03     EYE EXAM: was last done summer 2017     MAMMOGRAM: Done within last 2 years and results in are chart was last done 1-10-18 with normal results         PAST MEDICAL HISTORY                 ADVANCED DIRECTIVES: None         Surgical History: Abnormal mammogram in March s/p breast bx-everything was ok  2007         Cataract Removal: bilateral;      Appendectomy    Cholecystectomy: laparoscopic;     Hysterectomy: , ;     Foot surgery on both feet secondary bone spurs;      right ear surgery;         Family History:         Positive for Coronary Artery Disease, Hyperlipidemia ( mother ), Hypertension ( mother; brother; sister ), Myocardial Infarction ( mother; brother; sister ) and Sudden Cardiac Death ( brother ).      Positive for Breast Cancer ( daughter ) and Lung Cancer ( father -- , no h/o smoking ).      Positive for COPD ( father -- emphysema; brother ).      Positive for Renal Failure ( niece ).      Positive for Type 1 Diabetes ( mother; brother; sister ).      Son(s): 1 ;   from MVA     Daughter(s): 1 ;  Breast Cancer         Social History:     Occupation: Unemployed     Marital Status:      Children: 3 children         Tobacco/Alcohol/Supplements:     Last Reviewed on 2019 11:50 AM by Maricarmen Harrell    Tobacco: Current Smoker: She currently smokes every day, 1 pack per day.  Non-drinker         Substance Abuse History:     Last Reviewed on 2018 11:51 AM by Radha Maddox        [facility]  History:     Last Reviewed on 6/08/2018 11:51 AM by Radha Maddox        Communicable Diseases (eg STDs):     Last Reviewed on 6/08/2018 11:51 AM by Radha Maddox            Allergies:     Last Reviewed on 1/09/2019 12:17 PM by Conchis Gardiner    Pravastatin: (Adverse Reaction)    Crestor:    Niaspan: (Adverse Reaction)    Bentyl: confusion    Fentanyl: itching, shortness of breath (Adverse Reaction)    Zocor: pain (Adverse Reaction)    Morphine Sulfate:        Current Medications:     Last Reviewed on 1/09/2019 12:21 PM by Conchis Gadriner    Advair Diskus 250mcg/50mcg per 1blister Inhalation Powder Inhale 1 puff(s) bid     Hydrocodone/Acetaminophen 7.5mg/325mg Tablet One PO TID PRN     Benazepril HCl 10mg Tablet Take 1 tablet(s) by mouth daily     Cetirizine HCl 10mg Tablet 1 tab daily     Docusate Sodium 100mg Capsules 1 bid     Spiriva HandiHaler 18mcg/1capsule Capsules for Inhalation Inhale contents of 1 capsule(s) by Rotahaler by mouth daily thru inhaler prn     Synthroid 0.088mg Tablet Take 1 tablet(s) by mouth daily     Naprosyn 500mg Tablet Take 1 tablet(s) by mouth  daily     Restasis 0.05% Ophthalmic Emulsion Instill 1 drop(s) to each eye bid     Aspirin (ASA) 81mg Tablets, Enteric Coated Take 1 tablet(s) by mouth qam     Ranitidine 150mg Tablet 1 tab bid     Singulair  10mg Tablet Take 1 tablet(s) by mouth each evening     Gabapentin 300mg Capsules 1 capsule qid     Citalopram Hydrobromide 10mg Tablet 1 tab daily     ProAir HFA 90mcg/1actuation Oral Inhaler 1 puff daily as needed     8HR PAIN RELIEF PRN     Aleve     TYLENOL PM  PRN     Alrex 0.2% Ophthalmic Suspension 2 gtts BID     Bepreve 1.5% Ophthalmic Solution Instill 1 drop(s) to affected eye(s) bid         OBJECTIVE:        Vitals:         Current: 2/21/2019 11:18:02 AM    Ht:  5 ft, 3.75 in;  Wt: 129.4 lbs;  BMI: 22.4    T: 98.2 F (oral);  BP: 131/52 mm Hg (left arm, sitting);  P: 77 bpm (left arm (BP Cuff), sitting);  sCr: 0.88  mg/dL;  GFR: 50.98    O2 Sat: 98 % (room air)        Exams:     PHYSICAL EXAM:     GENERAL: vital signs recorded - well developed, well nourished;  no apparent distress;     EYES: extraocular movements intact; conjunctiva and cornea are normal; PERRLA;     E/N/T: EARS: R ear canal-canal is mildly erthematous, no edema; OROPHARYNX:  normal mucosa, dentition, gingiva, and posterior pharynx;     NECK: range of motion is normal; thyroid is non-palpable; supple, no LAD;     RESPIRATORY: normal respiratory rate and pattern with no distress; normal breath sounds with no rales, rhonchi, wheezes or rubs;     CARDIOVASCULAR: normal rate; rhythm is regular;  no systolic murmur; no edema;     GASTROINTESTINAL: nontender, nondistended; no hepatosplenomegaly or masses; no bruits;     BREAST/INTEGUMENT: skin-C/D/I;     MUSCULOSKELETAL: gait: affected by a limp and slowed;     NEUROLOGIC: mental status: alert and oriented x 3; GROSSLY INTACT     PSYCHIATRIC:  appropriate affect and demeanor; normal speech pattern; grossly normal memory;         ASSESSMENT           787.91   R19.7  Diarrhea              DDx:     300.02   F41.1  Generalized anxiety disorder              DDx:     724.2   M54.5  Chronic low back pain              DDx:         ORDERS:         Meds Prescribed:       Refill of: Spiriva HandiHaler (Tiotropium Bromide) 18mcg/1capsule Capsules for Inhalation Inhale contents of 1 capsule(s) by inhaler device by mouth daily thru inhaler.  #90 (Ninety) capsule(s) Refills: 1       Paroxetine HCl 10mg Tablet 1 tab daily  #90 (Ninety) tablet(s) Refills: 0       Refill of: Buspirone HCl 15mg Tablet 1 PO BID  #180 (One [place] [address]) tablet(s) Refills: 3       Refill of: Gabapentin 600mg Tablet 1 tab tid  #90 (Ninety) tablet(s) Refills: 2         Lab Orders:       [id number]  OBIA - [facility] Occult blood by immunoassay for diagnostic  (Send-Out)         23827  Lactoferrin, fecal; qualitative  (Send-Out)         54327  [facility] Stool  Culture  (Send-Out)         99129  GIAR - [facility] Ova and parasite smear, stool  (Send-Out)         55267  CDTEX - [facility] Clostridium Difficile Toxins A & B; dna probe  (Send-Out)         22287  BDCB2 - [facility] CBC w/o diff  (Send-Out)         80912  COMP - [facility] Comp. Metabolic Panel  (Send-Out)                   PLAN:          Diarrhea no signs of dehydration,will check stool studies, if all neg I will try her on colestipol     LABORATORY:  Labs ordered to be performed today include CBC W/O DIFF and Comprehensive metabolic panel.            Orders:       [id number]  OBIA - [facility] Occult blood by immunoassay for diagnostic  (Send-Out)         10399  Lactoferrin, fecal; qualitative  (Send-Out)         23226  STLC - [facility] Stool Culture  (Send-Out)         32960  GIAR - [facility] Ova and parasite smear, stool  (Send-Out)         76283  CDTEX - [facility] Clostridium Difficile Toxins A & B; dna probe  (Send-Out)         79839  BDCB2 - [facility] CBC w/o diff  (Send-Out)         34408  COMP - [facility] Comp. Metabolic Panel  (Send-Out)            Generalized anxiety disorder will change celexa to paxil           Prescriptions:       Refill of: Spiriva HandiHaler (Tiotropium Bromide) 18mcg/1capsule Capsules for Inhalation Inhale contents of 1 capsule(s) by inhaler device by mouth daily thru inhaler.  #90 (Ninety) capsule(s) Refills: 1       Paroxetine HCl 10mg Tablet 1 tab daily  #90 (Ninety) tablet(s) Refills: 0       Refill of: Buspirone HCl 15mg Tablet 1 PO BID  #180 (One [place] and [address]) tablet(s) Refills: 3          Chronic low back pain worse, will cont current meds and get her to quit smoking again           Prescriptions:       Refill of: Gabapentin 600mg Tablet 1 tab tid  #90 (Ninety) tablet(s) Refills: 2             CHARGE CAPTURE           **Please note: ICD descriptions below are intended for billing purposes only and may not represent clinical diagnoses**        Primary Diagnosis:         787.91 Diarrhea            R19.7    Diarrhea, unspecified               Orders:          [id number]   Office/outpatient visit; established patient, level 4  (In-House)           300.02 Generalized anxiety disorder            F41.1    Generalized anxiety disorder    724.2 Chronic low back pain            M54.5    Low back pain

## 2021-05-18 NOTE — PROGRESS NOTES
Amada Bentley  1945     Office/Outpatient Visit    Visit Date: Mon, Sep 28, 2020 10:39 am    Provider: Maggie Lackey N.P. (Assistant: Radha Pascual MA)    Location: [facility]        Electronically signed by Maggie Lackey N.P. on  09/28/2020 12:26:58 PM                             Subjective:        CC: Amada Plaza is a 75 year old White female.  Patient has had diarrhea for a month.;         HPI:           Patient to be evaluated for diarrhea, unspecified.  This has been a problem for the past month.  She estimates the stool frequency at 5 to 6 per day.  Stools are described as watery.  Associated symptoms include urgency.  She denies associated abdominal bloating, nausea or vomiting.  No family members or other contacts are similarly ill.  Amada Plaza denies recent travel.  There has been no recent ingestion of antibiotics, shellfish, or undercooked hamburger.  She denies exposure to ill contacts.  She feels this started when she started using the nicotine patch when she quit smoking.  She did try to stop using, but to no benefit.  She denies any changes with her mediation prior to this starting.  She has had weight loss as well.  She does not take anything to stop the diarrhea right now and has not bee put on anything.  She is scheduled for a consultation on EGD/colonscopy on 9/30 with Dr. Simpson at [facility]     ROS:     CONSTITUTIONAL:  Positive for fatigue and unintentional weight loss.   Negative for chills or fever.      CARDIOVASCULAR:  Positive for dizziness and orthostatic.   Negative for chest pain or pedal edema.      RESPIRATORY:  Negative for recent cough and dyspnea.      GASTROINTESTINAL:  Positive for diarrhea.   Negative for abdominal pain, anorexia, abdominal bloating, hematochezia, melena, nausea or vomiting.      MUSCULOSKELETAL:  Negative for arthralgias, back pain and myalgias.      NEUROLOGICAL:  Positive for dizziness and weakness.   Negative for fainting, headaches or  paresthesias.      PSYCHIATRIC:  Positive for anxiety.   Negative for depression or suicidal thoughts.          Past Medical History / Family History / Social History:         Last Reviewed on 2020 02:09 PM by Maricarmen Harrell    Past Medical History:                 PAST MEDICAL HISTORY         COPD     hiatal hernia     Pernicious Anemia     Depression: with loss of daughter.;     LBP, nicotine dependencs, HTN, hypothyroid, anxiety, hypercholesterolemia         GYNECOLOGICAL HISTORY:             CURRENT MEDICAL PROVIDERS:    Gastroenterologist: MANI    Ophthalmologist: KACI    Orthopedist: CELIA    Urologist: BRITTNEE         PREVENTIVE HEALTH MAINTENANCE             BONE DENSITY: was last done  with normal results     COLORECTAL CANCER SCREENING: declines colorectal cancer screening, understands reason for testing; 3-18-03     EYE EXAM: was last done 3/2019     MAMMOGRAM: Done within last 2 years and results in are chart was last done 20 with normal results     PAP SMEAR: was last done Hysterectomy         PAST MEDICAL HISTORY                 ADVANCED DIRECTIVES: None         Surgical History: Abnormal mammogram in March s/p breast bx-everything was ok  2007         Cataract Removal: bilateral;     Appendectomy    Cholecystectomy: laparoscopic;     Hysterectomy: , ;     Foot surgery on both feet secondary bone spurs;     right ear surgery;         Family History:         Positive for Coronary Artery Disease, Hyperlipidemia ( mother ), Hypertension ( mother; brother; sister ), Myocardial Infarction ( mother; brother; sister ) and Sudden Cardiac Death ( brother ).      Positive for Breast Cancer ( daughter ) and Lung Cancer ( father -- , no h/o smoking ).      Positive for COPD ( father -- emphysema; brother ).      Positive for Renal Failure ( niece ).      Positive for Type 1 Diabetes ( mother; brother; sister ).      Son(s): 1 ;   from MVA      Daughter(s): 1 ;  Breast Cancer         Social History:     Occupation: Unemployed     Marital Status:      Children: 3 children         Tobacco/Alcohol/Supplements:     Last Reviewed on 2020 01:40 PM by Venus Morrison    Tobacco: She has a past history of cigarette smoking; quit date:  aug, 27th 2020.  Non-drinker         Substance Abuse History:     Last Reviewed on 2020 01:19 PM by Osmar Morse        Mental Health History:     Last Reviewed on 2020 01:19 PM by Osmar Morse        Communicable Diseases (eg STDs):     Last Reviewed on 2020 01:19 PM by Osmar Morse        Current Problems:     Last Reviewed on 2020 01:19 PM by Osmar Morse    Other specified hypothyroidism    Low back pain    Essential (primary) hypertension    Pure hypercholesterolemia    Long term (current) use of opiate analgesic    History of falling    Generalized anxiety disorder    Chronic obstructive pulmonary disease with (acute) exacerbation    Constipation, unspecified    Other long term (current) drug therapy    Gastro-esophageal reflux disease without esophagitis    Disorientation, unspecified    Major depressive disorder, recurrent, moderate    Pain in right hip    Primary insomnia    Pure hypercholesterolemia, unspecified    Other specified disorders of kidney and ureter    Cardiac murmur, unspecified    Chronic kidney disease, stage 2 (mild)    Encounter for screening mammogram for malignant neoplasm of breast    Unspecified fall, initial encounter    Cellulitis of right upper limb    Nicotine dependence, other tobacco product, uncomplicated    Diarrhea, unspecified    Encounter for follow-up examination after completed treatment for conditions other than malignant neoplasm    Contusion of left upper arm, subsequent encounter    Muscle weakness (generalized)    Other insomnia    Hypokalemia    Influenza due to identified novel influenza A virus  with pneumonia    Abrasion of scalp, initial encounter    Other allergy, sequela    Other pneumonia, unspecified organism    Influenza due to other identified influenza virus with unspecified type of pneumonia    Acute nasopharyngitis [common cold]    Candidal stomatitis    Shortness of breath    Effusion, right foot    Effusion, left foot    Encounter for screening for depression    Localized swelling, mass and lump, right lower limb    Cellulitis of right lower limb    Encounter for other screening for malignant neoplasm of breast    Pruritus, unspecified    Nicotine dependence, unspecified, uncomplicated    Pain in left shoulder    Pain in right knee    Pain in left knee    Other allergy, subsequent encounter    Abnormal weight loss    Cough    Dysphagia, oropharyngeal phase    Dizziness and giddiness        Immunizations:     Prevnar 13 (Pneumococcal PCV 13) 11/3/2015    Fluzone (3 + years dose) 10/22/2008    Fluzone (3 + years dose) 9/23/2011    Fluzone High-Dose pf (>=65 yr) 10/23/2013    Fluzone High-Dose pf (>=65 yr) 11/3/2015    Fluzone High-Dose pf (>=65 yr) 10/17/2016    Fluzone High-Dose pf (>=65 yr) 10/30/2018    Fluzone High-Dose pf (>=65 yr) 10/22/2019    Influenza High-Dose Virus Vaccine pf (>=65 yr) 11/1/2017    PNEUMOVAX 23 (Pneumococcal PPV23) 1/4/2018    Zostavax (Zoster live) 10/2/2014        Allergies:     Last Reviewed on 9/22/2020 01:40 PM by Venus Morrison    Pravastatin:   (Adverse Reaction)    Crestor:      Niaspan:   (Adverse Reaction)    Bentyl: Confusion     Fentanyl: itching,shortness of breath  (Adverse Reaction)    TAPE:      BAND AID:      Zocor: pain  (Adverse Reaction)    Morphine Sulfate:          Current Medications:     Last Reviewed on 9/22/2020 01:40 PM by Venus Morrison    ProAir HFA     Glucosamine Chondroitin     Gabapentin 600 mg oral tablet [1 po tid]    HYDROcodone-acetaminophen 7.5-325 mg oral tablet [One PO TID PRN]    cetirizine 10 mg oral tablet [1 tab  daily]    Singulair  10 mg oral tablet [Take 1 tablet(s) by mouth each evening]    busPIRone 15 mg oral tablet [1 PO BID]    Restasis 0.05% Ophthalmic Emulsion [Instill 1 drop(s) to each eye bid]    Synthroid 88 mcg oral tablet [Take 1 tablet(s) by mouth daily]    metoprolol succinate 100 mg oral Tablet, Extended Release 24 hr [take 1 tablet (100 mg) by oral route once daily]    traZODone 50 mg oral tablet [take 1 tablet (50 mg) by oral route every hs]    omeprazole 20 mg oral capsule,delayed release (enteric coated) [take 1 capsule (20 mg) by oral route once daily, try to take it QOD]    losartan 100 mg oral tablet [take 1 tablet (100 mg) by oral route once daily]    Breo Ellipta 200-25 mcg/dose Inhalation Blister, With Inhalation Device [inhale 1 puff by inhalation route once daily at the same time each day]    INCRUSE ELPT INH 62.5MCG     umeclidinium 62.5 mcg/actuation Inhalation Blister, With Inhalation Device [inhale 1 puff (62.5 mcg) by inhalation route once daily at the same time each day]    potassium chloride 20 mEq oral tablet, extended release [take 1 tablet (20 meq) by oral route once a day]    amLODIPine 10 mg oral tablet [take 1 tablet (10 mg) by oral route once daily]    DULoxetine 60 mg oral capsule,delayed release (enteric coated) [take 1 capsule (60 mg) by oral route once daily]    furosemide 20 mg oral tablet [take 1 tablet (20 mg) by oral route once daily]    colestipoL 1 gram oral tablet [take 1 tablet (1 gram) by oral route 2 times per day swallowing whole with any liquid. Do not crush, chew and/or divide.]        Objective:        Vitals:         Historical:     9/22/2020  Wt:   120.2lbs9/8/2020  Wt:   451yhm28/25/2020  Wt:   127.6lbs    Current: 9/28/2020 10:41:30 AM    Ht:  5 ft, 2.25 in;  Wt: 116.9 lbs;  BMI: 21.2T: 96.7 F (temporal);  BP: 113/68 mm Hg (right arm, sitting);  P: 101 bpm (right arm (BP Cuff), sitting);  sCr: 1.16 mg/dL;  GFR: 35.86O2 Sat: 97 % (room air)        Exams:      PHYSICAL EXAM:     GENERAL: vital signs recorded - well developed, well nourished;  no apparent distress;     EYES: extraocular movements intact; PERRL;     E/N/T: EARS: external auditory canal normal;  bilateral TMs are normal;  NOSE:  normal nasal mucosa, septum, turbinates, and sinuses; OROPHARYNX:  normal mucosa, dentition, gingiva, and posterior pharynx;     NECK: range of motion is normal;     RESPIRATORY: normal appearance and symmetric expansion of chest wall; normal respiratory rate and pattern with no distress; normal breath sounds with no rales, rhonchi, wheezes or rubs;     CARDIOVASCULAR: normal rate; rhythm is regular;  no edema;     GASTROINTESTINAL: nontender; normal bowel sounds; no organomegaly;     LYMPHATIC: no enlargement of cervical or facial nodes; no supraclavicular nodes;     MUSCULOSKELETAL: normal gait; normal range of motion of all major muscle groups; no limb or joint pain with range of motion;     NEUROLOGIC: mental status: alert and oriented x 3;     PSYCHIATRIC: appropriate affect and demeanor; normal speech pattern; normal thought and perception;         Lab/Test Results:         TSH: 0.447 (mIU/L) (09/08/2020),     Lactoferrin, Fecal: POSITIVE (09/09/2020),     WBC count: 6.93 (K/ul) (09/08/2020), 8.78 (K/ul) (09/22/2020),     C difficile Toxin: NEGATIVE (NA) (09/09/2020),     Presumptive: NEGATIVE (NA) (09/09/2020),     Stool Culture-Comprehensive: NEGATIVE FOR SALMONELLA AND SHIGELLA BY CULTURE. (09/09/2020),     Glucose, Serum: 96 (mg/dL) (09/22/2020),     BUN: 14 (mg/dl) (09/22/2020),     Creatinine, Serum: 1.16 (mg/dl) (09/22/2020),     BUN/Creatinine Ratio: 12 (Ratio) (09/22/2020),     Glom Filt Rate, Est: 45 (ml/min/1.73m2) (09/22/2020),     Sodium, Serum: 135 (mmol/L) (09/22/2020),     Potassium, Serum: 2.8 (mmol/L) (09/22/2020),     Chloride, Serum: 103 (mmol/L) (09/22/2020),     Carbon Dioxide, Total: 18 (mmol/L) (09/22/2020),     Anion gap: 17 (mmol/l) (09/22/2020),      Osmolality, Serum: 280 (mOsm/kg) (09/22/2020),     Calcium, Total: 8.7 (mg/dl) (09/22/2020),             Procedures:     Encounter for immunization    Regarding contraindications to an Influenza vaccine:        No contraindications were noted.              Assessment:         R19.7   Diarrhea, unspecified       E87.6   Hypokalemia       R42   Dizziness and giddiness       R00.0   Tachycardia, unspecified       R63.4   Abnormal weight loss       Z23   Encounter for immunization           ORDERS:         Meds Prescribed:       [New Rx] Lactinex 1 million cell oral tablet,chewable [2 tabs with meals up to 3 times per day], #30 (thirty) tablets, Refills: 0 (zero)         Radiology/Test Orders:       [id number]  COVID 19 Testing  (Send-Out)              [facility]:       [id number]  ([facility])            [id number]  COMP - [facility] Comp. Metabolic Panel  (Send-Out)            67609  TSH - [facility] TSH  (Send-Out)              Procedures Ordered:       [id number]  Fluzone High Dose  (In-House)              Other Orders:         Administration of influenza virus vaccine  (x1)                  Plan:         Diarrhea, unspecifiedrecent stool studies shows positive inflammation in the bowel.  I would recommend that we try lactinex for now and she may try an immodium OTC for the next few days to see if improves.  Keep the follow up appt    LABORATORY:  Labs ordered to be performed today include CBC, Comprehensive metabolic panel, COVID 19 Testing, and TSH.            Prescriptions:       [New Rx] Lactinex 1 million cell oral tablet,chewable [2 tabs with meals up to 3 times per day], #30 (thirty) tablets, Refills: 0 (zero)           Orders:       [id number]  COVID 19 Testing  (Send-Out)            [id number]  [facility] with 3 part diff  (Send-Out)            85557  COMP - [facility] Comp. Metabolic Panel  (Send-Out)            [id number]  TSH - [facility] TSH  (Send-Out)              Hypokalemiareports that recently increased dose on  potassium - reduced Furosemide  will check labs to ensure normalized        Dizziness and giddinessmay be due to fluid deficit - will get labs again today to ensure stable and have her follow upw ith PCP after EGD/colonoscopy        Tachycardia, unspecifiedchecking labs        Abnormal weight lossdiarrhea x 1 month  scheduled for GS consult on 9/30 for scheduling EGD/colonoscopy - will recommend follow up with GI as well        Encounter for immunization        IMMUNIZATIONS given today: Influenza HIGH Dose.            Immunizations:       [id number]  Fluzone High Dose  (In-House)                Dose (ml): 0.7  Site: right deltoid  Route: intramuscular  Administered by: Radha Pascual          : [employer]  Lot #: [id number]  Exp: 06/30/2021          NDC: [address]-26        Administration of influenza virus vaccine  (x1)              Charge Capture:         Primary Diagnosis:     R19.7  Diarrhea, unspecified           Orders:      [id number]  Office/outpatient visit; established patient, level 4  (In-House)              E87.6  Hypokalemia     R42  Dizziness and giddiness     R00.0  Tachycardia, unspecified     R63.4  Abnormal weight loss     Z23  Encounter for immunization           Orders:      [id number]  Fluzone High Dose  (In-House)              Administration of influenza virus vaccine  (x1)

## 2021-05-18 NOTE — PROGRESS NOTES
Amada BentleyDimitri 1945     Office/Outpatient Visit    Visit Date:  11:32 am    Provider: Radha Maddox N.P. (Assistant: Conchis Gardiner MA)    Location: [facility]        Electronically signed by Radha Maddox N.P. on  2018 09:59:22 AM                             SUBJECTIVE:        CC:     Amada Plaza is a 73 year old White female.  follow up on sodium level;         HPI:         Hyponatremia: Pt states previous low sodium levels. Here today to recheck. States she drinks a lot of water. She has not been restricting her sodium intake.      ROS:     CONSTITUTIONAL:  Negative for chills, fatigue, fever, and weight change.      EYES:  Negative for blurred vision.      CARDIOVASCULAR:  Negative for chest pain, orthopnea, paroxysmal nocturnal dyspnea and pedal edema.      RESPIRATORY:  Negative for dyspnea.      GASTROINTESTINAL:  Negative for abdominal pain, constipation, diarrhea, nausea and vomiting.      NEUROLOGICAL:  Negative for dizziness, headaches, paresthesias, and weakness.      PSYCHIATRIC:  Negative for anxiety, depression, and sleep disturbances.          PMH/FMH/SH:     Last Reviewed on 2018 11:50 AM by Radha Maddox    Past Medical History:                 PAST MEDICAL HISTORY         COPD     hiatal hernia     2 bulding disc and a pinched nerve     Pernicious Anemia     Depression: with loss of daughter.;         GYNECOLOGICAL HISTORY:             PREVENTIVE HEALTH MAINTENANCE             BONE DENSITY: was last done  with normal results     COLORECTAL CANCER SCREENING: declines colorectal cancer screening, understands reason for testing; 3-18-03     EYE EXAM: was last done summer 2017     MAMMOGRAM: Done within last 2 years and results in are chart was last done 1-10-18 with normal results         PAST MEDICAL HISTORY                 ADVANCED DIRECTIVES: None         Surgical History: Abnormal mammogram in March s/p breast bx-everything was  ok  2007         Cataract Removal: bilateral;      Appendectomy    Cholecystectomy: laparoscopic;     Hysterectomy: , ;     Foot surgery on both feet secondary bone spurs;      right ear surgery;         Family History:         Positive for Coronary Artery Disease, Hyperlipidemia ( mother ), Hypertension ( mother; brother; sister ), Myocardial Infarction ( mother; brother; sister ) and Sudden Cardiac Death ( brother ).      Positive for Breast Cancer ( daughter ) and Lung Cancer ( father -- , no h/o smoking ).      Positive for COPD ( father -- emphysema; brother ).      Positive for Renal Failure ( niece ).      Positive for Type 1 Diabetes ( mother; brother; sister ).      Son(s): 1 ;   from MVA     Daughter(s): 1 ;  Breast Cancer         Social History:     Occupation: Unemployed     Marital Status:      Children: 3 children         Tobacco/Alcohol/Supplements:     Last Reviewed on 2018 11:50 AM by Radha Maddox    Tobacco: Current Smoker: She currently smokes every day, 1 pack per day; 1-1/2 packs per day.  Non-drinker         Substance Abuse History:     Last Reviewed on 2018 11:50 AM by Radha Maddox        Mental Health History:     Last Reviewed on 2018 11:50 AM by Rahda Maddox        Communicable Diseases (eg STDs):     Last Reviewed on 2018 11:50 AM by Radha Maddox            Current Problems:     Last Reviewed on 2018 11:50 AM by Radha Maddox    Cellulitis     History of fall     Unspecified fall     Chronic insomnia     Use of high risk medications     Chronic bronchitis, obstructive, with (acute) exacerbation     Hip pain     Heart murmur, undiagnosed     Constipation     Chronic renal insufficiency, stage 2     Hypercholesterolemia     Kidney mass     Essential hypercholesterolemia     Low back pain     Chronic low back pain     Depression with anxiety     Acute confusional state     GERD     Decompensated  chronic obstructive pulmonary disease (COPD)     Generalized anxiety disorder     Irritable bowel syndrome     Patient visit for long term (current) use of other drugs     Late effect of adverse effect of medicinal substance     Hypertension     Acquired hypothyroidism     Memory loss     Post-menopausal HRT     Depression     Pernicious anemia     History of tobacco abuse     Dry eye syndrome     Tobacco dependence     Insomnia, unspecified         Immunizations:     Prevnar 13 (Pneumococcal PCV 13) 11/3/2015     Fluzone (3 + years dose) 10/22/2008     Fluzone (3 + years dose) 9/23/2011     Fluzone High-Dose pf (>=65 yr) 10/23/2013     Fluzone High-Dose pf (>=65 yr) 11/3/2015     Fluzone High-Dose pf (>=65 yr) 10/17/2016     Influenza High-Dose Virus Vaccine pf (>=65 yr) 11/1/2017     PNEUMOVAX 23 (Pneumococcal PPV23) 1/4/2018     Zostavax (Zoster) 10/2/2014         Allergies:     Last Reviewed on 6/08/2018 11:50 AM by Radha Maddox    Pravastatin: (Adverse Reaction)    Crestor:    Niaspan: (Adverse Reaction)    Bentyl: confusion    Fentanyl: itching, shortness of breath (Adverse Reaction)    Zocor: pain (Adverse Reaction)    Morphine Sulfate:        Current Medications:     Last Reviewed on 6/08/2018 11:50 AM by Radha Maddox    Gabapentin 600mg Tablet 1 tab QID     Hydroxyzine HCl 25mg Tablet 1-2 tablets qhs     Restasis 0.05% Ophthalmic Emulsion Instill 1 drop(s) to each eye bid     Advair Diskus 250mcg/50mcg per 1blister Inhalation Powder Inhale 1 puff(s) bid     Aspirin (ASA) 81mg Tablets, Enteric Coated Take 1 tablet(s) by mouth qam     Benazepril HCl 10mg Tablet Take 1 tablet(s) by mouth daily     Buspirone HCl 15mg Tablet Take 1 tablet(s) by mouth bid     Cetirizine HCl 10mg Tablet 1 tab daily     Citalopram Hydrobromide 40mg Tablet 1 tab daily     Naprosyn 250mg Tablet 1 a day prn     Ranitidine 150mg Tablet 1 tab bid     Singulair  10mg Tablet Take 1 tablet(s) by mouth each evening     Spiriva  HandiHaler 18mcg/1capsule Capsules for Inhalation Inhale contents of 1 capsule(s) by Rotahaler by mouth daily thru inhaler prn     Synthroid 0.088mg Tablet Take 1 tablet(s) by mouth daily     Bactrim DS 160mg/800mg Tablet Take 1 tablet(s) by mouth q12h for 10 days     Doxycycline Monohydrate 100mg Tablet Take 1 tablet(s) by mouth bid     Mupirocin 2% Topical Ointment apply affected area bid     Hydrocodone/Acetaminophen 7.5mg/325mg Tablet 1 tab PO QID prn pain     Restoril 30mg Capsules Take 1 capsule(s) by mouth at bedtime prn for insomnia     Docusate Sodium 100mg Capsules 1 bid     ProAir HFA 90mcg/1actuation Oral Inhaler 1 puff daily as needed         OBJECTIVE:        Vitals:         Current: 6/8/2018 11:35:16 AM    Ht:  5 ft, 3.75 in;  Wt: 130.4 lbs;  BMI: 22.6    T: 97.4 F (oral);  BP: 148/84 mm Hg (left arm, standing);  P: 103 bpm (left arm (BP Cuff), sitting);  sCr: 0.95 mg/dL;  GFR: 48.09        Exams:     PHYSICAL EXAM:     GENERAL: vital signs recorded - well developed, well nourished;  no apparent distress;     EYES: extraocular movements intact; conjunctiva and cornea are normal; PERRLA;     NECK: range of motion is normal; thyroid is non-palpable;     RESPIRATORY: normal respiratory rate and pattern with no distress; normal breath sounds with no rales, rhonchi, wheezes or rubs;     CARDIOVASCULAR: normal rate; rhythm is regular;  no systolic murmur; no edema;     GASTROINTESTINAL: nontender; normal bowel sounds; no organomegaly;     NEUROLOGICAL:  cranial nerves, motor and sensory function, reflexes, gait and coordination are all intact;     PSYCHIATRIC:  appropriate affect and demeanor; normal speech pattern; grossly normal memory;         ASSESSMENT:           276.1   E87.1  Hyponatremia              DDx:         ORDERS:         Lab Orders:       [id number]  COMP - [facility] Comp. Metabolic Panel  (Send-Out)                   PLAN:          Hyponatremia     LABORATORY:  Labs ordered to be performed today  include Comprehensive metabolic panel.      FOLLOW-UP: Advised to call if there is no improvement..   appointment to be scheduled with [facility] visit follow up           Orders:       [id number]  COMP [facility] Comp. Metabolic Panel  (Send-Out)             Patient Education Handouts:       Sodium Imbalance              Patient Recommendations:        For  Hyponatremia:                     APPOINTMENT INFORMATION:        Monday Tuesday Wednesday Thursday Friday Saturday Sunday            Time:___________________AM  PM   Date:_____________________             CHARGE CAPTURE:           Primary Diagnosis:     276.1 Hyponatremia            E87.1    Hypo-osmolality and hyponatremia              Orders:          [id number]   Office/outpatient visit; established patient, level 3  (In-House)

## 2021-05-18 NOTE — PROGRESS NOTES
Amada Bentley  1945     Office/Outpatient Visit    Visit Date: Wed, Carl 15, 2020 03:04 pm    Provider: Maricarmen Harrell MD (Assistant: Conchis Gardiner MA)    Location: [facility]        Electronically signed by Maricarmen Harrell MD on  01/21/2020 09:58:12 AM                             Subjective:        CC: (PT IS OUT OF BREO, UNSURE IF SHE SHOULD GET REFILL)Amada Plaza is a 74 year old White female.  She presents with right foot swelliing. SOA, lack of energy.          HPI:           PHQ-9 Depression Screening: Completed form scanned and in chart; Total Score 9           In regard to the acute nasopharyngitis [common cold], these have been present for the past one week.  The symptoms include chest congestion, WEAK, WET, NP, WHEEZING A LOT cough, wheezing and she feels disoriented.  She denies body aches, ear complaints, fever, headache, nasal congestion or sore throat.  She denies exposure to ill contacts.  She has already tried to relieve the symptoms with nasal saline, spiriva and ran out of her breo and proventil.  Medical history is significant for allergies and COPD.      ROS:     CONSTITUTIONAL:  Negative for chills and fever.      EYES:  Negative for blurred vision.      E/N/T:  Positive for frequent rhinorrhea.   Negative for ear pain.      CARDIOVASCULAR:  Negative for chest pain, dizziness and palpitations.      RESPIRATORY:  Positive for chronic cough.   Negative for dyspnea or frequent wheezing.      GASTROINTESTINAL:  Negative for abdominal pain, constipation, diarrhea, hematochezia, melena, nausea and vomiting.      MUSCULOSKELETAL:  Positive for arthralgias and back pain.      INTEGUMENTARY/BREAST:  Positive for bruising on arms.      NEUROLOGICAL:  Positive for weakness ( generalized ).   Negative for dizziness, headaches or memory loss.      PSYCHIATRIC:  Positive for insomnia.   Negative for crying spells, feelings of stress, sadness or suicidal thoughts.          Past Medical  History / Family History / Social History:         Last Reviewed on 1/15/2020 03:41 PM by Maricarmen Harrell    Past Medical History:                 PAST MEDICAL HISTORY         COPD     hiatal hernia     Pernicious Anemia     Depression: with loss of daughter.;         GYNECOLOGICAL HISTORY:             CURRENT MEDICAL PROVIDERS:    Gastroenterologist: MANI    Ophthalmologist: KACI    Orthopedist: CELIA    Urologist: BRITTNEE         PREVENTIVE HEALTH MAINTENANCE             BONE DENSITY: was last done  with normal results     COLORECTAL CANCER SCREENING: declines colorectal cancer screening, understands reason for testing; 3-18-03     EYE EXAM: was last done 3/2019     MAMMOGRAM: Done within last 2 years and results in are chart was last done 2019 with normal results     PAP SMEAR: was last done Hysterectomy         PAST MEDICAL HISTORY                 ADVANCED DIRECTIVES: None         Surgical History: Abnormal mammogram in March s/p breast bx-everything was ok  2007         Cataract Removal: bilateral;     Appendectomy    Cholecystectomy: laparoscopic;     Hysterectomy: , ;     Foot surgery on both feet secondary bone spurs;     right ear surgery;         Family History:         Positive for Coronary Artery Disease, Hyperlipidemia ( mother ), Hypertension ( mother; brother; sister ), Myocardial Infarction ( mother; brother; sister ) and Sudden Cardiac Death ( brother ).      Positive for Breast Cancer ( daughter ) and Lung Cancer ( father -- , no h/o smoking ).      Positive for COPD ( father -- emphysema; brother ).      Positive for Renal Failure ( niece ).      Positive for Type 1 Diabetes ( mother; brother; sister ).      Son(s): 1 ;   from MVA     Daughter(s): 1 ;  Breast Cancer         Social History:     Occupation: Unemployed     Marital Status:      Children: 3 children         Tobacco/Alcohol/Supplements:     Last Reviewed on  1/15/2020 03:12 PM by Conchis Gardiner    Tobacco: She has a past history of cigarette smoking; quit date:  11/26/19.  Non-drinker         Substance Abuse History:     Last Reviewed on 6/08/2018 11:51 AM by Radha Maddox        Mental Health History:     Last Reviewed on 6/08/2018 11:51 AM by Radha Maddox        Communicable Diseases (eg STDs):     Last Reviewed on 6/08/2018 11:51 AM by Radha Maddox        Allergies:     Last Reviewed on 12/16/2019 11:07 AM by Conchis Gardiner    Pravastatin:   (Adverse Reaction)    Crestor:      Niaspan:   (Adverse Reaction)    Bentyl: confusion     Fentanyl: itching,shortness of breath  (Adverse Reaction)    TAPE:      BAND AID:      Zocor: pain  (Adverse Reaction)    Morphine Sulfate:          Current Medications:     Last Reviewed on 1/15/2020 03:41 PM by Maricarmen Harrell    hydroCHLOROthiazide 12.5 mg oral tablet [take 1 tablet (12.5 mg) by oral route 1 time per day]    Gabapentin 600 mg oral tablet [1 po tid]    HYDROcodone-acetaminophen 7.5-325 mg oral tablet [One PO TID PRN]    cetirizine 10 mg oral tablet [1 tab daily]    Spiriva with HandiHaler 18 mcg Inhalation Capsule, With Inhalation Device [Inhale contents of 1 capsule(s) by inhaler device by mouth once daily thru inhaler.]    Singulair  10 mg oral tablet [Take 1 tablet(s) by mouth each evening]    Buspirone HCl 15mg Tablet [1 PO BID]    Restasis 0.05% Ophthalmic Emulsion [Instill 1 drop(s) to each eye bid]    Synthroid 88 mcg oral tablet [Take 1 tablet(s) by mouth daily]    ProAir HFA 90mcg/1actuation Oral Inhaler [1 puff daily as needed]    Paroxetine HCl 10 mg oral tablet [1 tab daily ]    Breo Ellipta 200-25 mcg/dose Inhalation Blister, With Inhalation Device [inhale 1 puff by inhalation route once daily at the same time each day]    metoprolol succinate 100 mg oral Tablet, Extended Release 24 hr [take 1 tablet (100 mg) by oral route once daily]    traZODone 50 mg oral tablet [take 1 tablet (50  mg) by oral route every hs]    mupirocin 2 % Topical Ointment [apply a small amount to the affected area by topical route 3 times per day]    fluticasone propionate 50 mcg/actuation Intranasal Spray, Suspension [inhale 1 spray (50 mcg) in each nostril by intranasal route 2 times per day]    omeprazole 20 mg oral capsule,delayed release (enteric coated) [take 1 capsule (20 mg) by oral route once daily]    amLODIPine 5 mg oral tablet [take 1 tablet (5 mg) by oral route once daily]    losartan 100 mg oral tablet [take 1 tablet (100 mg) by oral route once daily]        Objective:        Vitals:         Current: 1/15/2020 3:17:23 PM    Ht:  5 ft, 2.25 in;  Wt: 141 lbs;  BMI: 25.6T: 98.3 F (oral);  BP: 149/43 mm Hg (right arm, sitting);  P: 54 bpm (right arm (BP Cuff), sitting);  sCr: 1.08 mg/dL;  GFR: 42.35O2 Sat: 94 % (room air)        Exams:     PHYSICAL EXAM:     GENERAL: vital signs recorded - well developed, well nourished;  no apparent distress;     EYES: extraocular movements intact; conjunctiva and cornea are normal; PERRLA;     E/N/T: EARS:  normal external auditory canals and tympanic membranes;  grossly normal hearing; OROPHARYNX: tongue coated with thrush;  posterior pharynx, including tonsils, tongue, and uvula are normal;     NECK: range of motion is normal; thyroid is non-palpable; supple, no LAD;     RESPIRATORY: normal respiratory rate and pattern with no distress; normal breath sounds with no rales, rhonchi, wheezes or rubs;     CARDIOVASCULAR: normal rate; rhythm is regular;  no systolic murmur; no edema;     GASTROINTESTINAL: nontender, nondistended; no hepatosplenomegaly or masses; no bruits;     MUSCULOSKELETAL: gait: affected by a limp and slowed; weakness with decreased sensation C5/6 distribution L UE, decreased ROM L shoulder with crepitus    NEUROLOGIC: mental status: alert and oriented x 3; cranial nerves II-XII grossly intact; Reflexes: knee jerks: 2+;     PSYCHIATRIC:  appropriate affect and  demeanor; normal speech pattern; grossly normal memory;         Lab/Test Results:         Influenza A: Negative (01/15/2020),     Influenza B: Positive (01/15/2020),     Performed by:: laura (01/15/2020),             Assessment:         Z13.31   Encounter for screening for depression       J00   Acute nasopharyngitis [common cold]       M25.475   Effusion, left foot       M25.474   Effusion, right foot       M62.81   Muscle weakness (generalized)       R06.02   Shortness of breath       J44.1   Chronic obstructive pulmonary disease with (acute) exacerbation       B37.0   Candidal stomatitis       J10.00   Influenza due to other identified influenza virus with unspecified type of pneumonia           ORDERS:         Meds Prescribed:       [Recorded] Medrol (Caleb) 4 mg oral Tablet, Dose Pack       [Recorded] Breo Ellipta 200-25 mcg/dose Inhalation Blister, With Inhalation Device [inhale 1 puff by inhalation route once daily at the same time each day]       [Refilled] Medrol (Caleb) 4 mg oral Tablet, Dose Pack [as directed], #1 (one) packet, Refills: 0 (zero)       [Refilled] Breo Ellipta 200-25 mcg/dose Inhalation Blister, With Inhalation Device [inhale 1 puff by inhalation route once daily at the same time each day], #1 (one) each, Refills: 0 (zero)       [Recorded] nystatin 100,000 unit/mL oral Suspension [take 4 milliliters (400,000 unit) by oral route 4 times per day]       [Refilled] nystatin 100,000 unit/mL oral Suspension [take 10 milliliters (400,000 unit) by oral route 4 times per day], #240 (two hundred and forty) milliliters, Refills: 0 (zero)       [Recorded] Tamiflu 75 mg oral capsule [take 1 capsule (75 mg) by oral route 2 times per day]       [Refilled] Tamiflu 75 mg oral capsule [take 1 capsule (75 mg) by oral route 2 times per day], #10 (ten) capsules, Refills: 0 (zero)       [Refilled] Tamiflu 75 mg oral capsule [take 1 capsule (75 mg) by oral route 2 times per day], #10 (ten) capsules, Refills: 0 (zero)          Radiology/Test Orders:       [id number]  Radiologic exam chest 2 views  (Send-Out)            93913  Needle electromyography, one extremity with or without related paraspinal areas  (Send-Out)              Lab Orders:       [id number]  [facility] - [facility] CBC with 3 part diff  (Send-Out)            [id number]  COMP - [facility] Comp. Metabolic Panel  (Send-Out)            05416  NTBNP - [facility] B-Type Natriurectic peptide  (Send-Out)            16240-85  Infectious agent antigen detection by immunoassay; Influenza  (In-House)            99674  Infectious agent antigen detection by immunoassay; Influenza  (In-House)              Other Orders:         Depression screen negative  (In-House)                      Plan:         Encounter for screening for depression    MIPS PHQ-9 Depression Screening: Completed form scanned and in chart; Total Score 6; Positive Depression Screen but after further evaluation the patient does not have a diagnosis of depression.            Orders:         Depression screen negative  (In-House)              Acute nasopharyngitis [common cold]          Orders:       [id number]  Infectious agent antigen detection by immunoassay; Influenza  (In-House)            20131  Infectious agent antigen detection by immunoassay; Influenza  (In-House)              Effusion, left footprob due to her amlodipine        Effusion, right footprob due to amlodipine, elevate feel        Muscle weakness (generalized)of the UE B        TESTS/PROCEDURES:  Will proceed with an EMG and nerve conduction study to be performed/scheduled now.            Orders:       [id number]  Needle electromyography, one extremity with or without related paraspinal areas  (Send-Out)              Shortness of breath    LABORATORY:  Labs ordered to be performed today include BNP, CBC, and Comprehensive metabolic panel.      RADIOLOGY:  I have ordered a chest x-ray (PA and lateral) to be done today.            Orders:       [id number]  [facility] with 3 part diff   (Send-Out)            49142  [facility] Comp. Metabolic Panel  (Send-Out)            16320  Radiologic exam chest 2 views  (Send-Out)            07225  [facility] - [facility] B-Type Natriurectic peptide  (Send-Out)              Chronic obstructive pulmonary disease with (acute) exacerbation          Prescriptions:       [Recorded] Medrol (Caleb) 4 mg oral Tablet, Dose Pack       [Recorded] Breo Ellipta 200-25 mcg/dose Inhalation Blister, With Inhalation Device [inhale 1 puff by inhalation route once daily at the same time each day]       [Refilled] Medrol (Caleb) 4 mg oral Tablet, Dose Pack [as directed], #1 (one) packet, Refills: 0 (zero)       [Refilled] Breo Ellipta 200-25 mcg/dose Inhalation Blister, With Inhalation Device [inhale 1 puff by inhalation route once daily at the same time each day], #1 (one) each, Refills: 0 (zero)         Candidal stomatitis          Prescriptions:       [Recorded] nystatin 100,000 unit/mL oral Suspension [take 4 milliliters (400,000 unit) by oral route 4 times per day]       [Refilled] nystatin 100,000 unit/mL oral Suspension [take 10 milliliters (400,000 unit) by oral route 4 times per day], #240 (two hundred and forty) milliliters, Refills: 0 (zero)         Influenza due to other identified influenza virus with unspecified type of pneumonia          Prescriptions:       [Recorded] Tamiflu 75 mg oral capsule [take 1 capsule (75 mg) by oral route 2 times per day]       [Refilled] Tamiflu 75 mg oral capsule [take 1 capsule (75 mg) by oral route 2 times per day], #10 (ten) capsules, Refills: 0 (zero)       [Refilled] Tamiflu 75 mg oral capsule [take 1 capsule (75 mg) by oral route 2 times per day], #10 (ten) capsules, Refills: 0 (zero)             Charge Capture:         Primary Diagnosis:     Z13.31  Encounter for screening for depression           Orders:      [id number]  Office/outpatient visit; established patient, level 4  (In-House)              Depression screen negative  (In-House)               J00  Acute nasopharyngitis [common cold]           Orders:      [id number]  Infectious agent antigen detection by immunoassay; Influenza  (In-House)            22288  Infectious agent antigen detection by immunoassay; Influenza  (In-House)              M25.475  Effusion, left foot     M25.474  Effusion, right foot     M62.81  Muscle weakness (generalized)     R06.02  Shortness of breath     J44.1  Chronic obstructive pulmonary disease with (acute) exacerbation     B37.0  Candidal stomatitis     J10.00  Influenza due to other identified influenza virus with unspecified type of pneumonia         ADDENDUMS:      ____________________________________    Addendum: 02/03/2020 03:00 PM - One, Team         Visit Note Faxed to:        User Entered Recipient; Number [phone]

## 2021-05-18 NOTE — PROGRESS NOTES
Amada BentleyDimitri 1945     Office/Outpatient Visit    Visit Date: Thu, May 24, 2018 11:13 am    Provider: Mira Simmons N.P. (Assistant: Venus Morrison MA)    Location: [facility]        Electronically signed by Mira Simmons N.P. on  2018 12:01:00 PM                             SUBJECTIVE:        CC:     Amada Plaza is a 73 year old White female.  She presents with possible infection to right upper arm due to fall 6 days ago.  The patient is accompanied into the exam room by her grandaughter.          HPI:         Patient complains of arm pain.  Other details: fell in bathroom friday may 18 while pulling up her pants.  fell face first to floor.  right upper arm rec'd injury.  top layer of skin pushed back.  trimmed excess skin and has been cleaning with soap and water and applying neosporin.  concerned that area is becoming increasingly red and sore.  denies drainage..      see HPI above.     ROS:     CONSTITUTIONAL:  Negative for chills, fatigue, fever, and weight change.      CARDIOVASCULAR:  Negative for chest pain, palpitations, tachycardia, orthopnea, and edema.      RESPIRATORY:  Negative for cough, dyspnea, and hemoptysis.      MUSCULOSKELETAL:  Negative for arthralgias, back pain, and myalgias.      INTEGUMENTARY/BREAST:  Positive for bruising right upper arm and wound right upper arm.      NEUROLOGICAL:  Negative for dizziness, headaches, paresthesias, and weakness.      ENDOCRINE:  Negative for hair loss, heat/cold intolerance, polydipsia, and polyphagia.          PMH/FMH/SH:     Last Reviewed on 2018 02:50 PM by Maricarmen Harrell    Past Medical History:                 PAST MEDICAL HISTORY         COPD     hiatal hernia     2 bulding disc and a pinched nerve     Pernicious Anemia     Depression: with loss of daughter.;         GYNECOLOGICAL HISTORY:             PREVENTIVE HEALTH MAINTENANCE             BONE DENSITY: was last done  with normal results     COLORECTAL  CANCER SCREENING: declines colorectal cancer screening, understands reason for testing; 3-18-03     EYE EXAM: was last done summer 2017     MAMMOGRAM: Done within last 2 years and results in are chart was last done 1-10-18 with normal results         PAST MEDICAL HISTORY                 ADVANCED DIRECTIVES: None         Surgical History: Abnormal mammogram in March s/p breast bx-everything was ok  2007         Cataract Removal: bilateral;      Appendectomy    Cholecystectomy: laparoscopic;     Hysterectomy: , ;     Foot surgery on both feet secondary bone spurs;      right ear surgery;         Family History:         Positive for Coronary Artery Disease, Hyperlipidemia ( mother ), Hypertension ( mother; brother; sister ), Myocardial Infarction ( mother; brother; sister ) and Sudden Cardiac Death ( brother ).      Positive for Breast Cancer ( daughter ) and Lung Cancer ( father -- , no h/o smoking ).      Positive for COPD ( father -- emphysema; brother ).      Positive for Renal Failure ( niece ).      Positive for Type 1 Diabetes ( mother; brother; sister ).      Son(s): 1 ;   from MVA     Daughter(s): 1 ;  Breast Cancer         Social History:     Occupation: Unemployed     Marital Status:      Children: 3 children         Tobacco/Alcohol/Supplements:     Last Reviewed on 2018 02:50 PM by Maricarmen Harrell    Tobacco: Current Smoker: She currently smokes every day, 1 pack per day.  Non-drinker         Substance Abuse History:     Last Reviewed on 3/15/2017 11:38 AM by Maggie Lackey        Mental Health History:     Last Reviewed on 3/15/2017 11:38 AM by Maggie Lackey        Communicable Diseases (eg STDs):     Last Reviewed on 3/15/2017 11:38 AM by Maggie Lackey            Current Problems:     Last Reviewed on 3/15/2017 11:38 AM by Maggie Lackey    Unspecified fall     Chronic insomnia     Use of high risk medications     Chronic bronchitis,  obstructive, with (acute) exacerbation     Hip pain     Heart murmur, undiagnosed     Constipation     Chronic renal insufficiency, stage 2     Hypercholesterolemia     Kidney mass     Essential hypercholesterolemia     Low back pain     Chronic low back pain     Depression with anxiety     Acute confusional state     GERD     Decompensated chronic obstructive pulmonary disease (COPD)     Generalized anxiety disorder     Irritable bowel syndrome     History of fall     Patient visit for long term (current) use of other drugs     Late effect of adverse effect of medicinal substance     Hypertension     Acquired hypothyroidism     Memory loss     Post-menopausal HRT     Depression     Pernicious anemia     History of tobacco abuse     Dry eye syndrome     Tobacco dependence     Insomnia, unspecified         Immunizations:     Prevnar 13 (Pneumococcal PCV 13) 11/3/2015     Fluzone (3 + years dose) 10/22/2008     Fluzone (3 + years dose) 9/23/2011     Fluzone High-Dose pf (>=65 yr) 10/23/2013     Fluzone High-Dose pf (>=65 yr) 11/3/2015     Fluzone High-Dose pf (>=65 yr) 10/17/2016     Influenza High-Dose Virus Vaccine pf (>=65 yr) 11/1/2017     PNEUMOVAX 23 (Pneumococcal PPV23) 1/4/2018     Zostavax (Zoster) 10/2/2014         Allergies:     Last Reviewed on 5/24/2018 11:16 AM by Venus Morrison    Pravastatin: (Adverse Reaction)    Crestor:    Niaspan: (Adverse Reaction)    Bentyl: confusion    Fentanyl: itching, shortness of breath (Adverse Reaction)    Zocor: pain (Adverse Reaction)    Morphine Sulfate:        Current Medications:     Last Reviewed on 5/24/2018 11:17 AM by Venus Morrison    Restasis 0.05% Ophthalmic Emulsion Instill 1 drop(s) to each eye bid     Advair Diskus 250mcg/50mcg per 1blister Inhalation Powder Inhale 1 puff(s) bid     Aspirin (ASA) 81mg Tablets, Enteric Coated Take 1 tablet(s) by mouth qam     Benazepril HCl 10mg Tablet Take 1 tablet(s) by mouth daily     Buspirone HCl 15mg Tablet  Take 1 tablet(s) by mouth bid     Cetirizine HCl 10mg Tablet 1 tab daily     Citalopram Hydrobromide 40mg Tablet 1 tab daily     Lovaza 1gm Capsules Take 2 capsule(s) by mouth bid     Naprosyn 250mg Tablet 1 a day prn     Ranitidine 150mg Tablet 1 tab bid     Singulair  10mg Tablet Take 1 tablet(s) by mouth each evening     Spiriva HandiHaler 18mcg/1capsule Capsules for Inhalation Inhale contents of 1 capsule(s) by Rotahaler by mouth daily thru inhaler prn     Synthroid 0.088mg Tablet Take 1 tablet(s) by mouth daily     Hydrocodone/Acetaminophen 7.5mg/325mg Tablet 1 tab PO QID prn pain     Restoril 30mg Capsules Take 1 capsule(s) by mouth at bedtime prn for insomnia     Gabapentin 600mg Tablet 1 tab QID     Docusate Sodium 100mg Capsules 1 bid     ProAir HFA 90mcg/1actuation Oral Inhaler 1 puff daily as needed         OBJECTIVE:        Vitals:         Historical:     04/24/2018  BP:   128/79 mm Hg ( (left arm, , standing, );)     04/24/2018  Wt:   131.8lbs        Current: 5/24/2018 11:15:21 AM    Ht:  5 ft, 3.75 in;  Wt: 129.7 lbs;  BMI: 22.4    T: 97 F (oral);  BP: 144/74 mm Hg (left arm, standing);  P: 80 bpm (left arm (BP Cuff), sitting);  sCr: 0.95 mg/dL;  GFR: 47.98        Exams:     PHYSICAL EXAM:     GENERAL:  well developed and nourished; appropriately groomed; in no apparent distress;     RESPIRATORY: normal respiratory rate and pattern with no distress; normal breath sounds with no rales, rhonchi, wheezes or rubs;     CARDIOVASCULAR: normal rate; rhythm is regular;     BREAST/INTEGUMENT: (2 inch x 3 inch wound right upper arm.  mild erythema.  mild bruising right upper arm appears to be about 5-6 days old)     MUSCULOSKELETAL:  Normal range of motion, strength and tone;     NEUROLOGIC: mental status: alert and oriented x 3; GROSSLY INTACT     PSYCHIATRIC:  appropriate affect and demeanor; normal speech pattern; grossly normal memory;         ASSESSMENT           682.9   L03.113  Cellulitis               DDx:     V15.88   Z91.81  History of fall              DDx:         ORDERS:         Meds Prescribed:       Doxycycline Monohydrate 100mg Tablet Take 1 tablet(s) by mouth bid  #14 (Fourteen) tablet(s) Refills: 0       Mupirocin 2% Topical Ointment apply affected area bid  #15 (Fifteen) gm Refills: 0                 PLAN:          Cellulitis           Prescriptions:       Doxycycline Monohydrate 100mg Tablet Take 1 tablet(s) by mouth bid  #14 (Fourteen) tablet(s) Refills: 0       Mupirocin 2% Topical Ointment apply affected area bid  #15 (Fifteen) gm Refills: 0          History of fall         RECOMMENDATIONS given include: xray right shoulder and right humerus this am normal.  reviewed xray facial bones earlier this week (normal).  follow up for persisting concerns.  denies dizziness, chest pain , or other problems..              CHARGE CAPTURE           **Please note: ICD descriptions below are intended for billing purposes only and may not represent clinical diagnoses**        Primary Diagnosis:         682.9 Cellulitis            L03.113    Cellulitis of right upper limb              Orders:          [id number]   Office/outpatient visit; established patient, level 3  (In-House)           V15.88 History of fall            Z91.81    History of falling

## 2021-05-18 NOTE — PROGRESS NOTES
Amada Bentley  1945     Office/Outpatient Visit    Visit Date: Thu, Jul 16, 2020 02:06 pm    Provider: Maricarmen Harrell MD (Assistant: Conchis Gardiner MA)    Location: [facility]        Electronically signed by Maricarmen Harrell MD on  07/21/2020 02:13:23 PM                             Subjective:        CC: fall    HPI:       Samson got up to go to the bathroom at 5 am, she wasnt using her walker and lost her balance and fell down hard on the floor on her tailbone and was in severe pain, her pain is better, now moderate severity, but she cant sit very well due to pain, and the pain has run down both legs and her LE have been swelling more.  She also has pain laying down.  She is on pain meds hydrocodone, and this helps her pain, but she hasnt been on anything else.      she is smoking a lot more again          Dx with essential (primary) hypertension; her current cardiac medication regimen includes a diuretic ( Hydrochlorothiazide (HCTZ) ), a beta-blocker ( Toprol-XL ), an ACE inhibitor ( Lotensin ), a calcium channel blocker ( Norvasc ), and an angiotensin receptor blocker ( Cozaar ).  Review of her blood pressure log reveals systolics in the 119-191 and diastolics in the 55-88.  She has been experiencing possible adverse medication effects, including headache and hot.  Compliance with treatment has been good; she takes her medication as directed.      ROS:     CONSTITUTIONAL:  Negative for chills and fever.      CARDIOVASCULAR:  Negative for chest pain, dizziness and palpitations.      RESPIRATORY:  Positive for chronic cough.   Negative for dyspnea or frequent wheezing.      GASTROINTESTINAL:  Negative for abdominal pain, constipation, diarrhea, hematochezia, melena, nausea and vomiting.      INTEGUMENTARY/BREAST:  Negative for rash.      NEUROLOGICAL:  Negative for dizziness, headaches and memory loss.      PSYCHIATRIC:  Negative for crying spells, feelings of stress, mood swings, sadness  and suicidal thoughts.          Past Medical History / Family History / Social History:         Last Reviewed on 2020 02:59 PM by Maricarmen Harrell    Past Medical History:                 PAST MEDICAL HISTORY         COPD     hiatal hernia     Pernicious Anemia     Depression: with loss of daughter.;     LBP, nicotine dependencs, HTN, hypothyroid, anxiety, hypercholesterolemia         GYNECOLOGICAL HISTORY:             CURRENT MEDICAL PROVIDERS:    Gastroenterologist: MANI    Ophthalmologist: KACI    Orthopedist: CELIA    Urologist: BRITTNEE         PREVENTIVE HEALTH MAINTENANCE             BONE DENSITY: was last done  with normal results     COLORECTAL CANCER SCREENING: declines colorectal cancer screening, understands reason for testing; 3-18-03     EYE EXAM: was last done 3/2019     MAMMOGRAM: Done within last 2 years and results in are chart was last done 2019 with normal results     PAP SMEAR: was last done Hysterectomy         PAST MEDICAL HISTORY                 ADVANCED DIRECTIVES: None         Surgical History: Abnormal mammogram in March s/p breast bx-everything was ok  2007         Cataract Removal: bilateral;     Appendectomy    Cholecystectomy: laparoscopic;     Hysterectomy: , ;     Foot surgery on both feet secondary bone spurs;     right ear surgery;         Family History:         Positive for Coronary Artery Disease, Hyperlipidemia ( mother ), Hypertension ( mother; brother; sister ), Myocardial Infarction ( mother; brother; sister ) and Sudden Cardiac Death ( brother ).      Positive for Breast Cancer ( daughter ) and Lung Cancer ( father -- , no h/o smoking ).      Positive for COPD ( father -- emphysema; brother ).      Positive for Renal Failure ( niece ).      Positive for Type 1 Diabetes ( mother; brother; sister ).      Son(s): 1 ;   from MVA     Daughter(s): 1 ;  Breast Cancer         Social History:     Occupation:  Unemployed     Marital Status:      Children: 3 children         Tobacco/Alcohol/Supplements:     Last Reviewed on 7/16/2020 02:16 PM by Conchis Gardiner    Tobacco: Current Smoker: She currently smokes every day, 1 pack per day.  Non-drinker         Substance Abuse History:     Last Reviewed on 6/08/2018 11:51 AM by Radha Maddox        Mental Health History:     Last Reviewed on 6/08/2018 11:51 AM by Radha Maddox        Communicable Diseases (eg STDs):     Last Reviewed on 6/08/2018 11:51 AM by Radha Maddox        Allergies:     Last Reviewed on 4/28/2020 12:08 PM by Jax Dan    Pravastatin:   (Adverse Reaction)    Crestor:      Niaspan:   (Adverse Reaction)    Bentyl: Confusion     Fentanyl: itching,shortness of breath  (Adverse Reaction)    TAPE:      BAND AID:      Zocor: pain  (Adverse Reaction)    Morphine Sulfate:          Current Medications:     Last Reviewed on 4/28/2020 12:12 PM by Jax Dan    Gabapentin 600 mg oral tablet [1 po tid]    HYDROcodone-acetaminophen 7.5-325 mg oral tablet [One PO TID PRN]    Spiriva with HandiHaler 18 mcg Inhalation Capsule, With Inhalation Device [Inhale contents of 1 capsule(s) by inhaler device by mouth once daily thru inhaler.]    cetirizine 10 mg oral tablet [1 tab daily]    busPIRone 15 mg oral tablet [1 PO BID]    Singulair  10 mg oral tablet [Take 1 tablet(s) by mouth each evening]    Restasis 0.05% Ophthalmic Emulsion [Instill 1 drop(s) to each eye bid]    Synthroid 88 mcg oral tablet [Take 1 tablet(s) by mouth daily]    Paroxetine HCl 10 mg oral tablet [1 tab daily ]    metoprolol succinate 100 mg oral Tablet, Extended Release 24 hr [take 1 tablet (100 mg) by oral route once daily]    traZODone 50 mg oral tablet [take 1 tablet (50 mg) by oral route every hs]    mupirocin 2 % Topical Ointment [apply a small amount to the affected area by topical route 3 times per day]    fluticasone propionate 50 mcg/actuation Intranasal Spray,  Suspension [inhale 1 spray (50 mcg) in each nostril by intranasal route 2 times per day]    omeprazole 20 mg oral capsule,delayed release (enteric coated) [take 1 capsule (20 mg) by oral route once daily]    losartan 100 mg oral tablet [take 1 tablet (100 mg) by oral route once daily]    amLODIPine 5 mg oral tablet [take 1 tablet (5 mg) by oral route once daily]    Breo Ellipta 200-25 mcg/dose Inhalation Blister, With Inhalation Device [inhale 1 puff by inhalation route once daily at the same time each day]    hydroCHLOROthiazide 12.5 mg oral tablet [take 1 tablet (12.5 mg) by oral route 1 time per day]    umeclidinium 62.5 mcg/actuation Inhalation Blister, With Inhalation Device [inhale 1 puff (62.5 mcg) by inhalation route once daily at the same time each day]    sulfamethoxazole-trimethoprim 800-160 mg oral tablet [take 1 tablet by oral route 2 times per day]    halobetasol propionate 0.05 % Topical Cream [apply a thin layer to right lower leg  by topical route 2 times per day]        Objective:        Vitals:         Current: 7/16/2020 2:18:28 PM    Ht:  5 ft, 2.25 in;  Wt: 134.6 lbs;  BMI: 24.4T: 98.2 F (temporal);  BP: 146/54 mm Hg (right arm, sitting);  P: 58 bpm (right arm (BP Cuff), sitting);  sCr: 0.92 mg/dL;  GFR: 48.01        Exams:     PHYSICAL EXAM:     GENERAL: vital signs recorded - well developed, well nourished;  no apparent distress;     EYES: extraocular movements intact; conjunctiva and cornea are normal; PERRLA;     E/N/T: EARS:  normal external auditory canals and tympanic membranes;  grossly normal hearing; OROPHARYNX: tongue coated with thrush;  posterior pharynx, including tonsils, tongue, and uvula are normal;     NECK: range of motion is normal; thyroid is non-palpable; supple, no LAD;     RESPIRATORY: normal respiratory rate and pattern with no distress; normal breath sounds with no rales, rhonchi, wheezes or rubs;     CARDIOVASCULAR: normal rate; rhythm is regular;  no systolic murmur;  no edema;     GASTROINTESTINAL: nontender, nondistended; no hepatosplenomegaly or masses; no bruits;     MUSCULOSKELETAL: gait: affected by a limp and slowed;     NEUROLOGIC: mental status: alert and oriented x 3; cranial nerves II-XII grossly intact; Reflexes: knee jerks: 2+;     PSYCHIATRIC:  appropriate affect and demeanor; normal speech pattern; grossly normal memory;         Assessment:         W19.XXXA   Unspecified fall, initial encounter       M54.5   Low back pain       F17.200   Nicotine dependence, unspecified, uncomplicated       E03.8   Other specified hypothyroidism       I10   Essential (primary) hypertension       E78.0   Pure hypercholesterolemia       F41.1   Generalized anxiety disorder       K21.9   Gastro-esophageal reflux disease without esophagitis           ORDERS:         Meds Prescribed:       [Refilled] hydroCHLOROthiazide 25 mg oral tablet [take 1 tablet (25 mg) by oral route daily], #90 (ninety) tablets, Refills: 0 (zero)       [Refilled] omeprazole 20 mg oral capsule,delayed release (enteric coated) [take 1 capsule (20 mg) by oral route once daily, try to take it QOD], #90 (ninety) capsules, Refills: 0 (zero)       [Refilled] hydroCHLOROthiazide 25 mg oral tablet [take 1 tablet (25 mg) by oral route daily], #90 (ninety) tablets, Refills: 0 (zero)         Radiology/Test Orders:       38399  Radiologic examination, spine, lumbosacral;  minimum of four views  (Send-Out)            01317  X-ray sacrum and coccyx  (Send-Out)              Lab Orders:       [id number]  HTNLP - [facility] CMP AND LIPID: [id number]  (Send-Out)            35525  THYII - [facility] Thyroid panel with TSH ([id number], 23818)  (Send-Out)              Other Orders:       1100F  Pt screen for fall risk; document 2+ falls in the past yr or any fall w/injury in past year (NATASHA)  (In-House)                      Plan:         Unspecified fall, initial encountershe needs PT with home health for balance and weakness and LBP        Low back  painworse post a fall, she needs xrays        RADIOLOGY:  I have ordered Lumbar/Sacral Spine X-ray and Sacrum and Coccyx X-ray to be done today.            Orders:       [id number]  Radiologic examination, spine, lumbosacral;  minimum of four views  (Send-Out)            38056  X-ray sacrum and coccyx  (Send-Out)              Nicotine dependence, unspecified, uncomplicated    MIPS Has fallen 2+ times in the past year or had one fall with an injury in the past year  Smoking cessation encouraged. Counseling for less than 3 minutes.            Orders:       1100F  Pt screen for fall risk; document 2+ falls in the past yr or any fall w/injury in past year (NATASHA)  (In-House)              Other specified hypothyroidismdue for labs    LABORATORY:  Labs ordered to be performed today include Thyroid Panel.            Orders:       63759  THY - [facility] Thyroid panel with TSH (00561, 74813)  (Send-Out)              Essential (primary) hypertension    LABORATORY:  Labs ordered to be performed today include HTN/Lipid Panel: CMP, Lipid.      RECOMMENDATIONS given include: avoidance of caffeine, avoidance of cigarette smoke, keep blood pressure log as directed, healthy carb, high healthy protein and high fiber diet, avoid salt in her diet, f/u every 6 months for BP checks and labs, and exercise 30 min 3-4 days a week.            Prescriptions:       [Refilled] hydroCHLOROthiazide 25 mg oral tablet [take 1 tablet (25 mg) by oral route daily], #90 (ninety) tablets, Refills: 0 (zero)       [Refilled] omeprazole 20 mg oral capsule,delayed release (enteric coated) [take 1 capsule (20 mg) by oral route once daily, try to take it QOD], #90 (ninety) capsules, Refills: 0 (zero)       [Refilled] hydroCHLOROthiazide 25 mg oral tablet [take 1 tablet (25 mg) by oral route daily], #90 (ninety) tablets, Refills: 0 (zero)           Orders:       05611  HTNLP - [facility] CMP AND LIPID: [id number]  (Send-Out)              Pure hypercholesterolemiadue for  labs        Generalized anxiety disorderstable on meds, she is on paxil and buspar        Gastro-esophageal reflux disease without esophagitis        RECOMMENDATIONS given include: patient is aware of increased risk of kidney failure, osteoporosis and alzheimers with daily use of this med.              Patient Recommendations:        For  Essential (primary) hypertension:    Try to avoid or reduce the amount of caffeine intake. Avoid cigarette smoke. Keep a daily blood pressure log and report elevated blood pressure to provider as directed. Drink plenty of fluids.  Fever increases the loss of fluids and can lead to dehydration.              Charge Capture:         Primary Diagnosis:     W19.XXXA  Unspecified fall, initial encounter           Orders:      38300  Office/outpatient visit; established patient, level 4  (In-House)              M54.5  Low back pain     F17.200  Nicotine dependence, unspecified, uncomplicated           Orders:      1100F  Pt screen for fall risk; document 2+ falls in the past yr or any fall w/injury in past year (NATASHA)  (In-House)              E03.8  Other specified hypothyroidism     I10  Essential (primary) hypertension     E78.0  Pure hypercholesterolemia     F41.1  Generalized anxiety disorder     K21.9  Gastro-esophageal reflux disease without esophagitis         ADDENDUMS:      ____________________________________    Date: 07/16/2020 03:11 PM    Author: One, Team         Visit Note Faxed to:        [facility]; Number [phone]            Addendum: 08/14/2020 04:17 PM - One, Team         Visit Note Faxed to:        [facility]; Number [phone]

## 2021-05-18 NOTE — PROGRESS NOTES
Amada Bentley  1945     Office/Outpatient Visit    Visit Date:  10:54 am    Provider: Lacey Le N.P. (Assistant: Aneta Han, )    Location: [facility]        Electronically signed by Lacey Le N.P. on  2021 03:59:07 PM                             Subjective:        CC: Amada Plaza is a 75 year old White female.  no energy, no strength;         HPI: 75 year old female presenting to office c/o chronic fatigue and weight loss. States she was put on cymbalta for depression earlier this year. After looking at vitals, it appears weight loss began after starting cymbalta in . Denies depression, but family member in room states it appears as if pt is sad. No abd complaints.     ROS:     CONSTITUTIONAL:  Negative for fatigue, fever and night sweats.      EYES:  Negative for blurred vision.      CARDIOVASCULAR:  Negative for chest pain, palpitations and pedal edema.      RESPIRATORY:  Negative for recent cough and dyspnea.      GASTROINTESTINAL:  Negative for abdominal pain, constipation, diarrhea, nausea and vomiting.      MUSCULOSKELETAL:  Negative for myalgias.      INTEGUMENTARY/BREAST:  Negative for rash.      NEUROLOGICAL:  Positive for weakness ( generalized ).   Negative for dizziness or paresthesias.      PSYCHIATRIC:  Positive for depression.   Negative for anxiety, sleep disturbance or suicidal thoughts.          Past Medical History / Family History / Social History:         Last Reviewed on 2021 03:43 PM by Lacey Le    Past Medical History:                 PAST MEDICAL HISTORY         COPD     hiatal hernia     Pernicious Anemia     Depression: with loss of daughter.;     LBP, nicotine dependencs, HTN, hypothyroid, anxiety, hypercholesterolemia         GYNECOLOGICAL HISTORY:             CURRENT MEDICAL PROVIDERS:    Gastroenterologist: Aston    Orthopedist: CELIA    Urologist: BRITTNEE         PREVENTIVE HEALTH  MAINTENANCE             BONE DENSITY: was last done  with normal results     COLORECTAL CANCER SCREENING: Up to date (colonoscopy q10y; sigmoidoscopy q5y; Cologuard q3y) was last done 2013; gastritis     EYE EXAM: was last done 3/2019     MAMMOGRAM: Done within last 2 years and results in are chart was last done 20 with normal results     PAP SMEAR: was last done Hysterectomy         PAST MEDICAL HISTORY                 ADVANCED DIRECTIVES: None         Surgical History: Abnormal mammogram in March s/p breast bx-everything was ok  2007         Cataract Removal: bilateral;     Appendectomy    Cholecystectomy: laparoscopic;     Hysterectomy: , ;     Foot surgery on both feet secondary bone spurs;     right ear surgery;         Family History:         Positive for Coronary Artery Disease, Hyperlipidemia ( mother ), Hypertension ( mother; brother; sister ), Myocardial Infarction ( mother; brother; sister ) and Sudden Cardiac Death ( brother ).      Positive for Breast Cancer ( daughter ) and Lung Cancer ( father -- , no h/o smoking ).      Positive for COPD ( father -- emphysema; brother ).      Positive for Renal Failure ( niece ).      Positive for Type 1 Diabetes ( mother; brother; sister ).      Son(s): 1 ;   from MVA     Daughter(s): 1 ;  Breast Cancer         Social History:     Occupation: Unemployed     Marital Status:      Children: 3 children         Tobacco/Alcohol/Supplements:     Last Reviewed on 2021 03:43 PM by Lacey Le    Tobacco: She has a past history of cigarette smoking; quit date:  aug, 27th 2020.  Non-drinker         Substance Abuse History:     Last Reviewed on 2020 01:19 PM by Osmar Morse        Mental Health History:     Last Reviewed on 2020 01:19 PM by Osmar Morse        Communicable Diseases (eg STDs):     Last Reviewed on 2020 01:19 PM by Osmar Morse         Immunizations:     influenza, high-dose, quadrivalent (FLUZONE HIGH-DOSE QUAD 2020-21) 9/28/2020    Prevnar 13 (Pneumococcal PCV 13) 11/3/2015    Fluzone (3 + years dose) 10/22/2008    Fluzone (3 + years dose) 9/23/2011    Fluzone High-Dose pf (>=65 yr) 10/23/2013    Fluzone High-Dose pf (>=65 yr) 11/3/2015    Fluzone High-Dose pf (>=65 yr) 10/17/2016    Fluzone High-Dose pf (>=65 yr) 10/30/2018    Fluzone High-Dose pf (>=65 yr) 10/22/2019    Influenza High-Dose Virus Vaccine pf (>=65 yr) 11/1/2017    PNEUMOVAX 23 (Pneumococcal PPV23) 1/4/2018    Zostavax (Zoster live) 10/2/2014        Allergies:     Last Reviewed on 1/28/2021 03:43 PM by Lacey Le    Pravastatin:   (Adverse Reaction)    Crestor:      Niaspan:   (Adverse Reaction)    Bentyl: Confusion     Fentanyl: itching,shortness of breath  (Adverse Reaction)    TAPE:      BAND AID:      Zocor: pain  (Adverse Reaction)    Morphine Sulfate:          Current Medications:     Last Reviewed on 1/28/2021 03:43 PM by Lacey Le    ProAir HFA     HYDROcodone-acetaminophen 7.5-325 mg oral tablet [One PO TID PRN]    cetirizine 10 mg oral tablet [1 tab daily]    gabapentin 600 mg oral tablet [take 1 tablet (600 mg) by oral route 3 times per day]    Singulair  10 mg oral tablet [Take 1 tablet(s) by mouth each evening]    busPIRone 15 mg oral tablet [1 PO BID]    Restasis 0.05% Ophthalmic Emulsion [Instill 1 drop(s) to each eye bid]    Synthroid 88 mcg oral tablet [Take 1 tablet(s) by mouth daily]    ProAir HFA 90 mcg/actuation Inhalation HFA Aerosol Inhaler [1 puff daily as needed]    traZODone 50 mg oral tablet [take 1 tablet (50 mg) by oral route every hs]    omeprazole 20 mg oral capsule,delayed release (enteric coated) [take 1 capsule (20 mg) by oral route once daily, try to take it QOD]    INCRUSE ELPT INH 62.5MCG     potassium chloride 20 mEq oral tablet, extended release [take 1 tablet (20 meq) by oral route once a day]    DULoxetine 60 mg oral  capsule,delayed release (enteric coated) [take 1 capsule (60 mg) by oral route once daily]    colestipoL 1 gram oral tablet [take 1 tablet (1 gram) by oral route 2 times per day swallowing whole with any liquid. Do not crush, chew and/or divide.]    Lactinex 1 million cell oral tablet,chewable [2 tabs with meals up to 3 times per day]    metoprolol succinate 50 mg oral Tablet, Extended Release 24 hr [take 1 tablet (50 mg) by oral route once daily]    Advair -21 mcg/actuation Inhalation HFA Aerosol Inhaler [inhale 2 puffs by inhalation route 2 times per day in the morning and evening]    Atrovent HFA 17 mcg/actuation Inhalation HFA Aerosol Inhaler [inhale 2 puffs (34 mcg) by inhalation route 4 times per day]    mirtazapine 7.5 mg oral tablet [take 1 tablet (7.5 mg) by oral route once daily at bedtime]        Objective:        Vitals:         Current: 1/22/2021 10:57:14 AM    Ht:  5 ft, 2.25 in;  Wt: 104.5 lbs;  BMI: 19.0T: 96.9 F (temporal);  BP: 139/52 mm Hg (right arm, sitting);  P: 76 bpm (right arm (BP Cuff), sitting);  sCr: 1.27 mg/dL;  GFR: 31.23O2 Sat: 92 % (room air)        Exams:     PHYSICAL EXAM:     GENERAL: vital signs recorded - well developed, well nourished;  well groomed;  no apparent distress;     EYES: extraocular movements intact; conjunctiva and cornea are normal; PERRLA;     NECK: range of motion is normal; thyroid exam reveals not enlarged;     RESPIRATORY: normal respiratory rate and pattern with no distress; normal breath sounds with no rales, rhonchi, wheezes or rubs;     CARDIOVASCULAR: normal rate; rhythm is regular;  no systolic murmur; no edema;     GASTROINTESTINAL: nontender; normal bowel sounds;     LYMPHATIC: no enlargement of cervical or facial nodes;     BREAST/INTEGUMENT: no rash. good turgor;     MUSCULOSKELETAL: normal gait; normal overall tone     NEUROLOGIC: mental status: alert and oriented x 3;     PSYCHIATRIC:  appropriate affect and demeanor; normal speech pattern;  grossly normal memory;         Lab/Test Results:         Weight (lb): 119.4 (10/15/2020), 113.0 (11/05/2020), 104.5 (01/22/2021),     O2 Sat: 97 (09/28/2020), 92 (01/22/2021),             Assessment:         R63.4   Abnormal weight loss       F33.1   Major depressive disorder, recurrent, moderate       R53.83   Other fatigue           ORDERS:         Meds Prescribed:       [New Rx] mirtazapine 7.5 mg oral tablet [take 1 tablet (7.5 mg) by oral route once daily at bedtime], #30 (thirty) tablets, Refills: 1 (one)         Lab Orders:       [id number]  [facility] CBC with 3 part diff  (Send-Out)            23113  [facility] Comp. Metabolic Panel  (Send-Out)            15863  [facility] TSH  (Send-Out)                      Plan:         Abnormal weight lossWeaning pt off of Cymbalta and starting Remeron. This will hopefully help depression, fatigue, and decreased appetite. Potential s/e of medication discussed. F/u scheduled. Pt to notify office with any acute concerns or issues such as worsening mood, persistent weight loss, etc. Pt and family member v/u and had no further questions upon d/c.     LABORATORY:  Labs ordered to be performed today include CBC, Comprehensive metabolic panel, and TSH.            Prescriptions:       [New Rx] mirtazapine 7.5 mg oral tablet [take 1 tablet (7.5 mg) by oral route once daily at bedtime], #30 (thirty) tablets, Refills: 1 (one)           Orders:       [id number]  [facility] CBC with 3 part diff  (Send-Out)            34337  [facility] Comp. Metabolic Panel  (Send-Out)            64356  [facility] TSH  (Send-Out)              Major depressive disorder, recurrent, moderatesee above        Other fatiguesee above            Charge Capture:         Primary Diagnosis:     R63.4  Abnormal weight loss           Orders:      08829  Office/outpatient visit; established patient, level 4  (In-House)              F33.1  Major depressive disorder, recurrent, moderate     R53.83  Other fatigue          ZAY:      ____________________________________    Addendum: 02/10/2021 07:24 Lacey Bro neg fatigue from ROS

## 2021-05-27 ENCOUNTER — TRANSCRIBE ORDERS (OUTPATIENT)
Dept: ADMINISTRATIVE | Facility: HOSPITAL | Age: 76
End: 2021-05-27

## 2021-05-27 DIAGNOSIS — I65.29 CAROTID STENOSIS, ASYMPTOMATIC, UNSPECIFIED LATERALITY: Primary | ICD-10-CM

## 2021-06-05 NOTE — PROGRESS NOTES
Amada Bentley  1945     Office/Outpatient Visit    Visit Date: Tue, May 18, 2021 10:38 am    Provider: Maricarmen Harrell MD (Assistant: Aneta Han, )    Location: [facility]        Electronically signed by Maricarmen Harrell MD on  05/21/2021 10:55:58 AM                             Subjective:        CC: weight loss    HPI:           Patient presents with other specified hypothyroidism.  She is currently taking Synthroid, 88 mcg daily.  TSH was last checked 6 months ago.  The result was reported as normal.  She denies any related symptoms.  She reports no symptoms suggestive of adverse medication effect.            With regard to the essential (primary) hypertension, her current cardiac medication regimen includes a diuretic ( Hydrochlorothiazide (HCTZ) ), a beta-blocker ( Toprol-XL ), an ACE inhibitor ( Lotensin ), a calcium channel blocker ( Norvasc ), and an angiotensin receptor blocker ( Cozaar ).  Review of her blood pressure log reveals systolics in the 119-191 and diastolics in the 55-88.  She has been experiencing possible adverse medication effects, including headache and hot.  Compliance with treatment has been good; she takes her medication as directed.            With regard to the pure hypercholesterolemia, date of diagnosis 2005.  Current treatment includes OFF COLESTIPOL due to constipation and a low cholesterol/low fat diet.  Compliance with treatment has been good; she takes her medication as directed, maintains her low cholesterol diet, and follows up as directed.  Most recent lab tests include MCHC:  32.3 (g/dL) (01/22/2021), RDW:  12.7 (%) (01/22/2021), TSH:  0.505 (mIU/L) (01/22/2021), Creatinine, Serum:  1.17 (mg/dl) (01/22/2021), Glom Filt Rate, Est:  45 (ml/min/1.73m2) (01/22/2021), Alkaline Phosphatase, Serum:  64 (U/L) (01/22/2021), ALT (SGPT):  <5 (U/L) (01/22/2021), AST (SGOT):  7 (U/L) (01/22/2021), Total Cholesterol:  204 (mg/dL) (07/16/2020), HDL:  46  "(mg/dL) (07/16/2020), Triglycerides:  178 (mg/dL) (07/16/2020), LDL:  122 (mg/dL) (07/16/2020).        Denae is in for F2F for her chronic pain med for her gabapentin and hydrocodone 7.5 mg tid prn pain, which is daily and moderate severity and she is functional with her pain meds.  Pain is radicular down R LE and she is functional on medication.   MRI showed severe canal stenosis and bilateral foraminal narrowing. She has seen neurosurgeon Dr Swartz and s/p back surgery of L spine in past and told her she needed surgery again and she declines surgery.  NO signs of diversion or abuseJomonica has been under treatment for ongoing diarrhea that is finally much better, she has episodes now only with greasy food.  She mostly has normal BM now. Off colestipol.  On 10/26 she had an EGD and colonoscopy by Dr Richard Simpson and it showed polyps and a \"functional disorder\" of the colon causing diarrhea.  Abd surgeries:appendectomy, hysterectomy, cholecystectomy.    ROS:     CONSTITUTIONAL:  Negative for fatigue, fever and night sweats.      EYES:  Negative for blurred vision.      CARDIOVASCULAR:  Negative for chest pain, palpitations and pedal edema.      RESPIRATORY:  Negative for recent cough and dyspnea.      GASTROINTESTINAL:  Negative for abdominal pain, constipation, diarrhea, nausea and vomiting.      MUSCULOSKELETAL:  Negative for myalgias.      INTEGUMENTARY/BREAST:  Negative for rash.      NEUROLOGICAL:  Positive for weakness ( generalized ).   Negative for dizziness or paresthesias.      PSYCHIATRIC:  Positive for depression.   Negative for anxiety, sleep disturbance or suicidal thoughts.          Past Medical History / Family History / Social History:         Last Reviewed on 5/18/2021 11:15 AM by Maricarmen Harrell    Past Medical History:                 PAST MEDICAL HISTORY         COPD     hiatal hernia     Pernicious Anemia     Depression: with loss of daughter.;     LBP, nicotine dependencs, HTN, hypothyroid, " anxiety, hypercholesterolemia         GYNECOLOGICAL HISTORY:             CURRENT MEDICAL PROVIDERS:    Gastroenterologist: Aston    Orthopedist: CELIA    Urologist: BRITTNEE         PREVENTIVE HEALTH MAINTENANCE             BONE DENSITY: was last done  with normal results     COLORECTAL CANCER SCREENING: Up to date (colonoscopy q10y; sigmoidoscopy q5y; Cologuard q3y) was last done 2013; gastritis     EYE EXAM: was last done 3/2019     MAMMOGRAM: Done within last 2 years and results in are chart was last done 20 with normal results     PAP SMEAR: was last done Hysterectomy         PAST MEDICAL HISTORY                 ADVANCED DIRECTIVES: None         Surgical History: Abnormal mammogram in March s/p breast bx-everything was ok  2007         Cataract Removal: bilateral;     Appendectomy    Cholecystectomy: laparoscopic;     Hysterectomy: , ;     Foot surgery on both feet secondary bone spurs;     right ear surgery;         Family History:         Positive for Coronary Artery Disease, Hyperlipidemia ( mother ), Hypertension ( mother; brother; sister ), Myocardial Infarction ( mother; brother; sister ) and Sudden Cardiac Death ( brother ).      Positive for Breast Cancer ( daughter ) and Lung Cancer ( father -- , no h/o smoking ).      Positive for COPD ( father -- emphysema; brother ).      Positive for Renal Failure ( niece ).      Positive for Type 1 Diabetes ( mother; brother; sister ).      Son(s): 1 ;   from MVA     Daughter(s): 1 ;  Breast Cancer         Social History:     Occupation: Unemployed     Marital Status:      Children: 3 children         Tobacco/Alcohol/Supplements:     Last Reviewed on 2021 10:43 AM by Aneta Han    Tobacco: Current Smoker: Currently smokes cigarettes every day.  Non-drinker         Substance Abuse History:     Last Reviewed on 2020 01:19 PM by Osmar Morse        Mental Health History:      Last Reviewed on 9/08/2020 01:19 PM by Osmar Morse        Communicable Diseases (eg STDs):     Last Reviewed on 9/08/2020 01:19 PM by Osmar Morse        Allergies:     Last Reviewed on 1/28/2021 03:43 PM by Lacey Le    Pravastatin:   (Adverse Reaction)    Crestor:      Niaspan:   (Adverse Reaction)    Bentyl: Confusion     Fentanyl: itching,shortness of breath  (Adverse Reaction)    TAPE:      BAND AID:      Zocor: pain  (Adverse Reaction)    Morphine Sulfate:          Current Medications:     Last Reviewed on 5/18/2021 11:15 AM by Maricarmen Harrell    ProAir HFA     gabapentin 600 mg oral tablet [take 1 tablet (600 mg) by oral route 3 times per day]    cetirizine 10 mg oral tablet [1 tab daily]    HYDROcodone-acetaminophen 7.5-325 mg oral tablet [One PO TID PRN]    busPIRone 15 mg oral tablet [1 PO BID]    Singulair  10 mg oral tablet [Take 1 tablet(s) by mouth each evening]    Restasis 0.05 % ophthalmic (eye) Dropperette [Instill 1 drop(s) to each eye bid]    Synthroid 88 mcg oral tablet [Take 1 tablet(s) by mouth daily]    ProAir HFA 90 mcg/actuation Inhalation HFA Aerosol Inhaler [1 puff daily as needed]    traZODone 50 mg oral tablet [take 1 tablet (50 mg) by oral route every hs]    omeprazole 20 mg oral capsule,delayed release (enteric coated) [take 1 capsule (20 mg) by oral route once daily, try to take it QOD]    INCRUSE ELPT INH 62.5MCG     potassium chloride 20 mEq oral tablet, extended release [take 1 tablet (20 meq) by oral route once a day]    DULoxetine 60 mg oral capsule,delayed release (enteric coated) [take 1 capsule (60 mg) by oral route once daily]    colestipoL 1 gram oral tablet [take 1 tablet (1 gram) by oral route 2 times per day swallowing whole with any liquid. Do not crush, chew and/or divide.]    Lactinex 1 million cell oral tablet,chewable [2 tabs with meals up to 3 times per day]    metoprolol succinate 50 mg oral Tablet, Extended Release 24 hr [take  1 tablet (50 mg) by oral route once daily]    Advair -21 mcg/actuation Inhalation HFA Aerosol Inhaler [inhale 2 puffs by inhalation route 2 times per day in the morning and evening]    Atrovent HFA 17 mcg/actuation Inhalation HFA Aerosol Inhaler [inhale 2 puffs (34 mcg) by inhalation route 4 times per day]    megestroL 400 mg/10 mL (40 mg/mL) oral Suspension [take 10 milliliters (400 mg) by oral route once daily]        Objective:        Vitals:         Current: 5/18/2021 10:41:50 AM    Ht:  5 ft, 2.25 in;  Wt: 97.4 lbs;  BMI: 17.7T: 97.6 F (temporal);  BP: 147/60 mm Hg (right arm, sitting);  P: 68 bpm (right arm (BP Cuff), sitting);  sCr: 1.17 mg/dL;  GFR: 32.40        Repeat:     11:53:41 AM  BP:   136/41mm Hg (right arm, sitting, pulse 54)     Exams:     PHYSICAL EXAM:     GENERAL: vital signs recorded - well developed, well nourished;  no apparent distress;     EYES: extraocular movements intact; PERRL;     E/N/T: EARS: external auditory canal normal;  bilateral TMs are normal;  OROPHARYNX:  normal mucosa, dentition, gingiva, and posterior pharynx;     NECK: range of motion is normal; carotid exam reveals bilateral bruits;     RESPIRATORY: normal appearance and symmetric expansion of chest wall; normal respiratory rate and pattern with no distress; normal breath sounds with no rales, rhonchi, wheezes or rubs;     CARDIOVASCULAR: normal rate; rhythm is regular;  no edema;     GASTROINTESTINAL: nontender; normal bowel sounds; no organomegaly;     MUSCULOSKELETAL: normal gait; normal range of motion of all major muscle groups; no limb or joint pain with range of motion;     NEUROLOGIC: mental status: alert and oriented x 3;     PSYCHIATRIC: appropriate affect and demeanor; normal speech pattern; normal thought and perception;         Lab/Test Results:         THC Cannabinoids Screen, Urin: Negative (05/18/2021),     Buprenorphine: Negative (05/18/2021),     BAR-Barbiturates Screen, Urin: Negative  (05/18/2021),     BZO-Benzodiazepines Screen,Ur: Negative (05/18/2021),     MTD-Methadone Screen, Urine: Negative (05/18/2021),     Amphetamines Screen, Urin: Negative (05/18/2021),     Opiate Screen, Urine: Positive (05/18/2021),     OXY-Oxycodone: Negative (05/18/2021),     MDMA-Ecstasy: Negative (05/18/2021),     Cocaine(Metab.)Screen, Ur: Negative (05/18/2021),     PCP-Phencyclidine Screen, Uri: Negative (05/18/2021),     Met-Methamphetamine: Negative (05/18/2021),     Urine temperature: confirmed (05/18/2021),     Date and time of last pill: gabapentin and hydrocodone 5-18-21 at 0900/kh (05/18/2021),     Performed by: laura (05/18/2021),     Collection Time: 1207 (05/18/2021),             Assessment:         E03.8   Other specified hypothyroidism       I10   Essential (primary) hypertension       E78.0   Pure hypercholesterolemia       R19.7   Diarrhea, unspecified       M54.5   Low back pain       Z79.891   Long term (current) use of opiate analgesic       F41.1   Generalized anxiety disorder       K21.9   Gastro-esophageal reflux disease without esophagitis       N18.2   Chronic kidney disease, stage 2 (mild)       I65.23   Occlusion and stenosis of bilateral carotid arteries           ORDERS:         Meds Prescribed:       [Refilled] potassium chloride 20 mEq oral tablet, extended release [take 1 tablet (20 meq) by oral route once a day], #180 (one hundred and eighty) tablets, Refills: 1 (one)       [Refilled] Singulair  10 mg oral tablet [Take 1 tablet(s) by mouth each evening], #90 (ninety) tablets, Refills: 1 (one)       [Refilled] cetirizine 10 mg oral tablet [1 tab daily], #90 (ninety) tablets, Refills: 1 (one)       [Refilled] DULoxetine 60 mg oral capsule,delayed release (enteric coated) [take 1 capsule (60 mg) by oral route once daily], #90 (ninety) capsules, Refills: 1 (one)       [Refilled] levothyroxine 75 mcg oral tablet [take 1 tablet (75 mcg) by oral route once daily], #90 (ninety) tablets,  Refills: 1 (one)       [Recorded] Spiriva Respimat 1.25 mcg/actuation Inhalation Mist [inhale 2 puffs (2.5 mcg) by inhalation route once daily]       [Refilled] Spiriva Respimat 1.25 mcg/actuation Inhalation Mist [inhale 2 puffs (2.5 mcg) by inhalation route once daily], #4 (four) grams, Refills: 5 (five)         Radiology/Test Orders:       [id number]  Duplex scan of extracranial arteries; complete bilateral study  (Send-Out)              Lab Orders:       [id number]  THYII - [facility] Thyroid panel with TSH ([id number], 34512)  (Send-Out)            87797  HTNLP - [facility] CMP AND LIPID: [id number]  (Send-Out)            07579  Drug test prsmv read direct optical obs pr date  (In-House)                      Plan:         Other specified hypothyroidismdecrease her levothyroid to help with her weight and try megase for appetite    LABORATORY:  Labs ordered to be performed today include Thyroid Panel.            Prescriptions:       [Refilled] potassium chloride 20 mEq oral tablet, extended release [take 1 tablet (20 meq) by oral route once a day], #180 (one hundred and eighty) tablets, Refills: 1 (one)       [Refilled] Singulair  10 mg oral tablet [Take 1 tablet(s) by mouth each evening], #90 (ninety) tablets, Refills: 1 (one)       [Refilled] cetirizine 10 mg oral tablet [1 tab daily], #90 (ninety) tablets, Refills: 1 (one)       [Refilled] DULoxetine 60 mg oral capsule,delayed release (enteric coated) [take 1 capsule (60 mg) by oral route once daily], #90 (ninety) capsules, Refills: 1 (one)       [Refilled] levothyroxine 75 mcg oral tablet [take 1 tablet (75 mcg) by oral route once daily], #90 (ninety) tablets, Refills: 1 (one)       [Recorded] Spiriva Respimat 1.25 mcg/actuation Inhalation Mist [inhale 2 puffs (2.5 mcg) by inhalation route once daily]       [Refilled] Spiriva Respimat 1.25 mcg/actuation Inhalation Mist [inhale 2 puffs (2.5 mcg) by inhalation route once daily], #4 (four) grams, Refills: 5 (five)           Orders:        [id number]  THYII - [facility] Thyroid panel with TSH ([id number], [id number])  (Send-Out)              Essential (primary) hypertensionelevated    LABORATORY:  Labs ordered to be performed today include HTN/Lipid Panel: CMP, Lipid.      RECOMMENDATIONS given include: avoidance of caffeine, avoidance of cigarette smoke, keep blood pressure log as directed, healthy carb, high healthy protein and high fiber diet, avoid salt in diet, f/u every 6 months for BP checks and labs, and exercise 30 min 3-4 days a week.            Orders:       [id number]  HTNLP - [facility] CMP AND LIPID: [id number]  (Send-Out)              Pure hypercholesterolemiadiet controlled        Diarrhea, unspecifiedgreatly improved, avoid greasy foods        Long term (current) use of opiate analgesic        RECOMMENDATIONS given include: darline reviewed, drug screen performed and appropriate, consent is reviewed and signed and on the chart, she is aware of risk of addiction on this medication and understands that she will need to follow up for a review every 3 months and her medications will be adjusted or decreased as deemed appropriate at each visit.  No personal history of drug or alcohol abuse.  No concerns about diversion or abuse.  She denies side effects related to the medication.  She is  aware that she may be called in for pill counts..            Orders:       [id number]  Drug test prsmv read direct optical obs pr date  (In-House)              Generalized anxiety disorderstable on duloxetine, doing well, sleeping well with trazodone        Gastro-esophageal reflux disease without esophagitisstable on omeprazole        Chronic kidney disease, stage 2 (mild)due for labs, on low dose PPI, she is to push fluids and avoid NSAIDS        RECOMMENDATIONS given include: patient is aware of increased risk of kidney failure, osteoporosis and alzheimers with daily use of this med.          Occlusion and stenosis of bilateral carotid arteries        RADIOLOGY:  I have ordered Carotid  Ultrasound to be done today.            Orders:       [id number]  Duplex scan of extracranial arteries; complete bilateral study  (Send-Out)                  Patient Recommendations:        For  Essential (primary) hypertension:    Try to avoid or reduce the amount of caffeine intake. Avoid cigarette smoke. Keep a daily blood pressure log and report elevated blood pressure to provider as directed. Drink plenty of fluids.  Fever increases the loss of fluids and can lead to dehydration.              Charge Capture:         Primary Diagnosis:     E03.8  Other specified hypothyroidism           Orders:      04552  Office/outpatient visit; established patient, level 4  (In-House)              I10  Essential (primary) hypertension     E78.0  Pure hypercholesterolemia     R19.7  Diarrhea, unspecified     M54.5  Low back pain     Z79.891  Long term (current) use of opiate analgesic           Orders:      16546  Drug test prsmv read direct optical obs pr date  (In-House)              F41.1  Generalized anxiety disorder     K21.9  Gastro-esophageal reflux disease without esophagitis     N18.2  Chronic kidney disease, stage 2 (mild)     I65.23  Occlusion and stenosis of bilateral carotid arteries

## 2021-06-09 ENCOUNTER — OFFICE VISIT (OUTPATIENT)
Dept: FAMILY MEDICINE CLINIC | Age: 76
End: 2021-06-09

## 2021-06-09 VITALS
SYSTOLIC BLOOD PRESSURE: 165 MMHG | DIASTOLIC BLOOD PRESSURE: 59 MMHG | HEART RATE: 64 BPM | HEIGHT: 60 IN | WEIGHT: 100.4 LBS | BODY MASS INDEX: 19.71 KG/M2

## 2021-06-09 DIAGNOSIS — Z48.02 ENCOUNTER FOR REMOVAL OF SUTURES: Primary | ICD-10-CM

## 2021-06-09 PROBLEM — E07.9 THYROID DISORDER: Status: ACTIVE | Noted: 2021-06-09

## 2021-06-09 PROBLEM — R01.1 MURMUR, CARDIAC: Status: ACTIVE | Noted: 2017-03-29

## 2021-06-09 PROBLEM — I10 HIGH BLOOD PRESSURE: Status: ACTIVE | Noted: 2021-06-09

## 2021-06-09 PROBLEM — M51.37 DEGENERATION OF LUMBOSACRAL INTERVERTEBRAL DISC: Status: ACTIVE | Noted: 2017-06-29

## 2021-06-09 PROBLEM — J44.9 OTHER SPECIFIED CHRONIC OBSTRUCTIVE AIRWAYS DISEASE: Status: ACTIVE | Noted: 2021-06-09

## 2021-06-09 PROBLEM — IMO0002 THORACIC OR LUMBOSACRAL NEURITIS OR RADICULITIS: Status: ACTIVE | Noted: 2017-08-01

## 2021-06-09 PROBLEM — M48.061 LUMBAR SPINAL STENOSIS: Status: ACTIVE | Noted: 2017-06-29

## 2021-06-09 PROBLEM — M51.379 DEGENERATION OF LUMBOSACRAL INTERVERTEBRAL DISC: Status: ACTIVE | Noted: 2017-06-29

## 2021-06-09 PROBLEM — J44.89 OTHER SPECIFIED CHRONIC OBSTRUCTIVE AIRWAYS DISEASE: Status: ACTIVE | Noted: 2021-06-09

## 2021-06-09 PROBLEM — N28.9 KIDNEY DISORDER: Status: ACTIVE | Noted: 2017-02-03

## 2021-06-09 PROBLEM — E78.00 HIGH CHOLESTEROL: Status: ACTIVE | Noted: 2021-06-09

## 2021-06-09 PROCEDURE — 99024 POSTOP FOLLOW-UP VISIT: CPT | Performed by: NURSE PRACTITIONER

## 2021-06-09 RX ORDER — BUSPIRONE HYDROCHLORIDE 15 MG/1
TABLET ORAL
COMMUNITY
End: 2021-10-12 | Stop reason: SDUPTHER

## 2021-06-09 RX ORDER — MONTELUKAST SODIUM 10 MG/1
TABLET ORAL
COMMUNITY
End: 2021-06-11 | Stop reason: SDUPTHER

## 2021-06-09 RX ORDER — RANITIDINE 150 MG/1
TABLET ORAL
COMMUNITY
End: 2021-06-29 | Stop reason: ALTCHOICE

## 2021-06-09 RX ORDER — BENAZEPRIL HYDROCHLORIDE 20 MG/1
TABLET ORAL
COMMUNITY
End: 2021-06-29 | Stop reason: ALTCHOICE

## 2021-06-09 RX ORDER — GABAPENTIN 600 MG/1
TABLET ORAL
COMMUNITY
End: 2021-06-15 | Stop reason: SDUPTHER

## 2021-06-09 RX ORDER — CITALOPRAM 40 MG/1
TABLET ORAL
COMMUNITY
End: 2021-06-29 | Stop reason: ALTCHOICE

## 2021-06-09 RX ORDER — NAPROXEN 500 MG/1
TABLET ORAL
COMMUNITY
End: 2021-06-29 | Stop reason: ALTCHOICE

## 2021-06-09 RX ORDER — LEVOTHYROXINE SODIUM 0.05 MG/1
50 TABLET ORAL DAILY
COMMUNITY
End: 2021-06-16 | Stop reason: SDUPTHER

## 2021-06-09 RX ORDER — CYCLOSPORINE 0.5 MG/ML
EMULSION OPHTHALMIC
COMMUNITY
End: 2021-06-29 | Stop reason: ALTCHOICE

## 2021-06-09 RX ORDER — OXYCODONE HYDROCHLORIDE AND ACETAMINOPHEN 5; 325 MG/1; MG/1
TABLET ORAL
COMMUNITY
End: 2021-06-29 | Stop reason: ALTCHOICE

## 2021-06-09 RX ORDER — TEMAZEPAM 30 MG/1
CAPSULE ORAL
COMMUNITY
End: 2021-06-29 | Stop reason: ALTCHOICE

## 2021-06-09 RX ORDER — TIOTROPIUM BROMIDE INHALATION SPRAY 1.56 UG/1
SPRAY, METERED RESPIRATORY (INHALATION)
COMMUNITY
End: 2021-06-29 | Stop reason: SDUPTHER

## 2021-06-09 RX ORDER — CETIRIZINE HYDROCHLORIDE 10 MG/1
TABLET ORAL
COMMUNITY
End: 2021-06-11 | Stop reason: SDUPTHER

## 2021-06-09 RX ORDER — HYDROCODONE BITARTRATE AND ACETAMINOPHEN 10; 325 MG/1; MG/1
TABLET ORAL
COMMUNITY
End: 2021-06-15 | Stop reason: SDUPTHER

## 2021-06-09 RX ORDER — ALBUTEROL SULFATE 90 UG/1
AEROSOL, METERED RESPIRATORY (INHALATION)
COMMUNITY
End: 2021-10-12 | Stop reason: SDUPTHER

## 2021-06-09 NOTE — PROGRESS NOTES
"Chief Complaint  Suture / Staple Removal (right leg, 10 sutures, put in at [place] [facility])    Subjective          Amada Bentley presents to [facility] to have sutures removed from left lower leg after a fall , went to ER placed sutures on 5/31/2021 and was given tetanus shot. Since then area has healed , denies fever , drainage or pain           Objective   Vital Signs:   /59 (BP Location: Right arm, Patient Position: Sitting)   Pulse 64   Ht 152.4 cm (60\")   Wt 45.5 kg (100 lb 6.4 oz)   BMI 19.61 kg/m²       Physical Exam  Constitutional:       Appearance: Normal appearance.   Neck:      Vascular: No carotid bruit.   Cardiovascular:      Rate and Rhythm: Normal rate and regular rhythm.      Heart sounds: Normal heart sounds.   Pulmonary:      Effort: Pulmonary effort is normal.      Breath sounds: Normal breath sounds.   Musculoskeletal:         General: Normal range of motion.   Skin:     Comments: Right lower leg laceration, 10 sutures removed. Steri strips replaced due to skin tear below sutures    Neurological:      General: No focal deficit present.      Mental Status: She is alert.   Psychiatric:         Mood and Affect: Mood normal.         Behavior: Behavior normal.              Result Review :       Suture Removal    Date/Time: 6/9/2021 1:44 PM  Performed by: Ross Miller APRN  Authorized by: Ross Miller APRN   Body area: lower extremity  Location details: right lower leg  Wound Appearance: clean and pink  Sutures Removed: 10  Post-removal: Steri-Strips applied  Patient tolerance: patient tolerated the procedure well with no immediate complications            Assessment and Plan    Diagnoses and all orders for this visit:    1. Encounter for removal of sutures (Primary)    Other orders  -     Suture Removal        Follow Up    No follow-ups on file.  Patient was given instructions and counseling regarding her condition or for health " maintenance advice. Please see specific information pulled into the AVS if appropriate.

## 2021-06-10 ENCOUNTER — HOSPITAL ENCOUNTER (OUTPATIENT)
Dept: CARDIOLOGY | Facility: HOSPITAL | Age: 76
Discharge: HOME | End: 2021-06-10
Admitting: FAMILY MEDICINE

## 2021-06-10 DIAGNOSIS — I65.29 CAROTID STENOSIS, ASYMPTOMATIC, UNSPECIFIED LATERALITY: ICD-10-CM

## 2021-06-10 DIAGNOSIS — I65.23 CAROTID STENOSIS, ASYMPTOMATIC, BILATERAL: ICD-10-CM

## 2021-06-10 LAB
BH CV XLRA MEAS LEFT CAROTID BULB EDV: 15.7 CM/SEC
BH CV XLRA MEAS LEFT CAROTID BULB PSV: 100 CM/SEC
BH CV XLRA MEAS LEFT DIST CCA EDV: 14.1 CM/SEC
BH CV XLRA MEAS LEFT DIST CCA PSV: 103 CM/SEC
BH CV XLRA MEAS LEFT DIST ICA EDV: 18 CM/SEC
BH CV XLRA MEAS LEFT DIST ICA PSV: 141 CM/SEC
BH CV XLRA MEAS LEFT MID ICA EDV: 27.4 CM/SEC
BH CV XLRA MEAS LEFT MID ICA PSV: 149 CM/SEC
BH CV XLRA MEAS LEFT PROX CCA EDV: 19.6 CM/SEC
BH CV XLRA MEAS LEFT PROX CCA PSV: 109 CM/SEC
BH CV XLRA MEAS LEFT PROX ECA EDV: 13.4 CM/SEC
BH CV XLRA MEAS LEFT PROX ECA PSV: 184 CM/SEC
BH CV XLRA MEAS LEFT PROX ICA EDV: 23.5 CM/SEC
BH CV XLRA MEAS LEFT PROX ICA PSV: 130 CM/SEC
BH CV XLRA MEAS LEFT VERTEBRAL A EDV: 13.9 CM/SEC
BH CV XLRA MEAS LEFT VERTEBRAL A PSV: 101 CM/SEC
BH CV XLRA MEAS RIGHT CAROTID BULB EDV: 15.6 CM/SEC
BH CV XLRA MEAS RIGHT CAROTID BULB PSV: 150 CM/SEC
BH CV XLRA MEAS RIGHT DIST CCA EDV: 15.5 CM/SEC
BH CV XLRA MEAS RIGHT DIST CCA PSV: 82 CM/SEC
BH CV XLRA MEAS RIGHT DIST ICA EDV: 18.9 CM/SEC
BH CV XLRA MEAS RIGHT DIST ICA PSV: 100 CM/SEC
BH CV XLRA MEAS RIGHT MID ICA EDV: 26.6 CM/SEC
BH CV XLRA MEAS RIGHT MID ICA PSV: 94.6 CM/SEC
BH CV XLRA MEAS RIGHT PROX CCA EDV: 14.9 CM/SEC
BH CV XLRA MEAS RIGHT PROX CCA PSV: 90.1 CM/SEC
BH CV XLRA MEAS RIGHT PROX ECA EDV: 13.4 CM/SEC
BH CV XLRA MEAS RIGHT PROX ECA PSV: 188 CM/SEC
BH CV XLRA MEAS RIGHT PROX ICA EDV: 21 CM/SEC
BH CV XLRA MEAS RIGHT PROX ICA PSV: 86.2 CM/SEC
BH CV XLRA MEAS RIGHT VERTEBRAL A EDV: 18.8 CM/SEC
BH CV XLRA MEAS RIGHT VERTEBRAL A PSV: 83.9 CM/SEC
LEFT ARM BP: NORMAL MMHG
MAXIMAL PREDICTED HEART RATE: 142 BPM
RIGHT ARM BP: NORMAL MMHG
STRESS TARGET HR: 121 BPM

## 2021-06-10 PROCEDURE — 93880 EXTRACRANIAL BILAT STUDY: CPT

## 2021-06-10 PROCEDURE — 93880 EXTRACRANIAL BILAT STUDY: CPT | Performed by: SURGERY

## 2021-06-11 ENCOUNTER — TELEPHONE (OUTPATIENT)
Dept: FAMILY MEDICINE CLINIC | Age: 76
End: 2021-06-11

## 2021-06-11 DIAGNOSIS — M54.50 CHRONIC LOW BACK PAIN, UNSPECIFIED BACK PAIN LATERALITY, UNSPECIFIED WHETHER SCIATICA PRESENT: Primary | ICD-10-CM

## 2021-06-11 DIAGNOSIS — G89.29 CHRONIC LOW BACK PAIN, UNSPECIFIED BACK PAIN LATERALITY, UNSPECIFIED WHETHER SCIATICA PRESENT: Primary | ICD-10-CM

## 2021-06-11 NOTE — TELEPHONE ENCOUNTER
Caller: Amada Bentley    Relationship: Self    Best call back number: [phone]     Medication needed:   Requested Prescriptions     Pending Prescriptions Disp Refills   • cetirizine (zyrTEC) 10 MG tablet     • montelukast (Singulair) 10 MG tablet         When do you need the refill by: 06/11/21     What additional details did the patient provide when requesting the medication: PATIENT HAS ENOUGH TIL THIS WEEKEND.  PLEASE CALL AND ADVISE     Does the patient have less than a 3 day supply:  [] Yes  [x] No    What is the patient's preferred pharmacy:    [facility]   [address] [place] [id number]

## 2021-06-11 NOTE — TELEPHONE ENCOUNTER
Caller: Amada Bentley    Relationship: Self    Best call back number: [phone]     Medication needed:   Requested Prescriptions     Pending Prescriptions Disp Refills   • HYDROcodone-acetaminophen (NORCO)  MG per tablet         When do you need the refill by: 6-14-21    What additional details did the patient provide when requesting the medication: SAID HAS ENOUGH SHE THINKS TO MAKE IT THRU THE WEEKEND. BACK IS HURTING REALLY SARIKA    Does the patient have less than a 3 day supply:  [x] Yes  [] No    What is the patient's preferred pharmacy:  [facility]

## 2021-06-14 RX ORDER — CETIRIZINE HYDROCHLORIDE 10 MG/1
10 TABLET ORAL DAILY
Qty: 90 TABLET | Refills: 1 | Status: SHIPPED | OUTPATIENT
Start: 2021-06-14 | End: 2021-06-16 | Stop reason: SDUPTHER

## 2021-06-14 RX ORDER — MONTELUKAST SODIUM 10 MG/1
10 TABLET ORAL NIGHTLY
Qty: 90 TABLET | Refills: 1 | Status: SHIPPED | OUTPATIENT
Start: 2021-06-14 | End: 2021-06-16 | Stop reason: SDUPTHER

## 2021-06-15 ENCOUNTER — TELEPHONE (OUTPATIENT)
Dept: FAMILY MEDICINE CLINIC | Age: 76
End: 2021-06-15

## 2021-06-15 RX ORDER — GABAPENTIN 600 MG/1
600 TABLET ORAL 3 TIMES DAILY
Qty: 90 TABLET | Refills: 2 | Status: SHIPPED | OUTPATIENT
Start: 2021-06-15 | End: 2021-12-15 | Stop reason: SDUPTHER

## 2021-06-15 RX ORDER — HYDROCODONE BITARTRATE AND ACETAMINOPHEN 10; 325 MG/1; MG/1
TABLET ORAL
Status: CANCELLED | OUTPATIENT
Start: 2021-06-15

## 2021-06-15 RX ORDER — HYDROCODONE BITARTRATE AND ACETAMINOPHEN 7.5; 325 MG/1; MG/1
1 TABLET ORAL EVERY 8 HOURS PRN
Qty: 90 TABLET | Refills: 0 | Status: SHIPPED | OUTPATIENT
Start: 2021-06-15 | End: 2021-07-27 | Stop reason: SDUPTHER

## 2021-06-16 ENCOUNTER — TELEPHONE (OUTPATIENT)
Dept: FAMILY MEDICINE CLINIC | Age: 76
End: 2021-06-16

## 2021-06-16 RX ORDER — MONTELUKAST SODIUM 10 MG/1
10 TABLET ORAL NIGHTLY
Qty: 90 TABLET | Refills: 1 | Status: SHIPPED | OUTPATIENT
Start: 2021-06-16 | End: 2021-11-15 | Stop reason: SDUPTHER

## 2021-06-16 RX ORDER — LEVOTHYROXINE SODIUM 0.05 MG/1
50 TABLET ORAL DAILY
Qty: 90 TABLET | Refills: 1 | Status: SHIPPED | OUTPATIENT
Start: 2021-06-16 | End: 2021-08-17 | Stop reason: SDUPTHER

## 2021-06-16 RX ORDER — CETIRIZINE HYDROCHLORIDE 10 MG/1
10 TABLET ORAL DAILY
Qty: 90 TABLET | Refills: 1 | Status: SHIPPED | OUTPATIENT
Start: 2021-06-16 | End: 2021-07-29 | Stop reason: SDUPTHER

## 2021-06-16 NOTE — TELEPHONE ENCOUNTER
Caller: Amada Bentley    Relationship: Self    Best call back number: [phone]    What was the call regarding: PATIENT CALLED WANTING TO KNOW WHY SOME OF HER MEDS WERE NOT CALLED INTO THE PHARMACY   SHE REQUESTED    WANTS TO TALK KATELYN      Do you require a callback:YES PLEASE CALL ADVISE

## 2021-06-18 ENCOUNTER — TRANSCRIBE ORDERS (OUTPATIENT)
Dept: ADMINISTRATIVE | Facility: HOSPITAL | Age: 76
End: 2021-06-18

## 2021-06-18 DIAGNOSIS — I65.23 BILATERAL CAROTID ARTERY OCCLUSION: Primary | ICD-10-CM

## 2021-06-19 ENCOUNTER — TRANSCRIBE ORDERS (OUTPATIENT)
Dept: ADMINISTRATIVE | Facility: HOSPITAL | Age: 76
End: 2021-06-19

## 2021-06-19 DIAGNOSIS — I65.23 BILATERAL CAROTID ARTERY OCCLUSION: Primary | ICD-10-CM

## 2021-06-22 ENCOUNTER — TELEPHONE (OUTPATIENT)
Dept: FAMILY MEDICINE CLINIC | Age: 76
End: 2021-06-22

## 2021-06-22 NOTE — TELEPHONE ENCOUNTER
[facility] CALLED TO MAKE A GWYN FOR VIC MALDONADO 1945 WITH MARCELLE GAMBLE. SHE DID NOT HAVE ANY AVAILABLE APPOINTMENTS BEFORE THE TWO WEEKS. I SCHEDULED HER WITH MAEVE MURO ON 6/29/21 AT 11AM. SHE WAS ADMITTED ON 6/18/21 FOR PNEUMONIA AND COPD AND WAS DISCHARGED ON 6/22/2021.     THANKS.

## 2021-06-23 ENCOUNTER — READMISSION MANAGEMENT (OUTPATIENT)
Dept: CALL CENTER | Facility: HOSPITAL | Age: 76
End: 2021-06-23

## 2021-06-23 ENCOUNTER — TRANSITIONAL CARE MANAGEMENT TELEPHONE ENCOUNTER (OUTPATIENT)
Dept: CALL CENTER | Facility: HOSPITAL | Age: 76
End: 2021-06-23

## 2021-06-23 NOTE — OUTREACH NOTE
Call Center TCM Note      Responses   [facility] patient discharged from?  Non-   Does the patient have one of the following disease processes/diagnoses(primary or secondary)?  Other   TCM attempt successful?  Yes   Call start time  0928   Call end time  0930   Discharge diagnosis  unknown   Meds reviewed with patient/caregiver?  Yes   Is the patient having any side effects they believe may be caused by any medication additions or changes?  No   Does the patient have all medications ordered at discharge?  Yes   Is the patient taking all medications as directed (includes completed medication regime)?  Yes   Does the patient have a primary care provider?   Yes   Comments regarding PCP  [facility]osp DC FU appt 6/29/21 @ 11am.   Psychosocial issues?  No   Did the patient receive a copy of their discharge instructions?  Yes   Nursing interventions  Reviewed instructions with patient   What is the patient's perception of their health status since discharge?  Improving   Is the patient/caregiver able to teach back signs and symptoms related to disease process for when to call PCP?  Yes   Is the patient/caregiver able to teach back signs and symptoms related to disease process for when to call 911?  Yes   Is the patient/caregiver able to teach back the hierarchy of who to call/visit for symptoms/problems? PCP, Specialist, Home health nurse, Urgent Care, ED, 911  Yes   If the patient is a current smoker, are they able to teach back resources for cessation?  Smoking cessation medications [Uses [facility]]   TCM call completed?  Yes          Chrissy Jones RN    6/23/2021, 09:31 EDT

## 2021-06-23 NOTE — OUTREACH NOTE
Prep Survey      Responses   [facility] facility patient discharged from?  Non-BH   Is LACE score < 7 ?  Non-BH Discharge   Emergency Room discharge w/ pulse ox?  No   Eligibility  [facility]   Date of Discharge  06/22/21   Discharge Disposition  Home or Self Care   Discharge diagnosis  unknown   Does the patient have one of the following disease processes/diagnoses(primary or secondary)?  Other   Prep survey completed?  Yes          Amanda Marmolejo RN

## 2021-06-29 ENCOUNTER — OFFICE VISIT (OUTPATIENT)
Dept: FAMILY MEDICINE CLINIC | Age: 76
End: 2021-06-29

## 2021-06-29 VITALS
BODY MASS INDEX: 18.97 KG/M2 | HEART RATE: 56 BPM | DIASTOLIC BLOOD PRESSURE: 65 MMHG | TEMPERATURE: 98.9 F | SYSTOLIC BLOOD PRESSURE: 191 MMHG | HEIGHT: 60 IN | OXYGEN SATURATION: 91 % | WEIGHT: 96.6 LBS

## 2021-06-29 DIAGNOSIS — I10 ESSENTIAL (PRIMARY) HYPERTENSION: Chronic | ICD-10-CM

## 2021-06-29 DIAGNOSIS — J44.1 CHRONIC OBSTRUCTIVE PULMONARY DISEASE WITH ACUTE EXACERBATION (HCC): ICD-10-CM

## 2021-06-29 DIAGNOSIS — B37.0 THRUSH: ICD-10-CM

## 2021-06-29 DIAGNOSIS — F17.210 CIGARETTE NICOTINE DEPENDENCE WITHOUT COMPLICATION: ICD-10-CM

## 2021-06-29 DIAGNOSIS — F19.982 DRUG-INDUCED INSOMNIA (HCC): ICD-10-CM

## 2021-06-29 DIAGNOSIS — J18.9 PNEUMONIA OF BOTH LUNGS DUE TO INFECTIOUS ORGANISM, UNSPECIFIED PART OF LUNG: Primary | ICD-10-CM

## 2021-06-29 PROCEDURE — 1111F DSCHRG MED/CURRENT MED MERGE: CPT | Performed by: NURSE PRACTITIONER

## 2021-06-29 PROCEDURE — 99495 TRANSJ CARE MGMT MOD F2F 14D: CPT | Performed by: NURSE PRACTITIONER

## 2021-06-29 RX ORDER — DOXYCYCLINE HYCLATE 100 MG/1
100 CAPSULE ORAL 2 TIMES DAILY
COMMUNITY
End: 2021-07-22

## 2021-06-29 RX ORDER — TRAZODONE HYDROCHLORIDE 50 MG/1
50 TABLET ORAL NIGHTLY
COMMUNITY
End: 2021-07-22

## 2021-06-29 RX ORDER — POTASSIUM CHLORIDE 750 MG/1
10 TABLET, EXTENDED RELEASE ORAL DAILY
COMMUNITY
End: 2021-12-15 | Stop reason: SDUPTHER

## 2021-06-29 RX ORDER — GUAIFENESIN 600 MG/1
1200 TABLET, EXTENDED RELEASE ORAL 2 TIMES DAILY
COMMUNITY
End: 2021-06-29 | Stop reason: SDUPTHER

## 2021-06-29 RX ORDER — DULOXETIN HYDROCHLORIDE 60 MG/1
60 CAPSULE, DELAYED RELEASE ORAL DAILY
COMMUNITY
End: 2021-07-29 | Stop reason: SDUPTHER

## 2021-06-29 RX ORDER — GUAIFENESIN 600 MG/1
1200 TABLET, EXTENDED RELEASE ORAL 2 TIMES DAILY
Qty: 120 TABLET | Refills: 2 | Status: SHIPPED | OUTPATIENT
Start: 2021-06-29 | End: 2021-06-30 | Stop reason: SDUPTHER

## 2021-06-29 RX ORDER — CHOLECALCIFEROL (VITAMIN D3) 125 MCG
5 CAPSULE ORAL NIGHTLY
COMMUNITY
End: 2022-05-03

## 2021-06-29 RX ORDER — OMEPRAZOLE 20 MG/1
20 CAPSULE, DELAYED RELEASE ORAL DAILY
COMMUNITY
End: 2021-10-12 | Stop reason: SDUPTHER

## 2021-06-29 RX ORDER — FOLIC ACID 1 MG/1
1 TABLET ORAL DAILY
COMMUNITY
End: 2021-07-22

## 2021-06-29 RX ORDER — PREDNISONE 20 MG/1
20 TABLET ORAL DAILY
COMMUNITY
End: 2021-07-22

## 2021-06-29 RX ORDER — METOPROLOL SUCCINATE 50 MG/1
50 TABLET, EXTENDED RELEASE ORAL DAILY
COMMUNITY
End: 2021-07-29 | Stop reason: SDUPTHER

## 2021-06-29 RX ORDER — TIOTROPIUM BROMIDE INHALATION SPRAY 1.56 UG/1
2 SPRAY, METERED RESPIRATORY (INHALATION)
Qty: 1 G | Refills: 2 | Status: SHIPPED | OUTPATIENT
Start: 2021-06-29 | End: 2021-09-13 | Stop reason: SDUPTHER

## 2021-06-29 RX ORDER — NICOTINE 21 MG/24HR
1 PATCH, TRANSDERMAL 24 HOURS TRANSDERMAL EVERY 24 HOURS
COMMUNITY
End: 2021-07-22

## 2021-06-29 NOTE — PROGRESS NOTES
Transitional Care Follow Up Visit  Subjective     Amada Bentley is a 76 y.o. female who presents for a transitional care management visit.    Within 48 business hours after discharge our office contacted her via telephone to coordinate her care and needs.      I reviewed and discussed the details of that call along with the discharge summary, hospital problems, inpatient lab results, inpatient diagnostic studies, and consultation reports with Amada.     Current outpatient and discharge medications have been reconciled for the patient.  Reviewed by: ALBERTO Zambrano      Date of TCM Phone Call 6/23/2021   Hospital [facility] - Inpatient   Date of Discharge 6/22/2021   Discharge Disposition Home or Self Care       Risk for Readmission (LACE) No data recorded    History of Present Illness  Amada is here for follow up GWYN from recent hospitalization at [facility] admitted on 6/18/2021 and discharged on 6/22/2021 with a diagnosis of pneumonia and COPD exacerbation.  Amada reports that she is feeling much better, continues on her steroid taper and finished her antibiotics.  She is wearing oxygen at 2L at HS PRN and continues with [facility] home health services.  She has been instructed to continue on the Mucinex daily , add an inhaler , BREO and start taking a probiotic at home.  She has not filled any of these medications at this time.       Amada's blood pressure is elevated and has been since being out of the hospital- she just recently took her blood pressure medication.  She is asymptomatic today with her blood pressure elevation.  She has been using the patches and continues with smoking cessation since being discharged from the hospital.      She is reporting some thrush in her mouth as well. She has been having a hard time falling to sleep since being home.        Course During Hospital Stay(Copied from D/C Summary and reviewed during encounter on 06/29/2021 by ALBERTO Zambrano):      The  "following portions of the patient's history were reviewed and updated as appropriate: allergies, current medications, past family history, past medical history, past social history, past surgical history and problem list.    Vitals:    06/29/21 1106 06/29/21 1123 06/29/21 1201   BP: (!) 183/65 177/58 (!) 191/65   BP Location: Left arm Left arm Right arm   Patient Position: Sitting Sitting Sitting   Pulse: 57 57 56   Temp: 98.9 °F (37.2 °C)     SpO2: 91%     Weight: 43.8 kg (96 lb 9.6 oz)     Height: 152.4 cm (60\")         Advance Care Planning     Review of Systems   Constitutional: Negative for fatigue and fever.   HENT: Positive for mouth sores (tongue sore).    Respiratory: Positive for cough (chronic - but at baseline), shortness of breath (using oxygen at night) and wheezing. Negative for chest tightness.    Cardiovascular: Negative for chest pain.   Gastrointestinal: Negative for diarrhea.   Musculoskeletal: Negative for back pain and neck pain.   Neurological: Negative for dizziness.   Psychiatric/Behavioral: Positive for sleep disturbance. Negative for behavioral problems and dysphoric mood.        Objective   Physical Exam  Constitutional:       General: She is not in acute distress.     Appearance: Normal appearance.   HENT:      Head: Normocephalic.   Cardiovascular:      Rate and Rhythm: Normal rate and regular rhythm.   Pulmonary:      Effort: Pulmonary effort is normal.      Breath sounds: Examination of the right-middle field reveals wheezing. Examination of the right-lower field reveals wheezing. Examination of the left-lower field reveals wheezing. Decreased breath sounds and wheezing present.   Musculoskeletal:         General: Normal range of motion.   Neurological:      General: No focal deficit present.      Mental Status: She is alert and oriented to person, place, and time.   Psychiatric:         Mood and Affect: Mood normal.         Behavior: Behavior normal.         DATA REVIEWED:    Data " Reviewed:  DISCHARGE SUMMARY - SCAN - DC SUMMARY [facility] 6/22/21 (06/18/2021)  RADIOLOGY - SCAN - [place]/[facility]/[id number] (06/21/2021)  RADIOLOGY - SCAN (06/18/2021)         Assessment/Plan     Diagnoses and all orders for this visit:    1. Pneumonia of both lungs due to infectious organism, unspecified part of lung (Primary)  Comments:  complete full course of antibiotics and prednisone     2. Essential (primary) hypertension  Comments:  take medication when arriving home.  Monitor BP at home and if remains elevated, will need to follow up in office wiht PCP for further recommendations    3. Chronic obstructive pulmonary disease with acute exacerbation (CMS/HCC)  Comments:  New medications discussed , recommended to take daily and follow up with PCP as scheduled   Orders:  -     Fluticasone Furoate-Vilanterol (Breo Ellipta) 100-25 MCG/INH inhaler; Inhale 1 puff Daily for 30 days.  Dispense: 60 each; Refill: 2  -     Tiotropium Bromide Monohydrate (Spiriva Respimat) 1.25 MCG/ACT aerosol solution inhaler; Inhale 2 puffs Daily for 30 days.  Dispense: 1 g; Refill: 2  -     guaiFENesin (MUCINEX) 600 MG 12 hr tablet; Take 2 tablets by mouth 2 (Two) Times a Day for 90 days.  Dispense: 120 tablet; Refill: 2    4. Cigarette nicotine dependence without complication  Comments:  continue cessation     5. Thrush  Comments:  rinse mouth completely after inhalers, follow up PRN   Orders:  -     nystatin (MYCOSTATIN) 151687 UNIT/ML suspension; Swish and swallow 5 mL 4 (Four) Times a Day for 10 days.  Dispense: 200 mL; Refill: 1    6. Drug-induced insomnia (CMS/HCC)  Comments:  feel that this is related to her steroid use.  If continues after completion of the medication, would recommend that she follow up with PCP         Follow Up      Return for Next scheduled follow up.

## 2021-06-30 ENCOUNTER — TELEPHONE (OUTPATIENT)
Dept: FAMILY MEDICINE CLINIC | Age: 76
End: 2021-06-30

## 2021-06-30 DIAGNOSIS — J44.9 CHRONIC OBSTRUCTIVE PULMONARY DISEASE, UNSPECIFIED COPD TYPE (HCC): ICD-10-CM

## 2021-06-30 DIAGNOSIS — I10 ESSENTIAL (PRIMARY) HYPERTENSION: Primary | ICD-10-CM

## 2021-06-30 DIAGNOSIS — J44.1 CHRONIC OBSTRUCTIVE PULMONARY DISEASE WITH ACUTE EXACERBATION (HCC): ICD-10-CM

## 2021-06-30 DIAGNOSIS — N18.2 CHRONIC KIDNEY DISEASE, STAGE 2 (MILD): ICD-10-CM

## 2021-06-30 RX ORDER — GUAIFENESIN 600 MG/1
1200 TABLET, EXTENDED RELEASE ORAL 2 TIMES DAILY
Qty: 120 TABLET | Refills: 2 | Status: SHIPPED | OUTPATIENT
Start: 2021-06-30 | End: 2021-09-28

## 2021-06-30 RX ORDER — LISINOPRIL 20 MG/1
20 TABLET ORAL DAILY
Qty: 30 TABLET | Refills: 1 | Status: SHIPPED | OUTPATIENT
Start: 2021-06-30 | End: 2021-07-29 | Stop reason: SDUPTHER

## 2021-06-30 NOTE — TELEPHONE ENCOUNTER
MM inf she is going to send in lisinopril and also the mucinex daughter inf she  wants it sent to [facility]

## 2021-06-30 NOTE — TELEPHONE ENCOUNTER
Pt and home health Is calling about b/p yesterday at /58 and today 198/98  Pt feels ok no headache no dizziness please advise she is on steroids still until tomorrow or Friday . The breo needs a PA and the mucinex is only in the pharmacy for  200 mg immediate release

## 2021-07-01 VITALS
DIASTOLIC BLOOD PRESSURE: 78 MMHG | OXYGEN SATURATION: 96 % | SYSTOLIC BLOOD PRESSURE: 157 MMHG | WEIGHT: 132.8 LBS | HEART RATE: 93 BPM | HEIGHT: 64 IN | TEMPERATURE: 98.1 F | BODY MASS INDEX: 22.67 KG/M2

## 2021-07-01 VITALS
SYSTOLIC BLOOD PRESSURE: 154 MMHG | HEART RATE: 66 BPM | DIASTOLIC BLOOD PRESSURE: 66 MMHG | WEIGHT: 132.5 LBS | HEIGHT: 64 IN | TEMPERATURE: 97.7 F | BODY MASS INDEX: 22.62 KG/M2

## 2021-07-01 VITALS
HEART RATE: 90 BPM | WEIGHT: 134.8 LBS | DIASTOLIC BLOOD PRESSURE: 56 MMHG | SYSTOLIC BLOOD PRESSURE: 178 MMHG | BODY MASS INDEX: 23.01 KG/M2 | HEIGHT: 64 IN | TEMPERATURE: 98.5 F

## 2021-07-01 VITALS
SYSTOLIC BLOOD PRESSURE: 111 MMHG | DIASTOLIC BLOOD PRESSURE: 56 MMHG | WEIGHT: 123.7 LBS | BODY MASS INDEX: 21.12 KG/M2 | HEIGHT: 64 IN | TEMPERATURE: 97.7 F | HEART RATE: 86 BPM

## 2021-07-01 VITALS
WEIGHT: 131.4 LBS | TEMPERATURE: 97.9 F | HEIGHT: 64 IN | HEART RATE: 71 BPM | SYSTOLIC BLOOD PRESSURE: 154 MMHG | BODY MASS INDEX: 22.43 KG/M2 | DIASTOLIC BLOOD PRESSURE: 53 MMHG | OXYGEN SATURATION: 96 %

## 2021-07-01 VITALS
BODY MASS INDEX: 22.5 KG/M2 | DIASTOLIC BLOOD PRESSURE: 71 MMHG | WEIGHT: 131.8 LBS | TEMPERATURE: 98.6 F | HEART RATE: 80 BPM | HEIGHT: 64 IN | SYSTOLIC BLOOD PRESSURE: 150 MMHG

## 2021-07-01 VITALS
BODY MASS INDEX: 22.5 KG/M2 | TEMPERATURE: 97.9 F | DIASTOLIC BLOOD PRESSURE: 50 MMHG | HEIGHT: 64 IN | OXYGEN SATURATION: 95 % | SYSTOLIC BLOOD PRESSURE: 141 MMHG | HEART RATE: 65 BPM | WEIGHT: 131.8 LBS

## 2021-07-01 VITALS
TEMPERATURE: 97.7 F | DIASTOLIC BLOOD PRESSURE: 70 MMHG | SYSTOLIC BLOOD PRESSURE: 129 MMHG | HEIGHT: 64 IN | WEIGHT: 127.1 LBS | HEART RATE: 86 BPM | BODY MASS INDEX: 21.7 KG/M2

## 2021-07-01 VITALS
SYSTOLIC BLOOD PRESSURE: 128 MMHG | TEMPERATURE: 97.7 F | BODY MASS INDEX: 22.5 KG/M2 | WEIGHT: 131.8 LBS | DIASTOLIC BLOOD PRESSURE: 79 MMHG | HEIGHT: 64 IN | HEART RATE: 86 BPM

## 2021-07-01 VITALS
HEART RATE: 70 BPM | SYSTOLIC BLOOD PRESSURE: 148 MMHG | BODY MASS INDEX: 50.02 KG/M2 | WEIGHT: 293 LBS | DIASTOLIC BLOOD PRESSURE: 55 MMHG | HEIGHT: 64 IN | TEMPERATURE: 98.2 F | OXYGEN SATURATION: 97 %

## 2021-07-01 VITALS
SYSTOLIC BLOOD PRESSURE: 182 MMHG | TEMPERATURE: 98.1 F | WEIGHT: 134.6 LBS | DIASTOLIC BLOOD PRESSURE: 69 MMHG | HEART RATE: 89 BPM | HEIGHT: 64 IN | BODY MASS INDEX: 22.98 KG/M2

## 2021-07-01 VITALS
HEIGHT: 64 IN | HEART RATE: 66 BPM | BODY MASS INDEX: 23.56 KG/M2 | SYSTOLIC BLOOD PRESSURE: 182 MMHG | DIASTOLIC BLOOD PRESSURE: 74 MMHG | WEIGHT: 138 LBS | TEMPERATURE: 98.3 F

## 2021-07-01 VITALS
HEIGHT: 64 IN | WEIGHT: 135 LBS | TEMPERATURE: 97.9 F | BODY MASS INDEX: 23.05 KG/M2 | DIASTOLIC BLOOD PRESSURE: 63 MMHG | SYSTOLIC BLOOD PRESSURE: 156 MMHG | OXYGEN SATURATION: 99 % | HEART RATE: 82 BPM

## 2021-07-01 VITALS
DIASTOLIC BLOOD PRESSURE: 74 MMHG | TEMPERATURE: 97 F | BODY MASS INDEX: 22.14 KG/M2 | HEART RATE: 80 BPM | SYSTOLIC BLOOD PRESSURE: 144 MMHG | WEIGHT: 129.7 LBS | HEIGHT: 64 IN

## 2021-07-01 VITALS
TEMPERATURE: 98.2 F | OXYGEN SATURATION: 98 % | BODY MASS INDEX: 22.09 KG/M2 | WEIGHT: 129.4 LBS | DIASTOLIC BLOOD PRESSURE: 52 MMHG | SYSTOLIC BLOOD PRESSURE: 131 MMHG | HEIGHT: 64 IN | HEART RATE: 77 BPM

## 2021-07-01 VITALS
WEIGHT: 136 LBS | BODY MASS INDEX: 25.03 KG/M2 | HEART RATE: 67 BPM | HEIGHT: 62 IN | SYSTOLIC BLOOD PRESSURE: 160 MMHG | TEMPERATURE: 97.9 F | DIASTOLIC BLOOD PRESSURE: 48 MMHG

## 2021-07-01 VITALS
WEIGHT: 130.4 LBS | DIASTOLIC BLOOD PRESSURE: 84 MMHG | TEMPERATURE: 97.4 F | HEIGHT: 64 IN | BODY MASS INDEX: 22.26 KG/M2 | HEART RATE: 103 BPM | SYSTOLIC BLOOD PRESSURE: 148 MMHG

## 2021-07-02 VITALS
HEIGHT: 62 IN | BODY MASS INDEX: 24.88 KG/M2 | HEART RATE: 58 BPM | SYSTOLIC BLOOD PRESSURE: 171 MMHG | TEMPERATURE: 98.2 F | WEIGHT: 135.2 LBS | DIASTOLIC BLOOD PRESSURE: 48 MMHG

## 2021-07-02 VITALS
BODY MASS INDEX: 17.92 KG/M2 | WEIGHT: 97.4 LBS | HEART RATE: 68 BPM | TEMPERATURE: 97.6 F | DIASTOLIC BLOOD PRESSURE: 41 MMHG | HEIGHT: 62 IN | SYSTOLIC BLOOD PRESSURE: 136 MMHG

## 2021-07-02 VITALS
DIASTOLIC BLOOD PRESSURE: 54 MMHG | HEIGHT: 62 IN | HEART RATE: 88 BPM | SYSTOLIC BLOOD PRESSURE: 130 MMHG | BODY MASS INDEX: 21.97 KG/M2 | WEIGHT: 119.4 LBS | TEMPERATURE: 97.2 F

## 2021-07-02 VITALS
BODY MASS INDEX: 19.23 KG/M2 | HEART RATE: 76 BPM | DIASTOLIC BLOOD PRESSURE: 52 MMHG | OXYGEN SATURATION: 92 % | HEIGHT: 62 IN | SYSTOLIC BLOOD PRESSURE: 139 MMHG | WEIGHT: 104.5 LBS | TEMPERATURE: 96.9 F

## 2021-07-02 VITALS
HEIGHT: 62 IN | WEIGHT: 122 LBS | DIASTOLIC BLOOD PRESSURE: 64 MMHG | HEART RATE: 109 BPM | SYSTOLIC BLOOD PRESSURE: 138 MMHG | TEMPERATURE: 96.9 F | BODY MASS INDEX: 22.45 KG/M2

## 2021-07-02 VITALS
DIASTOLIC BLOOD PRESSURE: 43 MMHG | SYSTOLIC BLOOD PRESSURE: 149 MMHG | OXYGEN SATURATION: 94 % | BODY MASS INDEX: 25.95 KG/M2 | TEMPERATURE: 98.3 F | WEIGHT: 141 LBS | HEART RATE: 54 BPM | HEIGHT: 62 IN

## 2021-07-02 VITALS
HEIGHT: 62 IN | SYSTOLIC BLOOD PRESSURE: 113 MMHG | BODY MASS INDEX: 21.51 KG/M2 | OXYGEN SATURATION: 97 % | WEIGHT: 116.9 LBS | HEART RATE: 101 BPM | DIASTOLIC BLOOD PRESSURE: 68 MMHG | TEMPERATURE: 96.7 F

## 2021-07-02 VITALS
SYSTOLIC BLOOD PRESSURE: 111 MMHG | HEART RATE: 107 BPM | WEIGHT: 120.2 LBS | HEIGHT: 62 IN | BODY MASS INDEX: 22.12 KG/M2 | DIASTOLIC BLOOD PRESSURE: 69 MMHG | TEMPERATURE: 97.4 F

## 2021-07-02 VITALS
DIASTOLIC BLOOD PRESSURE: 71 MMHG | HEIGHT: 62 IN | HEART RATE: 69 BPM | SYSTOLIC BLOOD PRESSURE: 190 MMHG | TEMPERATURE: 97.5 F | WEIGHT: 144.8 LBS | BODY MASS INDEX: 26.65 KG/M2

## 2021-07-02 VITALS — TEMPERATURE: 98 F | WEIGHT: 113 LBS | HEIGHT: 62 IN | BODY MASS INDEX: 20.8 KG/M2

## 2021-07-02 VITALS
HEART RATE: 117 BPM | DIASTOLIC BLOOD PRESSURE: 59 MMHG | TEMPERATURE: 97.3 F | BODY MASS INDEX: 23.48 KG/M2 | SYSTOLIC BLOOD PRESSURE: 127 MMHG | WEIGHT: 127.6 LBS | HEIGHT: 62 IN

## 2021-07-02 VITALS
TEMPERATURE: 98 F | RESPIRATION RATE: 18 BRPM | BODY MASS INDEX: 26.27 KG/M2 | SYSTOLIC BLOOD PRESSURE: 139 MMHG | DIASTOLIC BLOOD PRESSURE: 64 MMHG | HEIGHT: 62 IN

## 2021-07-02 VITALS
BODY MASS INDEX: 24.77 KG/M2 | SYSTOLIC BLOOD PRESSURE: 146 MMHG | DIASTOLIC BLOOD PRESSURE: 54 MMHG | HEART RATE: 58 BPM | WEIGHT: 134.6 LBS | HEIGHT: 62 IN | TEMPERATURE: 98.2 F

## 2021-07-08 ENCOUNTER — PRIOR AUTHORIZATION (OUTPATIENT)
Dept: FAMILY MEDICINE CLINIC | Age: 76
End: 2021-07-08

## 2021-07-08 NOTE — TELEPHONE ENCOUNTER
Called pt and informed PA for Chandu Melendrez was approved.. [facility] pharmacy stated to let pt know that she will need to tell them to look under electronic  when picking medication up.     PA Case # - [id number]   Exp. Date - 2099

## 2021-07-13 DIAGNOSIS — J44.1 CHRONIC OBSTRUCTIVE PULMONARY DISEASE WITH ACUTE EXACERBATION (HCC): ICD-10-CM

## 2021-07-13 RX ORDER — GUAIFENESIN 600 MG/1
1200 TABLET, EXTENDED RELEASE ORAL 2 TIMES DAILY
Qty: 120 TABLET | Refills: 2 | Status: CANCELLED | OUTPATIENT
Start: 2021-07-13 | End: 2021-10-11

## 2021-07-13 NOTE — TELEPHONE ENCOUNTER
Caller: Amada Bentley    Relationship: Self    Best call back number: [phone]     Medication needed:   Requested Prescriptions     Pending Prescriptions Disp Refills   • guaiFENesin (MUCINEX) 600 MG 12 hr tablet 120 tablet 2     Sig: Take 2 tablets by mouth 2 (Two) Times a Day for 90 days.   • fluticasone-salmeterol (Advair HFA) 230-21 MCG/ACT inhaler  2     Sig: Inhale.       When do you need the refill by: ASAP    What additional details did the patient provide when requesting the medication: PATIENT STATED SHE HAS 3 DAYS LEFT    Does the patient have less than a 3 day supply:  [] Yes  [x] No    What is the patient's preferred pharmacy: [facility] - [place], [place] - [address]  [phone] [facility] [phone]

## 2021-07-14 RX ORDER — FLUTICASONE PROPIONATE AND SALMETEROL XINAFOATE 230; 21 UG/1; UG/1
2 AEROSOL, METERED RESPIRATORY (INHALATION)
Qty: 1 EACH | Refills: 5 | Status: SHIPPED | OUTPATIENT
Start: 2021-07-14 | End: 2021-07-21 | Stop reason: ALTCHOICE

## 2021-07-14 NOTE — TELEPHONE ENCOUNTER
PATIENTS DAUGHTER CALLED AND WOULD LIKE TO KNOW WHAT IS GOING ON WITH THIS MEDICATION    [phone]

## 2021-07-15 ENCOUNTER — TELEPHONE (OUTPATIENT)
Dept: FAMILY MEDICINE CLINIC | Age: 76
End: 2021-07-15
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
## 2021-07-16 DIAGNOSIS — F40.10 SOCIAL FEARS: Primary | ICD-10-CM

## 2021-07-16 NOTE — TELEPHONE ENCOUNTER
Per  they want patient to be seen with a provider ASAP for an updated note.  They also told patient they are a non-smoking campus and not sure if patient will want to follow through because of this. I called pt and left a message to return call

## 2021-07-16 NOTE — TELEPHONE ENCOUNTER
Per  I will call Bhavani at [facility] around 9am this morning and check on her status  They usually send it to the Director of Nursing for review before they make their decision.      [8:45 AM] Tonja Whitlock ([facility])      Spoke with Bhavani- she has all of the information she needs and is just waiting on the DON to review it and let her know. She is going to call me once she has an answer as well as calling the patient.
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
## 2021-07-20 ENCOUNTER — TELEPHONE (OUTPATIENT)
Dept: FAMILY MEDICINE CLINIC | Age: 76
End: 2021-07-20
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
## 2021-07-22 ENCOUNTER — OFFICE VISIT (OUTPATIENT)
Dept: FAMILY MEDICINE CLINIC | Age: 76
End: 2021-07-22

## 2021-07-22 ENCOUNTER — PATIENT OUTREACH (OUTPATIENT)
Dept: CASE MANAGEMENT | Facility: OTHER | Age: 76
End: 2021-07-22

## 2021-07-22 VITALS
WEIGHT: 97.4 LBS | SYSTOLIC BLOOD PRESSURE: 152 MMHG | BODY MASS INDEX: 19.12 KG/M2 | HEIGHT: 60 IN | HEART RATE: 75 BPM | DIASTOLIC BLOOD PRESSURE: 83 MMHG | TEMPERATURE: 98.5 F

## 2021-07-22 DIAGNOSIS — Z02.2 ENCOUNTER FOR EXAMINATION FOR ADMISSION TO NURSING HOME: Primary | ICD-10-CM

## 2021-07-22 DIAGNOSIS — D51.0 PERNICIOUS ANEMIA: ICD-10-CM

## 2021-07-22 DIAGNOSIS — F32.0 CURRENT MILD EPISODE OF MAJOR DEPRESSIVE DISORDER WITHOUT PRIOR EPISODE (HCC): ICD-10-CM

## 2021-07-22 DIAGNOSIS — J44.9 CHRONIC OBSTRUCTIVE PULMONARY DISEASE, UNSPECIFIED COPD TYPE (HCC): ICD-10-CM

## 2021-07-22 DIAGNOSIS — K21.9 GASTROESOPHAGEAL REFLUX DISEASE WITHOUT ESOPHAGITIS: ICD-10-CM

## 2021-07-22 DIAGNOSIS — F03.91 DEMENTIA WITH BEHAVIORAL DISTURBANCE, UNSPECIFIED DEMENTIA TYPE: ICD-10-CM

## 2021-07-22 DIAGNOSIS — E03.9 ACQUIRED HYPOTHYROIDISM: ICD-10-CM

## 2021-07-22 DIAGNOSIS — M51.37 DEGENERATION OF LUMBOSACRAL INTERVERTEBRAL DISC: ICD-10-CM

## 2021-07-22 DIAGNOSIS — R01.1 MURMUR, CARDIAC: ICD-10-CM

## 2021-07-22 DIAGNOSIS — E78.00 HIGH CHOLESTEROL: ICD-10-CM

## 2021-07-22 DIAGNOSIS — F51.01 PRIMARY INSOMNIA: ICD-10-CM

## 2021-07-22 DIAGNOSIS — I10 ESSENTIAL HYPERTENSION: ICD-10-CM

## 2021-07-22 DIAGNOSIS — N18.2 CHRONIC KIDNEY DISEASE, STAGE 2 (MILD): ICD-10-CM

## 2021-07-22 DIAGNOSIS — J30.1 SEASONAL ALLERGIC RHINITIS DUE TO POLLEN: ICD-10-CM

## 2021-07-22 DIAGNOSIS — F41.9 ANXIETY: ICD-10-CM

## 2021-07-22 DIAGNOSIS — M62.81 MUSCLE WEAKNESS (GENERALIZED): ICD-10-CM

## 2021-07-22 PROCEDURE — 99214 OFFICE O/P EST MOD 30 MIN: CPT | Performed by: FAMILY MEDICINE
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
## 2021-07-26 ENCOUNTER — PATIENT OUTREACH (OUTPATIENT)
Dept: CASE MANAGEMENT | Facility: OTHER | Age: 76
End: 2021-07-26

## 2021-07-26 NOTE — OUTREACH NOTE
Patient Outreach    MSW spoke with patient's daughter, Marjan, to inform her that the [place] application for housing has been filled out and is ready to be picked up in the front office. MSW spoke with patient's daughter about process for sending in application and what to attach including SS card and drivers license. Patient's daughter states that patient does not want to move back into [facility], so will stay with sister until [place] apartment is available. Patient has also applied for Section 8 through [facility].     MARIELOS Bailey   , Ambulatory Case Management    7/26/2021 12:00 EDT

## 2021-07-27 DIAGNOSIS — G89.29 CHRONIC LOW BACK PAIN, UNSPECIFIED BACK PAIN LATERALITY, UNSPECIFIED WHETHER SCIATICA PRESENT: ICD-10-CM

## 2021-07-27 DIAGNOSIS — M54.50 CHRONIC LOW BACK PAIN, UNSPECIFIED BACK PAIN LATERALITY, UNSPECIFIED WHETHER SCIATICA PRESENT: ICD-10-CM

## 2021-07-27 NOTE — TELEPHONE ENCOUNTER
Caller: Amada Bentley    Relationship: Self    Best call back number: [phone]  Medication needed:   Requested Prescriptions     Pending Prescriptions Disp Refills   • HYDROcodone-acetaminophen (NORCO) 7.5-325 MG per tablet 90 tablet 0     Sig: Take 1 tablet by mouth Every 8 (Eight) Hours As Needed for Moderate Pain .       When do you need the refill by: 7-27-21    What additional details did the patient provide when requesting the medication: PATIENT NEEDS TO GET WRITTEN PRESCIPTION FOR. WANT A CALL WHEN IT'S AVAILABLE FOR HER TO .     Does the patient have less than a 3 day supply:  [x] Yes  [] No    What is the patient's preferred pharmacy:      WANTS TO

## 2021-07-28 RX ORDER — HYDROCODONE BITARTRATE AND ACETAMINOPHEN 7.5; 325 MG/1; MG/1
1 TABLET ORAL EVERY 8 HOURS PRN
Qty: 90 TABLET | Refills: 0 | Status: SHIPPED | OUTPATIENT
Start: 2021-07-28 | End: 2021-07-28 | Stop reason: SDUPTHER

## 2021-07-28 RX ORDER — HYDROCODONE BITARTRATE AND ACETAMINOPHEN 7.5; 325 MG/1; MG/1
1 TABLET ORAL EVERY 8 HOURS PRN
Qty: 90 TABLET | Refills: 0 | Status: SHIPPED | OUTPATIENT
Start: 2021-07-28 | End: 2021-09-03 | Stop reason: SDUPTHER

## 2021-07-28 NOTE — ADDENDUM NOTE
Addended by: MARCELLE GAMBLE on: 7/28/2021 03:55 PM     Modules accepted: Orders     Domingo Jones [facility]   Phone Number: [phone]             Mom wants to know if she should bring the patient to see the doctor today. Leobardo Graff advise.      [phone]

## 2021-07-29 RX ORDER — LISINOPRIL 20 MG/1
20 TABLET ORAL DAILY
Qty: 30 TABLET | Refills: 1 | Status: CANCELLED | OUTPATIENT
Start: 2021-07-29 | End: 2021-08-28

## 2021-07-29 RX ORDER — METOPROLOL SUCCINATE 50 MG/1
50 TABLET, EXTENDED RELEASE ORAL DAILY
Qty: 90 TABLET | Refills: 0 | Status: SHIPPED | OUTPATIENT
Start: 2021-07-29 | End: 2021-10-12 | Stop reason: SDUPTHER

## 2021-07-29 RX ORDER — HYDROCODONE BITARTRATE AND ACETAMINOPHEN 7.5; 325 MG/1; MG/1
1 TABLET ORAL EVERY 8 HOURS PRN
Qty: 90 TABLET | Refills: 0 | OUTPATIENT
Start: 2021-07-29

## 2021-07-29 RX ORDER — LISINOPRIL 20 MG/1
20 TABLET ORAL DAILY
Qty: 30 TABLET | Refills: 1 | Status: SHIPPED | OUTPATIENT
Start: 2021-07-29 | End: 2021-08-17 | Stop reason: SDUPTHER

## 2021-07-29 RX ORDER — DULOXETIN HYDROCHLORIDE 60 MG/1
60 CAPSULE, DELAYED RELEASE ORAL DAILY
Qty: 90 CAPSULE | Refills: 0 | Status: SHIPPED | OUTPATIENT
Start: 2021-07-29 | End: 2021-11-15 | Stop reason: SDUPTHER

## 2021-07-29 RX ORDER — CETIRIZINE HYDROCHLORIDE 10 MG/1
10 TABLET ORAL DAILY
Qty: 90 TABLET | Refills: 1 | Status: SHIPPED | OUTPATIENT
Start: 2021-07-29 | End: 2021-10-12 | Stop reason: SDUPTHER

## 2021-07-29 NOTE — TELEPHONE ENCOUNTER
Pt is staying with her sister in law at this time she is requesting social service help to find a more permeant living situation.

## 2021-08-17 NOTE — TELEPHONE ENCOUNTER
Caller: Amada Bentley    Relationship: Self    Best call back number: [phone]    Medication needed:   Requested Prescriptions     Pending Prescriptions Disp Refills   • lisinopril (PRINIVIL,ZESTRIL) 20 MG tablet 30 tablet 1     Sig: Take 1 tablet by mouth Daily for 30 days.   • Fluticasone Furoate-Vilanterol (Breo Ellipta) 100-25 MCG/INH inhaler       Sig: Inhale 1 puff Daily.   • levothyroxine (SYNTHROID, LEVOTHROID) 50 MCG tablet 90 tablet 1     Sig: Take 1 tablet by mouth Daily.   TRAZONDONE    When do you need the refill by: ASAP    What additional details did the patient provide when requesting the medication:     Does the patient have less than a 3 day supply:  [] Yes  [x] No    What is the patient's preferred pharmacy:    [facility]   [address] [place] [id number] [phone]

## 2021-08-19 RX ORDER — LEVOTHYROXINE SODIUM 0.05 MG/1
50 TABLET ORAL DAILY
Qty: 30 TABLET | Refills: 0 | Status: SHIPPED | OUTPATIENT
Start: 2021-08-19 | End: 2021-08-31 | Stop reason: SDUPTHER

## 2021-08-19 RX ORDER — LISINOPRIL 20 MG/1
20 TABLET ORAL DAILY
Qty: 30 TABLET | Refills: 1 | Status: SHIPPED | OUTPATIENT
Start: 2021-08-19 | End: 2021-10-12 | Stop reason: SDUPTHER

## 2021-08-31 RX ORDER — LEVOTHYROXINE SODIUM 0.05 MG/1
50 TABLET ORAL DAILY
Qty: 30 TABLET | Refills: 0 | Status: SHIPPED | OUTPATIENT
Start: 2021-08-31 | End: 2021-10-12 | Stop reason: SDUPTHER

## 2021-08-31 NOTE — TELEPHONE ENCOUNTER
Pt is requesting something different other then breo she can not afford it , she has been using albuterol in her mntx and she wants a refill on this also . Refills and new inhaler need to go to [place].        ALSO needs written rx for norco last requested 7- tox 5-

## 2021-09-02 ENCOUNTER — TELEPHONE (OUTPATIENT)
Dept: FAMILY MEDICINE CLINIC | Age: 76
End: 2021-09-02

## 2021-09-02 DIAGNOSIS — G89.29 CHRONIC LOW BACK PAIN, UNSPECIFIED BACK PAIN LATERALITY, UNSPECIFIED WHETHER SCIATICA PRESENT: ICD-10-CM

## 2021-09-02 DIAGNOSIS — M54.50 CHRONIC LOW BACK PAIN, UNSPECIFIED BACK PAIN LATERALITY, UNSPECIFIED WHETHER SCIATICA PRESENT: ICD-10-CM

## 2021-09-02 NOTE — TELEPHONE ENCOUNTER
Caller: Amada Bentley    Relationship: Self    Best call back number: [phone]    What is the best time to reach you: ANY    Who are you requesting to speak with (clinical staff, provider,  specific staff member): KATELYN    PATIENT NEEDS SOMETHING ELSE BESIDES BREO SENT IN [facility].    PATIENT REQUESTS HANDWRITTEN PRESCRIPTION FOR HYDROcodone-acetaminophen (NORCO) 7.5-325 MG per tablet    PATIENT WOULD LIKE TO BE ABLE TO  HANDWRITTEN PRESCRIPTION Friday 09/3/2021.      Do you require a callback: YES, PATIENT REQUESTS CALLBACK WITH ANY UPDATES.

## 2021-09-03 RX ORDER — HYDROCODONE BITARTRATE AND ACETAMINOPHEN 7.5; 325 MG/1; MG/1
1 TABLET ORAL EVERY 8 HOURS PRN
Qty: 90 TABLET | Refills: 0 | Status: SHIPPED | OUTPATIENT
Start: 2021-09-03 | End: 2021-10-12 | Stop reason: SDUPTHER

## 2021-09-10 ENCOUNTER — TELEPHONE (OUTPATIENT)
Dept: FAMILY MEDICINE CLINIC | Age: 76
End: 2021-09-10

## 2021-09-10 DIAGNOSIS — J44.1 CHRONIC OBSTRUCTIVE PULMONARY DISEASE WITH ACUTE EXACERBATION (HCC): ICD-10-CM

## 2021-09-10 NOTE — TELEPHONE ENCOUNTER
Pt can not afford breo please advise something else and is requesting a refill on sprivia and also a refill of nebulizer solution please advise

## 2021-09-10 NOTE — TELEPHONE ENCOUNTER
Caller: Amada Bentley    Relationship: Self    Best call back number: [phone]     What is the best time to reach you: ANY    Who are you requesting to speak with (clinical staff, provider,  specific staff member): KATELYN    What was the call regarding: PATIENT STATES SHE WOULD LIKE TO DISCUSS A CONCERN SHE IS HAVING WITH HER INHALER     Do you require a callback: YES

## 2021-09-13 RX ORDER — FLUTICASONE PROPIONATE AND SALMETEROL XINAFOATE 230; 21 UG/1; UG/1
2 AEROSOL, METERED RESPIRATORY (INHALATION)
Qty: 1 EACH | Refills: 5 | Status: SHIPPED | OUTPATIENT
Start: 2021-09-13 | End: 2021-10-14 | Stop reason: SDUPTHER

## 2021-09-13 RX ORDER — TIOTROPIUM BROMIDE INHALATION SPRAY 1.56 UG/1
2 SPRAY, METERED RESPIRATORY (INHALATION)
Qty: 1 G | Refills: 2 | Status: SHIPPED | OUTPATIENT
Start: 2021-09-13 | End: 2021-12-15 | Stop reason: SDUPTHER

## 2021-09-13 RX ORDER — ALBUTEROL SULFATE 2.5 MG/3ML
2.5 SOLUTION RESPIRATORY (INHALATION) EVERY 4 HOURS PRN
Qty: 100 EACH | Refills: 5 | Status: SHIPPED | OUTPATIENT
Start: 2021-09-13 | End: 2022-08-29 | Stop reason: SDUPTHER

## 2021-09-22 DIAGNOSIS — Z12.31 ENCOUNTER FOR SCREENING MAMMOGRAM FOR MALIGNANT NEOPLASM OF BREAST: Primary | ICD-10-CM

## 2021-10-12 DIAGNOSIS — M54.50 CHRONIC LOW BACK PAIN, UNSPECIFIED BACK PAIN LATERALITY, UNSPECIFIED WHETHER SCIATICA PRESENT: ICD-10-CM

## 2021-10-12 DIAGNOSIS — G89.29 CHRONIC LOW BACK PAIN, UNSPECIFIED BACK PAIN LATERALITY, UNSPECIFIED WHETHER SCIATICA PRESENT: ICD-10-CM

## 2021-10-12 NOTE — TELEPHONE ENCOUNTER
Caller: Amada Bentley    Relationship: Self      Medication requested (name and dosage): HYDROcodone-acetaminophen (NORCO) 7.5-325 MG per tablet  metoprolol succinate XL (TOPROL-XL) 50 MG 24 hr tablet  lisinopril (PRINIVIL,ZESTRIL) 20 MG tablet ()  levothyroxine (SYNTHROID, LEVOTHROID) 50 MCG tablet  cetirizine (zyrTEC) 10 MG tablet  busPIRone (BUSPAR) 15 MG tablet  omeprazole (priLOSEC) 20 MG capsule  albuterol sulfate HFA (Ventolin HFA) 108 (90 Base) MCG/ACT inhaler      Pharmacy where request should be sent: [facility] [place] [facility] [place] - [address] - [phone] [facility] [phone]   [phone]      Best call back number:[phone]     Does the patient have less than a 3 day supply:  [x] Yes  [] No    Megan Alvarado Rep   10/12/21 12:28 EDT

## 2021-10-13 DIAGNOSIS — M54.50 CHRONIC LOW BACK PAIN, UNSPECIFIED BACK PAIN LATERALITY, UNSPECIFIED WHETHER SCIATICA PRESENT: ICD-10-CM

## 2021-10-13 DIAGNOSIS — G89.29 CHRONIC LOW BACK PAIN, UNSPECIFIED BACK PAIN LATERALITY, UNSPECIFIED WHETHER SCIATICA PRESENT: ICD-10-CM

## 2021-10-13 RX ORDER — ALBUTEROL SULFATE 90 UG/1
1 AEROSOL, METERED RESPIRATORY (INHALATION) DAILY PRN
Qty: 18 G | Refills: 1 | Status: SHIPPED | OUTPATIENT
Start: 2021-10-13 | End: 2022-04-26 | Stop reason: SDUPTHER

## 2021-10-13 RX ORDER — CETIRIZINE HYDROCHLORIDE 10 MG/1
10 TABLET ORAL DAILY
Qty: 90 TABLET | Refills: 1 | Status: SHIPPED | OUTPATIENT
Start: 2021-10-13 | End: 2022-01-31 | Stop reason: SDUPTHER

## 2021-10-13 RX ORDER — HYDROCODONE BITARTRATE AND ACETAMINOPHEN 7.5; 325 MG/1; MG/1
1 TABLET ORAL EVERY 8 HOURS PRN
Qty: 90 TABLET | Refills: 0 | Status: SHIPPED | OUTPATIENT
Start: 2021-10-13 | End: 2021-12-15 | Stop reason: SDUPTHER

## 2021-10-13 RX ORDER — METOPROLOL SUCCINATE 50 MG/1
50 TABLET, EXTENDED RELEASE ORAL DAILY
Qty: 90 TABLET | Refills: 0 | Status: SHIPPED | OUTPATIENT
Start: 2021-10-13 | End: 2022-01-31 | Stop reason: SDUPTHER

## 2021-10-13 RX ORDER — HYDROCODONE BITARTRATE AND ACETAMINOPHEN 7.5; 325 MG/1; MG/1
1 TABLET ORAL EVERY 8 HOURS PRN
Qty: 90 TABLET | Refills: 0 | Status: CANCELLED | OUTPATIENT
Start: 2021-10-13

## 2021-10-13 RX ORDER — LEVOTHYROXINE SODIUM 0.05 MG/1
50 TABLET ORAL DAILY
Qty: 30 TABLET | Refills: 0 | Status: SHIPPED | OUTPATIENT
Start: 2021-10-13 | End: 2021-11-15 | Stop reason: SDUPTHER

## 2021-10-13 RX ORDER — BUSPIRONE HYDROCHLORIDE 15 MG/1
15 TABLET ORAL 2 TIMES DAILY
Qty: 180 TABLET | Refills: 0 | Status: SHIPPED | OUTPATIENT
Start: 2021-10-13 | End: 2022-01-31 | Stop reason: SDUPTHER

## 2021-10-13 RX ORDER — LISINOPRIL 20 MG/1
20 TABLET ORAL DAILY
Qty: 30 TABLET | Refills: 1 | Status: SHIPPED | OUTPATIENT
Start: 2021-10-13 | End: 2021-12-15 | Stop reason: SDUPTHER

## 2021-10-13 RX ORDER — OMEPRAZOLE 20 MG/1
20 CAPSULE, DELAYED RELEASE ORAL DAILY
Qty: 90 CAPSULE | Refills: 0 | Status: SHIPPED | OUTPATIENT
Start: 2021-10-13 | End: 2022-01-31 | Stop reason: SDUPTHER

## 2021-11-15 DIAGNOSIS — G89.29 CHRONIC LOW BACK PAIN, UNSPECIFIED BACK PAIN LATERALITY, UNSPECIFIED WHETHER SCIATICA PRESENT: ICD-10-CM

## 2021-11-15 DIAGNOSIS — M54.50 CHRONIC LOW BACK PAIN, UNSPECIFIED BACK PAIN LATERALITY, UNSPECIFIED WHETHER SCIATICA PRESENT: ICD-10-CM

## 2021-11-15 RX ORDER — HYDROCODONE BITARTRATE AND ACETAMINOPHEN 7.5; 325 MG/1; MG/1
1 TABLET ORAL EVERY 8 HOURS PRN
Qty: 90 TABLET | Refills: 0 | Status: CANCELLED | OUTPATIENT
Start: 2021-11-15

## 2021-11-15 RX ORDER — GABAPENTIN 600 MG/1
600 TABLET ORAL 3 TIMES DAILY
Qty: 90 TABLET | Refills: 2 | Status: CANCELLED | OUTPATIENT
Start: 2021-11-15

## 2021-11-15 NOTE — TELEPHONE ENCOUNTER
Caller: Amada Bentley    Relationship: Self    Best call back number: [phone]    Requested Prescriptions:   Requested Prescriptions     Pending Prescriptions Disp Refills   • gabapentin (NEURONTIN) 600 MG tablet 90 tablet 2     Sig: Take 1 tablet by mouth 3 (Three) Times a Day.   • HYDROcodone-acetaminophen (NORCO) 7.5-325 MG per tablet 90 tablet 0     Sig: Take 1 tablet by mouth Every 8 (Eight) Hours As Needed for Moderate Pain .   • levothyroxine (SYNTHROID, LEVOTHROID) 50 MCG tablet 30 tablet 0     Sig: Take 1 tablet by mouth Daily.   • DULoxetine (CYMBALTA) 60 MG capsule 90 capsule 0     Sig: Take 1 capsule by mouth Daily.   • montelukast (Singulair) 10 MG tablet 90 tablet 1     Sig: Take 1 tablet by mouth Every Night.        Pharmacy where request should be sent: [facility] - [phone] [facility] [phone] FX     Additional details provided by patient: PATIENT STATES SHE NEEDS A WRITTEN SCRIPT FOR THE GABAPENTIN AND HYDROCODONE     Does the patient have less than a 3 day supply:  [x] Yes  [] No

## 2021-11-16 NOTE — TELEPHONE ENCOUNTER
PATIENT IS REQUESTING A CALL BACK WHEN ORDERS ARE PLACED FOR MEDICATIONS AS SHE IS OUT OF MEDICATIONS.  [phone]

## 2021-11-17 RX ORDER — MONTELUKAST SODIUM 10 MG/1
10 TABLET ORAL NIGHTLY
Qty: 90 TABLET | Refills: 1 | Status: SHIPPED | OUTPATIENT
Start: 2021-11-17 | End: 2022-01-31 | Stop reason: SDUPTHER

## 2021-11-17 RX ORDER — LEVOTHYROXINE SODIUM 0.05 MG/1
50 TABLET ORAL DAILY
Qty: 30 TABLET | Refills: 0 | Status: SHIPPED | OUTPATIENT
Start: 2021-11-17 | End: 2021-12-15 | Stop reason: SDUPTHER

## 2021-11-17 RX ORDER — DULOXETIN HYDROCHLORIDE 60 MG/1
60 CAPSULE, DELAYED RELEASE ORAL DAILY
Qty: 90 CAPSULE | Refills: 0 | Status: SHIPPED | OUTPATIENT
Start: 2021-11-17 | End: 2022-01-31 | Stop reason: SDUPTHER

## 2021-11-17 NOTE — TELEPHONE ENCOUNTER
Pt's daughter is here and request a written rx for norco ,last filled 10- #90 and also gabapentin  Last filled 6- #90 with 2 refills , last tox 5-

## 2021-11-30 ENCOUNTER — OFFICE VISIT (OUTPATIENT)
Dept: FAMILY MEDICINE CLINIC | Age: 76
End: 2021-11-30

## 2021-11-30 VITALS
HEART RATE: 60 BPM | SYSTOLIC BLOOD PRESSURE: 135 MMHG | DIASTOLIC BLOOD PRESSURE: 94 MMHG | TEMPERATURE: 98.1 F | OXYGEN SATURATION: 95 % | BODY MASS INDEX: 21.2 KG/M2 | WEIGHT: 108 LBS | HEIGHT: 60 IN

## 2021-11-30 DIAGNOSIS — J30.9 ALLERGIC RHINITIS, UNSPECIFIED SEASONALITY, UNSPECIFIED TRIGGER: ICD-10-CM

## 2021-11-30 DIAGNOSIS — N18.2 CHRONIC KIDNEY DISEASE, STAGE 2 (MILD): ICD-10-CM

## 2021-11-30 DIAGNOSIS — Z78.0 POSTMENOPAUSAL STATE: ICD-10-CM

## 2021-11-30 DIAGNOSIS — Z23 ENCOUNTER FOR IMMUNIZATION: ICD-10-CM

## 2021-11-30 DIAGNOSIS — E03.9 ACQUIRED HYPOTHYROIDISM: ICD-10-CM

## 2021-11-30 DIAGNOSIS — Z12.2 ENCOUNTER FOR SCREENING FOR LUNG CANCER: ICD-10-CM

## 2021-11-30 DIAGNOSIS — Z00.00 ENCOUNTER FOR ANNUAL WELLNESS EXAM IN MEDICARE PATIENT: Primary | ICD-10-CM

## 2021-11-30 DIAGNOSIS — Z12.31 ENCOUNTER FOR SCREENING MAMMOGRAM FOR BREAST CANCER: ICD-10-CM

## 2021-11-30 DIAGNOSIS — M48.061 SPINAL STENOSIS OF LUMBAR REGION WITHOUT NEUROGENIC CLAUDICATION: ICD-10-CM

## 2021-11-30 DIAGNOSIS — F41.9 ANXIETY: ICD-10-CM

## 2021-11-30 DIAGNOSIS — I10 ESSENTIAL (PRIMARY) HYPERTENSION: ICD-10-CM

## 2021-11-30 DIAGNOSIS — J44.9 CHRONIC OBSTRUCTIVE PULMONARY DISEASE, UNSPECIFIED COPD TYPE (HCC): ICD-10-CM

## 2021-11-30 DIAGNOSIS — E78.00 PURE HYPERCHOLESTEROLEMIA: ICD-10-CM

## 2021-11-30 DIAGNOSIS — F17.210 CIGARETTE NICOTINE DEPENDENCE WITHOUT COMPLICATION: ICD-10-CM

## 2021-11-30 DIAGNOSIS — Z79.899 HIGH RISK MEDICATION USE: ICD-10-CM

## 2021-11-30 DIAGNOSIS — F32.4 MAJOR DEPRESSIVE DISORDER WITH SINGLE EPISODE, IN PARTIAL REMISSION (HCC): ICD-10-CM

## 2021-11-30 DIAGNOSIS — Z11.59 ENCOUNTER FOR SCREENING FOR OTHER VIRAL DISEASES: ICD-10-CM

## 2021-11-30 LAB
AMPHET+METHAMPHET UR QL: NEGATIVE
AMPHETAMINES UR QL: NEGATIVE
BARBITURATES UR QL SCN: NEGATIVE
BENZODIAZ UR QL SCN: NEGATIVE
BUPRENORPHINE SERPL-MCNC: NEGATIVE NG/ML
CANNABINOIDS SERPL QL: NEGATIVE
COCAINE UR QL: NEGATIVE
EXPIRATION DATE: NORMAL
Lab: NORMAL
MDMA UR QL SCN: NEGATIVE
METHADONE UR QL SCN: NEGATIVE
OPIATES UR QL: NEGATIVE
OXYCODONE UR QL SCN: NEGATIVE
PCP UR QL SCN: NEGATIVE

## 2021-11-30 PROCEDURE — 99214 OFFICE O/P EST MOD 30 MIN: CPT | Performed by: FAMILY MEDICINE

## 2021-11-30 PROCEDURE — 80305 DRUG TEST PRSMV DIR OPT OBS: CPT | Performed by: FAMILY MEDICINE

## 2021-11-30 PROCEDURE — 91300 COVID-19 (PFIZER): CPT | Performed by: FAMILY MEDICINE

## 2021-11-30 PROCEDURE — 0003A COVID-19 (PFIZER): CPT | Performed by: FAMILY MEDICINE

## 2021-11-30 PROCEDURE — 90662 IIV NO PRSV INCREASED AG IM: CPT | Performed by: FAMILY MEDICINE

## 2021-11-30 PROCEDURE — G0008 ADMIN INFLUENZA VIRUS VAC: HCPCS | Performed by: FAMILY MEDICINE

## 2021-11-30 NOTE — PROGRESS NOTES
Amada Bentley presents to [facility].    Chief Complaint:    Subjective       History of Present Illness:  HPI     Denae is in today and she is overall doing better, she was weak and struggling with her relationship with her daughter over the summer and she is so much better today.  She lives in the garage at her daughters house, states it is fixed up really nice for her.  She feels good, strength is back.  She is independent.  Needs help with driving.  Moods are good and upbeat.  She is on duloxetine.  Her anxiety is stable on buspar.  She is sleeping well at night.  No suicidal ideation.    Patient presents with hypothyroidism.  She is currently taking Synthroid, 50 mcg daily.  TSH was last checked 6 months ago.  The result was reported as normal.  She denies any related symptoms.  She reports no symptoms suggestive of adverse medication effect.            With regard to the essential (primary) hypertension, her current cardiac medication regimen includes a beta-blocker ( Toprol-XL ), an ACE inhibitor ( lisinopril ). Compliance with treatment has been good; she takes her medication as directed.            With regard to the pure hypercholesterolemia, date of diagnosis 2005.  Current treatment is low cholesterol/low fat diet.  Compliance with treatment has been good; she takes her medication as directed, maintains her low cholesterol diet, and follows up as directed.       Chronic allergies and COPD and she is stable on singulair with zyrtec as well as ventolin and albuterol nebulizers.         Denae is in for F2F for her chronic pain med for her gabapentin and hydrocodone 7.5 mg tid prn pain.   Pain is radicular down R LE and she is functional on medication.   MRI showed severe canal stenosis and bilateral foraminal narrowing. She has seen neurosurgeon Dr Swartz and s/p back surgery of L spine in past and told her she needed surgery again and she declines surgery.  NO signs of  diversion or abuse  Review of Systems:  Review of Systems   Constitutional: Negative for chills and fever.   HENT: Negative for ear pain and sore throat.    Eyes: Negative for blurred vision and double vision.   Respiratory: Positive for cough. Negative for shortness of breath and wheezing.    Cardiovascular: Negative for chest pain and palpitations.   Gastrointestinal: Negative for abdominal pain, blood in stool, constipation, diarrhea, nausea and vomiting.   Skin: Negative for rash.   Neurological: Negative for dizziness and headache.   Psychiatric/Behavioral: Negative for sleep disturbance, suicidal ideas and depressed mood.        Objective   Medical History:  Past Medical History:   • Abnormal weight loss   • Anxiety   • Cardiac murmur, unspecified   • Chronic kidney disease, stage 2 (mild)   • Condition not found    LBP   • COPD (chronic obstructive pulmonary disease) ([facility])   • Depression    loss of daughter   • Diarrhea, unspecified   • Dysphagia, oropharyngeal phase   • Essential (primary) hypertension   • Gastritis   • Gastro-esophageal reflux disease without esophagitis   • Generalized anxiety disorder   • Hiatal hernia   • History of falling   • Hypercholesterolemia   • Hypothyroid   • Infected abrasion of scalp, initial encounter   • Long term (current) use of opiate analgesic   • Major depressive disorder, recurrent, moderate ([facility])   • Muscle weakness (generalized)   • Nicotine dependence, unspecified, uncomplicated   • Occlusion and stenosis of bilateral carotid arteries   • Other allergy, subsequent encounter   • Other long term (current) drug therapy   • Pain    LEFT-knee, shoulder RIGHT-hip, knee   • Pernicious anemia   • Primary insomnia   • Solitary pulmonary nodule     Past Surgical History:   • APPENDECTOMY   • CATARACT EXTRACTION, BILATERAL   • FOOT SURGERY    secondary bone spurs   • HYSTERECTOMY   • LAPAROSCOPIC CHOLECYSTECTOMY   • OTHER SURGICAL HISTORY    Ear Surger; unspecified      Family  History   Problem Relation Age of Onset   • Coronary artery disease Mother    • Hyperlipidemia Mother    • Hypertension Mother    • Heart attack Mother    • Diabetes type I Mother    • Lung cancer Father         no history of smoking   • COPD Father    • Emphysema Father    • Hypertension Sister    • Heart attack Sister    • Diabetes type I Sister    • Hypertension Brother    • Heart attack Brother         cardiac death   • Emphysema Brother    • Diabetes type I Brother    • Breast cancer Daughter    • Kidney failure Niece      Social History     Tobacco Use   • Smoking status: Current Every Day Smoker     Packs/day: 1.00     Years: 60.00     Pack years: 60.00     Types: Cigarettes     Last attempt to quit: 6/15/2021     Years since quittin.4   • Smokeless tobacco: Never Used   Substance Use Topics   • Alcohol use: Never       Health Maintenance Due   Topic Date Due   • ZOSTER VACCINE (2 of 3) 2014   • DXA SCAN  2020   • HEPATITIS C SCREENING  Never done   • LUNG CANCER SCREENING  2021        Immunization History   Administered Date(s) Administered   • COVID-19 (MODERNA) 1st, 2nd, 3rd Dose Only 2021, 2021   • COVID-19 (PFIZER) 2021   • FluLaval/Fluarix/Fluzone >6 10/08/2015   • Fluzone High Dose =>65 Years ([employer] ONLY) 10/23/2013, 10/02/2014, 2015, 10/17/2016, 2017   • Fluzone High-Dose 65+yrs 2021   • Influenza, Unspecified 2020   • Pneumococcal Conjugate 13-Valent (PCV13) 2015   • Pneumococcal Polysaccharide (PPSV23) 2018   • Tdap 2021   • Zostavax 10/02/2014       No Known Allergies     Medications:  Current Outpatient Medications on File Prior to Visit   Medication Sig   • albuterol (PROVENTIL) (2.5 MG/3ML) 0.083% nebulizer solution Take 2.5 mg by nebulization Every 4 (Four) Hours As Needed for Wheezing.   • albuterol sulfate HFA (Ventolin HFA) 108 (90 Base) MCG/ACT inhaler Inhale 1 puff Daily As Needed for Wheezing.   •  "busPIRone (BUSPAR) 15 MG tablet Take 1 tablet by mouth 2 (two) times a day.   • cetirizine (zyrTEC) 10 MG tablet Take 1 tablet by mouth Daily.   • DULoxetine (CYMBALTA) 60 MG capsule Take 1 capsule by mouth Daily.   • fluticasone-salmeterol (Advair HFA) 230-21 MCG/ACT inhaler Inhale 2 puffs 2 (Two) Times a Day.   • gabapentin (NEURONTIN) 600 MG tablet Take 1 tablet by mouth 3 (Three) Times a Day.   • HYDROcodone-acetaminophen (NORCO) 7.5-325 MG per tablet Take 1 tablet by mouth Every 8 (Eight) Hours As Needed for Moderate Pain .   • levothyroxine (SYNTHROID, LEVOTHROID) 50 MCG tablet Take 1 tablet by mouth Daily.   • metoprolol succinate XL (TOPROL-XL) 50 MG 24 hr tablet Take 1 tablet by mouth Daily.   • montelukast (Singulair) 10 MG tablet Take 1 tablet by mouth Every Night.   • omeprazole (priLOSEC) 20 MG capsule Take 1 capsule by mouth Daily.   • potassium chloride (K-DUR,KLOR-CON) 10 MEQ CR tablet Take 10 mEq by mouth Daily. Take 2 tablets once daily   • lactobacillus (BACID) tablet caplet Take  by mouth Daily.   • lisinopril (PRINIVIL,ZESTRIL) 20 MG tablet Take 1 tablet by mouth Daily for 30 days.   • melatonin 5 MG tablet tablet Take 5 mg by mouth Every Night.   • Tiotropium Bromide Monohydrate (Spiriva Respimat) 1.25 MCG/ACT aerosol solution inhaler Inhale 2 puffs Daily for 30 days.     No current facility-administered medications on file prior to visit.       Vital Signs:   /94 (BP Location: Right arm, Patient Position: Sitting, Cuff Size: Adult)   Pulse 60   Temp 98.1 °F (36.7 °C) (Oral)   Ht 152.4 cm (60\")   Wt 49 kg (108 lb)   SpO2 95% Comment: room air  BMI 21.09 kg/m²       Physical Exam:  Physical Exam  Vitals and nursing note reviewed.   Constitutional:       General: She is not in acute distress.     Appearance: Normal appearance. She is not ill-appearing, toxic-appearing or diaphoretic.   HENT:      Head: Normocephalic and atraumatic.      Right Ear: Tympanic membrane, ear canal and " external ear normal.      Left Ear: Tympanic membrane, ear canal and external ear normal.      Nose: No congestion or rhinorrhea.      Mouth/Throat:      Pharynx: Oropharynx is clear. No oropharyngeal exudate or posterior oropharyngeal erythema.   Eyes:      Extraocular Movements: Extraocular movements intact.      Conjunctiva/sclera: Conjunctivae normal.   Cardiovascular:      Rate and Rhythm: Normal rate and regular rhythm.      Heart sounds: Normal heart sounds.   Pulmonary:      Breath sounds: Normal breath sounds. No wheezing, rhonchi or rales.   Chest:   Breasts:      Right: Normal.      Left: Normal.       Abdominal:      General: Abdomen is flat.      Palpations: Abdomen is soft.   Musculoskeletal:      Cervical back: Neck supple. No rigidity.   Lymphadenopathy:      Cervical: No cervical adenopathy.   Skin:     General: Skin is warm and dry.   Neurological:      Mental Status: She is alert and oriented to person, place, and time.   Psychiatric:         Mood and Affect: Mood normal.         Behavior: Behavior normal.         Result Review      The following data was reviewed by Maricarmen Harrell MD on 11/30/2021.  Lab Results   Component Value Date    WBC 5.34 01/22/2021    HGB 13.10 01/22/2021    HCT 40.5 01/22/2021    .8 (H) 01/22/2021    .00 01/22/2021     Lab Results   Component Value Date    GLUCOSE 90 05/18/2021    BUN 16 05/18/2021    CREATININE 1.11 (H) 05/18/2021    BCR 14 05/18/2021    K 4.9 05/18/2021    CO2 27 05/18/2021    CALCIUM 9.3 05/18/2021    ALBUMIN 4.0 05/18/2021    LABIL2 1.3 (L) 05/18/2021    AST 7 (L) 05/18/2021    ALT <5 (L) 05/18/2021     Lab Results   Component Value Date    CHLPL 212 (H) 05/18/2021    TRIG 108 05/18/2021    HDL 51 05/18/2021     (H) 05/18/2021     Lab Results   Component Value Date    TSH 0.264 (L) 05/18/2021     No results found for: HGBA1C  No results found for: PSA                    Assessment and Plan:          Diagnoses and all orders  for this visit:    1. Encounter for annual wellness exam in Medicare patient (Primary)  Assessment & Plan:  MEDICARE WELLNESS EXAM-SHE IS UTD on MAMMOGRAM, DUE FOR COLONOSCOPY AND DEXA, DONE WITH PAP AND PELVIC EXAM, UTD ON SHINGLES/FLU/PREVNAR/PNEUMOVAX, DEFERS TETANUS, NO FALLS IN PAST 6 MONTHS-USING A ROLLATOR, NO MEMORY ISSUES,  DEPRESSION IS WELL CONTROLLED, SHE LIVES WITH HER DAUGHTER,  SHE IS NO LONGER ABLE TO DRIVE BUT SHE CAN PERFORM ADL'S/FINANCES INDEPENDENTLY, HEARING IS POOR IN R EAR-H/O SURGERY-SHE STATES SHE CANT AFFORD HEARING AIDS, SHE WAS GIVEN A HA ON  A LIVING WILL/SAFETY HA AND A PREV SERVICES HANDOUT AND SAFETY HANDOUT WERE GIVEN TO HER.  CURRENT DOCTOR LIST UPDATED.  SAFETY ISSUES REVIEWED WITH HER. RECOMMEND YEARLY EYE EXAMS    ADVANCED DIRECTIVES: None       2. Essential (primary) hypertension  Comments:  borderline, looks ok, no change in meds today    3. High risk medication use  -     POC Urine Drug Screen [facility]    4. Chronic obstructive pulmonary disease, unspecified COPD type (HCC)  Comments:  stable on spiriva, singulair and albuterol prn    5. Chronic kidney disease, stage 2 (mild)  Comments:  Labs reviewed.  Overall stable    6. Cigarette nicotine dependence without complication  Comments:  Counseled on the need for cessation again and patient defers.  She states she does not inhale and is not willing to quit    7. Major depressive disorder with single episode, in partial remission (HCC)  Comments:  she is doing well on cymbalta, she and her daughter have worked out all of their issues    8. Anxiety  Comments:  Feeling well controlled with current medication    9. Acquired hypothyroidism  Comments:  stable on meds, due for labs    10. Pure hypercholesterolemia  Comments:  diet controlled    11. Allergic rhinitis, unspecified seasonality, unspecified trigger  Comments:  stable on singulair and zyrtec    12. Spinal stenosis of lumbar region without neurogenic  claudication    13. Encounter for screening mammogram for breast cancer  -     Mammo Screening Digital Tomosynthesis Bilateral With CAD; Future    14. Postmenopausal state  -     DEXA Bone Density Axial; Future    15. Encounter for immunization  -     Fluzone High-Dose 65+yrs ([id number])  -     COVID-19 Vaccine (Pfizer)    16. Encounter for screening for other viral diseases  -     Hepatitis C antibody; Future    17. Encounter for screening for lung cancer        Follow Up   Return in about 6 months (around 5/30/2022).

## 2021-11-30 NOTE — ASSESSMENT & PLAN NOTE
MEDICARE WELLNESS EXAM-SHE IS UTD on MAMMOGRAM, DUE FOR COLONOSCOPY AND DEXA, DONE WITH PAP AND PELVIC EXAM, UTD ON SHINGLES/FLU/PREVNAR/PNEUMOVAX, DEFERS TETANUS, NO FALLS IN PAST 6 MONTHS-USING A ROLLATOR, NO MEMORY ISSUES,  DEPRESSION IS WELL CONTROLLED, SHE LIVES WITH HER DAUGHTER,  SHE IS NO LONGER ABLE TO DRIVE BUT SHE CAN PERFORM ADL'S/FINANCES INDEPENDENTLY, HEARING IS POOR IN R EAR-H/O SURGERY-SHE STATES SHE CANT AFFORD HEARING AIDS, SHE WAS GIVEN A HA ON  A LIVING WILL/SAFETY HA AND A PREV SERVICES HANDOUT AND SAFETY HANDOUT WERE GIVEN TO HER.  CURRENT DOCTOR LIST UPDATED.  SAFETY ISSUES REVIEWED WITH HER. RECOMMEND YEARLY EYE EXAMS    ADVANCED DIRECTIVES: None

## 2021-12-02 ENCOUNTER — PATIENT OUTREACH (OUTPATIENT)
Dept: CASE MANAGEMENT | Facility: OTHER | Age: 76
End: 2021-12-02

## 2021-12-02 NOTE — OUTREACH NOTE
MSW completed chart review and reviewed recent note from office visit. Patient is doing well with no new needs at this time. MSW to d/c from [facility], but available if needed in the future.    MARIELOS Bailey   , Ambulatory Case Management    12/2/2021 08:52 EST

## 2021-12-15 ENCOUNTER — TELEPHONE (OUTPATIENT)
Dept: FAMILY MEDICINE CLINIC | Age: 76
End: 2021-12-15

## 2021-12-15 DIAGNOSIS — G89.29 CHRONIC LOW BACK PAIN, UNSPECIFIED BACK PAIN LATERALITY, UNSPECIFIED WHETHER SCIATICA PRESENT: ICD-10-CM

## 2021-12-15 DIAGNOSIS — M54.50 CHRONIC LOW BACK PAIN, UNSPECIFIED BACK PAIN LATERALITY, UNSPECIFIED WHETHER SCIATICA PRESENT: ICD-10-CM

## 2021-12-15 DIAGNOSIS — J44.1 CHRONIC OBSTRUCTIVE PULMONARY DISEASE WITH ACUTE EXACERBATION: ICD-10-CM

## 2021-12-15 RX ORDER — TIOTROPIUM BROMIDE INHALATION SPRAY 1.56 UG/1
2 SPRAY, METERED RESPIRATORY (INHALATION)
Qty: 1 G | Refills: 2 | Status: SHIPPED | OUTPATIENT
Start: 2021-12-15 | End: 2022-03-07 | Stop reason: SDUPTHER

## 2021-12-15 RX ORDER — GABAPENTIN 600 MG/1
600 TABLET ORAL 3 TIMES DAILY
Qty: 90 TABLET | Refills: 2 | Status: SHIPPED | OUTPATIENT
Start: 2021-12-15 | End: 2022-04-26 | Stop reason: SDUPTHER

## 2021-12-15 RX ORDER — HYDROCODONE BITARTRATE AND ACETAMINOPHEN 7.5; 325 MG/1; MG/1
1 TABLET ORAL EVERY 8 HOURS PRN
Qty: 90 TABLET | Refills: 0 | Status: SHIPPED | OUTPATIENT
Start: 2021-12-15 | End: 2022-01-31 | Stop reason: SDUPTHER

## 2021-12-15 RX ORDER — LEVOTHYROXINE SODIUM 0.05 MG/1
50 TABLET ORAL DAILY
Qty: 30 TABLET | Refills: 0 | Status: SHIPPED | OUTPATIENT
Start: 2021-12-15 | End: 2022-01-31 | Stop reason: SDUPTHER

## 2021-12-15 RX ORDER — POTASSIUM CHLORIDE 750 MG/1
10 TABLET, EXTENDED RELEASE ORAL DAILY
Qty: 90 TABLET | Refills: 1 | Status: SHIPPED | OUTPATIENT
Start: 2021-12-15 | End: 2022-04-26 | Stop reason: SDUPTHER

## 2021-12-15 RX ORDER — LISINOPRIL 20 MG/1
20 TABLET ORAL DAILY
Qty: 30 TABLET | Refills: 1 | Status: SHIPPED | OUTPATIENT
Start: 2021-12-15 | End: 2022-01-31 | Stop reason: SDUPTHER

## 2021-12-30 ENCOUNTER — TELEPHONE (OUTPATIENT)
Dept: FAMILY MEDICINE CLINIC | Age: 76
End: 2021-12-30

## 2021-12-30 ENCOUNTER — NURSE TRIAGE (OUTPATIENT)
Dept: CALL CENTER | Facility: HOSPITAL | Age: 76
End: 2021-12-30

## 2021-12-30 NOTE — TELEPHONE ENCOUNTER
Called and spoke with pts daughter and informed her she really needs to take her mother to the ER for further evaluation. Pts daughter voiced understanding and agreed.- clr

## 2021-12-30 NOTE — TELEPHONE ENCOUNTER
[facility] staff attempted to follow warm transfer process and was unsuccessful     Caller: KALIN WISE    Relationship to patient: Emergency Contact    Best call back number: [phone]    Patient is needing: THE PATIENT'S DAUGHTER STATED THE PATIENT HAS A SUDDEN ONSET OF CONFUSION AND FALLING. [facility] RECOMMENDED EMERGENCY ROOM BUT DAUGHTER REFUSED. UNABLE TO REACH OFFICE, [facility] CONNECTED TO [facility]

## 2021-12-30 NOTE — TELEPHONE ENCOUNTER
"HUB call, pt. Confused ,not sure where she is at times, daughter calling wanting appt. With PCP, Triage Nurse sent to ER, has had headache on and off 2 days, not sleeping, falling,left side weaker talking about things,daughter not sure of.     Reason for Disposition  • Patient sounds very sick or weak to the triager    Additional Information  • Negative: [1] Difficult to awaken or acting confused (e.g., disoriented, slurred speech) AND [2] present now AND [3] diabetes mellitus  • Negative: [1] Difficult to awaken or acting confused (e.g., disoriented, slurred speech) AND [2] present now AND [3] new-onset  • Negative: [1] Weakness of the face, arm, or leg on one side of the body AND [2] new-onset  • Negative: [1] Numbness of the face, arm, or leg on one side of the body AND [2] new-onset  • Negative: [1] Loss of speech or garbled speech AND [2] new-onset  • Negative: Difficulty breathing or bluish lips  • Negative: Shock suspected (e.g., cold/pale/clammy skin, too weak to stand, low BP, rapid pulse)  • Negative: Seeing, hearing, or feeling things that are not there (i.e., visual, auditory, or tactile hallucinations)  • Negative: Followed a head injury  • Negative: Drug overdose suspected  • Negative: Sounds like a life-threatening emergency to the triager  • Negative: Questions or concerns about alcohol use, unhealthy alcohol use, binge drinking, intoxication, or withdrawal  • Negative: Questions or concerns about substance use (drug use), unhealthy drug use, intoxication, or withdrawal  • Negative: [1] Diabetes mellitus AND [2] confusion from low blood sugar (i.e., < 60 mg/dl or 3.5 mmol/l)  • Negative: Headache or vomiting  • Negative: Stiff neck (can't touch chin to chest)  • Negative: Very strange or paranoid behavior  • Negative: Fever > 100.4 F (38.0 C)    Answer Assessment - Initial Assessment Questions  1. LEVEL OF CONSCIOUSNESS: \"How is he (she, the patient) acting right now?\" (e.g., alert-oriented, " "confused, lethargic, stuporous, comatose)      disoriented at times  2. ONSET: \"When did the confusion start?\"  (minutes, hours, days)     2 days  3. PATTERN \"Does this come and go, or has it been constant since it started?\"  \"Is it present now?\"      Comes and goes  4. ALCOHOL or DRUGS: \"Has he been drinking alcohol or taking any drugs?\"       no  5. NARCOTIC MEDICATIONS: \"Has he been receiving any narcotic medications?\" (e.g., morphine, Vicodin)    Takes hydrocodone and gabapentin  6. CAUSE: \"What do you think is causing the confusion?\"       unknown  7. OTHER SYMPTOMS: \"Are there any other symptoms?\" (e.g., difficulty breathing, headache, fever, weakness)     Headache, confusion, weakness leftside    Protocols used: CONFUSION - DELIRIUM-ADULT-      "

## 2022-01-03 ENCOUNTER — READMISSION MANAGEMENT (OUTPATIENT)
Dept: CALL CENTER | Facility: HOSPITAL | Age: 77
End: 2022-01-03

## 2022-01-03 NOTE — OUTREACH NOTE
Prep Survey      Responses   [facility] facility patient discharged from? Non-BH   Is LACE score < 7 ? Non-BH Discharge   Emergency Room discharge w/ pulse ox? No   Eligibility [facility]   Date of Discharge 01/03/22   Discharge Disposition Home or Self Care   Discharge diagnosis Unavailable   Does the patient have one of the following disease processes/diagnoses(primary or secondary)? Other   Prep survey completed? Yes          Amanda Marmolejo RN

## 2022-01-04 ENCOUNTER — TRANSITIONAL CARE MANAGEMENT TELEPHONE ENCOUNTER (OUTPATIENT)
Dept: CALL CENTER | Facility: HOSPITAL | Age: 77
End: 2022-01-04

## 2022-01-04 NOTE — OUTREACH NOTE
Call Center TCM Note      Responses   [facility] patient discharged from? Non-   Does the patient have one of the following disease processes/diagnoses(primary or secondary)? Other   TCM attempt successful? Yes   Call start time 1345   Call end time 1351   Discharge diagnosis COPD Pneumonia   Meds reviewed with patient/caregiver? Yes   Is the patient having any side effects they believe may be caused by any medication additions or changes? No   Does the patient have all medications ordered at discharge? Yes   Is the patient taking all medications as directed (includes completed medication regime)? Yes   Does the patient have a primary care provider?  Yes   Does the patient have an appointment with their PCP within 7 days of discharge? Yes   Comments regarding [facility] F[facility] appt 1/7/2022 with Dr Harrell   Has the patient kept scheduled appointments due by today? N/A   Has home health visited the patient within 72 hours of discharge? N/A   Psychosocial issues? No   Did the patient receive a copy of their discharge instructions? Yes   Nursing interventions Reviewed instructions with patient   What is the patient's perception of their health status since discharge? Improving   Is the patient/caregiver able to teach back signs and symptoms related to disease process for when to call PCP? Yes   Is the patient/caregiver able to teach back signs and symptoms related to disease process for when to call 911? Yes   Is the patient/caregiver able to teach back the hierarchy of who to call/visit for symptoms/problems? PCP, Specialist, Home health nurse, Urgent Care, ED, 911 Yes   If the patient is a current smoker, are they able to teach back resources for cessation? Smoking cessation medications   TCM call completed? Yes   Wrap up additional comments Patient reports she is doing better. No questions or concerns.           Johnie Zavaleta RN    1/4/2022, 13:52 EST

## 2022-01-31 DIAGNOSIS — G89.29 CHRONIC LOW BACK PAIN, UNSPECIFIED BACK PAIN LATERALITY, UNSPECIFIED WHETHER SCIATICA PRESENT: ICD-10-CM

## 2022-01-31 DIAGNOSIS — M54.50 CHRONIC LOW BACK PAIN, UNSPECIFIED BACK PAIN LATERALITY, UNSPECIFIED WHETHER SCIATICA PRESENT: ICD-10-CM

## 2022-01-31 DIAGNOSIS — J44.1 CHRONIC OBSTRUCTIVE PULMONARY DISEASE WITH ACUTE EXACERBATION: ICD-10-CM

## 2022-02-01 RX ORDER — LISINOPRIL 20 MG/1
20 TABLET ORAL DAILY
Qty: 30 TABLET | Refills: 1 | Status: SHIPPED | OUTPATIENT
Start: 2022-02-01 | End: 2022-04-26 | Stop reason: SDUPTHER

## 2022-02-01 RX ORDER — CETIRIZINE HYDROCHLORIDE 10 MG/1
10 TABLET ORAL DAILY
Qty: 90 TABLET | Refills: 1 | Status: SHIPPED | OUTPATIENT
Start: 2022-02-01 | End: 2022-08-29 | Stop reason: SDUPTHER

## 2022-02-01 RX ORDER — DULOXETIN HYDROCHLORIDE 60 MG/1
60 CAPSULE, DELAYED RELEASE ORAL DAILY
Qty: 90 CAPSULE | Refills: 0 | Status: SHIPPED | OUTPATIENT
Start: 2022-02-01 | End: 2022-06-20 | Stop reason: SDUPTHER

## 2022-02-01 RX ORDER — FLUTICASONE PROPIONATE AND SALMETEROL XINAFOATE 230; 21 UG/1; UG/1
2 AEROSOL, METERED RESPIRATORY (INHALATION)
Qty: 1 EACH | Refills: 5 | Status: SHIPPED | OUTPATIENT
Start: 2022-02-01 | End: 2022-05-03 | Stop reason: SDUPTHER

## 2022-02-01 RX ORDER — MONTELUKAST SODIUM 10 MG/1
10 TABLET ORAL NIGHTLY
Qty: 90 TABLET | Refills: 1 | Status: SHIPPED | OUTPATIENT
Start: 2022-02-01 | End: 2022-07-19 | Stop reason: SDUPTHER

## 2022-02-01 RX ORDER — BUSPIRONE HYDROCHLORIDE 15 MG/1
15 TABLET ORAL 2 TIMES DAILY
Qty: 180 TABLET | Refills: 0 | Status: SHIPPED | OUTPATIENT
Start: 2022-02-01 | End: 2022-06-20 | Stop reason: SDUPTHER

## 2022-02-01 RX ORDER — LEVOTHYROXINE SODIUM 0.05 MG/1
50 TABLET ORAL DAILY
Qty: 30 TABLET | Refills: 0 | Status: SHIPPED | OUTPATIENT
Start: 2022-02-01 | End: 2022-03-07 | Stop reason: SDUPTHER

## 2022-02-01 RX ORDER — HYDROCODONE BITARTRATE AND ACETAMINOPHEN 7.5; 325 MG/1; MG/1
1 TABLET ORAL EVERY 8 HOURS PRN
Qty: 90 TABLET | Refills: 0 | Status: SHIPPED | OUTPATIENT
Start: 2022-02-01 | End: 2022-03-21 | Stop reason: SDUPTHER

## 2022-02-01 RX ORDER — OMEPRAZOLE 20 MG/1
20 CAPSULE, DELAYED RELEASE ORAL DAILY
Qty: 90 CAPSULE | Refills: 0 | Status: SHIPPED | OUTPATIENT
Start: 2022-02-01 | End: 2022-04-26 | Stop reason: SDUPTHER

## 2022-02-01 RX ORDER — METOPROLOL SUCCINATE 50 MG/1
50 TABLET, EXTENDED RELEASE ORAL DAILY
Qty: 90 TABLET | Refills: 0 | Status: SHIPPED | OUTPATIENT
Start: 2022-02-01 | End: 2022-04-26 | Stop reason: SDUPTHER

## 2022-02-07 DIAGNOSIS — M54.50 CHRONIC LOW BACK PAIN, UNSPECIFIED BACK PAIN LATERALITY, UNSPECIFIED WHETHER SCIATICA PRESENT: ICD-10-CM

## 2022-02-07 DIAGNOSIS — G89.29 CHRONIC LOW BACK PAIN, UNSPECIFIED BACK PAIN LATERALITY, UNSPECIFIED WHETHER SCIATICA PRESENT: ICD-10-CM

## 2022-02-07 RX ORDER — HYDROCODONE BITARTRATE AND ACETAMINOPHEN 7.5; 325 MG/1; MG/1
1 TABLET ORAL EVERY 8 HOURS PRN
Qty: 90 TABLET | Refills: 0 | Status: CANCELLED | OUTPATIENT
Start: 2022-02-07

## 2022-02-07 NOTE — TELEPHONE ENCOUNTER
Caller: Amada Bentley    Relationship: Self    Best call back number: [phone]    Requested Prescriptions:   Requested Prescriptions     Pending Prescriptions Disp Refills   • HYDROcodone-acetaminophen (NORCO) 7.5-325 MG per tablet 90 tablet 0     Sig: Take 1 tablet by mouth Every 8 (Eight) Hours As Needed for Moderate Pain .        Pharm[facility] where request should be sent[facility]  [place] [place] [facility] [address] - [phone] [facility] [phone] FX     Additional details provided by patient: 3 DAYS LEFT     Does the patient have less than a 3 day supply:  [] Yes  [] No    Megan Miller Rep   02/07/22 09:54 EST

## 2022-02-08 RX ORDER — HYDROCODONE BITARTRATE AND ACETAMINOPHEN 7.5; 325 MG/1; MG/1
1 TABLET ORAL EVERY 8 HOURS PRN
Qty: 90 TABLET | Refills: 0 | Status: CANCELLED | OUTPATIENT
Start: 2022-02-08

## 2022-02-08 NOTE — TELEPHONE ENCOUNTER
DR wilder filled out a green script which was picked up today at 8:42 am by pt's granddaughter Ragini.

## 2022-02-08 NOTE — TELEPHONE ENCOUNTER
Pt is here and she needs hydrocodone on a hard green script for simeon lara pcp is out of the office will you print and sign on call ?

## 2022-03-07 DIAGNOSIS — J44.1 CHRONIC OBSTRUCTIVE PULMONARY DISEASE WITH ACUTE EXACERBATION: ICD-10-CM

## 2022-03-07 RX ORDER — CETIRIZINE HYDROCHLORIDE 10 MG/1
10 TABLET ORAL DAILY
Qty: 90 TABLET | Refills: 1 | Status: CANCELLED | OUTPATIENT
Start: 2022-03-07

## 2022-03-07 RX ORDER — METOPROLOL SUCCINATE 50 MG/1
50 TABLET, EXTENDED RELEASE ORAL DAILY
Qty: 90 TABLET | Refills: 0 | Status: CANCELLED | OUTPATIENT
Start: 2022-03-07

## 2022-03-07 RX ORDER — TIOTROPIUM BROMIDE INHALATION SPRAY 1.56 UG/1
2 SPRAY, METERED RESPIRATORY (INHALATION)
Qty: 1 G | Refills: 2 | Status: SHIPPED | OUTPATIENT
Start: 2022-03-07 | End: 2022-04-26 | Stop reason: SDUPTHER

## 2022-03-07 RX ORDER — LISINOPRIL 20 MG/1
20 TABLET ORAL DAILY
Qty: 30 TABLET | Refills: 1 | Status: CANCELLED | OUTPATIENT
Start: 2022-03-07 | End: 2022-04-06

## 2022-03-07 RX ORDER — TRAZODONE HYDROCHLORIDE 50 MG/1
50 TABLET ORAL NIGHTLY
Qty: 90 TABLET | Refills: 1 | Status: SHIPPED | OUTPATIENT
Start: 2022-03-07 | End: 2022-03-21 | Stop reason: SDUPTHER

## 2022-03-07 RX ORDER — LEVOTHYROXINE SODIUM 0.05 MG/1
50 TABLET ORAL DAILY
Qty: 30 TABLET | Refills: 0 | Status: SHIPPED | OUTPATIENT
Start: 2022-03-07 | End: 2022-03-21 | Stop reason: SDUPTHER

## 2022-03-07 RX ORDER — MONTELUKAST SODIUM 10 MG/1
10 TABLET ORAL NIGHTLY
Qty: 90 TABLET | Refills: 1 | Status: CANCELLED | OUTPATIENT
Start: 2022-03-07

## 2022-03-07 NOTE — TELEPHONE ENCOUNTER
Caller: [place] [facility] [address]    Relationship: Self    Best call back number: [phone]    Requested Prescriptions:   Requested Prescriptions     Pending Prescriptions Disp Refills   • montelukast (Singulair) 10 MG tablet 90 tablet 1     Sig: Take 1 tablet by mouth Every Night.   • lisinopril (PRINIVIL,ZESTRIL) 20 MG tablet 30 tablet 1     Sig: Take 1 tablet by mouth Daily for 30 days.   • metoprolol succinate XL (TOPROL-XL) 50 MG 24 hr tablet 90 tablet 0     Sig: Take 1 tablet by mouth Daily.   • levothyroxine (SYNTHROID, LEVOTHROID) 50 MCG tablet 30 tablet 0     Sig: Take 1 tablet by mouth Daily.   • cetirizine (zyrTEC) 10 MG tablet 90 tablet 1     Sig: Take 1 tablet by mouth Daily.   • Tiotropium Bromide Monohydrate (Spiriva Respimat) 1.25 MCG/ACT aerosol solution inhaler 1 g 2     Sig: Inhale 2 puffs Daily for 30 days.   • traZODone (DESYREL) 50 MG tablet       Sig: Take 1 tablet by mouth Every Night.        Pharm[facility] where request should be sent[facility]  [place] [place]  [address] [facility] - [phone] [facility] [phone] FX     Additional details provided by patient: PATIENT IS OUT OF TRAZODONE     Does the patient have less than a 3 day supply:  [x] Yes  [] No    Megan Sheikh Rep   03/07/22 10:43 EST

## 2022-03-16 ENCOUNTER — HOSPITAL ENCOUNTER (OUTPATIENT)
Dept: BONE DENSITY | Facility: HOSPITAL | Age: 77
Discharge: HOME | End: 2022-03-16

## 2022-03-16 ENCOUNTER — HOSPITAL ENCOUNTER (OUTPATIENT)
Dept: MAMMOGRAPHY | Facility: HOSPITAL | Age: 77
End: 2022-03-16

## 2022-03-16 ENCOUNTER — HOSPITAL ENCOUNTER (OUTPATIENT)
Dept: MAMMOGRAPHY | Facility: HOSPITAL | Age: 77
Discharge: HOME | End: 2022-03-16

## 2022-03-16 ENCOUNTER — TELEPHONE (OUTPATIENT)
Dept: FAMILY MEDICINE CLINIC | Age: 77
End: 2022-03-16

## 2022-03-16 DIAGNOSIS — Z12.31 SCREENING MAMMOGRAM FOR BREAST CANCER: ICD-10-CM

## 2022-03-16 DIAGNOSIS — Z78.0 POSTMENOPAUSAL STATE: ICD-10-CM

## 2022-03-16 PROCEDURE — 77067 SCR MAMMO BI INCL CAD: CPT

## 2022-03-16 PROCEDURE — 77063 BREAST TOMOSYNTHESIS BI: CPT

## 2022-03-16 PROCEDURE — 77080 DXA BONE DENSITY AXIAL: CPT

## 2022-03-16 NOTE — TELEPHONE ENCOUNTER
Caller: mAada Bentley    Relationship: Self    Best call back number: [phone]     What is the best time to reach you: ANY     Who are you requesting to speak with (clinical staff, provider,  specific staff member): CLINICAL     What was the call regarding: PATIENT WANTED TO INFORM PCP THAT SHE GOT HER DEXA SCAN AND MAMMOGRAM DONE      Do you require a callback: IF NEEDED

## 2022-03-21 DIAGNOSIS — G89.29 CHRONIC LOW BACK PAIN, UNSPECIFIED BACK PAIN LATERALITY, UNSPECIFIED WHETHER SCIATICA PRESENT: ICD-10-CM

## 2022-03-21 DIAGNOSIS — M54.50 CHRONIC LOW BACK PAIN, UNSPECIFIED BACK PAIN LATERALITY, UNSPECIFIED WHETHER SCIATICA PRESENT: ICD-10-CM

## 2022-03-21 NOTE — TELEPHONE ENCOUNTER
Caller: Amada Bentley    Relationship: Self    Best call back number: [phone]    Requested Prescriptions:   Requested Prescriptions     Pending Prescriptions Disp Refills   • HYDROcodone-acetaminophen (NORCO) 7.5-325 MG per tablet 90 tablet 0     Sig: Take 1 tablet by mouth Every 8 (Eight) Hours As Needed for Moderate Pain .   • levothyroxine (SYNTHROID, LEVOTHROID) 50 MCG tablet 30 tablet 0     Sig: Take 1 tablet by mouth Daily.   • traZODone (DESYREL) 50 MG tablet 90 tablet 1     Sig: Take 1 tablet by mouth Every Night.        Pharmacy where request should be sent: [facility] [place] [facility] [place], [place] - [address] [facility] [address] [facility] [phone] [facility] [phone] FX     Additional details provided by patient: PATIENT NEEDS A PAPER COPY OF THE PRESCRIPTION FOR THE HYDROCODONE HER GRANDDAUGHTER WILL BE PICKING IT UP FROM THE OFFICE PLEASE CONTACT PATIENT WHEN PRESCRIPTION IS READY TO BE PICKED UP.    Does the patient have less than a 3 day supply:  [x] Yes  [] No    Megan Augustine Rep   03/21/22 12:47 EDT

## 2022-03-22 RX ORDER — HYDROCODONE BITARTRATE AND ACETAMINOPHEN 7.5; 325 MG/1; MG/1
1 TABLET ORAL EVERY 8 HOURS PRN
Qty: 90 TABLET | Refills: 0 | Status: SHIPPED | OUTPATIENT
Start: 2022-03-22 | End: 2022-04-27 | Stop reason: SDUPTHER

## 2022-03-22 RX ORDER — TRAZODONE HYDROCHLORIDE 50 MG/1
50 TABLET ORAL NIGHTLY
Qty: 90 TABLET | Refills: 1 | Status: SHIPPED | OUTPATIENT
Start: 2022-03-22 | End: 2022-05-03

## 2022-03-22 RX ORDER — LEVOTHYROXINE SODIUM 0.05 MG/1
50 TABLET ORAL DAILY
Qty: 30 TABLET | Refills: 0 | Status: SHIPPED | OUTPATIENT
Start: 2022-03-22 | End: 2022-04-26 | Stop reason: SDUPTHER

## 2022-03-22 NOTE — TELEPHONE ENCOUNTER
PT NEEDS A HAND WRITTEN RX TO TAKE TO [facility] [place] last filled 2-8-22 #90 by DR Colin on call , last visit 11-30-22, tox 11-

## 2022-03-24 NOTE — TELEPHONE ENCOUNTER
Pt inf script is ready to be picked up and Dr Betts increased the quantity to #270 to last 3 months

## 2022-04-08 ENCOUNTER — TELEPHONE (OUTPATIENT)
Dept: FAMILY MEDICINE CLINIC | Age: 77
End: 2022-04-08

## 2022-04-08 NOTE — TELEPHONE ENCOUNTER
Called pt on 4/8/22 in regards to scheduling her [facility] apt that is due after 11/30/22. Pt didn't answer and had no VM set up./kitty

## 2022-04-26 DIAGNOSIS — J44.1 CHRONIC OBSTRUCTIVE PULMONARY DISEASE WITH ACUTE EXACERBATION: ICD-10-CM

## 2022-04-26 DIAGNOSIS — M54.50 CHRONIC LOW BACK PAIN, UNSPECIFIED BACK PAIN LATERALITY, UNSPECIFIED WHETHER SCIATICA PRESENT: ICD-10-CM

## 2022-04-26 DIAGNOSIS — G89.29 CHRONIC LOW BACK PAIN, UNSPECIFIED BACK PAIN LATERALITY, UNSPECIFIED WHETHER SCIATICA PRESENT: ICD-10-CM

## 2022-04-26 RX ORDER — GABAPENTIN 600 MG/1
600 TABLET ORAL 3 TIMES DAILY
Qty: 90 TABLET | Refills: 2 | Status: SHIPPED | OUTPATIENT
Start: 2022-04-26 | End: 2022-06-20 | Stop reason: SDUPTHER

## 2022-04-26 RX ORDER — TIOTROPIUM BROMIDE INHALATION SPRAY 1.56 UG/1
2 SPRAY, METERED RESPIRATORY (INHALATION)
Qty: 1 G | Refills: 2 | Status: SHIPPED | OUTPATIENT
Start: 2022-04-26 | End: 2022-05-03 | Stop reason: SDUPTHER

## 2022-04-26 RX ORDER — METOPROLOL SUCCINATE 50 MG/1
50 TABLET, EXTENDED RELEASE ORAL DAILY
Qty: 90 TABLET | Refills: 0 | Status: SHIPPED | OUTPATIENT
Start: 2022-04-26 | End: 2022-06-20 | Stop reason: SDUPTHER

## 2022-04-26 RX ORDER — LEVOTHYROXINE SODIUM 0.05 MG/1
50 TABLET ORAL DAILY
Qty: 90 TABLET | Refills: 0 | Status: SHIPPED | OUTPATIENT
Start: 2022-04-26 | End: 2022-05-03 | Stop reason: SDUPTHER

## 2022-04-26 RX ORDER — ALBUTEROL SULFATE 90 UG/1
1 AEROSOL, METERED RESPIRATORY (INHALATION) DAILY PRN
Qty: 18 G | Refills: 0 | Status: SHIPPED | OUTPATIENT
Start: 2022-04-26 | End: 2022-05-03 | Stop reason: SDUPTHER

## 2022-04-26 RX ORDER — LISINOPRIL 20 MG/1
20 TABLET ORAL DAILY
Qty: 90 TABLET | Refills: 0 | Status: SHIPPED | OUTPATIENT
Start: 2022-04-26 | End: 2022-06-20 | Stop reason: SDUPTHER

## 2022-04-26 RX ORDER — POTASSIUM CHLORIDE 750 MG/1
TABLET, EXTENDED RELEASE ORAL
Qty: 90 TABLET | Refills: 0 | Status: SHIPPED | OUTPATIENT
Start: 2022-04-26 | End: 2022-06-20 | Stop reason: SDUPTHER

## 2022-04-26 RX ORDER — OMEPRAZOLE 20 MG/1
20 CAPSULE, DELAYED RELEASE ORAL DAILY
Qty: 90 CAPSULE | Refills: 0 | Status: SHIPPED | OUTPATIENT
Start: 2022-04-26 | End: 2022-08-29 | Stop reason: SDUPTHER

## 2022-04-27 DIAGNOSIS — M54.50 CHRONIC LOW BACK PAIN, UNSPECIFIED BACK PAIN LATERALITY, UNSPECIFIED WHETHER SCIATICA PRESENT: ICD-10-CM

## 2022-04-27 DIAGNOSIS — G89.29 CHRONIC LOW BACK PAIN, UNSPECIFIED BACK PAIN LATERALITY, UNSPECIFIED WHETHER SCIATICA PRESENT: ICD-10-CM

## 2022-04-27 RX ORDER — HYDROCODONE BITARTRATE AND ACETAMINOPHEN 7.5; 325 MG/1; MG/1
1 TABLET ORAL EVERY 8 HOURS PRN
Qty: 90 TABLET | Refills: 0 | Status: SHIPPED | OUTPATIENT
Start: 2022-04-27 | End: 2022-06-20 | Stop reason: SDUPTHER

## 2022-04-27 NOTE — TELEPHONE ENCOUNTER
Pt called and she said that the pharmacy only filled a 30 day supply #90 of her norco rx that was sent on 3-. I called [facility] and spoke with Mira and she said that they can only dispense a 30 day supply at a time so the remaining qty is void so she is due a refill. She needs this on a green written script please so she can take it to the pharmacy . TOX 11-30-21 last visit 11- I pend the med below and set to print so we would have the correct dates and times of fills.

## 2022-05-03 ENCOUNTER — OFFICE VISIT (OUTPATIENT)
Dept: FAMILY MEDICINE CLINIC | Age: 77
End: 2022-05-03

## 2022-05-03 VITALS
OXYGEN SATURATION: 94 % | SYSTOLIC BLOOD PRESSURE: 143 MMHG | BODY MASS INDEX: 19.48 KG/M2 | HEIGHT: 60 IN | HEART RATE: 71 BPM | DIASTOLIC BLOOD PRESSURE: 71 MMHG | TEMPERATURE: 98.6 F | WEIGHT: 99.2 LBS

## 2022-05-03 DIAGNOSIS — F51.01 PRIMARY INSOMNIA: ICD-10-CM

## 2022-05-03 DIAGNOSIS — F17.210 CIGARETTE NICOTINE DEPENDENCE WITHOUT COMPLICATION: ICD-10-CM

## 2022-05-03 DIAGNOSIS — J11.1 FLU: ICD-10-CM

## 2022-05-03 DIAGNOSIS — K21.9 GASTROESOPHAGEAL REFLUX DISEASE WITHOUT ESOPHAGITIS: ICD-10-CM

## 2022-05-03 DIAGNOSIS — R53.83 FATIGUE, UNSPECIFIED TYPE: ICD-10-CM

## 2022-05-03 DIAGNOSIS — J30.9 ALLERGIC RHINITIS, UNSPECIFIED SEASONALITY, UNSPECIFIED TRIGGER: ICD-10-CM

## 2022-05-03 DIAGNOSIS — Z59.9 INABILITY TO RETURN TO LIVING SITUATION: ICD-10-CM

## 2022-05-03 DIAGNOSIS — J44.1 CHRONIC OBSTRUCTIVE PULMONARY DISEASE WITH ACUTE EXACERBATION: ICD-10-CM

## 2022-05-03 DIAGNOSIS — F32.4 MAJOR DEPRESSIVE DISORDER WITH SINGLE EPISODE, IN PARTIAL REMISSION: ICD-10-CM

## 2022-05-03 DIAGNOSIS — I10 ESSENTIAL (PRIMARY) HYPERTENSION: Primary | ICD-10-CM

## 2022-05-03 DIAGNOSIS — E03.9 ACQUIRED HYPOTHYROIDISM: ICD-10-CM

## 2022-05-03 DIAGNOSIS — E78.00 HIGH CHOLESTEROL: ICD-10-CM

## 2022-05-03 DIAGNOSIS — D51.0 PERNICIOUS ANEMIA: ICD-10-CM

## 2022-05-03 LAB
EXPIRATION DATE: ABNORMAL
FLUAV AG UPPER RESP QL IA.RAPID: DETECTED
FLUBV AG UPPER RESP QL IA.RAPID: NOT DETECTED
INTERNAL CONTROL: ABNORMAL
Lab: ABNORMAL
SARS-COV-2 AG UPPER RESP QL IA.RAPID: NOT DETECTED

## 2022-05-03 PROCEDURE — 87428 SARSCOV & INF VIR A&B AG IA: CPT | Performed by: FAMILY MEDICINE

## 2022-05-03 PROCEDURE — 99214 OFFICE O/P EST MOD 30 MIN: CPT | Performed by: FAMILY MEDICINE

## 2022-05-03 RX ORDER — ALBUTEROL SULFATE 90 UG/1
2 AEROSOL, METERED RESPIRATORY (INHALATION) EVERY 6 HOURS PRN
Qty: 18 G | Refills: 5 | Status: SHIPPED | OUTPATIENT
Start: 2022-05-03 | End: 2022-06-20 | Stop reason: SDUPTHER

## 2022-05-03 RX ORDER — LEVOTHYROXINE SODIUM 0.05 MG/1
50 TABLET ORAL DAILY
Qty: 90 TABLET | Refills: 0 | Status: SHIPPED | OUTPATIENT
Start: 2022-05-03 | End: 2022-07-19 | Stop reason: SDUPTHER

## 2022-05-03 RX ORDER — MIRTAZAPINE 15 MG/1
15 TABLET, FILM COATED ORAL NIGHTLY
Qty: 30 TABLET | Refills: 2 | Status: SHIPPED | OUTPATIENT
Start: 2022-05-03 | End: 2022-09-06

## 2022-05-03 RX ORDER — FLUTICASONE PROPIONATE AND SALMETEROL XINAFOATE 230; 21 UG/1; UG/1
2 AEROSOL, METERED RESPIRATORY (INHALATION)
Qty: 1 EACH | Refills: 5 | Status: SHIPPED | OUTPATIENT
Start: 2022-05-03 | End: 2022-06-20 | Stop reason: SDUPTHER

## 2022-05-03 RX ORDER — OSELTAMIVIR PHOSPHATE 75 MG/1
75 CAPSULE ORAL 2 TIMES DAILY
Qty: 10 CAPSULE | Refills: 0 | Status: SHIPPED | OUTPATIENT
Start: 2022-05-03 | End: 2022-09-06

## 2022-05-03 RX ORDER — DEXTROMETHORPHAN HYDROBROMIDE AND PROMETHAZINE HYDROCHLORIDE 15; 6.25 MG/5ML; MG/5ML
5 SYRUP ORAL 4 TIMES DAILY PRN
Qty: 180 ML | Refills: 0 | Status: SHIPPED | OUTPATIENT
Start: 2022-05-03 | End: 2022-09-06

## 2022-05-03 RX ORDER — TIOTROPIUM BROMIDE INHALATION SPRAY 1.56 UG/1
2 SPRAY, METERED RESPIRATORY (INHALATION)
Qty: 1 G | Refills: 2 | Status: SHIPPED | OUTPATIENT
Start: 2022-05-03 | End: 2022-06-20 | Stop reason: SDUPTHER

## 2022-05-03 SDOH — ECONOMIC STABILITY - INCOME SECURITY: PROBLEM RELATED TO HOUSING AND ECONOMIC CIRCUMSTANCES, UNSPECIFIED: Z59.9
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
## 2022-05-09 ENCOUNTER — REFERRAL TRIAGE (OUTPATIENT)
Dept: CASE MANAGEMENT | Facility: OTHER | Age: 77
End: 2022-05-09

## 2022-05-10 ENCOUNTER — PATIENT OUTREACH (OUTPATIENT)
Dept: CASE MANAGEMENT | Facility: OTHER | Age: 77
End: 2022-05-10

## 2022-05-10 NOTE — OUTREACH NOTE
Patient Outreach    MSW spoke with patient to discuss housing concerns from referral. Patient states that her housing is 'okay for now' and she is fine living with her daughter. Patient states that she lives on opposite end of the home and she is planning to stay here. MSW did offer some resources for [place] and the senior apartments in [place] as well for patient. Patient took down information and will call MSW if needed in the future.    CHUCKIE POWELL -   Ambulatory Case Management    5/10/2022, 14:10 EDT

## 2022-05-12 DIAGNOSIS — I65.23 BILATERAL CAROTID ARTERY STENOSIS: Primary | ICD-10-CM

## 2022-05-17 DIAGNOSIS — I65.23 BILATERAL CAROTID ARTERY STENOSIS: Primary | ICD-10-CM

## 2022-05-25 ENCOUNTER — TELEPHONE (OUTPATIENT)
Dept: FAMILY MEDICINE CLINIC | Age: 77
End: 2022-05-25

## 2022-06-01 ENCOUNTER — TELEPHONE (OUTPATIENT)
Dept: FAMILY MEDICINE CLINIC | Age: 77
End: 2022-06-01

## 2022-06-02 ENCOUNTER — TELEPHONE (OUTPATIENT)
Dept: FAMILY MEDICINE CLINIC | Age: 77
End: 2022-06-02

## 2022-06-20 DIAGNOSIS — M54.50 CHRONIC LOW BACK PAIN, UNSPECIFIED BACK PAIN LATERALITY, UNSPECIFIED WHETHER SCIATICA PRESENT: ICD-10-CM

## 2022-06-20 DIAGNOSIS — G89.29 CHRONIC LOW BACK PAIN, UNSPECIFIED BACK PAIN LATERALITY, UNSPECIFIED WHETHER SCIATICA PRESENT: ICD-10-CM

## 2022-06-20 DIAGNOSIS — J44.1 CHRONIC OBSTRUCTIVE PULMONARY DISEASE WITH ACUTE EXACERBATION: ICD-10-CM

## 2022-06-20 RX ORDER — POTASSIUM CHLORIDE 750 MG/1
TABLET, EXTENDED RELEASE ORAL
Qty: 180 TABLET | Refills: 0 | Status: SHIPPED | OUTPATIENT
Start: 2022-06-20 | End: 2023-01-31 | Stop reason: SDUPTHER

## 2022-06-20 RX ORDER — BUSPIRONE HYDROCHLORIDE 15 MG/1
15 TABLET ORAL 2 TIMES DAILY
Qty: 180 TABLET | Refills: 0 | Status: SHIPPED | OUTPATIENT
Start: 2022-06-20 | End: 2022-09-06

## 2022-06-20 RX ORDER — TIOTROPIUM BROMIDE INHALATION SPRAY 1.56 UG/1
2 SPRAY, METERED RESPIRATORY (INHALATION)
Qty: 1 G | Refills: 2 | Status: SHIPPED | OUTPATIENT
Start: 2022-06-20 | End: 2022-07-19 | Stop reason: SDUPTHER

## 2022-06-20 RX ORDER — GABAPENTIN 600 MG/1
600 TABLET ORAL 3 TIMES DAILY
Qty: 90 TABLET | Refills: 2 | Status: SHIPPED | OUTPATIENT
Start: 2022-06-20

## 2022-06-20 RX ORDER — LISINOPRIL 20 MG/1
20 TABLET ORAL DAILY
Qty: 90 TABLET | Refills: 0 | Status: SHIPPED | OUTPATIENT
Start: 2022-06-20 | End: 2022-10-28 | Stop reason: SDUPTHER

## 2022-06-20 RX ORDER — DULOXETIN HYDROCHLORIDE 60 MG/1
60 CAPSULE, DELAYED RELEASE ORAL DAILY
Qty: 90 CAPSULE | Refills: 0 | Status: SHIPPED | OUTPATIENT
Start: 2022-06-20 | End: 2022-09-08 | Stop reason: SDUPTHER

## 2022-06-20 RX ORDER — METOPROLOL SUCCINATE 50 MG/1
50 TABLET, EXTENDED RELEASE ORAL DAILY
Qty: 90 TABLET | Refills: 0 | Status: SHIPPED | OUTPATIENT
Start: 2022-06-20 | End: 2022-10-28 | Stop reason: SDUPTHER

## 2022-06-20 RX ORDER — FLUTICASONE PROPIONATE AND SALMETEROL XINAFOATE 230; 21 UG/1; UG/1
2 AEROSOL, METERED RESPIRATORY (INHALATION)
Qty: 1 EACH | Refills: 5 | Status: SHIPPED | OUTPATIENT
Start: 2022-06-20 | End: 2022-08-29 | Stop reason: SDUPTHER

## 2022-06-20 RX ORDER — HYDROCODONE BITARTRATE AND ACETAMINOPHEN 7.5; 325 MG/1; MG/1
1 TABLET ORAL EVERY 8 HOURS PRN
Qty: 90 TABLET | Refills: 0 | Status: SHIPPED | OUTPATIENT
Start: 2022-06-20

## 2022-06-20 RX ORDER — ALBUTEROL SULFATE 90 UG/1
2 AEROSOL, METERED RESPIRATORY (INHALATION) EVERY 6 HOURS PRN
Qty: 18 G | Refills: 5 | Status: SHIPPED | OUTPATIENT
Start: 2022-06-20 | End: 2022-08-29 | Stop reason: SDUPTHER

## 2022-06-20 NOTE — TELEPHONE ENCOUNTER
Pt requesting written prescriptions for Hydrocodone and Gabapentin    LV:5/3/22  LF:4/27/22 #90 hydrocodone  LF:4/26/22 #90 w/2RF gabapentin  Tox:11/30/21

## 2022-06-22 ENCOUNTER — TELEPHONE (OUTPATIENT)
Dept: FAMILY MEDICINE CLINIC | Age: 77
End: 2022-06-22

## 2022-07-19 DIAGNOSIS — E03.9 ACQUIRED HYPOTHYROIDISM: ICD-10-CM

## 2022-07-19 DIAGNOSIS — J44.1 CHRONIC OBSTRUCTIVE PULMONARY DISEASE WITH ACUTE EXACERBATION: ICD-10-CM

## 2022-07-19 RX ORDER — LEVOTHYROXINE SODIUM 0.05 MG/1
50 TABLET ORAL DAILY
Qty: 90 TABLET | Refills: 0 | Status: SHIPPED | OUTPATIENT
Start: 2022-07-19 | End: 2022-10-28 | Stop reason: SDUPTHER

## 2022-07-19 RX ORDER — TIOTROPIUM BROMIDE INHALATION SPRAY 1.56 UG/1
2 SPRAY, METERED RESPIRATORY (INHALATION)
Qty: 1 G | Refills: 2 | Status: SHIPPED | OUTPATIENT
Start: 2022-07-19 | End: 2022-08-29 | Stop reason: SDUPTHER

## 2022-07-19 RX ORDER — MONTELUKAST SODIUM 10 MG/1
10 TABLET ORAL NIGHTLY
Qty: 90 TABLET | Refills: 1 | Status: SHIPPED | OUTPATIENT
Start: 2022-07-19 | End: 2022-10-28 | Stop reason: SDUPTHER

## 2022-07-19 NOTE — TELEPHONE ENCOUNTER
Caller: Amada Bentley    Relationship: Self    Best call back number: [phone]    Requested Prescriptions:   Requested Prescriptions     Pending Prescriptions Disp Refills   • montelukast (Singulair) 10 MG tablet 90 tablet 1     Sig: Take 1 tablet by mouth Every Night.   • levothyroxine (SYNTHROID, LEVOTHROID) 50 MCG tablet 90 tablet 0     Sig: Take 1 tablet by mouth Daily.   • Tiotropium Bromide Monohydrate (Spiriva Respimat) 1.25 MCG/ACT aerosol solution inhaler 1 g 2     Sig: Inhale 2 puffs Daily.        Pharmacy where request should be sent: [facility] [place] [facility] [place] - [address] [phone] [facility] [phone] FX     Additional details provided by patient: OUT OF SPIRIVA     Does the patient have less than a 3 day supply:  [x] Yes  [] No    Megan Miller Rep   07/19/22 10:05 EDT

## 2022-08-17 ENCOUNTER — TELEPHONE (OUTPATIENT)
Dept: FAMILY MEDICINE CLINIC | Age: 77
End: 2022-08-17

## 2022-08-17 NOTE — TELEPHONE ENCOUNTER
Caller: ANI WITH [facility] [employer]     Relationship:     Best call back number: [phone]    What is the best time to reach you: ANY TIME     Who are you requesting to speak with (clinical staff, provider,  specific staff member): NON CLINICAL       What was the call regarding: CALLER WAS INQUIRING IF THE OFFICE HAD RECEIVED A FAX TODAY 08/17/2022 FOR AN ORDER FOR THE PATIENT TO GET A HIP, NECK, AND ANKLE SUPPORT PRODUCT. STATES THAT IT NEEDS SIGNED AND FAXED BACK -[phone] CALLER HAS REQUESTED THAT OFFICE PUT ATTN: ANI NAIR  WHEN IT IS BEING FAXED BACK TO THE OFFICE.     Do you require a callback: NO ONLY IF THE OFFICE HAS ADDITIONAL QUESTIONS. THANKS.

## 2022-08-29 DIAGNOSIS — J44.1 CHRONIC OBSTRUCTIVE PULMONARY DISEASE WITH ACUTE EXACERBATION: ICD-10-CM

## 2022-08-29 RX ORDER — CETIRIZINE HYDROCHLORIDE 10 MG/1
10 TABLET ORAL DAILY
Qty: 90 TABLET | Refills: 1 | Status: SHIPPED | OUTPATIENT
Start: 2022-08-29

## 2022-08-29 RX ORDER — ALBUTEROL SULFATE 90 UG/1
2 AEROSOL, METERED RESPIRATORY (INHALATION) EVERY 6 HOURS PRN
Qty: 18 G | Refills: 5 | Status: SHIPPED | OUTPATIENT
Start: 2022-08-29 | End: 2022-10-28 | Stop reason: SDUPTHER

## 2022-08-29 RX ORDER — TIOTROPIUM BROMIDE INHALATION SPRAY 1.56 UG/1
2 SPRAY, METERED RESPIRATORY (INHALATION)
Qty: 1 G | Refills: 1 | Status: SHIPPED | OUTPATIENT
Start: 2022-08-29 | End: 2022-09-06

## 2022-08-29 RX ORDER — OMEPRAZOLE 20 MG/1
20 CAPSULE, DELAYED RELEASE ORAL DAILY
Qty: 90 CAPSULE | Refills: 0 | Status: SHIPPED | OUTPATIENT
Start: 2022-08-29 | End: 2022-12-29 | Stop reason: SDUPTHER

## 2022-08-29 RX ORDER — FLUTICASONE PROPIONATE AND SALMETEROL XINAFOATE 230; 21 UG/1; UG/1
2 AEROSOL, METERED RESPIRATORY (INHALATION)
Qty: 1 EACH | Refills: 1 | Status: SHIPPED | OUTPATIENT
Start: 2022-08-29 | End: 2022-10-28 | Stop reason: SDUPTHER

## 2022-08-29 RX ORDER — ALBUTEROL SULFATE 2.5 MG/3ML
2.5 SOLUTION RESPIRATORY (INHALATION) EVERY 4 HOURS PRN
Qty: 100 EACH | Refills: 5 | Status: SHIPPED | OUTPATIENT
Start: 2022-08-29 | End: 2023-02-09 | Stop reason: SDUPTHER

## 2022-08-29 NOTE — TELEPHONE ENCOUNTER
Pt needs the below refills AND A WRITTEN RX FOR HYDROCODONE AND GABAPENTIN .    norco ,last filled 6-20-22 #90  Last visit 5-3-22  tox 11-30-21    Gabapentin : last filled 6-20-22 #90 with 2 refills

## 2022-09-06 ENCOUNTER — TELEPHONE (OUTPATIENT)
Dept: FAMILY MEDICINE CLINIC | Age: 77
End: 2022-09-06

## 2022-09-06 ENCOUNTER — OFFICE VISIT (OUTPATIENT)
Dept: FAMILY MEDICINE CLINIC | Age: 77
End: 2022-09-06

## 2022-09-06 ENCOUNTER — REFERRAL TRIAGE (OUTPATIENT)
Dept: CASE MANAGEMENT | Facility: OTHER | Age: 77
End: 2022-09-06

## 2022-09-06 ENCOUNTER — LAB (OUTPATIENT)
Dept: LAB | Facility: HOSPITAL | Age: 77
End: 2022-09-06

## 2022-09-06 VITALS
OXYGEN SATURATION: 92 % | DIASTOLIC BLOOD PRESSURE: 74 MMHG | SYSTOLIC BLOOD PRESSURE: 153 MMHG | BODY MASS INDEX: 17.79 KG/M2 | HEART RATE: 89 BPM | HEIGHT: 60 IN | WEIGHT: 90.6 LBS

## 2022-09-06 DIAGNOSIS — Z23 ENCOUNTER FOR IMMUNIZATION: ICD-10-CM

## 2022-09-06 DIAGNOSIS — E03.9 ACQUIRED HYPOTHYROIDISM: ICD-10-CM

## 2022-09-06 DIAGNOSIS — J44.9 CHRONIC OBSTRUCTIVE PULMONARY DISEASE, UNSPECIFIED COPD TYPE: ICD-10-CM

## 2022-09-06 DIAGNOSIS — F41.9 ANXIETY AND DEPRESSION: ICD-10-CM

## 2022-09-06 DIAGNOSIS — Z11.59 ENCOUNTER FOR SCREENING FOR OTHER VIRAL DISEASES: ICD-10-CM

## 2022-09-06 DIAGNOSIS — F41.9 ANXIETY AND DEPRESSION: Primary | ICD-10-CM

## 2022-09-06 DIAGNOSIS — N18.2 CHRONIC KIDNEY DISEASE, STAGE 2 (MILD): ICD-10-CM

## 2022-09-06 DIAGNOSIS — E03.9 ACQUIRED HYPOTHYROIDISM: Primary | ICD-10-CM

## 2022-09-06 DIAGNOSIS — Z79.899 HIGH RISK MEDICATION USE: ICD-10-CM

## 2022-09-06 DIAGNOSIS — J43.2 CENTRILOBULAR EMPHYSEMA: ICD-10-CM

## 2022-09-06 DIAGNOSIS — D51.0 PERNICIOUS ANEMIA: ICD-10-CM

## 2022-09-06 DIAGNOSIS — F51.01 PRIMARY INSOMNIA: ICD-10-CM

## 2022-09-06 DIAGNOSIS — F32.A ANXIETY AND DEPRESSION: Primary | ICD-10-CM

## 2022-09-06 DIAGNOSIS — E78.00 HIGH CHOLESTEROL: ICD-10-CM

## 2022-09-06 DIAGNOSIS — F32.A ANXIETY AND DEPRESSION: ICD-10-CM

## 2022-09-06 DIAGNOSIS — F17.210 CIGARETTE NICOTINE DEPENDENCE WITHOUT COMPLICATION: ICD-10-CM

## 2022-09-06 DIAGNOSIS — R63.4 WEIGHT LOSS: ICD-10-CM

## 2022-09-06 DIAGNOSIS — I10 ESSENTIAL (PRIMARY) HYPERTENSION: ICD-10-CM

## 2022-09-06 LAB
AMPHETAMINE INTERNAL CONTROL: ABNORMAL
AMPHETAMINES UR QL: NEGATIVE
BARBITURATES UR QL SCN: NEGATIVE
BENZODIAZ UR QL SCN: NEGATIVE
BUPRENORPHINE SERPL-MCNC: NEGATIVE NG/ML
CANNABINOIDS SERPL QL: NEGATIVE
CHOLEST SERPL-MCNC: 213 MG/DL (ref 0–200)
COCAINE UR QL: NEGATIVE
DEPRECATED RDW RBC AUTO: 48.1 FL (ref 37–54)
ERYTHROCYTE [DISTWIDTH] IN BLOOD BY AUTOMATED COUNT: 12.9 % (ref 12.3–15.4)
EXPIRATION DATE: ABNORMAL
HCT VFR BLD AUTO: 40.9 % (ref 34–46.6)
HCV AB SER DONR QL: NORMAL
HDLC SERPL-MCNC: 46 MG/DL (ref 40–60)
HGB BLD-MCNC: 13.3 G/DL (ref 12–15.9)
LDLC SERPL CALC-MCNC: 143 MG/DL (ref 0–100)
LDLC/HDLC SERPL: 3.05 {RATIO}
Lab: ABNORMAL
MCH RBC QN AUTO: 32.4 PG (ref 26.6–33)
MCHC RBC AUTO-ENTMCNC: 32.5 G/DL (ref 31.5–35.7)
MCV RBC AUTO: 99.5 FL (ref 79–97)
MDMA UR QL SCN: NEGATIVE
METHADONE UR QL SCN: NEGATIVE
OPIATES UR QL: POSITIVE
OXYCODONE UR QL SCN: NEGATIVE
PCP UR QL SCN: NEGATIVE
PLATELET # BLD AUTO: 401 10*3/MM3 (ref 140–450)
PMV BLD AUTO: 9.8 FL (ref 6–12)
RBC # BLD AUTO: 4.11 10*6/MM3 (ref 3.77–5.28)
T4 FREE SERPL-MCNC: 1.15 NG/DL (ref 0.93–1.7)
TRIGL SERPL-MCNC: 134 MG/DL (ref 0–150)
TSH SERPL DL<=0.05 MIU/L-ACNC: 6.77 UIU/ML (ref 0.27–4.2)
VLDLC SERPL-MCNC: 24 MG/DL (ref 5–40)
WBC NRBC COR # BLD: 10.26 10*3/MM3 (ref 3.4–10.8)

## 2022-09-06 PROCEDURE — 99214 OFFICE O/P EST MOD 30 MIN: CPT | Performed by: FAMILY MEDICINE

## 2022-09-06 PROCEDURE — 86803 HEPATITIS C AB TEST: CPT

## 2022-09-06 PROCEDURE — 80053 COMPREHEN METABOLIC PANEL: CPT

## 2022-09-06 PROCEDURE — 80061 LIPID PANEL: CPT

## 2022-09-06 PROCEDURE — 85027 COMPLETE CBC AUTOMATED: CPT

## 2022-09-06 PROCEDURE — 84443 ASSAY THYROID STIM HORMONE: CPT

## 2022-09-06 PROCEDURE — 91305 COVID-19 (PFIZER) 12+ YRS: CPT | Performed by: FAMILY MEDICINE

## 2022-09-06 PROCEDURE — 80305 DRUG TEST PRSMV DIR OPT OBS: CPT | Performed by: FAMILY MEDICINE

## 2022-09-06 PROCEDURE — 84439 ASSAY OF FREE THYROXINE: CPT

## 2022-09-06 PROCEDURE — 36415 COLL VENOUS BLD VENIPUNCTURE: CPT

## 2022-09-06 PROCEDURE — 0054A COVID-19 (PFIZER) 12+ YRS: CPT | Performed by: FAMILY MEDICINE

## 2022-09-06 RX ORDER — BUSPIRONE HYDROCHLORIDE 30 MG/1
30 TABLET ORAL 2 TIMES DAILY
Qty: 60 TABLET | Refills: 2 | Status: SHIPPED | OUTPATIENT
Start: 2022-09-06 | End: 2022-10-28 | Stop reason: SDUPTHER

## 2022-09-06 RX ORDER — MIRTAZAPINE 15 MG/1
15 TABLET, FILM COATED ORAL NIGHTLY
Qty: 90 TABLET | Refills: 1 | Status: SHIPPED | OUTPATIENT
Start: 2022-09-06 | End: 2022-10-28 | Stop reason: SDUPTHER

## 2022-09-06 NOTE — PROGRESS NOTES
Amada Bentley presents to [facility].    Chief Complaint: Weight loss    Subjective       History of Present Illness:  HPI   I am seeing Krissy today with her daughter and she presents with concerns of ongoing weight loss.  It 1 time she was on  mirtazapine for poor appetite and poor sleep and for some reason she stopped this medication on her own and she has had been having both of these issues.  In addition she smokes a pack a day and her daughter says that she has a cigarette in her hand at all times.  Krissy has been living separately from her daughter which is helped their relationship with her daughter states that she still exhibits signs of depression.  She is currently on duloxetine 60 mg daily.  She states that she is having trouble sleeping and feels sad.  She does enjoy things in life but she just feels isolated and lonely in her current situation.  On review of her chart she is up-to-date with her mammograms, CT of the abdomen and chest done approximately a year ago were both negative for cancer.  She does have underlying thyroid disease and is on levothyroxine 50 mcg daily.  Her last TSH was low at 0.2 and she will need a repeat of this lab since weight loss can be a sign of hyperactive treatment         Today is also Denae's face-to-face for her chronic pain meds gabapentin and hydrocodone.  She takes these routinely and allows her to be functional with her chronic lower back pain and osteoarthritis pain.  She is shows no signs of diversion or abuse.  She tolerates medication well and has not had a fall in the past 6 months.        Review of Systems:  Review of Systems   Constitutional: Positive for fatigue. Negative for chills and fever.   HENT: Negative for ear pain, sinus pressure and sore throat.    Eyes: Negative for blurred vision and double vision.   Respiratory: Negative for cough, shortness of breath and wheezing.    Cardiovascular: Negative for chest pain and  palpitations.   Gastrointestinal: Negative for abdominal pain, blood in stool, constipation, diarrhea, nausea and vomiting.   Skin: Negative for rash.   Neurological: Negative for dizziness and headache.   Psychiatric/Behavioral: Positive for sleep disturbance, depressed mood and stress. Negative for suicidal ideas. The patient is nervous/anxious.         Objective   Medical History:  Past Medical History:   • Abnormal weight loss   • Anxiety   • Cardiac murmur, unspecified   • Chronic kidney disease, stage 2 (mild)   • Condition not found    LBP   • COPD (chronic obstructive pulmonary disease) (HCC)   • Depression    loss of daughter   • Diarrhea, unspecified   • Dysphagia, oropharyngeal phase   • Essential (primary) hypertension   • Gastritis   • Gastro-esophageal reflux disease without esophagitis   • Generalized anxiety disorder   • Hiatal hernia   • History of falling   • Hypercholesterolemia   • Hypothyroid   • Infected abrasion of scalp, initial encounter   • Long term (current) use of opiate analgesic   • Major depressive disorder, recurrent, moderate ([facility])   • Muscle weakness (generalized)   • Nicotine dependence, unspecified, uncomplicated   • Occlusion and stenosis of bilateral carotid arteries   • Other allergy, subsequent encounter   • Other long term (current) drug therapy   • Pain    LEFT-knee, shoulder RIGHT-hip, knee   • Pernicious anemia   • Primary insomnia   • Solitary pulmonary nodule     Past Surgical History:   • APPENDECTOMY   • CATARACT EXTRACTION, BILATERAL   • FOOT SURGERY    secondary bone spurs   • HYSTERECTOMY   • LAPAROSCOPIC CHOLECYSTECTOMY   • OTHER SURGICAL HISTORY    Ear Surger; unspecified      Family History   Problem Relation Age of Onset   • Coronary artery disease Mother    • Hyperlipidemia Mother    • Hypertension Mother    • Heart attack Mother    • Diabetes type I Mother    • Lung cancer Father         no history of smoking   • COPD Father    • Emphysema Father    •  Hypertension Sister    • Heart attack Sister    • Diabetes type I Sister    • Hypertension Brother    • Heart attack Brother         cardiac death   • Emphysema Brother    • Diabetes type I Brother    • Breast cancer Daughter    • Kidney failure Niece      Social History     Tobacco Use   • Smoking status: Current Every Day Smoker     Packs/day: 1.00     Years: 60.00     Pack years: 60.00     Types: Cigarettes     Last attempt to quit: 6/15/2021     Years since quittin.2   • Smokeless tobacco: Never Used   Substance Use Topics   • Alcohol use: Never       Health Maintenance Due   Topic Date Due   • ZOSTER VACCINE (2 of 3) 2014   • HEPATITIS C SCREENING  Never done   • LIPID PANEL  2022        Immunization History   Administered Date(s) Administered   • COVID-19 (MODERNA) 1st, 2nd, 3rd Dose Only 2021, 2021   • COVID-19 (PFIZER) PURPLE CAP 2021   • Covid-19 (Pfizer) Gray Cap 2022   • FluLaval/Fluzone >6mos 10/08/2015   • Fluzone High Dose =>65 Years ([employer] ONLY) 10/23/2013, 10/02/2014, 2015, 10/17/2016, 2017   • Fluzone High-Dose 65+yrs 2021   • Influenza, Unspecified 2020   • Pneumococcal Conjugate 13-Valent (PCV13) 2015   • Pneumococcal Polysaccharide (PPSV23) 2018   • Tdap 2021   • Zostavax 10/02/2014       No Known Allergies     Medications:  Current Outpatient Medications on File Prior to Visit   Medication Sig   • albuterol (PROVENTIL) (2.5 MG/3ML) 0.083% nebulizer solution Take 2.5 mg by nebulization Every 4 (Four) Hours As Needed for Wheezing.   • albuterol sulfate  (90 Base) MCG/ACT inhaler Inhale 2 puffs Every 6 (Six) Hours As Needed for Wheezing or Shortness of Air.   • busPIRone (BUSPAR) 15 MG tablet Take 1 tablet by mouth 2 (Two) Times a Day.   • cetirizine (zyrTEC) 10 MG tablet Take 1 tablet by mouth Daily.   • DULoxetine (CYMBALTA) 60 MG capsule Take 1 capsule by mouth Daily.   • fluticasone-salmeterol (Advair  "HFA) 230-21 MCG/ACT inhaler Inhale 2 puffs 2 (Two) Times a Day.   • gabapentin (NEURONTIN) 600 MG tablet Take 1 tablet by mouth 3 (Three) Times a Day.   • HYDROcodone-acetaminophen (NORCO) 7.5-325 MG per tablet Take 1 tablet by mouth Every 8 (Eight) Hours As Needed for Moderate Pain .   • levothyroxine (SYNTHROID, LEVOTHROID) 50 MCG tablet Take 1 tablet by mouth Daily.   • lisinopril (PRINIVIL,ZESTRIL) 20 MG tablet Take 1 tablet by mouth Daily.   • metoprolol succinate XL (TOPROL-XL) 50 MG 24 hr tablet Take 1 tablet by mouth Daily.   • montelukast (Singulair) 10 MG tablet Take 1 tablet by mouth Every Night.   • omeprazole (priLOSEC) 20 MG capsule Take 1 capsule by mouth Daily.   • potassium chloride (K-DUR,KLOR-CON) 10 MEQ CR tablet Take 2 tablets once daily   • [DISCONTINUED] lactobacillus (BACID) tablet caplet Take  by mouth Daily.   • [DISCONTINUED] mirtazapine (Remeron) 15 MG tablet Take 1 tablet by mouth Every Night.   • [DISCONTINUED] oseltamivir (Tamiflu) 75 MG capsule Take 1 capsule by mouth 2 (Two) Times a Day.   • [DISCONTINUED] promethazine-dextromethorphan (PROMETHAZINE-DM) 6.25-15 MG/5ML syrup Take 5 mL by mouth 4 (Four) Times a Day As Needed for Cough.   • [DISCONTINUED] Tiotropium Bromide Monohydrate (Spiriva Respimat) 1.25 MCG/ACT aerosol solution inhaler Inhale 2 puffs Daily.     No current facility-administered medications on file prior to visit.       Vital Signs:   /74 (BP Location: Left arm, Patient Position: Sitting, Cuff Size: Adult)   Pulse 89   Ht 152.4 cm (60\")   Wt 41.1 kg (90 lb 9.6 oz)   SpO2 92% Comment: Room air  BMI 17.69 kg/m²       Physical Exam:  Physical Exam  Vitals and nursing note reviewed.   Constitutional:       General: She is not in acute distress.     Appearance: Normal appearance. She is not ill-appearing, toxic-appearing or diaphoretic.   HENT:      Head: Normocephalic and atraumatic.      Right Ear: Tympanic membrane, ear canal and external ear normal.      " Left Ear: Tympanic membrane, ear canal and external ear normal.      Nose: No congestion or rhinorrhea.      Mouth/Throat:      Mouth: Mucous membranes are moist.      Pharynx: Oropharynx is clear. No oropharyngeal exudate or posterior oropharyngeal erythema.   Eyes:      Extraocular Movements: Extraocular movements intact.      Conjunctiva/sclera: Conjunctivae normal.      Pupils: Pupils are equal, round, and reactive to light.   Cardiovascular:      Rate and Rhythm: Normal rate and regular rhythm.      Heart sounds: Normal heart sounds.   Pulmonary:      Effort: Pulmonary effort is normal.      Breath sounds: Normal breath sounds. No wheezing, rhonchi or rales.   Abdominal:      General: Abdomen is flat.      Palpations: Abdomen is soft.   Musculoskeletal:      Cervical back: Neck supple. No rigidity.   Lymphadenopathy:      Cervical: No cervical adenopathy.   Skin:     General: Skin is warm and dry.   Neurological:      Mental Status: She is alert and oriented to person, place, and time.   Psychiatric:         Mood and Affect: Mood normal.         Behavior: Behavior normal.         Result Review      The following data was reviewed by Maricarmen Harrell MD on 09/06/2022.  Lab Results   Component Value Date    WBC 10.26 09/06/2022    HGB 13.3 09/06/2022    HCT 40.9 09/06/2022    MCV 99.5 (H) 09/06/2022     09/06/2022     Lab Results   Component Value Date    GLUCOSE 90 05/18/2021    BUN 16 05/18/2021    CREATININE 1.11 (H) 05/18/2021    BCR 14 05/18/2021    K 4.9 05/18/2021    CO2 27 05/18/2021    CALCIUM 9.3 05/18/2021    ALBUMIN 4.0 05/18/2021    LABIL2 1.3 (L) 05/18/2021    AST 7 (L) 05/18/2021    ALT <5 (L) 05/18/2021     Lab Results   Component Value Date    CHLPL 212 (H) 05/18/2021    TRIG 108 05/18/2021    HDL 51 05/18/2021     (H) 05/18/2021     Lab Results   Component Value Date    TSH 0.264 (L) 05/18/2021     No results found for: HGBA1C  No results found for: PSA                     Assessment and Plan:          Diagnoses and all orders for this visit:    1. Acquired hypothyroidism (Primary)  -     TSH+Free T4; Future    2. Chronic obstructive pulmonary disease, unspecified COPD type (HCC)  Comments:  Lungs are clear on exam.  No signs of exacerbation.  She needs to quit smoking    3. Centrilobular emphysema (HCC)  Comments:  she needs to quit smoking, will try buspar and call [phone] quitnow    4. Cigarette nicotine dependence without complication  Comments:  she needs to quit smoking, will try buspar and call 1800 quitnow  Orders:  -     Ambulatory Referral to Social Care Services (Amb Case Mgmt)    5. Anxiety and depression  Comments:  worse, will restart remeron, cont cymbalta and increase buspar  Orders:  -     Ambulatory Referral to Social Care Services (Amb Case Mgmt)    6. Weight loss  Comments:  start remeron to stimulate appetite and help with depression and sleep, get her set up with moms meals  Orders:  -     mirtazapine (Remeron) 15 MG tablet; Take 1 tablet by mouth Every Night.  Dispense: 90 tablet; Refill: 1  -     Ambulatory Referral to Social Care Services (Amb Case Mgmt)    7. Encounter for immunization  -     COVID-19 Vaccine (Pfizer) Gray Cap    8. High risk medication use  -     POC Urine Drug Screen Premier Bio-Cup    9. Primary insomnia  -     mirtazapine (Remeron) 15 MG tablet; Take 1 tablet by mouth Every Night.  Dispense: 90 tablet; Refill: 1    10. Chronic kidney disease, stage 2 (mild)    11. Primary insomnia  Comments:  We will start her  on Remeron and sleeping well  Orders:  -     mirtazapine (Remeron) 15 MG tablet; Take 1 tablet by mouth Every Night.  Dispense: 90 tablet; Refill: 1          Follow Up   Return in about 3 months (around 12/6/2022), or if symptoms worsen or fail to improve, for Medicare Wellness.

## 2022-09-07 LAB
ALBUMIN SERPL-MCNC: 4.6 G/DL (ref 3.5–5.2)
ALBUMIN/GLOB SERPL: 1.9 G/DL
ALP SERPL-CCNC: 98 U/L (ref 39–117)
ALT SERPL W P-5'-P-CCNC: <5 U/L (ref 1–33)
ANION GAP SERPL CALCULATED.3IONS-SCNC: 15 MMOL/L (ref 5–15)
AST SERPL-CCNC: 7 U/L (ref 1–32)
BILIRUB SERPL-MCNC: 0.3 MG/DL (ref 0–1.2)
BUN SERPL-MCNC: 15 MG/DL (ref 8–23)
BUN/CREAT SERPL: 13.5 (ref 7–25)
CALCIUM SPEC-SCNC: 9.5 MG/DL (ref 8.6–10.5)
CHLORIDE SERPL-SCNC: 96 MMOL/L (ref 98–107)
CO2 SERPL-SCNC: 24 MMOL/L (ref 22–29)
CREAT SERPL-MCNC: 1.11 MG/DL (ref 0.57–1)
EGFRCR SERPLBLD CKD-EPI 2021: 50.7 ML/MIN/1.73
GLOBULIN UR ELPH-MCNC: 2.4 GM/DL
GLUCOSE SERPL-MCNC: 83 MG/DL (ref 65–99)
POTASSIUM SERPL-SCNC: 4.3 MMOL/L (ref 3.5–5.2)
PROT SERPL-MCNC: 7 G/DL (ref 6–8.5)
SODIUM SERPL-SCNC: 135 MMOL/L (ref 136–145)

## 2022-09-08 NOTE — TELEPHONE ENCOUNTER
Pt's daughter is at the pharmacy and she needs a refill of cymbalta , but she is on remeron now and you increased the buspar do you want her to be on all these?  Also daughter is bring back controlled rx because [facility] would not take it the way it was written.

## 2022-09-09 RX ORDER — DULOXETIN HYDROCHLORIDE 60 MG/1
60 CAPSULE, DELAYED RELEASE ORAL DAILY
Qty: 90 CAPSULE | Refills: 0 | Status: SHIPPED | OUTPATIENT
Start: 2022-09-09 | End: 2022-12-29 | Stop reason: SDUPTHER

## 2022-09-12 ENCOUNTER — PATIENT OUTREACH (OUTPATIENT)
Dept: CASE MANAGEMENT | Facility: OTHER | Age: 77
End: 2022-09-12

## 2022-09-12 NOTE — OUTREACH NOTE
Care Coordination    MSW attempted to contact patient and left VM. MSW sent referral email to Judy at [facility] to reach out to patient to complete screening to re-start Meals on Wheels. MSW available if needed in the future.    CHUCKIE POWELL -   Ambulatory Case Management    9/12/2022, 10:13 EDT

## 2022-10-25 ENCOUNTER — PATIENT OUTREACH (OUTPATIENT)
Dept: CASE MANAGEMENT | Facility: OTHER | Age: 77
End: 2022-10-25

## 2022-10-25 NOTE — OUTREACH NOTE
Care Coordination    MSW received message from Joselyn at office regarding patient's daughter calling for an update on Mom[employer] Meals. MSW reached out to Judy at [facility]. [facility] was UTR patient on 9/12 for assessment. MSW provided patient's daughter's phone number as an alternate contact for assessment for meal delivery services.    CHUCKIE POWELL -   Ambulatory Case Management    10/25/2022, 14:12 EDT

## 2022-10-28 ENCOUNTER — TELEPHONE (OUTPATIENT)
Dept: FAMILY MEDICINE CLINIC | Age: 77
End: 2022-10-28

## 2022-10-28 DIAGNOSIS — M54.50 CHRONIC LOW BACK PAIN, UNSPECIFIED BACK PAIN LATERALITY, UNSPECIFIED WHETHER SCIATICA PRESENT: ICD-10-CM

## 2022-10-28 DIAGNOSIS — J44.1 CHRONIC OBSTRUCTIVE PULMONARY DISEASE WITH ACUTE EXACERBATION: ICD-10-CM

## 2022-10-28 DIAGNOSIS — F41.9 ANXIETY AND DEPRESSION: ICD-10-CM

## 2022-10-28 DIAGNOSIS — F32.A ANXIETY AND DEPRESSION: ICD-10-CM

## 2022-10-28 DIAGNOSIS — G89.29 CHRONIC LOW BACK PAIN, UNSPECIFIED BACK PAIN LATERALITY, UNSPECIFIED WHETHER SCIATICA PRESENT: ICD-10-CM

## 2022-10-28 DIAGNOSIS — E03.9 ACQUIRED HYPOTHYROIDISM: ICD-10-CM

## 2022-10-28 DIAGNOSIS — R63.4 WEIGHT LOSS: ICD-10-CM

## 2022-10-28 DIAGNOSIS — F51.01 PRIMARY INSOMNIA: ICD-10-CM

## 2022-10-28 RX ORDER — GABAPENTIN 600 MG/1
600 TABLET ORAL 3 TIMES DAILY
Qty: 90 TABLET | Refills: 2 | Status: CANCELLED | OUTPATIENT
Start: 2022-10-28

## 2022-10-28 RX ORDER — HYDROCODONE BITARTRATE AND ACETAMINOPHEN 7.5; 325 MG/1; MG/1
1 TABLET ORAL EVERY 8 HOURS PRN
Qty: 90 TABLET | Refills: 0 | Status: CANCELLED | OUTPATIENT
Start: 2022-10-28

## 2022-10-28 NOTE — TELEPHONE ENCOUNTER
HAS TO BE WRITTEN RX       norco 6-20-22 #90  Last visit 9-6-22  tox 9-6-22    Gabapentin  6-20-22 #90 with 2 refills  Last visit 9-6-22  tox 9-6-22

## 2022-10-28 NOTE — TELEPHONE ENCOUNTER
Caller: Amada Bentley Ann    Relationship: Self    Best call back number: [phone]    Requested Prescriptions:   Requested Prescriptions     Pending Prescriptions Disp Refills   • metoprolol succinate XL (TOPROL-XL) 50 MG 24 hr tablet 90 tablet 0     Sig: Take 1 tablet by mouth Daily.   • lisinopril (PRINIVIL,ZESTRIL) 20 MG tablet 90 tablet 0     Sig: Take 1 tablet by mouth Daily.   • mirtazapine (Remeron) 15 MG tablet 90 tablet 1     Sig: Take 1 tablet by mouth Every Night.   • busPIRone (BUSPAR) 30 MG tablet 60 tablet 2     Sig: Take 1 tablet by mouth 2 (Two) Times a Day.   • levothyroxine (SYNTHROID, LEVOTHROID) 50 MCG tablet 90 tablet 0     Sig: Take 1 tablet by mouth Daily.   • gabapentin (NEURONTIN) 600 MG tablet 90 tablet 2     Sig: Take 1 tablet by mouth 3 (Three) Times a Day.   • HYDROcodone-acetaminophen (NORCO) 7.5-325 MG per tablet 90 tablet 0     Sig: Take 1 tablet by mouth Every 8 (Eight) Hours As Needed for Moderate Pain.   • montelukast (Singulair) 10 MG tablet 90 tablet 1     Sig: Take 1 tablet by mouth Every Night.   • fluticasone-salmeterol (Advair HFA) 230-21 MCG/ACT inhaler 1 each 1     Sig: Inhale 2 puffs 2 (Two) Times a Day.   • albuterol sulfate  (90 Base) MCG/ACT inhaler 18 g 5     Sig: Inhale 2 puffs Every 6 (Six) Hours As Needed for Wheezing or Shortness of Air.        [facility] [place] [facility] [address] - [phone] [facility] [phone] FX     Additional details provided by patient: PATIENT NEEDS WRITTEN PRESCRIPTIONS OF ANY CONTROLLED SUBSTANCES.     PATIENT ALSO STATED SHE NEEDED REFILL OF : SPIRIVA RESPIMAT INHALER    Does the patient have less than a 3 day supply:  [x] Yes  [] No    Megan Birch Rep   10/28/22 10:32 EDT

## 2022-10-31 RX ORDER — LEVOTHYROXINE SODIUM 0.05 MG/1
50 TABLET ORAL DAILY
Qty: 90 TABLET | Refills: 0 | Status: SHIPPED | OUTPATIENT
Start: 2022-10-31 | End: 2022-11-28 | Stop reason: DRUGHIGH

## 2022-10-31 RX ORDER — MIRTAZAPINE 15 MG/1
15 TABLET, FILM COATED ORAL NIGHTLY
Qty: 90 TABLET | Refills: 0 | Status: SHIPPED | OUTPATIENT
Start: 2022-10-31 | End: 2023-01-13 | Stop reason: SDUPTHER

## 2022-10-31 RX ORDER — ALBUTEROL SULFATE 90 UG/1
2 AEROSOL, METERED RESPIRATORY (INHALATION) EVERY 6 HOURS PRN
Qty: 18 G | Refills: 5 | Status: SHIPPED | OUTPATIENT
Start: 2022-10-31 | End: 2023-02-09 | Stop reason: SDUPTHER

## 2022-10-31 RX ORDER — METOPROLOL SUCCINATE 50 MG/1
50 TABLET, EXTENDED RELEASE ORAL DAILY
Qty: 90 TABLET | Refills: 0 | Status: SHIPPED | OUTPATIENT
Start: 2022-10-31 | End: 2022-11-28 | Stop reason: SDUPTHER

## 2022-10-31 RX ORDER — BUSPIRONE HYDROCHLORIDE 30 MG/1
30 TABLET ORAL 2 TIMES DAILY
Qty: 60 TABLET | Refills: 2 | Status: SHIPPED | OUTPATIENT
Start: 2022-10-31

## 2022-10-31 RX ORDER — LISINOPRIL 20 MG/1
20 TABLET ORAL DAILY
Qty: 90 TABLET | Refills: 0 | Status: SHIPPED | OUTPATIENT
Start: 2022-10-31

## 2022-10-31 RX ORDER — FLUTICASONE PROPIONATE AND SALMETEROL XINAFOATE 230; 21 UG/1; UG/1
2 AEROSOL, METERED RESPIRATORY (INHALATION)
Qty: 1 EACH | Refills: 2 | Status: SHIPPED | OUTPATIENT
Start: 2022-10-31 | End: 2023-02-09 | Stop reason: SDUPTHER

## 2022-10-31 RX ORDER — MONTELUKAST SODIUM 10 MG/1
10 TABLET ORAL NIGHTLY
Qty: 90 TABLET | Refills: 0 | Status: SHIPPED | OUTPATIENT
Start: 2022-10-31

## 2022-10-31 NOTE — TELEPHONE ENCOUNTER
Casimiro richmond for the written controls but please review the pended medications that she needs also

## 2022-11-06 ENCOUNTER — READMISSION MANAGEMENT (OUTPATIENT)
Dept: CALL CENTER | Facility: HOSPITAL | Age: 77
End: 2022-11-06

## 2022-11-07 ENCOUNTER — TRANSITIONAL CARE MANAGEMENT TELEPHONE ENCOUNTER (OUTPATIENT)
Dept: CALL CENTER | Facility: HOSPITAL | Age: 77
End: 2022-11-07

## 2022-11-07 NOTE — OUTREACH NOTE
Prep Survey    Flowsheet Row Responses   [facility] facility patient discharged from? Non-BH   Is LACE score < 7 ? Non-BH Discharge   Emergency Room discharge w/ pulse ox? No   [facility] -    Date of Discharge 11/06/22   Discharge Disposition Home or Self Care   Discharge diagnosis Acute respiratory failure with hypoxia   Does the patient have one of the following disease processes/diagnoses(primary or secondary)? Other   Prep survey completed? Yes          JULIA KATZ - Registered Nurse

## 2022-11-07 NOTE — OUTREACH NOTE
Call Center TCM Note    Flowsheet Row Responses   [facility] patient discharged from? Non-   Does the patient have one of the following disease processes/diagnoses(primary or secondary)? Other   TCM attempt successful? Yes   Call start time 0955   Call end time 0959   Discharge diagnosis Acute respiratory failure with hypoxia   Meds reviewed with patient/caregiver? Yes   Is the patient having any side effects they believe may be caused by any medication additions or changes? No   Does the patient have all medications ordered at discharge? Yes   Prescription comments antibiotic/prednisone   Is the patient taking all medications as directed (includes completed medication regime)? Yes   Does the patient have an appointment with their PCP within 7 days of discharge? Yes  [11/11/22 at 9:15 AM]   Psychosocial issues? No   Did the patient receive a copy of their discharge instructions? Yes   Nursing interventions Reviewed instructions with patient   What is the patient's perception of their health status since discharge? Improving   Is the patient/caregiver able to teach back signs and symptoms related to disease process for when to call PCP? Yes   Is the patient/caregiver able to teach back signs and symptoms related to disease process for when to call 911? Yes   Is the patient/caregiver able to teach back the hierarchy of who to call/visit for symptoms/problems? PCP, Specialist, Home health nurse, Urgent Care, ED, 911 Yes   If the patient is a current smoker, are they able to teach back resources for cessation? Not a smoker   TCM call completed? Yes   Wrap up additional comments Pt states she is doing better, and denies SOB. Pt reports she was sent home from [facility] with an antibiotic, and prednisone. Pt verified PCP hospital fu appt on 11/11/22. No questons/concerns.    Call end time 0959   Would this patient benefit from a Referral to [facility] Social Work? No   Is the patient interested in additional calls  from an ambulatory ?  NOTE:  applies to high risk patients requiring additional follow-up. Leticia Dexter RN    11/7/2022, 10:02 EST

## 2022-11-11 ENCOUNTER — LAB (OUTPATIENT)
Dept: LAB | Facility: HOSPITAL | Age: 77
End: 2022-11-11

## 2022-11-11 ENCOUNTER — OFFICE VISIT (OUTPATIENT)
Dept: FAMILY MEDICINE CLINIC | Age: 77
End: 2022-11-11

## 2022-11-11 VITALS
HEART RATE: 68 BPM | HEIGHT: 60 IN | DIASTOLIC BLOOD PRESSURE: 77 MMHG | WEIGHT: 102.4 LBS | SYSTOLIC BLOOD PRESSURE: 184 MMHG | TEMPERATURE: 98.2 F | BODY MASS INDEX: 20.1 KG/M2 | OXYGEN SATURATION: 93 %

## 2022-11-11 DIAGNOSIS — Z72.0 TOBACCO ABUSE: ICD-10-CM

## 2022-11-11 DIAGNOSIS — J44.9 CHRONIC OBSTRUCTIVE PULMONARY DISEASE, UNSPECIFIED COPD TYPE: ICD-10-CM

## 2022-11-11 DIAGNOSIS — R93.89 ABNORMAL CHEST CT: ICD-10-CM

## 2022-11-11 DIAGNOSIS — R06.02 SHORTNESS OF BREATH: ICD-10-CM

## 2022-11-11 DIAGNOSIS — R63.4 WEIGHT LOSS: ICD-10-CM

## 2022-11-11 DIAGNOSIS — R06.02 SHORTNESS OF BREATH: Primary | ICD-10-CM

## 2022-11-11 DIAGNOSIS — I10 ESSENTIAL (PRIMARY) HYPERTENSION: ICD-10-CM

## 2022-11-11 DIAGNOSIS — R91.1 SOLITARY PULMONARY NODULE: ICD-10-CM

## 2022-11-11 LAB
ALBUMIN SERPL-MCNC: 3.7 G/DL (ref 3.5–5.2)
ALBUMIN/GLOB SERPL: 1.3 G/DL
ALP SERPL-CCNC: 55 U/L (ref 39–117)
ALT SERPL W P-5'-P-CCNC: 7 U/L (ref 1–33)
ANION GAP SERPL CALCULATED.3IONS-SCNC: 8.7 MMOL/L (ref 5–15)
AST SERPL-CCNC: 13 U/L (ref 1–32)
BASOPHILS # BLD AUTO: 0.03 10*3/MM3 (ref 0–0.2)
BASOPHILS NFR BLD AUTO: 0.4 % (ref 0–1.5)
BILIRUB SERPL-MCNC: <0.2 MG/DL (ref 0–1.2)
BUN SERPL-MCNC: 23 MG/DL (ref 8–23)
BUN/CREAT SERPL: 17.8 (ref 7–25)
CALCIUM SPEC-SCNC: 9 MG/DL (ref 8.6–10.5)
CHLORIDE SERPL-SCNC: 103 MMOL/L (ref 98–107)
CO2 SERPL-SCNC: 28.3 MMOL/L (ref 22–29)
CREAT SERPL-MCNC: 1.29 MG/DL (ref 0.57–1)
DEPRECATED RDW RBC AUTO: 56 FL (ref 37–54)
EGFRCR SERPLBLD CKD-EPI 2021: 42.3 ML/MIN/1.73
EOSINOPHIL # BLD AUTO: 0.26 10*3/MM3 (ref 0–0.4)
EOSINOPHIL NFR BLD AUTO: 3.3 % (ref 0.3–6.2)
ERYTHROCYTE [DISTWIDTH] IN BLOOD BY AUTOMATED COUNT: 14.1 % (ref 12.3–15.4)
GLOBULIN UR ELPH-MCNC: 2.8 GM/DL
GLUCOSE SERPL-MCNC: 85 MG/DL (ref 65–99)
HCT VFR BLD AUTO: 39 % (ref 34–46.6)
HGB BLD-MCNC: 12.6 G/DL (ref 12–15.9)
IMM GRANULOCYTES # BLD AUTO: 0.02 10*3/MM3 (ref 0–0.05)
IMM GRANULOCYTES NFR BLD AUTO: 0.3 % (ref 0–0.5)
LYMPHOCYTES # BLD AUTO: 4.14 10*3/MM3 (ref 0.7–3.1)
LYMPHOCYTES NFR BLD AUTO: 52.1 % (ref 19.6–45.3)
MCH RBC QN AUTO: 34.1 PG (ref 26.6–33)
MCHC RBC AUTO-ENTMCNC: 32.3 G/DL (ref 31.5–35.7)
MCV RBC AUTO: 105.7 FL (ref 79–97)
MONOCYTES # BLD AUTO: 0.59 10*3/MM3 (ref 0.1–0.9)
MONOCYTES NFR BLD AUTO: 7.4 % (ref 5–12)
NEUTROPHILS NFR BLD AUTO: 2.91 10*3/MM3 (ref 1.7–7)
NEUTROPHILS NFR BLD AUTO: 36.5 % (ref 42.7–76)
PLATELET # BLD AUTO: 412 10*3/MM3 (ref 140–450)
PMV BLD AUTO: 9.5 FL (ref 6–12)
POTASSIUM SERPL-SCNC: 4.8 MMOL/L (ref 3.5–5.2)
PROT SERPL-MCNC: 6.5 G/DL (ref 6–8.5)
RBC # BLD AUTO: 3.69 10*6/MM3 (ref 3.77–5.28)
SODIUM SERPL-SCNC: 140 MMOL/L (ref 136–145)
WBC NRBC COR # BLD: 7.95 10*3/MM3 (ref 3.4–10.8)

## 2022-11-11 PROCEDURE — 36415 COLL VENOUS BLD VENIPUNCTURE: CPT

## 2022-11-11 PROCEDURE — 80053 COMPREHEN METABOLIC PANEL: CPT

## 2022-11-11 PROCEDURE — 85025 COMPLETE CBC W/AUTO DIFF WBC: CPT

## 2022-11-11 PROCEDURE — 1111F DSCHRG MED/CURRENT MED MERGE: CPT | Performed by: NURSE PRACTITIONER

## 2022-11-11 PROCEDURE — 99496 TRANSJ CARE MGMT HIGH F2F 7D: CPT | Performed by: NURSE PRACTITIONER

## 2022-11-11 NOTE — PROGRESS NOTES
"Chief Complaint  Hospital Follow Up Visit ([facility] ER for Shortness of Breath 11/4/22 - 11/6/22)    Subjective          Amada Bentley presents to [facility] for transition of care visit.  Patient went to ER at [facility] on 11/4/2022.  She was admitted and was discharged 2 days later on the 6.  She was given azithromycin, prednisone and breathing treatments.  She was discharged on prednisone and azithromycin.  She has nebulizer at home.  She states she is feeling much better.  She states over the last couple of years she has been losing weight steadily.  She states that her appetite has not changed however.  She is taking Remeron 15 mg every night.  Upon review there was a CT of her chest in June 2021 that noted peripherally nodular cavity of the right middle lobe. She has not had a CT since. Pt states she use to see a pulmonologist several years ago. She is smoking 1 ppd. She has not taken BP meds this am. Denies fever, chills , nausea, vomiting, diarrhea or chest pain.        Objective   Vital Signs:   Vitals:    11/11/22 0932 11/11/22 0935   BP: (!) 181/48 (!) 184/77   BP Location: Right arm Right arm   Patient Position: Sitting Sitting   Cuff Size: Adult Pediatric   Pulse: 70 68   Temp: 98.2 °F (36.8 °C)    TempSrc: Oral    SpO2: 93% 93%   Weight: 46.4 kg (102 lb 6.4 oz)    Height: 152.4 cm (60\")        Physical Exam  Vitals reviewed.   Constitutional:       General: She is not in acute distress.     Appearance: Normal appearance. She is well-developed.   HENT:      Head: Normocephalic and atraumatic.   Eyes:      Conjunctiva/sclera: Conjunctivae normal.      Pupils: Pupils are equal, round, and reactive to light.   Cardiovascular:      Rate and Rhythm: Normal rate and regular rhythm.      Heart sounds: Normal heart sounds. No murmur heard.  Pulmonary:      Effort: Pulmonary effort is normal. No respiratory distress.      Breath sounds: Wheezing present.      Comments: Diminished at " bases. On RA  Skin:     General: Skin is warm and dry.   Neurological:      Mental Status: She is alert and oriented to person, place, and time.   Psychiatric:         Mood and Affect: Mood and affect normal.         Behavior: Behavior normal.         Thought Content: Thought content normal.         Judgment: Judgment normal.          Result Review :   The following data was reviewed by: ALBERTO Latham on 11/11/2022:  SCANNED - IMAGING (06/18/2021)           Assessment and Plan    Diagnoses and all orders for this visit:    1. Shortness of breath (Primary)  Comments:  Will notify pt of results. Go back to ED with any worsening soa.   Orders:  -     CBC Auto Differential; Future  -     Comprehensive Metabolic Panel; Future  -     CT Chest With Contrast; Future    2. Abnormal chest CT  Comments:  CT of chest.   Orders:  -     CT Chest With Contrast; Future    3. Chronic obstructive pulmonary disease, unspecified COPD type (HCC)  Assessment & Plan:  Sending to pulmonology. Continue nebs and inhalers.       Orders:  -     Ambulatory Referral to Pulmonology    4. Weight loss  Comments:  Reviewed weight loss over the last year. It does not appear she had lost weight. The year prior to that, she lost 20-30 pounds. Cont remeron and f/u with pcp.    5. Essential (primary) hypertension  Assessment & Plan:  Take medication right away once home. F/u with pcp as scheduled next month.       6. Tobacco abuse  Comments:  Encouraged cessation.   Patient to notify office with any acute concerns or issues.  Patient verbalizes understanding, agrees with plan of care and has no further questions upon discharge.    Please note that portions of this note were completed with a voice recognition program.    Follow Up    Return for Next scheduled follow up.  Patient was given instructions and counseling regarding her condition or for health maintenance advice. Please see specific information pulled into the AVS if appropriate.

## 2022-11-14 ENCOUNTER — HOSPITAL ENCOUNTER (OUTPATIENT)
Dept: CT IMAGING | Facility: HOSPITAL | Age: 77
Discharge: HOME | End: 2022-11-14
Admitting: NURSE PRACTITIONER

## 2022-11-14 DIAGNOSIS — R93.89 ABNORMAL CHEST CT: ICD-10-CM

## 2022-11-14 DIAGNOSIS — R06.02 SHORTNESS OF BREATH: ICD-10-CM

## 2022-11-14 PROCEDURE — 71260 CT THORAX DX C+: CPT

## 2022-11-14 PROCEDURE — 0 IOPAMIDOL PER 1 ML: Performed by: NURSE PRACTITIONER

## 2022-11-14 RX ADMIN — IOPAMIDOL 100 ML: 755 INJECTION, SOLUTION INTRAVENOUS at 15:49

## 2022-11-16 ENCOUNTER — READMISSION MANAGEMENT (OUTPATIENT)
Dept: CALL CENTER | Facility: HOSPITAL | Age: 77
End: 2022-11-16

## 2022-11-16 NOTE — OUTREACH NOTE
Prep Survey    Flowsheet Row Responses   [facility]uddh[facility] facility patient discharged from? Non-BH   Is LACE score < 7 ? Non-BH Discharge   Emergency Room discharge w/ pulse ox? No   Eligibility [facility]   Hospital [facility]   Date of Admission 11/14/22   Date of Discharge 11/16/22   Discharge diagnosis COPD   Does the patient have one of the following disease processes/diagnoses(primary or secondary)? COPD   Prep survey completed? Yes          CEZAR Villa Registered Nurse

## 2022-11-17 ENCOUNTER — TRANSITIONAL CARE MANAGEMENT TELEPHONE ENCOUNTER (OUTPATIENT)
Dept: CALL CENTER | Facility: HOSPITAL | Age: 77
End: 2022-11-17

## 2022-11-17 NOTE — OUTREACH NOTE
Call Center TCM Note    Flowsheet Row Responses   [facility] patient discharged from? Non-   Does the patient have one of the following disease processes/diagnoses(primary or secondary)? COPD   TCM attempt successful? Yes   Call start time 1056   Call end time 1058   Discharge diagnosis COPD   Meds reviewed with patient/caregiver? Yes   Is the patient having any side effects they believe may be caused by any medication additions or changes? No   Does the patient have all medications ordered at discharge? No   What is keeping the patient from filling the prescriptions? --  [will p/u RX's today]   Is the patient taking all medications as directed (includes completed medication regime)? Yes   Comments Hospital d/c f/u appt on 11/28/22 @3pm   Does the patient have an appointment with their PCP within 7 days of discharge? Yes   Has home health visited the patient within 72 hours of discharge? N/A   DME comments home oxygen prior to this admission, wears 2LO2 prn   Psychosocial issues? No   Did the patient receive a copy of their discharge instructions? Yes   Nursing interventions Reviewed instructions with patient   What is the patient's perception of their health status since discharge? Improving   Is the patient/caregiver able to teach back the hierarchy of who to call/visit for symptoms/problems? PCP, Specialist, Home health nurse, Urgent Care, ED, 911 Yes   TCM call completed? Yes   Call end time 1058   Would this patient benefit from a Referral to Amb Social Work? No   Is the patient interested in additional calls from an ambulatory ?  NOTE:  applies to high risk patients requiring additional follow-up. No          Johanna Mae RN    11/17/2022, 10:59 EST

## 2022-11-23 ENCOUNTER — READMISSION MANAGEMENT (OUTPATIENT)
Dept: CALL CENTER | Facility: HOSPITAL | Age: 77
End: 2022-11-23

## 2022-11-23 NOTE — OUTREACH NOTE
Prep Survey    Flowsheet Row Responses   [facility]? [facility]   Is LACE score < 7 ? Non-BH Discharge   Emergency Room discharge w/ pulse ox? No   Eligibility TCM   Hospital [facility]    Date of Discharge 11/23/22   Discharge Disposition Home or Self Care   Discharge diagnosis COPD with acute exacerbation    Does the patient have one of the following disease processes/diagnoses(primary or secondary)? Other   Prep survey completed? Yes          DIEGO TIPTON - Registered Nurse

## 2022-11-25 ENCOUNTER — TRANSITIONAL CARE MANAGEMENT TELEPHONE ENCOUNTER (OUTPATIENT)
Dept: CALL CENTER | Facility: HOSPITAL | Age: 77
End: 2022-11-25

## 2022-11-25 NOTE — OUTREACH NOTE
Call Center TCM Note    Flowsheet Row Responses   [facility] patient discharged from? Non-  [[facility] ]   Does the patient have one of the following disease processes/diagnoses(primary or secondary)? Other   TCM attempt successful? Yes   Call start time 1307   Call end time 1312   Discharge diagnosis COPD with acute exacerbation    Person spoke with today (if not patient) and relationship patient   Meds reviewed with patient/caregiver? Yes  [Patient reports steroid ordered at discharge. Will bring discharge med list to f/u appt. ]   Does the patient have all medications ordered at discharge? No   What is keeping the patient from filling the prescriptions? --  [Patient reports hasn't picked up prednisone from pharmacy yet. ]   Nursing Interventions Nurse provided patient education   Is the patient taking all medications as directed (includes completed medication regime)? No   What is preventing the patient from taking all medications as directed? Other  [see above. ]   Nursing Interventions Nurse provided patient education  [ADvised to  meds from pharmacy asap and take as prescribed. ]   Comments PCP Dr Harrell. [facility] d/c f/u appt on 11/28/22 @3pm   Does the patient have an appointment with their PCP within 7 days of discharge? Yes   Has home health visited the patient within 72 hours of discharge? N/A   DME comments Uses home O2 as needed and uses home nebulizer.    Psychosocial issues? No   Did the patient receive a copy of their discharge instructions? Yes   Nursing interventions Reviewed instructions with patient   What is the patient's perception of their health status since discharge? Improving   Is the patient/caregiver able to teach back signs and symptoms related to disease process for when to call PCP? Yes   Is the patient/caregiver able to teach back signs and symptoms related to disease process for when to call 911? Yes   Is the patient/caregiver able to teach back the hierarchy of  who to call/visit for symptoms/problems? PCP, Specialist, Home health nurse, Urgent Care, ED, 911 Yes   TCM call completed? Yes   Call end time 1312   Would this patient benefit from a Referral to [facility] Social Work? No   Is the patient interested in additional calls from an ambulatory ?  NOTE:  applies to high risk patients requiring additional follow-up. No          Radha Duffy RN    11/25/2022, 13:12 EST

## 2022-11-28 ENCOUNTER — OFFICE VISIT (OUTPATIENT)
Dept: FAMILY MEDICINE CLINIC | Age: 77
End: 2022-11-28

## 2022-11-28 VITALS
HEIGHT: 60 IN | HEART RATE: 95 BPM | OXYGEN SATURATION: 95 % | BODY MASS INDEX: 18.85 KG/M2 | DIASTOLIC BLOOD PRESSURE: 78 MMHG | SYSTOLIC BLOOD PRESSURE: 162 MMHG | WEIGHT: 96 LBS

## 2022-11-28 DIAGNOSIS — E03.9 ACQUIRED HYPOTHYROIDISM: ICD-10-CM

## 2022-11-28 DIAGNOSIS — I10 ESSENTIAL (PRIMARY) HYPERTENSION: Primary | ICD-10-CM

## 2022-11-28 DIAGNOSIS — R93.89 ABNORMAL CHEST CT: ICD-10-CM

## 2022-11-28 DIAGNOSIS — N18.2 CHRONIC KIDNEY DISEASE, STAGE 2 (MILD): ICD-10-CM

## 2022-11-28 DIAGNOSIS — Z65.8 SOCIAL DISCORD: ICD-10-CM

## 2022-11-28 DIAGNOSIS — R06.09 DYSPNEA ON EXERTION: ICD-10-CM

## 2022-11-28 DIAGNOSIS — J44.9 CHRONIC OBSTRUCTIVE PULMONARY DISEASE, UNSPECIFIED COPD TYPE: ICD-10-CM

## 2022-11-28 DIAGNOSIS — R79.89 ELEVATED BRAIN NATRIURETIC PEPTIDE (BNP) LEVEL: ICD-10-CM

## 2022-11-28 DIAGNOSIS — Z72.0 TOBACCO ABUSE: ICD-10-CM

## 2022-11-28 PROCEDURE — 1111F DSCHRG MED/CURRENT MED MERGE: CPT | Performed by: FAMILY MEDICINE

## 2022-11-28 PROCEDURE — 99496 TRANSJ CARE MGMT HIGH F2F 7D: CPT | Performed by: FAMILY MEDICINE

## 2022-11-28 RX ORDER — LEVOTHYROXINE SODIUM 0.07 MG/1
75 TABLET ORAL DAILY
COMMUNITY
Start: 2022-11-16

## 2022-11-28 RX ORDER — PREDNISONE 20 MG/1
40 TABLET ORAL DAILY
COMMUNITY
Start: 2022-11-24 | End: 2022-11-28

## 2022-11-28 RX ORDER — METOPROLOL SUCCINATE 50 MG/1
50 TABLET, EXTENDED RELEASE ORAL 2 TIMES DAILY
Qty: 180 TABLET | Refills: 0 | Status: SHIPPED | OUTPATIENT
Start: 2022-11-28

## 2022-11-28 NOTE — PROGRESS NOTES
Amada Bentley presents to [facility].    Chief Complaint: hospitalization for flu    Subjective       History of Present Illness:  [facility]   HOSPITAL ADMIT Monday NOV 23-D/C WED NOV 25.     Ms Bentley is a 76 y/o WF who was recently hospitalized with COPD exacerbation due to influenza infection.  She has been to the ER 3 x in past few weeks.  She has chronic COPD and is O2 dependent 2 liters NC went to   ER due to dyspnea.  She was treated with steroids and breathing treatments as well as supplemental O2. She was also found to have macrocytic anemia for which B12 was checked and was low, she therefore received a B12 injection. On the day of discharge she underwent 6 minute walk test and remained sats above goal on room air. She was recommended to stop smoking and continues to defer quitting.. She was also found to have mildly elevated troponin during her stay which remained stable without significant elevation.It was presumed her elevated troponin is due to hypoxemia and CKD. In addition our NP Lacey Le reviewed a CT done this past summer that showed a lesion in the lung and malignancy could not be ruled out.  On further review this lesion has been present for a while and Amada Plaza had a negaive PET scan in 12/7/2020 at [facility].  She use to see Washington Melgoza, pulmonology and had a neg biopsy of the lung.  She has not seen a pulmologist in years.      Most recent CT chest IMPRESSION:    1. Pulmonary cyst within the right middle lobe with peripheral nodules measuring 8 mm   anteromedially and 11 mm posteriorly. These could represent soft tissue nodules are malignancy or   findings of fungal infection or coalescent secretions. No prior CT imaging is available for direct   comparison. In the absence of imaging documenting stability, nuclear medicine PET-CT would be   recommended.  2. Bilateral bronchial wall thickening with areas of tree-in-bud nodularity likely reflecting   infectious or  inflammatory bronchiolitis. This can be seen with chronic aspiration or atypical   infection such as ROX.     PET scan was ordered and she failed to go to the appt so I will reschedule today.     Review of Systems:  Review of Systems   Constitutional: Positive for fatigue. Negative for chills and fever.   HENT: Negative for ear pain, sinus pressure and sore throat.    Eyes: Negative for blurred vision and double vision.   Respiratory: Negative for cough, shortness of breath and wheezing.    Cardiovascular: Negative for chest pain and palpitations.   Gastrointestinal: Negative for abdominal pain, blood in stool, constipation, diarrhea, nausea and vomiting.   Skin: Negative for rash.   Neurological: Negative for dizziness and headache.        Objective   Medical History:  Past Medical History:   • Abnormal weight loss   • Anxiety   • Cardiac murmur, unspecified   • Chronic kidney disease, stage 2 (mild)   • Condition not found    LBP   • COPD (chronic obstructive pulmonary disease) (HCC)   • Depression    loss of daughter   • Diarrhea, unspecified   • Dysphagia, oropharyngeal phase   • Essential (primary) hypertension   • Gastritis   • Gastro-esophageal reflux disease without esophagitis   • Generalized anxiety disorder   • Hiatal hernia   • History of falling   • Hypercholesterolemia   • Hypothyroid   • Infected abrasion of scalp, initial encounter   • Long term (current) use of opiate analgesic   • Major depressive disorder, recurrent, moderate ([facility])   • Muscle weakness (generalized)   • Nicotine dependence, unspecified, uncomplicated   • Occlusion and stenosis of bilateral carotid arteries   • Other allergy, subsequent encounter   • Other long term (current) drug therapy   • Pain    LEFT-knee, shoulder RIGHT-hip, knee   • Pernicious anemia   • Primary insomnia   • Solitary pulmonary nodule     Past Surgical History:   • APPENDECTOMY   • CATARACT EXTRACTION, BILATERAL   • FOOT SURGERY    secondary bone spurs   •  HYSTERECTOMY   • LAPAROSCOPIC CHOLECYSTECTOMY   • OTHER SURGICAL HISTORY    Ear Surger; unspecified      Family History   Problem Relation Age of Onset   • Coronary artery disease Mother    • Hyperlipidemia Mother    • Hypertension Mother    • Heart attack Mother    • Diabetes type I Mother    • Lung cancer Father         no history of smoking   • COPD Father    • Emphysema Father    • Hypertension Sister    • Heart attack Sister    • Diabetes type I Sister    • Hypertension Brother    • Heart attack Brother         cardiac death   • Emphysema Brother    • Diabetes type I Brother    • Breast cancer Daughter    • Kidney failure Niece      Social History     Tobacco Use   • Smoking status: Every Day     Packs/day: 1.00     Years: 60.00     Pack years: 60.00     Types: Cigarettes     Last attempt to quit: 6/15/2021     Years since quittin.4   • Smokeless tobacco: Never   Substance Use Topics   • Alcohol use: Never       Health Maintenance Due   Topic Date Due   • ZOSTER VACCINE (2 of 3) 2014   • COVID-19 Vaccine (5 - Booster for Moderna series) 2022        Immunization History   Administered Date(s) Administered   • COVID-19 (MODERNA) 1st, 2nd, 3rd Dose Only 2021, 2021   • COVID-19 (PFIZER) PURPLE CAP 2021   • Covid-19 (Pfizer) Gray Cap 2022   • FluLaval/Fluzone >6mos 10/08/2015   • Fluzone High Dose =>65 Years ([employer] ONLY) 10/23/2013, 10/02/2014, 2015, 10/17/2016, 2017   • Fluzone High-Dose 65+yrs 2021, 2022   • Influenza, Unspecified 2020   • Pneumococcal Conjugate 13-Valent (PCV13) 2015   • Pneumococcal Polysaccharide (PPSV23) 2018   • Tdap 2021   • Zostavax 10/02/2014       No Known Allergies     Medications:  Current Outpatient Medications on File Prior to Visit   Medication Sig   • albuterol (PROVENTIL) (2.5 MG/3ML) 0.083% nebulizer solution Take 2.5 mg by nebulization Every 4 (Four) Hours As Needed for Wheezing.   •  "albuterol sulfate  (90 Base) MCG/ACT inhaler Inhale 2 puffs Every 6 (Six) Hours As Needed for Wheezing or Shortness of Air.   • busPIRone (BUSPAR) 30 MG tablet Take 1 tablet by mouth 2 (Two) Times a Day.   • cetirizine (zyrTEC) 10 MG tablet Take 1 tablet by mouth Daily.   • DULoxetine (CYMBALTA) 60 MG capsule Take 1 capsule by mouth Daily.   • fluticasone-salmeterol (Advair HFA) 230-21 MCG/ACT inhaler Inhale 2 puffs 2 (Two) Times a Day.   • gabapentin (NEURONTIN) 600 MG tablet Take 1 tablet by mouth 3 (Three) Times a Day.   • HYDROcodone-acetaminophen (NORCO) 7.5-325 MG per tablet Take 1 tablet by mouth Every 8 (Eight) Hours As Needed for Moderate Pain .   • levothyroxine (SYNTHROID, LEVOTHROID) 75 MCG tablet Take 75 mcg by mouth Daily.   • lisinopril (PRINIVIL,ZESTRIL) 20 MG tablet Take 1 tablet by mouth Daily.   • mirtazapine (Remeron) 15 MG tablet Take 1 tablet by mouth Every Night.   • montelukast (Singulair) 10 MG tablet Take 1 tablet by mouth Every Night.   • omeprazole (priLOSEC) 20 MG capsule Take 1 capsule by mouth Daily.   • potassium chloride (K-DUR,KLOR-CON) 10 MEQ CR tablet Take 2 tablets once daily   • tiotropium (SPIRIVA) 18 MCG per inhalation capsule Place 18 mcg into inhaler and inhale Daily.   • [DISCONTINUED] levothyroxine (SYNTHROID, LEVOTHROID) 50 MCG tablet Take 1 tablet by mouth Daily.   • [DISCONTINUED] metoprolol succinate XL (TOPROL-XL) 50 MG 24 hr tablet Take 1 tablet by mouth Daily.   • predniSONE (DELTASONE) 20 MG tablet Take 40 mg by mouth Daily.     No current facility-administered medications on file prior to visit.       Vital Signs:   /78   Pulse 95   Ht 152.4 cm (60\")   Wt 43.5 kg (96 lb)   SpO2 95%   BMI 18.75 kg/m²       Physical Exam:  Physical Exam  Vitals and nursing note reviewed.   Constitutional:       General: She is not in acute distress.     Appearance: Normal appearance. She is not ill-appearing, toxic-appearing or diaphoretic.   HENT:      Head: " Normocephalic and atraumatic.      Right Ear: Tympanic membrane, ear canal and external ear normal.      Left Ear: Tympanic membrane, ear canal and external ear normal.      Nose: No congestion or rhinorrhea.      Mouth/Throat:      Mouth: Mucous membranes are moist.      Pharynx: Oropharynx is clear. No oropharyngeal exudate or posterior oropharyngeal erythema.   Eyes:      Extraocular Movements: Extraocular movements intact.      Conjunctiva/sclera: Conjunctivae normal.      Pupils: Pupils are equal, round, and reactive to light.   Cardiovascular:      Rate and Rhythm: Normal rate and regular rhythm.      Heart sounds: Normal heart sounds.   Pulmonary:      Effort: Pulmonary effort is normal.      Breath sounds: Normal breath sounds. No wheezing, rhonchi or rales.   Abdominal:      General: Abdomen is flat.      Palpations: Abdomen is soft.   Musculoskeletal:      Cervical back: Neck supple. No rigidity.   Lymphadenopathy:      Cervical: No cervical adenopathy.   Skin:     General: Skin is warm and dry.   Neurological:      Mental Status: She is alert and oriented to person, place, and time.   Psychiatric:         Mood and Affect: Mood normal.         Behavior: Behavior normal.         Result Review      The following data was reviewed by Maricarmen Harrell MD on 11/28/2022.  Lab Results   Component Value Date    WBC 7.95 11/11/2022    HGB 12.6 11/11/2022    HCT 39.0 11/11/2022    .7 (H) 11/11/2022     11/11/2022     Lab Results   Component Value Date    GLUCOSE 85 11/11/2022    BUN 23 11/11/2022    CREATININE 1.29 (H) 11/11/2022    BCR 17.8 11/11/2022    K 4.8 11/11/2022    CO2 28.3 11/11/2022    CALCIUM 9.0 11/11/2022    ALBUMIN 3.70 11/11/2022    LABIL2 1.3 (L) 05/18/2021    AST 13 11/11/2022    ALT 7 11/11/2022     Lab Results   Component Value Date    CHOL 213 (H) 09/06/2022    CHLPL 212 (H) 05/18/2021    TRIG 134 09/06/2022    HDL 46 09/06/2022     (H) 09/06/2022     Lab Results    Component Value Date    TSH 7.688 (H) 11/15/2022     No results found for: HGBA1C  No results found for: PSA                    Assessment and Plan:          Diagnoses and all orders for this visit:    1. Essential (primary) hypertension (Primary)  Comments:  Elevated today.  Advised that Krissy check her blood pressures at home especially after I increased her metoprolol to twice a day today  Orders:  -     metoprolol succinate XL (TOPROL-XL) 50 MG 24 hr tablet; Take 1 tablet by mouth 2 (Two) Times a Day.  Dispense: 180 tablet; Refill: 0    2. Elevated brain natriuretic peptide (BNP) level  Comments:  We will echo her heart to further eval  Orders:  -     Adult Transthoracic Echo Complete W/ Cont if Necessary Per Protocol; Future    3. Abnormal chest CT  Comments:  resched PET scan today    4. Chronic obstructive pulmonary disease, unspecified COPD type (HCC)  Comments:  cont 2liter O2 contininuous!  referral to pulmonology, resched PET scan    5. Chronic kidney disease, stage 2 (mild)  Comments:  Stable, labs from hospital reviewed    6. Tobacco abuse  Comments:  recommend she quit all tobacco use, she defers    7. Acquired hypothyroidism  Comments:  Levothyroxine was increased at the hospital, will need to repeat thyroid labs in 3 months  Orders:  -     TSH+Free T4; Future    8. Social discord  Comments:  she and her daughter do not get along and are fighting in the office, recc therapy and family counseling and they defer referral    9. Dyspnea on exertion  -     Adult Transthoracic Echo Complete W/ Cont if Necessary Per Protocol; Future          Follow Up   No follow-ups on file.

## 2022-11-29 ENCOUNTER — PATIENT OUTREACH (OUTPATIENT)
Dept: CASE MANAGEMENT | Facility: OTHER | Age: 77
End: 2022-11-29

## 2022-11-29 DIAGNOSIS — J43.1 PANLOBULAR EMPHYSEMA: ICD-10-CM

## 2022-11-29 DIAGNOSIS — D51.0 PERNICIOUS ANEMIA: Primary | ICD-10-CM

## 2022-11-29 DIAGNOSIS — I10 ESSENTIAL (PRIMARY) HYPERTENSION: ICD-10-CM

## 2022-11-29 PROCEDURE — 99490 CHRNC CARE MGMT STAFF 1ST 20: CPT | Performed by: FAMILY MEDICINE

## 2022-11-29 PROCEDURE — 99439 CHRNC CARE MGMT STAF EA ADDL: CPT | Performed by: FAMILY MEDICINE

## 2022-11-29 RX ORDER — SYRINGE W-NEEDLE,DISPOSAB,3 ML 25GX5/8"
1000 SYRINGE, EMPTY DISPOSABLE MISCELLANEOUS WEEKLY
COMMUNITY
End: 2022-11-29 | Stop reason: SDUPTHER

## 2022-11-29 RX ORDER — CYANOCOBALAMIN 1000 UG/ML
1000 INJECTION, SOLUTION INTRAMUSCULAR; SUBCUTANEOUS WEEKLY
COMMUNITY
End: 2022-11-29 | Stop reason: SDUPTHER

## 2022-11-29 RX ORDER — SYRINGE W-NEEDLE,DISPOSAB,3 ML 25GX5/8"
1000 SYRINGE, EMPTY DISPOSABLE MISCELLANEOUS WEEKLY
Qty: 50 EACH | Refills: 0 | Status: SHIPPED | OUTPATIENT
Start: 2022-11-29

## 2022-11-29 RX ORDER — CYANOCOBALAMIN 1000 UG/ML
1000 INJECTION, SOLUTION INTRAMUSCULAR; SUBCUTANEOUS WEEKLY
Qty: 10 ML | Refills: 1 | Status: SHIPPED | OUTPATIENT
Start: 2022-11-29

## 2022-11-29 NOTE — OUTREACH NOTE
AMBULATORY CASE MANAGEMENT NOTE    Name and Relationship of Patient/Support Person: MARJAN WISE - Emergency Contact    Called to speak with daughter Marjan regarding new referral. Samson requested that I speak with Marjan.  Reviewed some of her most recent hospitalizations and her most pressing issue is her COPD/emphysema.  Marjan is the primary caregiver for her mom.  Marjan is also struggling with taking care of her son who is bipolar and Marjan herself is disabled.  She states her frustration is that her mom will not take care of herself.  She is not willing to be a participant in her care.      Vit B 12 level was low and received a B12 injection at [facility], will pend orders for PCP to sign for weekly B12 injections.     Heart failure, increased metoprolol, does she need a diuretic?      Added PT/OT for home health.     Spoke with A nurse Chante, please teach inhaler instructions.     LEXUS POWELL  Ambulatory Case Management    11/29/2022, 17:11 EST     [facility] End of Month Documentation    This Chronic Medical Management Care Plan for Amada Bentley, 77 y.o. female, has been a new plan of care implemented and a new plan of care implemented for the month of November.  A cumulative time of 42  minutes was spent on this patient record this month, including face to face visit with patient; phone call with care giver; electronic communication with primary care provider; chart review.    Regarding the patient's problems: has Degeneration of lumbosacral intervertebral disc; High blood pressure; High cholesterol; Kidney disorder; Lumbar spinal stenosis; Murmur, cardiac; Other specified chronic obstructive airways disease; Thoracic or lumbosacral neuritis or radiculitis; Thyroid disorder; Solitary pulmonary nodule; Primary insomnia; Pernicious anemia; Other long term (current) drug therapy; Chronic kidney disease, stage 2 (mild); COPD (chronic obstructive pulmonary disease) (HCC); Essential (primary) hypertension; Nicotine  dependence, unspecified, uncomplicated; Encounter for examination for admission to nursing home; and Encounter for annual wellness exam in Medicare patient on their problem list., the following items were addressed: medical records; transitions to medical care; medications and any changes can be found within the plan section of the note.  A detailed listing of time spent for chronic care management is tracked within each outreach encounter.  Current medications include:  has a current medication list which includes the following prescription(s): albuterol, albuterol sulfate hfa, buspirone, cetirizine, cyanocobalamin, duloxetine, advair hfa, gabapentin, hydrocodone-acetaminophen, levothyroxine, lisinopril, metoprolol succinate xl, mirtazapine, montelukast, o2, omeprazole, potassium chloride, syringe, and tiotropium. and the patient is reported to be a referral has been made to a community resource,  Medications are reported to be non-effective in controlling symptoms and changes have been made to the medication protocol.  Regarding these diagnoses, referrals were made to the following provider(s):  specialists.  All notes on chart for PCP to review.    The patient was monitored remotely for activity level; mood & behavior; medications; weight.    The patient's physical needs include:  needs assistance with ADLs; physical healthcare; physician referral.     The patient's mental support needs include:  continued support    The patient's cognitive support needs include:  continued support; household care; emotional care    The patient's psychosocial support needs include:  coordination of community providers; need for increased support; medication management or adherence    The patient's functional needs include: needs assistance for ADLs; resources for disability needs; physical healthcare    The patient's environmental needs include:  resources for disability needs    Care Plan overall comments:  New referral.  Chart  review and contact with daughter and home health.  Will follow.    Refer to previous outreach notes for more information on the areas listed above.    Monthly Billing Diagnoses  (D51.0) Pernicious anemia    (J43.1) Panlobular emphysema (HCC)    (I10) Essential (primary) hypertension    Medications   · Medications have been reconciled    Care Plan progress this month:      Recently Modified Care Plans Updates made since 10/29/2022 12:00 AM    No recently modified care plans.          · Current Specialty Plan of Care Status in process    Instructions   · Patient was provided an electronic copy of care plan  · CCM services were explained and offered and patient has accepted these services.  · Patient has given their written consent to receive CCM services and understands that this includes the authorization of electronic communication of medical information with the other treating providers.  · Patient understands that they may stop CCM services at any time and these changes will be effective at the end of the calendar month and will effectively revocate the agreement of CCM services.  · Patient understands that only one practitioner can furnish and be paid for CCM services during one calendar month.  Patient also understands that there may be co-payment or deductible fees in association with CCM services.  · Patient will continue with at least monthly follow-up calls with the Ambulatory .    LEXUS POWELL  Ambulatory Case Management    11/29/2022, 17:11 EST

## 2022-12-05 ENCOUNTER — PATIENT OUTREACH (OUTPATIENT)
Dept: CASE MANAGEMENT | Facility: OTHER | Age: 77
End: 2022-12-05

## 2022-12-05 DIAGNOSIS — J43.1 PANLOBULAR EMPHYSEMA: Primary | ICD-10-CM

## 2022-12-05 NOTE — PROGRESS NOTES
Please continue to monitor her respiratory status.  I recommend her daughter get an O2 sat monitor to make sure her oxygen levels are greater than 90%.  She needs to be on boost or Ensure supplementation, with weight loss I would recommend 2-3 drinks a day.  Is there any way we can make this affordable for her.  I can set her up with the social care worker if you guys are unable to help her up with these.  Dr. Harrell

## 2022-12-05 NOTE — OUTREACH NOTE
AMBULATORY CASE MANAGEMENT NOTE    Name and Relationship of Patient/Support Person: Kay Gonzalez, Rn - Home Health    BART Rucker called to report that she saw Amada today.  She is still continuing to loose weight despite the addition of remeron.    Also, she had wheezing throughout all lung fields, but at 2pm she had not done a breathing treatment not had not been using the Advair.  She also was not wearing oxygen when she arrived O2 87%, with O2 - 96%.    The breathing treatment did not help the wheezing much.  She will begin taking her inhalers again.   She has moved back in with daughter (for now).  She is scheduled for Pet on 14th and echo on 12th,  Pulm on 10th      LEXUS R  Ambulatory Case Management    12/5/2022, 14:28 EST

## 2022-12-07 NOTE — PROGRESS NOTES
Marjan informed.  She is being more compliant (she believes).  Going to get supplements for her mom, however she states she will not drink.

## 2022-12-14 ENCOUNTER — PATIENT OUTREACH (OUTPATIENT)
Dept: CASE MANAGEMENT | Facility: OTHER | Age: 77
End: 2022-12-14

## 2022-12-14 ENCOUNTER — HOSPITAL ENCOUNTER (OUTPATIENT)
Dept: PET IMAGING | Facility: HOSPITAL | Age: 77
Discharge: HOME | End: 2022-12-14

## 2022-12-14 DIAGNOSIS — J43.1 PANLOBULAR EMPHYSEMA: Primary | ICD-10-CM

## 2022-12-14 DIAGNOSIS — R91.1 SOLITARY PULMONARY NODULE: ICD-10-CM

## 2022-12-14 DIAGNOSIS — R93.89 ABNORMAL CHEST CT: ICD-10-CM

## 2022-12-14 PROCEDURE — 0 FLUDEOXYGLUCOSE F18 SOLUTION: Performed by: NURSE PRACTITIONER

## 2022-12-14 PROCEDURE — A9552 F18 FDG: HCPCS | Performed by: NURSE PRACTITIONER

## 2022-12-14 PROCEDURE — 78815 PET IMAGE W/CT SKULL-THIGH: CPT

## 2022-12-14 RX ADMIN — FLUDEOXYGLUCOSE F18 1 DOSE: 300 INJECTION INTRAVENOUS at 08:57

## 2022-12-14 NOTE — OUTREACH NOTE
AMBULATORY CASE MANAGEMENT NOTE    Name and Relationship of Patient/Support Person: BART Sharif - [facility]    Chante called to report that she saw Samson today and she had wheezing throughout all fields.  She had not had any breathing treatments today however.  Will follow up with Chante to see if she had any improvement after neb treatment.      LEXUS POWELL  Ambulatory Case Management    12/14/2022, 16:20 EST     AMBULATORY CASE MANAGEMENT NOTE    Name and Relationship of Patient/Support Person: BART Sharif - [facility]    Spoke with Chante and she reports that she did improve after nebulizer treatment and placing her oxygen back on.    LEXUS POWELL  Ambulatory Case Management    12/15/2022, 15:01 EST   AMBULATORY CASE MANAGEMENT NOTE    Name and Relationship of Patient/Support Person: MARJAN WISE - Emergency Contact    Called Marjan to inform that PET is ok.  Please keep pulmonary in [place].      LEXUS POWELL  Ambulatory Case Management    12/21/2022, 14:19 EST

## 2022-12-21 ENCOUNTER — OUTSIDE FACILITY SERVICE (OUTPATIENT)
Dept: FAMILY MEDICINE CLINIC | Age: 77
End: 2022-12-21

## 2022-12-21 PROCEDURE — G0180 MD CERTIFICATION HHA PATIENT: HCPCS | Performed by: FAMILY MEDICINE

## 2022-12-27 ENCOUNTER — PATIENT OUTREACH (OUTPATIENT)
Dept: CASE MANAGEMENT | Facility: OTHER | Age: 77
End: 2022-12-27

## 2022-12-27 DIAGNOSIS — D51.0 PERNICIOUS ANEMIA: ICD-10-CM

## 2022-12-27 DIAGNOSIS — J43.1 PANLOBULAR EMPHYSEMA: Primary | ICD-10-CM

## 2022-12-27 DIAGNOSIS — I10 ESSENTIAL (PRIMARY) HYPERTENSION: ICD-10-CM

## 2022-12-27 PROCEDURE — 99490 CHRNC CARE MGMT STAFF 1ST 20: CPT | Performed by: FAMILY MEDICINE

## 2022-12-28 DIAGNOSIS — G89.29 CHRONIC LOW BACK PAIN, UNSPECIFIED BACK PAIN LATERALITY, UNSPECIFIED WHETHER SCIATICA PRESENT: ICD-10-CM

## 2022-12-28 DIAGNOSIS — M54.50 CHRONIC LOW BACK PAIN, UNSPECIFIED BACK PAIN LATERALITY, UNSPECIFIED WHETHER SCIATICA PRESENT: ICD-10-CM

## 2022-12-28 NOTE — OUTREACH NOTE
[facility] End of Month Documentation    This Chronic Medical Management Care Plan for Amada Bentley, 77 y.o. female, has been a new plan of care implemented; established and a new plan of care implemented for the month of December.  A cumulative time of 30  minutes was spent on this patient record this month, including face to face visit with patient; phone call with care giver; electronic communication with primary care provider; electronic communication with other providers; chart review.    Regarding the patient's problems: has Degeneration of lumbosacral intervertebral disc; High blood pressure; High cholesterol; Kidney disorder; Lumbar spinal stenosis; Murmur, cardiac; Other specified chronic obstructive airways disease; Thoracic or lumbosacral neuritis or radiculitis; Thyroid disorder; Solitary pulmonary nodule; Primary insomnia; Pernicious anemia; Other long term (current) drug therapy; Chronic kidney disease, stage 2 (mild); COPD (chronic obstructive pulmonary disease) (HCC); Essential (primary) hypertension; Nicotine dependence, unspecified, uncomplicated; Encounter for examination for admission to nursing home; and Encounter for annual wellness exam in Medicare patient on their problem list., the following items were addressed: medical records; transitions to medical care; medications and any changes can be found within the plan section of the note.  A detailed listing of time spent for chronic care management is tracked within each outreach encounter.  Current medications include:  has a current medication list which includes the following prescription(s): albuterol, albuterol sulfate hfa, buspirone, cetirizine, cyanocobalamin, duloxetine, advair hfa, gabapentin, hydrocodone-acetaminophen, levothyroxine, lisinopril, metoprolol succinate xl, mirtazapine, montelukast, o2, omeprazole, potassium chloride, syringe, and tiotropium. and the patient is reported to be a referral has been made to a community resource,   Medications are reported to be non-effective in controlling symptoms and changes have been made to the medication protocol.  Regarding these diagnoses, referrals were made to the following provider(s):  specialists.  All notes on chart for PCP to review.    The patient was monitored remotely for activity level; mood & behavior; medications; weight.    The patient's physical needs include:  needs assistance with ADLs; physical healthcare; physician referral.     The patient's mental support needs include:  continued support    The patient's cognitive support needs include:  continued support; household care; emotional care    The patient's psychosocial support needs include:  coordination of community providers; need for increased support; medication management or adherence    The patient's functional needs include: needs assistance for ADLs; resources for disability needs; physical healthcare    The patient's environmental needs include:  resources for disability needs    Care Plan overall comments:  New referral.  Chart review and contact with daughter and home health.  Will follow.    Refer to previous outreach notes for more information on the areas listed above.    Monthly Billing Diagnoses  (J43.1) Panlobular emphysema (HCC)    (D51.0) Pernicious anemia    (I10) Essential (primary) hypertension    Medications   · Medications have been reconciled    Care Plan progress this month:      Recently Modified Care Plans Updates made since 11/26/2022 12:00 AM     COPD (Adult)         Problem Priority Last Modified     Psychological Adjustment to Diagnosis (COPD) --  11/29/2022  5:00 PM by Viv Love, NYA              Goal Recent Progress Last Modified     Adjustment to Disease Achieved --  11/29/2022  5:00 PM by Viv Love, RN     Evidence-based guidance:   Explore the patient and family's understanding of the disease; use open-ended questions to encourage patient and family to share what is important to them.  "  Assess and provide support around experience of loss and lifestyle adjustments, such as loss of function, roles, social ties, independence and hobbies.   Support adjustment to  €œnew normal\" with focus on maintaining daily life as closely as possible to life before chronic obstructive pulmonary disease (COPD) diagnosis.   Express empathy; listen actively by encouraging the patient and family to express feelings, concerns and fears; ask questions and encourage open communication regarding embarrassing or disturbing topics.   Assess for factors that may impact coping or adjustment, such as a preexisting mental health condition, prognosis, lack of social support, debilitating disease or financial difficulty that include no or insufficient insurance coverage.   Assess and address fear or anxiety related to dyspnea or perceived stigma of a noninfectious cough.   Prepare for re-entry into work, school and social activities; establish links or collaborate with mental health resources in the community, such as peer support or advocacy groups.   Assess anxiety, feelings of panic when breathing becomes labored, as well as impact on family/caregiver; consider cognitive behavioral therapy, deep breathing, meditation, visualization, photo or light therapy.   Encourage advanced care planning discussion to clarify goals of care; palliative care or hospice may be considered for advanced COPD patients if it aligns with patient's goals of care.   Explore common risk factors for depression, such as personal or family history of depression, substance use and stressors that include missed work, losing ability to do what patient was used to doing, changes in appearance, physical    or sexual abuse.   Destigmatize depression by presenting it as a problem requiring treatment, rather than a personal weakness.   Use a validated screening tool to identify level of suicide risk. If at risk, develop safety plan and implement additional safety " measures based on level of risk, such as behavioral health referral or immediate assessment.    Notes:            Task Due Date Last Modified     Support Psychosocial Response to Chronic Obstructive Pulmonary Disease --  11/29/2022  5:00 PM by Viv Love RN     Care Management Activities:      - caregiver stress acknowledged  - complementary therapy use encouraged      Notes:              Problem Priority Last Modified     Disease Progression (COPD) --  11/29/2022  5:00 PM by Viv Love RN              Goal Recent Progress Last Modified     Disease Progression Minimized or Managed --  11/29/2022  5:00 PM by Viv Love RN     Evidence-based guidance:   Identify current smoking/tobacco use; provide smoking cessation intervention.   Assess symptom control by the frequency and type of symptoms, reliever use and activity limitation at every encounter.   Assess risk for exacerbation (flare up) by evaluating spirometry, pulse oximetry, reliever use, presentation of symptoms and activity limitation; anticipate treatment adjustment based on risks and resources.   Develop and/or review and reinforce use of COPD rescue (action) plan even when symptoms are controlled or infrequent.   Ask patient to bring inhaler to all visits; assess and reinforce correct technique; address barriers to proper inhaler use, such as older age, use of multiple devices and lack of understanding.    Identify symptom triggers, such as smoking, virus, weather change, emotional upset, exercise, obesity and environmental allergen; consider reduction of work-exposure versus elimination to avoid compromising employment.   Correlate presentation to comorbidity, such as diabetes, heart failure, obstructive sleep apnea, depression and anxiety, which may worsen symptoms.   Prepare for individualized pharmacologic therapy that may include LABA (long-acting beta-2 agonist), LAMA (long-acting muscarinic antagonist), CYRUS (short-acting beta-2  agonist) oral or inhaled corticosteroid.   Promote participation in pulmonary rehabilitation for breathing exercises, skills training, improved exercise capacity, mood and quality of life; address barriers to participation.   Promote physical activity or exercise to improve or maintain exercise capacity, based on tolerance that may include walking, water exercise, cycling or limb muscle strength training.   Promote use of energy conservation and activity pacing techniques.   Promote use of breathing and coughing techniques, such as inspiratory muscle training, pursed-lip breathing, diaphragmatic breathing, pranayama yoga breathing or otero cough.   Screen for malnutrition risk factors, such as unintentional weight loss and poor oral intake; refer to dietitian if identified.   Consider recommendation for oral drink supplement or multivitamin and mineral supplements if suspect inadequate oral intake or micronutrient deficiencies.    Screen for obstructive sleep apnea; prepare patient for polysomnography based on risk and presentation.   Prepare patient for use of long-term oxygen and noninvasive ventilation to relieve hypercapnia, hypoxemia, obstructive sleep apnea and reduce work of breathing.   Prepare patient with worsening disease for surgical interventions that may include bronchoscopy, lung volume reduction surgery, bullectomy or lung transplantation.    Notes:            Task Due Date Last Modified     Alleviate Barriers to COPD Management --  11/29/2022  5:01 PM by Viv Love RN     Care Management Activities:      - barriers to treatment managed  - breathing techniques encouraged  - screen for functional limitations completed and reviewed      Notes:              Problem Priority Last Modified     Symptom Exacerbation (COPD) --  11/29/2022  5:00 PM by Viv Love RN              Goal Recent Progress Last Modified     Symptom Exacerbation Prevented or Minimized --  11/29/2022  5:00 PM by Ivan  Viv, RN     Evidence-based guidance:   Monitor for signs of respiratory infection, including changes in sputum color, volume and thickness, as well as fever.   Encourage infection prevention strategies that may include prophylactic antibiotic therapy for patients with history of frequent exacerbations or antibiotic administration during exacerbation based on presentation, risk and benefit.   Encourage receipt of influenza and pneumococcal vaccine.   Prepare patient for use of home long-term oxygen therapy in presence of sever resting hypoxemia.   Prepare patients for laboratory studies or diagnostic exams, such as spirometry, pulse oximetry and arterial blood gas based on current symptoms, risk factors and presentation.   Assess barriers and manage adherence, including inhaler technique and persistent trigger exposure; encourage adherence, even when symptoms are controlled or infrequent.   Assess and monitor for signs/symptoms of psychosocial concerns, such as shortness of breath-anxiety cycle or depression that may impact stability of symptoms.   Identify economic resources, sociocultural beliefs, social factors and health literacy that may interfere with adherence.   Promote lifestyle changes when needed, including regular physical activity based on tolerance, weight loss, healthy eating and stress management.   Consider referral to nurse or community health worker or home-visiting program for intensive support and education (disease-management program).   Increase frequency of follow-up following exacerbation or hospitalization; consider transition of care interventions, such as hospital visit, home visit, telephone follow-up, review of discharge summary and resource referrals.    Notes:            Task Due Date Last Modified     Identify and Minimize Risk of COPD Exacerbation --  11/29/2022  5:01 PM by Viv Love, RN     Care Management Activities:      - barriers to lifestyle changes reviewed and  addressed      Notes:                      · Current Specialty Plan of Care Status signed by both patient and provider    Instructions   · Patient was provided an electronic copy of care plan  · CCM services were explained and offered and patient has accepted these services.  · Patient has given their written consent to receive CCM services and understands that this includes the authorization of electronic communication of medical information with the other treating providers.  · Patient understands that they may stop CCM services at any time and these changes will be effective at the end of the calendar month and will effectively revocate the agreement of CCM services.  · Patient understands that only one practitioner can furnish and be paid for CCM services during one calendar month.  Patient also understands that there may be co-payment or deductible fees in association with CCM services.  · Patient will continue with at least monthly follow-up calls with the Ambulatory .    LEXUS POWELL  Ambulatory Case Management    12/27/2022, 21:58 EST

## 2022-12-29 ENCOUNTER — TELEPHONE (OUTPATIENT)
Dept: CASE MANAGEMENT | Facility: OTHER | Age: 77
End: 2022-12-29

## 2022-12-29 DIAGNOSIS — R19.7 DIARRHEA OF PRESUMED INFECTIOUS ORIGIN: Primary | ICD-10-CM

## 2022-12-29 RX ORDER — HYDROCODONE BITARTRATE AND ACETAMINOPHEN 7.5; 325 MG/1; MG/1
1 TABLET ORAL EVERY 8 HOURS PRN
Qty: 90 TABLET | Refills: 0 | Status: CANCELLED
Start: 2022-12-29

## 2022-12-29 NOTE — TELEPHONE ENCOUNTER
Hydrocodone 7.5/325mg   1 po tid  #90   Last filled:  11/9/22  UDS:  9/6/22  HARD COPY TO SIGN FOR [facility]NOT ACCEPT ELECTRONIC RX

## 2022-12-29 NOTE — TELEPHONE ENCOUNTER
Chante called to report /ask to add PT since not ordered.  2 falls this week, skin tear left forearm (petroleum guaze and telfa/kerlex). No other injuries.  Not using nebulizer.

## 2022-12-29 NOTE — TELEPHONE ENCOUNTER
Marjan called to report that her mom has been having explosive diarrhea since she has been home.  Bad smell.  She was on antibiotic at last hospitalization.  Pended stool studies to r/o c-diff etc.

## 2022-12-30 RX ORDER — DULOXETIN HYDROCHLORIDE 60 MG/1
60 CAPSULE, DELAYED RELEASE ORAL DAILY
Qty: 90 CAPSULE | Refills: 1 | Status: SHIPPED | OUTPATIENT
Start: 2022-12-30

## 2022-12-30 RX ORDER — OMEPRAZOLE 20 MG/1
20 CAPSULE, DELAYED RELEASE ORAL DAILY
Qty: 90 CAPSULE | Refills: 1 | Status: SHIPPED | OUTPATIENT
Start: 2022-12-30

## 2023-01-04 ENCOUNTER — TELEPHONE (OUTPATIENT)
Dept: CASE MANAGEMENT | Facility: OTHER | Age: 78
End: 2023-01-04
Payer: MEDICARE

## 2023-01-04 NOTE — TELEPHONE ENCOUNTER
Needed short term - 10 day supply sent in to local pharmacy.  Duloxetine #10 called in to [facility].

## 2023-01-09 ENCOUNTER — PATIENT OUTREACH (OUTPATIENT)
Dept: CASE MANAGEMENT | Facility: OTHER | Age: 78
End: 2023-01-09
Payer: MEDICARE

## 2023-01-09 DIAGNOSIS — I10 ESSENTIAL (PRIMARY) HYPERTENSION: Primary | ICD-10-CM

## 2023-01-09 NOTE — OUTREACH NOTE
AMBULATORY CASE MANAGEMENT NOTE    Name and Relationship of Patient/Support Person: Ash, PT - Home Health    Jason called to request an order for rollator walker.  Samson has a follow up appointment on Friday, can get the necessary documentation for that at the visit.  Also she did not complete the stool studies, wanted to see if diarrhea resolved.  Reminded of pulmonology tomorrow and Dr. Harrell on Friday.         Marjan called back to report that her mom already has a rollator walker in the garage, she refuses to use.    She did have a fall, no injury.    Marjan also reports that she has covid, is exhausted and has repiratory problems and not sure if she can manage getting her mom to the pulmonary appointment tomorrow.  Will follow.  '  Marjan also states that her moms dementia/memory has gotten much worse.  She is not taking her medications as prescribed, not bathing etc.  Marjan states that she can no longer hardly care for her and that she may need to go into a nursing home.  She is eligible for [facility] benefits but the paperwork is extensive and mom not too interested in going to pursue.     She is not positive about the stool, she knows that she still messes herself, but with recent covid diagnosis, she has not been hands on.  She will monitor.  The cup etc is still on her dresser.     LEXUS R  Ambulatory Case Management    1/9/2023, 12:39 EST     AMBULATORY CASE MANAGEMENT NOTE    Name and Relationship of Patient/Support Person: BILLIEMARJAN - Emergency Contact    Marjan called to report that they did see pulmonology and they are suspicious that she could have cancer with the weight loss.  The are planning on repeating scans in the future.      She has contacted [facility] for possible placement.  She is falling and needs rehab.  I don't think this is possible without the 3 day hospital stay.  Will contact  at [facility] to see about specifications.  Jenae Francois.     Education Documentation  No  documentation found.        LEXUS POWELL  Ambulatory Case Management    1/11/2023, 15:06 EST

## 2023-01-10 ENCOUNTER — OFFICE VISIT (OUTPATIENT)
Dept: PULMONOLOGY | Facility: CLINIC | Age: 78
End: 2023-01-10
Payer: MEDICARE

## 2023-01-10 VITALS
SYSTOLIC BLOOD PRESSURE: 163 MMHG | BODY MASS INDEX: 17.16 KG/M2 | HEIGHT: 60 IN | OXYGEN SATURATION: 94 % | DIASTOLIC BLOOD PRESSURE: 72 MMHG | RESPIRATION RATE: 20 BRPM | HEART RATE: 80 BPM | TEMPERATURE: 97.7 F | WEIGHT: 87.39 LBS

## 2023-01-10 DIAGNOSIS — J44.1 COPD WITH ACUTE EXACERBATION: Primary | ICD-10-CM

## 2023-01-10 DIAGNOSIS — R91.8 LUNG NODULES: ICD-10-CM

## 2023-01-10 PROCEDURE — 99203 OFFICE O/P NEW LOW 30 MIN: CPT | Performed by: INTERNAL MEDICINE

## 2023-01-10 RX ORDER — AMOXICILLIN AND CLAVULANATE POTASSIUM 875; 125 MG/1; MG/1
1 TABLET, FILM COATED ORAL 2 TIMES DAILY
Qty: 14 TABLET | Refills: 0 | Status: SHIPPED | OUTPATIENT
Start: 2023-01-10 | End: 2023-01-17

## 2023-01-10 RX ORDER — PREDNISONE 20 MG/1
40 TABLET ORAL DAILY
Qty: 14 TABLET | Refills: 0 | Status: SHIPPED | OUTPATIENT
Start: 2023-01-10 | End: 2023-02-09

## 2023-01-10 NOTE — PROGRESS NOTES
Pulmonary Consultation    Lacey Le APRN,    Thank you for asking me to see Amada Bentley for   Chief Complaint   Patient presents with   • Establish Care     New Patient- COPD, Abnormal CT   • Shortness of Breath   • Cough   • Wheezing   • Weight Loss     In a year 140 to 87.4 per patient   .      History of Present Illness  Amada Bentley is a 77 y.o. female with a PMH significant for COPD tobacco abuse and right lung nodules presents for evaluation patient has been having weight loss along with chest congestion wheezing and thick mucopurulent expectoration worse over the past couple of weeks she complains of weight loss along with poor appetite and generalized weakness  Patient continues to smoke and has not been very compliant with her medications*       Tobacco use history:  Type: cigarettes  Amount:0.5 ppd  Duration: 40 years  Cessation: n   Willing to quit: No      Review of Systems: History obtained from chart review and the patient.  Review of Systems   Constitutional: Positive for fatigue and unexpected weight change.   Respiratory: Positive for cough, shortness of breath and wheezing.    All other systems reviewed and are negative.    As described in the HPI. Otherwise, remainder of ROS (14 systems) were negative.    Patient Active Problem List   Diagnosis   • Degeneration of lumbosacral intervertebral disc   • High blood pressure   • High cholesterol   • Kidney disorder   • Lumbar spinal stenosis   • Murmur, cardiac   • Other specified chronic obstructive airways disease   • Thoracic or lumbosacral neuritis or radiculitis   • Thyroid disorder   • Solitary pulmonary nodule   • Primary insomnia   • Pernicious anemia   • Other long term (current) drug therapy   • Chronic kidney disease, stage 2 (mild)   • COPD (chronic obstructive pulmonary disease) (HCC)   • Essential (primary) hypertension   • Nicotine dependence, unspecified, uncomplicated   • Encounter for examination for admission to  nursing home   • Encounter for annual wellness exam in Medicare patient         Current Outpatient Medications:   •  albuterol (PROVENTIL) (2.5 MG/3ML) 0.083% nebulizer solution, Take 2.5 mg by nebulization Every 4 (Four) Hours As Needed for Wheezing., Disp: 100 each, Rfl: 5  •  albuterol sulfate  (90 Base) MCG/ACT inhaler, Inhale 2 puffs Every 6 (Six) Hours As Needed for Wheezing or Shortness of Air., Disp: 18 g, Rfl: 5  •  busPIRone (BUSPAR) 30 MG tablet, Take 1 tablet by mouth 2 (Two) Times a Day., Disp: 60 tablet, Rfl: 2  •  cetirizine (zyrTEC) 10 MG tablet, Take 1 tablet by mouth Daily., Disp: 90 tablet, Rfl: 1  •  cyanocobalamin 1000 MCG/ML injection, Inject 1 mL into the appropriate muscle as directed by prescriber 1 (One) Time Per Week. Weekly x 4 weeks, then monthly, Disp: 10 mL, Rfl: 1  •  DULoxetine (CYMBALTA) 60 MG capsule, Take 1 capsule by mouth Daily., Disp: 90 capsule, Rfl: 1  •  fluticasone-salmeterol (Advair HFA) 230-21 MCG/ACT inhaler, Inhale 2 puffs 2 (Two) Times a Day., Disp: 1 each, Rfl: 2  •  gabapentin (NEURONTIN) 600 MG tablet, Take 1 tablet by mouth 3 (Three) Times a Day., Disp: 90 tablet, Rfl: 2  •  HYDROcodone-acetaminophen (NORCO) 7.5-325 MG per tablet, Take 1 tablet by mouth Every 8 (Eight) Hours As Needed for Moderate Pain ., Disp: 90 tablet, Rfl: 0  •  levothyroxine (SYNTHROID, LEVOTHROID) 75 MCG tablet, Take 75 mcg by mouth Daily., Disp: , Rfl:   •  lisinopril (PRINIVIL,ZESTRIL) 20 MG tablet, Take 1 tablet by mouth Daily., Disp: 90 tablet, Rfl: 0  •  metoprolol succinate XL (TOPROL-XL) 50 MG 24 hr tablet, Take 1 tablet by mouth 2 (Two) Times a Day., Disp: 180 tablet, Rfl: 0  •  mirtazapine (Remeron) 15 MG tablet, Take 1 tablet by mouth Every Night., Disp: 90 tablet, Rfl: 0  •  montelukast (Singulair) 10 MG tablet, Take 1 tablet by mouth Every Night., Disp: 90 tablet, Rfl: 0  •  O2 (OXYGEN), Inhale 2 L Continuous., Disp: , Rfl:   •  omeprazole (priLOSEC) 20 MG capsule, Take 1  capsule by mouth Daily., Disp: 90 capsule, Rfl: 1  •  potassium chloride (K-DUR,KLOR-CON) 10 MEQ CR tablet, Take 2 tablets once daily, Disp: 180 tablet, Rfl: 0  •  Syringe 25G X 1\" 3 ML misc, Inject 1,000 mcg into the appropriate muscle as directed by prescriber 1 (One) Time Per Week. Inject 1cc weekly x 4 weeks, then inject 1 cc monthly, Disp: 50 each, Rfl: 0  •  tiotropium (SPIRIVA) 18 MCG per inhalation capsule, Place 18 mcg into inhaler and inhale Daily., Disp: , Rfl:     No Known Allergies    Past Medical History:   Diagnosis Date   • Abnormal weight loss    • Anxiety    • Cardiac murmur, unspecified    • Chronic kidney disease, stage 2 (mild)    • Condition not found     LBP   • COPD (chronic obstructive pulmonary disease) (HCC)    • Depression     loss of daughter   • Diarrhea, unspecified    • Dysphagia, oropharyngeal phase    • Essential (primary) hypertension    • Gastritis    • Gastro-esophageal reflux disease without esophagitis    • Generalized anxiety disorder    • Hiatal hernia    • History of falling    • Hypercholesterolemia    • Hypothyroid    • Infected abrasion of scalp, initial encounter    • Long term (current) use of opiate analgesic    • Major depressive disorder, recurrent, moderate (HCC)    • Muscle weakness (generalized)    • Nicotine dependence, unspecified, uncomplicated    • Occlusion and stenosis of bilateral carotid arteries    • Other allergy, subsequent encounter    • Other long term (current) drug therapy    • Pain     LEFT-knee, shoulder RIGHT-hip, knee   • Pernicious anemia    • Primary insomnia    • Solitary pulmonary nodule      Past Surgical History:   Procedure Laterality Date   • APPENDECTOMY     • CATARACT EXTRACTION, BILATERAL     • FOOT SURGERY Bilateral     secondary bone spurs   • HYSTERECTOMY  1976, 1987   • LAPAROSCOPIC CHOLECYSTECTOMY     • OTHER SURGICAL HISTORY Right     Ear Surger; unspecified     Social History     Socioeconomic History   • Marital status:     • Number of children: 3   Tobacco Use   • Smoking status: Every Day     Packs/day: 0.50     Years: 60.00     Pack years: 30.00     Types: Cigarettes     Last attempt to quit: 6/15/2021     Years since quittin.5   • Smokeless tobacco: Never   Vaping Use   • Vaping Use: Never used   Substance and Sexual Activity   • Alcohol use: Never   • Drug use: Never   • Sexual activity: Defer     Family History   Problem Relation Age of Onset   • Coronary artery disease Mother    • Hyperlipidemia Mother    • Hypertension Mother    • Heart attack Mother    • Diabetes type I Mother    • Lung cancer Father         no history of smoking   • COPD Father    • Emphysema Father    • Hypertension Sister    • Heart attack Sister    • Diabetes type I Sister    • Hypertension Brother    • Heart attack Brother         cardiac death   • Emphysema Brother    • Diabetes type I Brother    • Breast cancer Daughter    • Kidney failure Niece        CT Chest With Contrast Diagnostic    Result Date: 2022    1. Pulmonary cyst within the right middle lobe with peripheral nodules measuring 8 mm anteromedially and 11 mm posteriorly.  These could represent soft tissue nodules are malignancy or findings of fungal infection or coalescent secretions.  No prior CT imaging is available for direct comparison.  In the absence of imaging documenting stability, nuclear medicine PET-CT would be recommended. 2. Bilateral bronchial wall thickening with areas of tree-in-bud nodularity likely reflecting infectious or inflammatory bronchiolitis.  This can be seen with chronic aspiration or atypical infection such as ROX.      YOANDY SIMS MD       Electronically Signed and Approved By: YOANDY SIMS MD on 2022 at 17:09             XR Chest 1 View    Result Date: 11/15/2022  1. Hyperinflation consistent with COPD. 2. New left basilar opacity suggestive of atelectasis or pneumonia. Images reviewed, interpreted, and dictated by Dr. Stark  Lexa. Transcribed by Narda Lowery PA-C.    XR Chest 1 View    Result Date: 11/5/2022  No acute cardiopulmonary findings. Images personally reviewed, interpreted and dictated by SIVA Zamora M.D.    NM PET/CT Skull Base to Mid Thigh    Result Date: 12/14/2022    PET-CT demonstrating no abnormal uptake in pulmonary cyst with multiple soft tissue nodules in the right middle lobe.  Findings are most consistent with an inflammatory or infectious etiology.  Continued follow-up is recommended.     KERRIE DEJESUS MD       Electronically Signed and Approved By: KERRIE DEJESUS MD on 12/14/2022 at 11:19             CT chest for pulmonary embolus    Result Date: 11/21/2022  No pulmonary embolism or other acute abnormality. Stable air cyst lesion in the right upper lobe with mural nodularity. Images reviewed, interpreted, and dictated by Dr. Robinson Vasquez. Transcribed by Prosper Rodriguez.          Objective     Blood pressure 163/72, pulse 80, temperature 97.7 °F (36.5 °C), temperature source Tympanic, resp. rate 20, height 152.4 cm (60\"), weight 39.6 kg (87 lb 6.3 oz), SpO2 94 %.  Physical Exam  Vitals and nursing note reviewed.   Constitutional:       Appearance: She is ill-appearing.   HENT:      Head: Normocephalic and atraumatic.      Nose: Nose normal.      Mouth/Throat:      Mouth: Mucous membranes are moist.      Pharynx: Oropharynx is clear.   Eyes:      Conjunctiva/sclera: Conjunctivae normal.      Pupils: Pupils are equal, round, and reactive to light.   Cardiovascular:      Rate and Rhythm: Normal rate and regular rhythm.      Pulses: Normal pulses.      Heart sounds: Normal heart sounds.   Pulmonary:      Effort: Pulmonary effort is normal.      Breath sounds: Wheezing and rhonchi present.   Abdominal:      General: Abdomen is flat. Bowel sounds are normal.      Palpations: Abdomen is soft.   Musculoskeletal:         General: Normal range of motion.      Cervical back: Normal range of motion and neck supple.    Skin:     General: Skin is warm.      Capillary Refill: Capillary refill takes less than 2 seconds.   Neurological:      General: No focal deficit present.      Mental Status: She is alert and oriented to person, place, and time.   Psychiatric:         Mood and Affect: Mood normal.         Behavior: Behavior normal.       Immunization History   Administered Date(s) Administered   • COVID-19 (MODERNA) 1st, 2nd, 3rd Dose Only 03/25/2021, 04/22/2021   • COVID-19 (PFIZER) PURPLE CAP 11/30/2021   • Covid-19 (Pfizer) Gray Cap 09/06/2022   • FluLaval/Fluzone >6mos 10/08/2015   • Fluzone High Dose =>65 Years ([facility] ONLY) 10/23/2013, 10/02/2014, 11/03/2015, 10/17/2016, 11/01/2017   • Fluzone High-Dose 65+yrs 11/30/2021, 09/20/2022   • Influenza, Unspecified 09/28/2020   • Pneumococcal Conjugate 13-Valent (PCV13) 11/03/2015   • Pneumococcal Polysaccharide (PPSV23) 01/04/2018   • Tdap 05/31/2021   • Zostavax 10/02/2014            Assessment & Plan     Diagnoses and all orders for this visit:    1. COPD with acute exacerbation (HCC) (Primary)    2. Lung nodules         Discussion/ Recommendations:   Patient has severe COPD exacerbation  CT scan and PET scan were reviewed no metabolic activity was noted in the right lung nodules  Likely benign and post infective  Will start her on Augmentin along with prednisone for a week  Advised compliance with her medications  Strongly counseled to stop smoking  Discussed vaccination and recommended    BMI is below normal parameters (malnutrition). Recommendations: referral to primary care           Return in about 4 weeks (around 2/7/2023).      Thank you for allowing me to participate in the care of Amada Bentley. Please do not hesitate to contact me with any questions.         This document has been electronically signed by Manjit Gray MD on January 10, 2023 14:21 EST

## 2023-01-12 ENCOUNTER — APPOINTMENT (OUTPATIENT)
Dept: CARDIOLOGY | Facility: HOSPITAL | Age: 78
End: 2023-01-12
Payer: MEDICARE

## 2023-01-13 ENCOUNTER — OFFICE VISIT (OUTPATIENT)
Dept: FAMILY MEDICINE CLINIC | Age: 78
End: 2023-01-13
Payer: MEDICARE

## 2023-01-13 VITALS
DIASTOLIC BLOOD PRESSURE: 54 MMHG | HEART RATE: 62 BPM | BODY MASS INDEX: 17.28 KG/M2 | WEIGHT: 88 LBS | HEIGHT: 60 IN | OXYGEN SATURATION: 94 % | SYSTOLIC BLOOD PRESSURE: 165 MMHG

## 2023-01-13 DIAGNOSIS — M54.50 CHRONIC LOW BACK PAIN, UNSPECIFIED BACK PAIN LATERALITY, UNSPECIFIED WHETHER SCIATICA PRESENT: ICD-10-CM

## 2023-01-13 DIAGNOSIS — J30.9 ALLERGIC RHINITIS, UNSPECIFIED SEASONALITY, UNSPECIFIED TRIGGER: ICD-10-CM

## 2023-01-13 DIAGNOSIS — Z79.899 HIGH RISK MEDICATION USE: Primary | ICD-10-CM

## 2023-01-13 DIAGNOSIS — Z12.31 ENCOUNTER FOR SCREENING MAMMOGRAM FOR BREAST CANCER: ICD-10-CM

## 2023-01-13 DIAGNOSIS — N18.2 CHRONIC KIDNEY DISEASE, STAGE 2 (MILD): ICD-10-CM

## 2023-01-13 DIAGNOSIS — F51.01 PRIMARY INSOMNIA: ICD-10-CM

## 2023-01-13 DIAGNOSIS — I10 ESSENTIAL (PRIMARY) HYPERTENSION: ICD-10-CM

## 2023-01-13 DIAGNOSIS — F32.A ANXIETY AND DEPRESSION: ICD-10-CM

## 2023-01-13 DIAGNOSIS — G89.29 CHRONIC LOW BACK PAIN, UNSPECIFIED BACK PAIN LATERALITY, UNSPECIFIED WHETHER SCIATICA PRESENT: ICD-10-CM

## 2023-01-13 DIAGNOSIS — F41.9 ANXIETY AND DEPRESSION: ICD-10-CM

## 2023-01-13 DIAGNOSIS — Z23 ENCOUNTER FOR IMMUNIZATION: ICD-10-CM

## 2023-01-13 DIAGNOSIS — Z72.0 TOBACCO ABUSE: ICD-10-CM

## 2023-01-13 DIAGNOSIS — K21.9 GASTROESOPHAGEAL REFLUX DISEASE WITHOUT ESOPHAGITIS: ICD-10-CM

## 2023-01-13 DIAGNOSIS — J44.9 CHRONIC OBSTRUCTIVE PULMONARY DISEASE, UNSPECIFIED COPD TYPE: ICD-10-CM

## 2023-01-13 DIAGNOSIS — J44.1 CHRONIC OBSTRUCTIVE PULMONARY DISEASE WITH ACUTE EXACERBATION: ICD-10-CM

## 2023-01-13 DIAGNOSIS — Z79.899 HIGH RISK MEDICATION USE: ICD-10-CM

## 2023-01-13 DIAGNOSIS — Z12.11 COLON CANCER SCREENING: ICD-10-CM

## 2023-01-13 DIAGNOSIS — Z00.00 ENCOUNTER FOR ANNUAL WELLNESS EXAM IN MEDICARE PATIENT: Primary | ICD-10-CM

## 2023-01-13 DIAGNOSIS — Z78.0 POSTMENOPAUSAL STATE: ICD-10-CM

## 2023-01-13 DIAGNOSIS — E78.00 HIGH CHOLESTEROL: ICD-10-CM

## 2023-01-13 DIAGNOSIS — E03.9 ACQUIRED HYPOTHYROIDISM: ICD-10-CM

## 2023-01-13 DIAGNOSIS — R63.4 WEIGHT LOSS: ICD-10-CM

## 2023-01-13 PROCEDURE — 1170F FXNL STATUS ASSESSED: CPT | Performed by: FAMILY MEDICINE

## 2023-01-13 PROCEDURE — 99214 OFFICE O/P EST MOD 30 MIN: CPT | Performed by: FAMILY MEDICINE

## 2023-01-13 PROCEDURE — 80305 DRUG TEST PRSMV DIR OPT OBS: CPT | Performed by: FAMILY MEDICINE

## 2023-01-13 PROCEDURE — 1159F MED LIST DOCD IN RCRD: CPT | Performed by: FAMILY MEDICINE

## 2023-01-13 PROCEDURE — G0439 PPPS, SUBSEQ VISIT: HCPCS | Performed by: FAMILY MEDICINE

## 2023-01-13 PROCEDURE — 80307 DRUG TEST PRSMV CHEM ANLYZR: CPT | Performed by: FAMILY MEDICINE

## 2023-01-13 RX ORDER — MIRTAZAPINE 15 MG/1
15 TABLET, FILM COATED ORAL NIGHTLY
Qty: 90 TABLET | Refills: 1 | Status: SHIPPED | OUTPATIENT
Start: 2023-01-13

## 2023-01-13 RX ORDER — MIRTAZAPINE 15 MG/1
15 TABLET, FILM COATED ORAL NIGHTLY
Qty: 90 TABLET | Refills: 1 | Status: SHIPPED | OUTPATIENT
Start: 2023-01-13 | End: 2023-01-13

## 2023-01-13 RX ORDER — MEGESTROL ACETATE 125 MG/ML
SUSPENSION ORAL
Qty: 150 ML | Refills: 2 | Status: SHIPPED | OUTPATIENT
Start: 2023-01-13

## 2023-01-13 NOTE — PROGRESS NOTES
The ABCs of the Annual Wellness Visit  Subsequent Medicare Wellness Visit    Chief Complaint   Patient presents with   • Medicare Wellness-subsequent   • Hypertension     6 month follow up      Subjective    History of Present Illness:  Amada Bentley is a 77 y.o. female who presents for a Subsequent Medicare Wellness Visit.    The following portions of the patient's history were reviewed and   updated as appropriate: allergies, current medications, past family history, past medical history, past social history, past surgical history and problem list.    Compared to one year ago, the patient feels her physical   health is worse.    Compared to one year ago, the patient feels her mental   health is worse.    Recent Hospitalizations:  She was admitted within the past 365 days at [facility] for COPD exacerbation    Amada Bentley is needing more care, she is unable to care for herself, she is weak and participating in PT but not getting stronger, not eating well, balance is poor, frequent falls, last fall was 3 x last week.  I am recommending referral for skilled nursing to the NH due to generalized weakness/falls.  She will need nursing assistance and PT/OT..  Kvng memory is worse.  She needs assistance with her medications.   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  She is currently taking Synthroid, 50 mcg daily.  TSH was last checked 6 months ago.  The result was reported as normal.   She denies any related symptoms.  She reports no symptoms suggestive of adverse medication effect.          Essential (primary) hypertension, her current cardiac medication regimen includes a beta-blocker ( Toprol-XL ), an ACE inhibitor ( lisinopril ). Home BP proported as good.   Compliance with treatment has been good; she takes her medication as directed.          Pure hypercholesterolemia, date of diagnosis 2005.  Current treatment is low cholesterol/low fat diet.  Compliance with treatment has been good; she takes her medication as directed, maintains her low cholesterol diet, and follows up as directed.       Today is her F2F for her chronic pain med for her gabapentin and hydrocodone 7.5 mg tid prn pain, but she typically takes in once a day when her back pain acts up.  Pain is radicular down R LE and she is functional on medication.   MRI showed severe canal stenosis and bilateral foraminal narrowing. She saw the  neurosurgeon Dr Swartz in the past and s/p back surgery of L spine in past and told her she needed surgery again and she declines surgery.  No signs of diversion or abuse    Depression is currently stable on cymbalta, buspar and remeron, she is sleeping well. Her primary issue at this time is weakness and poor po intake. No suicidal ideation    She sees pulmonology Dr Mar for her COPD and she was dx with a copd exacerbation and is on augmentin and prednisone as well as pulmonary toilet (advair, spiriva and nebulizers).  She has a chronic cough, and is on 2 liters O2 at night for nighttime hypoxia    Current Medical Providers:  Patient Care Team:  Maricarmen Harrell MD as PCP - General (Family Medicine)  Tonja Whitlock MSW as  (Amb Case Mgmt) ([facility])  Viv Love RN as Ambulatory  ([facility])    Outpatient Medications Prior to Visit   Medication Sig Dispense Refill   • albuterol (PROVENTIL) (2.5 MG/3ML) 0.083% nebulizer solution Take 2.5 mg by  "nebulization Every 4 (Four) Hours As Needed for Wheezing. 100 each 5   • albuterol sulfate  (90 Base) MCG/ACT inhaler Inhale 2 puffs Every 6 (Six) Hours As Needed for Wheezing or Shortness of Air. 18 g 5   • amoxicillin-clavulanate (AUGMENTIN) 875-125 MG per tablet Take 1 tablet by mouth 2 (Two) Times a Day for 7 days. 14 tablet 0   • busPIRone (BUSPAR) 30 MG tablet Take 1 tablet by mouth 2 (Two) Times a Day. 60 tablet 2   • cetirizine (zyrTEC) 10 MG tablet Take 1 tablet by mouth Daily. 90 tablet 1   • cyanocobalamin 1000 MCG/ML injection Inject 1 mL into the appropriate muscle as directed by prescriber 1 (One) Time Per Week. Weekly x 4 weeks, then monthly 10 mL 1   • DULoxetine (CYMBALTA) 60 MG capsule Take 1 capsule by mouth Daily. 90 capsule 1   • fluticasone-salmeterol (Advair HFA) 230-21 MCG/ACT inhaler Inhale 2 puffs 2 (Two) Times a Day. 1 each 2   • gabapentin (NEURONTIN) 600 MG tablet Take 1 tablet by mouth 3 (Three) Times a Day. 90 tablet 2   • HYDROcodone-acetaminophen (NORCO) 7.5-325 MG per tablet Take 1 tablet by mouth Every 8 (Eight) Hours As Needed for Moderate Pain . 90 tablet 0   • levothyroxine (SYNTHROID, LEVOTHROID) 75 MCG tablet Take 75 mcg by mouth Daily.     • lisinopril (PRINIVIL,ZESTRIL) 20 MG tablet Take 1 tablet by mouth Daily. 90 tablet 0   • metoprolol succinate XL (TOPROL-XL) 50 MG 24 hr tablet Take 1 tablet by mouth 2 (Two) Times a Day. 180 tablet 0   • montelukast (Singulair) 10 MG tablet Take 1 tablet by mouth Every Night. 90 tablet 0   • O2 (OXYGEN) Inhale 2 L Continuous.     • omeprazole (priLOSEC) 20 MG capsule Take 1 capsule by mouth Daily. 90 capsule 1   • potassium chloride (K-DUR,KLOR-CON) 10 MEQ CR tablet Take 2 tablets once daily 180 tablet 0   • predniSONE (DELTASONE) 20 MG tablet Take 2 tablets by mouth Daily. 14 tablet 0   • Syringe 25G X 1\" 3 ML misc Inject 1,000 mcg into the appropriate muscle as directed by prescriber 1 (One) Time Per Week. Inject 1cc weekly x 4 " weeks, then inject 1 cc monthly 50 each 0   • tiotropium (SPIRIVA) 18 MCG per inhalation capsule Place 18 mcg into inhaler and inhale Daily.     • mirtazapine (Remeron) 15 MG tablet Take 1 tablet by mouth Every Night. 90 tablet 0     No facility-administered medications prior to visit.       Opioid medication/s are on active medication list.  and I have evaluated her active treatment plan and pain score trends (see table).  There were no vitals filed for this visit.  I have reviewed the chart for potential of high risk medication and harmful drug interactions in the elderly.            Aspirin is not on active medication list.  Aspirin use is not indicated based on review of current medical condition/s. Risk of harm outweighs potential benefits.  .    Patient Active Problem List   Diagnosis   • Degeneration of lumbosacral intervertebral disc   • High blood pressure   • High cholesterol   • Kidney disorder   • Lumbar spinal stenosis   • Murmur, cardiac   • Other specified chronic obstructive airways disease   • Thoracic or lumbosacral neuritis or radiculitis   • Thyroid disorder   • Solitary pulmonary nodule   • Primary insomnia   • Pernicious anemia   • Other long term (current) drug therapy   • Chronic kidney disease, stage 2 (mild)   • COPD (chronic obstructive pulmonary disease) (HCC)   • Essential (primary) hypertension   • Nicotine dependence, unspecified, uncomplicated   • Encounter for examination for admission to nursing home   • Encounter for annual wellness exam in Medicare patient     Advance Care Planning  Advance Directive is not on file.  ACP discussion was held with the patient during this visit. Patient does not have an advance directive, information provided.    Review of Systems   Constitutional: Positive for fatigue. Negative for chills and fever.   HENT: Negative for ear pain, sinus pressure and sore throat.    Respiratory: Positive for shortness of breath. Negative for cough and wheezing.   "  Cardiovascular: Negative for chest pain, palpitations and leg swelling.   Gastrointestinal: Negative for abdominal pain, blood in stool, constipation, diarrhea, nausea and vomiting.   Genitourinary: Negative for dysuria.   Skin: Negative for rash.   Neurological: Negative for dizziness.   Psychiatric/Behavioral: Negative for sleep disturbance and suicidal ideas.        Objective    Vitals:    01/13/23 1352 01/13/23 1744   BP: (!) 186/44 165/54   BP Location: Right arm    Patient Position: Sitting    Cuff Size: Adult    Pulse: 62    SpO2: 94%  Comment: Room air    Weight: 39.9 kg (88 lb)    Height: 152.4 cm (60\")      BMI Readings from Last 1 Encounters:   01/13/23 17.19 kg/m²   BMI is below normal parameters. Recommendations include: cont appetite stimulating med remeron    Does the patient have evidence of cognitive impairment? Yes with mood swings    Physical Exam  Vitals and nursing note reviewed.   Constitutional:       General: She is not in acute distress.     Appearance: Normal appearance. She is cachectic. She is not ill-appearing, toxic-appearing or diaphoretic.   HENT:      Head: Normocephalic and atraumatic.      Right Ear: Tympanic membrane, ear canal and external ear normal.      Left Ear: Tympanic membrane, ear canal and external ear normal.      Nose: No congestion or rhinorrhea.      Mouth/Throat:      Mouth: Mucous membranes are moist.      Pharynx: Oropharynx is clear. No oropharyngeal exudate or posterior oropharyngeal erythema.   Eyes:      Extraocular Movements: Extraocular movements intact.      Conjunctiva/sclera: Conjunctivae normal.      Pupils: Pupils are equal, round, and reactive to light.   Cardiovascular:      Rate and Rhythm: Normal rate and regular rhythm.      Heart sounds: Murmur heard.    Systolic murmur is present with a grade of 3/6.  Pulmonary:      Effort: Pulmonary effort is normal.      Breath sounds: Normal breath sounds. No wheezing, rhonchi or rales.   Chest: "   Breasts:     Right: Normal.      Left: Normal.   Abdominal:      General: Abdomen is flat.      Palpations: Abdomen is soft.   Musculoskeletal:      Cervical back: Neck supple. No rigidity.   Lymphadenopathy:      Cervical: No cervical adenopathy.   Skin:     General: Skin is warm and dry.   Neurological:      Mental Status: She is alert and oriented to person, place, and time.      Cranial Nerves: Cranial nerves 2-12 are intact.      Sensory: Sensation is intact.      Motor: Weakness present.      Gait: Gait abnormal.      Deep Tendon Reflexes:      Reflex Scores:       Patellar reflexes are 2+ on the right side and 2+ on the left side.  Psychiatric:         Mood and Affect: Mood normal.         Behavior: Behavior normal.                 HEALTH RISK ASSESSMENT    Smoking Status:  Social History     Tobacco Use   Smoking Status Every Day   • Packs/day: 0.50   • Years: 60.00   • Pack years: 30.00   • Types: Cigarettes   • Last attempt to quit: 6/15/2021   • Years since quittin.5   Smokeless Tobacco Never     Alcohol Consumption:  Social History     Substance and Sexual Activity   Alcohol Use Never     Fall Risk Screen:    [facility] Fall Risk Assessment was completed, and patient is at HIGH risk for falls. Assessment completed on:2023    Depression Screening:  PHQ-2/PHQ-9 Depression Screening 2023   Retired PHQ-9 Total Score -   Retired Total Score -   Little Interest or Pleasure in Doing Things 3-->nearly every day   Feeling Down, Depressed or Hopeless 1-->several days   Trouble Falling or Staying Asleep, or Sleeping Too Much 0-->not at all   Feeling Tired or Having Little Energy 3-->nearly every day   Poor Appetite or Overeating 3-->nearly every day   Feeling Bad about Yourself - or that You are a Failure or Have Let Yourself or Your Family Down 0-->not at all   Trouble Concentrating on Things, Such as Reading the Newspaper or Watching Television 0-->not at all   Moving or Speaking So Slowly that Other  People Could Have Noticed? Or the Opposite - Being So Fidgety 0-->not at all   Thoughts that You Would be Better Off Dead or of Hurting Yourself in Some Way 0-->not at all   PHQ-9: Brief Depression Severity Measure Score 10   If You Checked Off Any Problems, How Difficult Have These Problems Made It For You to Do Your Work, Take Care of Things at Home, or Get Along with Other People? not difficult at all       Health Habits and Functional and Cognitive Screening:  Functional & Cognitive Status 11/30/2021   Do you have difficulty preparing food and eating? No   Do you have difficulty bathing yourself, getting dressed or grooming yourself? No   Do you have difficulty using the toilet? No   Do you have difficulty moving around from place to place? No   Do you have trouble with steps or getting out of a bed or a chair? No   Current Diet Well Balanced Diet   Dental Exam Not up to date   Eye Exam Up to date   Exercise (times per week) 0 times per week   Do you need help using the phone?  No   Are you deaf or do you have serious difficulty hearing?  Yes   Do you need help with transportation? Yes   Do you need help shopping? No   Do you need help preparing meals?  No   Do you need help with housework?  Yes   Do you need help with laundry? Yes   Do you need help taking your medications? No   Do you need help managing money? No   Do you ever drive or ride in a car without wearing a seat belt? No   Have you felt unusual stress, anger or loneliness in the last month? No   Who do you live with? Other   If you need help, do you have trouble finding someone available to you? No   Have you been bothered in the last four weeks by sexual problems? No   Do you have difficulty concentrating, remembering or making decisions? No       Age-appropriate Screening Schedule:  Refer to the list below for future screening recommendations based on patient's age, sex and/or medical conditions. Orders for these recommended tests are listed in the  plan section. The patient has been provided with a written plan.    Health Maintenance   Topic Date Due   • ZOSTER VACCINE (2 of 3) 11/27/2014   • LIPID PANEL  09/06/2023   • DXA SCAN  03/16/2024   • TDAP/TD VACCINES (2 - Td or Tdap) 05/31/2031   • INFLUENZA VACCINE  Completed   • HEMOGLOBIN A1C  Discontinued   • DIABETIC EYE EXAM  Discontinued   • URINE MICROALBUMIN  Discontinued                The following data was reviewed by: Maricarmen Harrell MD on 01/13/2023:  Lab Results   Component Value Date    WBC 7.95 11/11/2022    HGB 12.6 11/11/2022    HCT 39.0 11/11/2022    .7 (H) 11/11/2022     11/11/2022     Lab Results   Component Value Date    GLUCOSE 85 11/11/2022    BUN 23 11/11/2022    CREATININE 1.29 (H) 11/11/2022    BCR 17.8 11/11/2022    K 4.8 11/11/2022    CO2 28.3 11/11/2022    CALCIUM 9.0 11/11/2022    ALBUMIN 3.70 11/11/2022    LABIL2 1.3 (L) 05/18/2021    AST 13 11/11/2022    ALT 7 11/11/2022     Lab Results   Component Value Date    CHOL 213 (H) 09/06/2022    CHLPL 212 (H) 05/18/2021    TRIG 134 09/06/2022    HDL 46 09/06/2022     (H) 09/06/2022     Lab Results   Component Value Date    TSH 7.688 (H) 11/15/2022     No results found for: HGBA1C  No results found for: PSA                     Assessment & Plan   CMS Preventative Services Quick Reference  Risk Factors Identified During Encounter  Chronic Pain: Chronic Pain Educational material Discussed and Printed for Patient.   OTC analgesics as needed. Proper dosing schedule discussed.   Follow up in 3 months.  Depression/Dysphoria: Current medication is effective, no change recommended  Fall Risk-High or Moderate: Discussed Fall Prevention in the home  Glaucoma or Family History of Glaucoma:  Ophthalmology Appointment Recommended  Hearing Problem: referral to audiologist recommended-pt defers  Immunizations Discussed/Encouraged: Shingrix and COVID19  Inactivity/Sedentary: pt is too weak to exercise, she is getting  PT  Polypharmacy: Medication List reviewed  Tobacco Use/Dependance Risk -counseled on need to stop smoking  Dental Screening Recommended  Vision Screening Recommended  The above risks/problems have been discussed with the patient.  Follow up actions/plans if indicated are seen below in the Assessment/Plan Section.  Pertinent information has been shared with the patient in the After Visit Summary.    Diagnoses and all orders for this visit:    1. Encounter for annual wellness exam in Medicare patient (Primary)    2. Weight loss  Comments:  start remeron to stimulate appetite and help with depression and sleep, get her set up with moms meals  Orders:  -     Discontinue: mirtazapine (Remeron) 15 MG tablet; Take 1 tablet by mouth Every Night.  Dispense: 90 tablet; Refill: 1  -     mirtazapine (Remeron) 15 MG tablet; Take 1 tablet by mouth Every Night.  Dispense: 90 tablet; Refill: 1    3. Primary insomnia  -     Discontinue: mirtazapine (Remeron) 15 MG tablet; Take 1 tablet by mouth Every Night.  Dispense: 90 tablet; Refill: 1  -     mirtazapine (Remeron) 15 MG tablet; Take 1 tablet by mouth Every Night.  Dispense: 90 tablet; Refill: 1    4. Encounter for screening mammogram for breast cancer  Comments:  Given her weight loss I will continue mammograms and reschedule  Orders:  -     Mammo Screening Digital Tomosynthesis Bilateral With CAD; Future    5. Encounter for immunization  -     COVID-19 Bivalent Booster (Pfizer) 12+yrs    6. Colon cancer screening  Comments:  Given her weight loss I will proceed with colonoscopy  Orders:  -     Cologuard - Stool, Per Rectum; Future    7. Postmenopausal state  Comments:  She wants to be done with DEXA due to age    8. Essential (primary) hypertension  Comments:  Elevated.  To start checking blood pressures at home and call if greater than 140/90    9. Chronic obstructive pulmonary disease, unspecified COPD type (HCC)  Comments:  Currently under pulmonology care, still strong  concerns for lung cancer.  Pulmonology is talking about doing a bronchial lavage washing to further evaluate.      10. Chronic kidney disease, stage 2 (mild)  Comments:  Avoid NSAIDs, push fluids, will repeat kidney function to reeval, continue ACE to protect kidneys    11. Tobacco abuse  Comments:  counseled on cessation, she smokes 5 cigarettes a day at this time    12. Acquired hypothyroidism  Comments:  Pt is stable on meds, tolerating medications well, no changes are needed in current meds or treatment plan, will check labs and reassess    13. Anxiety and depression  Comments:  Pt is stable on meds, tolerating medications well, no changes are needed in current meds or treatment plan    14. Chronic low back pain, unspecified back pain laterality, unspecified whether sciatica present  Comments:  today is her F2 F, no signs of diversion or abuse    15. Chronic obstructive pulmonary disease with acute exacerbation (HCC)  Comments:  under treatment with pulmonology     16. Allergic rhinitis, unspecified seasonality, unspecified trigger  Comments:  Stable on current meds.  No changes needed in current medications or treatment plan    17. Gastroesophageal reflux disease without esophagitis  Comments:  Pt is stable on meds, tolerating medications well, no changes are needed in current meds or treatment plan    18. High cholesterol  Comments:  Pt is stable on diet, no changes are needed in current treatment plan, will check labs and reassess    19. High risk medication use  -     POC Urine Drug Screen [facility] Bio-Cup    Other orders  -     megestrol (Megace ES) 625 MG/5ML suspension; 2.5 ml bid  Dispense: 150 mL; Refill: 2          Follow Up:   Return in about 3 months (around 4/13/2023), or if symptoms worsen or fail to improve, for Recheck.     An After Visit Summary and PPPS were made available to the patient.

## 2023-01-17 ENCOUNTER — PATIENT OUTREACH (OUTPATIENT)
Dept: CASE MANAGEMENT | Facility: OTHER | Age: 78
End: 2023-01-17
Payer: MEDICARE

## 2023-01-17 DIAGNOSIS — R29.6 FREQUENT FALLS: Primary | ICD-10-CM

## 2023-01-17 NOTE — OUTREACH NOTE
AMBULATORY CASE MANAGEMENT NOTE    Name and Relationship of Patient/Support Person: MARJAN WISE - Emergency Contact    Called to follow up on office visit. Despite the need to be inpatient for frequent falls and rehab, Samson states that she is not going into a nursing home.  Told Marjan that she can call me with any needs.      LEXUS ANDRE  Ambulatory Case Management    1/17/2023, 11:20 EST     AMBULATORY CASE MANAGEMENT NOTE    Name and Relationship of Patient/Support Person: MARJAN WISE - Emergency Contact    Marjan called back to ask that I send the office note and demographics for her mom.  She reports that she can barely take care of herself and her mom fell again, no injuries.  Her fear is that she is going to fall and break something.  Wants to prevent an injury.     Education Documentation  No documentation found.        LEXUS POWELL  Ambulatory Case Management    1/17/2023, 13:23 EST

## 2023-01-19 LAB
LABORATORY COMMENT REPORT: NORMAL
OPIATES UR QL SCN: NEGATIVE NG/ML

## 2023-01-20 ENCOUNTER — CLINICAL SUPPORT (OUTPATIENT)
Dept: FAMILY MEDICINE CLINIC | Age: 78
End: 2023-01-20
Payer: MEDICARE

## 2023-01-20 DIAGNOSIS — Z79.899 OTHER LONG TERM (CURRENT) DRUG THERAPY: Primary | ICD-10-CM

## 2023-01-29 ENCOUNTER — PATIENT OUTREACH (OUTPATIENT)
Dept: CASE MANAGEMENT | Facility: OTHER | Age: 78
End: 2023-01-29
Payer: MEDICARE

## 2023-01-29 DIAGNOSIS — G89.29 CHRONIC LOW BACK PAIN, UNSPECIFIED BACK PAIN LATERALITY, UNSPECIFIED WHETHER SCIATICA PRESENT: ICD-10-CM

## 2023-01-29 DIAGNOSIS — J43.1 PANLOBULAR EMPHYSEMA: ICD-10-CM

## 2023-01-29 DIAGNOSIS — I10 ESSENTIAL (PRIMARY) HYPERTENSION: ICD-10-CM

## 2023-01-29 DIAGNOSIS — M54.50 CHRONIC LOW BACK PAIN, UNSPECIFIED BACK PAIN LATERALITY, UNSPECIFIED WHETHER SCIATICA PRESENT: ICD-10-CM

## 2023-01-29 DIAGNOSIS — R29.6 FREQUENT FALLS: Primary | ICD-10-CM

## 2023-01-29 PROCEDURE — 99490 CHRNC CARE MGMT STAFF 1ST 20: CPT | Performed by: FAMILY MEDICINE

## 2023-01-29 NOTE — OUTREACH NOTE
[facility] End of Month Documentation    This Chronic Medical Management Care Plan for Amada Bentley, 78 y.o. female, has been monitored and managed; reviewed and a new plan of care implemented for the month of January.  A cumulative time of 32  minutes was spent on this patient record this month, including phone call with care giver; face to face with provider; electronic communication with primary care provider; chart review.    Regarding the patient's problems: has Degeneration of lumbosacral intervertebral disc; High blood pressure; High cholesterol; Kidney disorder; Lumbar spinal stenosis; Murmur, cardiac; Other specified chronic obstructive airways disease; Thoracic or lumbosacral neuritis or radiculitis; Thyroid disorder; Solitary pulmonary nodule; Primary insomnia; Pernicious anemia; Other long term (current) drug therapy; Chronic kidney disease, stage 2 (mild); COPD (chronic obstructive pulmonary disease) (HCC); Essential (primary) hypertension; Nicotine dependence, unspecified, uncomplicated; Encounter for examination for admission to nursing home; and Encounter for annual wellness exam in Medicare patient on their problem list., the following items were addressed: medical records; transitions to medical care; medications; referrals to community service providers and any changes can be found within the plan section of the note.  A detailed listing of time spent for chronic care management is tracked within each outreach encounter.  Current medications include:  has a current medication list which includes the following prescription(s): albuterol, albuterol sulfate hfa, buspirone, cetirizine, cyanocobalamin, duloxetine, advair hfa, gabapentin, hydrocodone-acetaminophen, levothyroxine, lisinopril, megestrol, metoprolol succinate xl, mirtazapine, montelukast, o2, omeprazole, potassium chloride, prednisone, syringe, and tiotropium. and the patient is reported to be a referral has been made to a community resource,   Medications are reported to be non-effective in controlling symptoms and changes have been made to the medication protocol.  Regarding these diagnoses, referrals were made to the following provider(s):  specialists.  All notes on chart for PCP to review.    The patient was monitored remotely for activity level; mood & behavior; medications; weight.    The patient's physical needs include:  needs assistance with ADLs; physical healthcare; physician referral.     The patient's mental support needs include:  increased support    The patient's cognitive support needs include:  continued support; household care; emotional care    The patient's psychosocial support needs include:  coordination of community providers; need for increased support; medication management or adherence    The patient's functional needs include: needs assistance for ADLs; resources for disability needs; physical healthcare    The patient's environmental needs include:  resources for disability needs    Care Plan overall comments:  Chart review and contact with daughter and home health.  Will follow. Possible nursing home placement.    Refer to previous outreach notes for more information on the areas listed above.    Monthly Billing Diagnoses  (R29.6) Frequent falls    (I10) Essential (primary) hypertension    (M54.50,  G89.29) Chronic low back pain, unspecified back pain laterality, unspecified whether sciatica present    (J43.1) Panlobular emphysema (HCC)    Medications   · Medications have been reconciled    Care Plan progress this month:      Recently Modified Care Plans Updates made since 12/29/2022 12:00 AM    No recently modified care plans.          · Current Specialty Plan of Care Status signed by both patient and provider    Instructions   · Patient was provided an electronic copy of care plan  · CCM services were explained and offered and patient has accepted these services.  · Patient has given their written consent to receive CCM  services and understands that this includes the authorization of electronic communication of medical information with the other treating providers.  · Patient understands that they may stop CCM services at any time and these changes will be effective at the end of the calendar month and will effectively revocate the agreement of CCM services.  · Patient understands that only one practitioner can furnish and be paid for CCM services during one calendar month.  Patient also understands that there may be co-payment or deductible fees in association with CCM services.  · Patient will continue with at least monthly follow-up calls with the Ambulatory .    LEXUS POWELL  Ambulatory Case Management    1/29/2023, 09:20 EST

## 2023-01-31 RX ORDER — POTASSIUM CHLORIDE 750 MG/1
TABLET, EXTENDED RELEASE ORAL
Qty: 180 TABLET | Refills: 0 | Status: SHIPPED | OUTPATIENT
Start: 2023-01-31

## 2023-02-01 ENCOUNTER — PATIENT OUTREACH (OUTPATIENT)
Dept: CASE MANAGEMENT | Facility: OTHER | Age: 78
End: 2023-02-01
Payer: MEDICARE

## 2023-02-01 DIAGNOSIS — J43.1 PANLOBULAR EMPHYSEMA: Primary | ICD-10-CM

## 2023-02-01 NOTE — OUTREACH NOTE
AMBULATORY CASE MANAGEMENT NOTE    Name and Relationship of Patient/Support Person: MARJAN WISE - Emergency Contact    Called Marjan to follow up on the admission to [facility].  Waiting on insurance to be transferred to Medicare/Medicaid.  She will keep me notified with any changes.     Education Documentation  No documentation found.        LEXUS POWELL  Ambulatory Case Management    2/1/2023, 11:16 EST

## 2023-02-09 ENCOUNTER — OFFICE VISIT (OUTPATIENT)
Dept: PULMONOLOGY | Facility: CLINIC | Age: 78
End: 2023-02-09
Payer: MEDICARE

## 2023-02-09 VITALS
RESPIRATION RATE: 18 BRPM | SYSTOLIC BLOOD PRESSURE: 101 MMHG | TEMPERATURE: 98 F | OXYGEN SATURATION: 98 % | HEIGHT: 60 IN | HEART RATE: 98 BPM | DIASTOLIC BLOOD PRESSURE: 74 MMHG | WEIGHT: 83.8 LBS | BODY MASS INDEX: 16.45 KG/M2

## 2023-02-09 DIAGNOSIS — J44.1 CHRONIC OBSTRUCTIVE PULMONARY DISEASE WITH ACUTE EXACERBATION: ICD-10-CM

## 2023-02-09 DIAGNOSIS — J43.1 PANLOBULAR EMPHYSEMA: Primary | ICD-10-CM

## 2023-02-09 DIAGNOSIS — R91.8 LUNG NODULES: ICD-10-CM

## 2023-02-09 PROCEDURE — 99214 OFFICE O/P EST MOD 30 MIN: CPT | Performed by: INTERNAL MEDICINE

## 2023-02-09 RX ORDER — ALBUTEROL SULFATE 2.5 MG/3ML
2.5 SOLUTION RESPIRATORY (INHALATION) EVERY 4 HOURS PRN
Qty: 100 EACH | Refills: 5 | Status: SHIPPED | OUTPATIENT
Start: 2023-02-09

## 2023-02-09 RX ORDER — ALBUTEROL SULFATE 90 UG/1
2 AEROSOL, METERED RESPIRATORY (INHALATION) EVERY 6 HOURS PRN
Qty: 18 G | Refills: 5 | Status: SHIPPED | OUTPATIENT
Start: 2023-02-09

## 2023-02-09 RX ORDER — FLUTICASONE PROPIONATE AND SALMETEROL XINAFOATE 230; 21 UG/1; UG/1
2 AEROSOL, METERED RESPIRATORY (INHALATION)
Qty: 1 EACH | Refills: 2 | Status: SHIPPED | OUTPATIENT
Start: 2023-02-09

## 2023-02-09 NOTE — PROGRESS NOTES
Pulmonary Office Follow-up    Subjective     Amada Bentley is seen today at the office for   Chief Complaint   Patient presents with   • COPD   • Shortness of Breath     On exertion   • Follow-up         HPI  Amada Bentley is a 78 y.o. female with a PMH significant for tobacco abuse and COPD with lung nodules presents for follow-up patient feels somewhat improved but she continues to have cough with mucoid expectoration and dyspnea on exertion with wheezing she continues to smoke patient denies any chest pain fever or hemoptysis      Tobacco use history:  Type: cigarettes  Amount: 1 ppd  Duration: 50 years  Cessation: n   Willing to quit: Yes      Patient Active Problem List   Diagnosis   • Degeneration of lumbosacral intervertebral disc   • High blood pressure   • High cholesterol   • Kidney disorder   • Lumbar spinal stenosis   • Murmur, cardiac   • Other specified chronic obstructive airways disease   • Thoracic or lumbosacral neuritis or radiculitis   • Thyroid disorder   • Solitary pulmonary nodule   • Primary insomnia   • Pernicious anemia   • Other long term (current) drug therapy   • Chronic kidney disease, stage 2 (mild)   • COPD (chronic obstructive pulmonary disease) (HCC)   • Essential (primary) hypertension   • Nicotine dependence, unspecified, uncomplicated   • Encounter for examination for admission to nursing home   • Encounter for annual wellness exam in Medicare patient       Review of Systems  Review of Systems   Respiratory: Positive for cough, shortness of breath and wheezing.    All other systems reviewed and are negative.    As described in the HPI. Otherwise, remainder of ROS (14 systems) were negative.    Medications, Allergies, Social, and Family Histories reviewed as per EMR.    Objective     Vitals:    02/09/23 1501   BP: 101/74   Pulse: 98   Resp: 18   Temp: 98 °F (36.7 °C)   SpO2: 98%         02/09/23  1501   Weight: 38 kg (83 lb 12.8 oz)       Physical Exam  Vitals and nursing  note reviewed.   Constitutional:       Appearance: She is ill-appearing.   HENT:      Head: Normocephalic and atraumatic.      Nose: Nose normal.      Mouth/Throat:      Mouth: Mucous membranes are moist.      Pharynx: Oropharynx is clear.   Eyes:      Conjunctiva/sclera: Conjunctivae normal.      Pupils: Pupils are equal, round, and reactive to light.   Cardiovascular:      Rate and Rhythm: Normal rate and regular rhythm.      Pulses: Normal pulses.      Heart sounds: Normal heart sounds.   Pulmonary:      Effort: Pulmonary effort is normal.      Breath sounds: Wheezing and rhonchi present.   Abdominal:      General: Abdomen is flat. Bowel sounds are normal.      Palpations: Abdomen is soft.   Musculoskeletal:         General: Normal range of motion.      Cervical back: Normal range of motion and neck supple.   Skin:     General: Skin is warm.      Capillary Refill: Capillary refill takes less than 2 seconds.   Neurological:      General: No focal deficit present.      Mental Status: She is alert and oriented to person, place, and time.   Psychiatric:         Mood and Affect: Mood normal.         Behavior: Behavior normal.         CT Chest With Contrast Diagnostic    Result Date: 11/14/2022    1. Pulmonary cyst within the right middle lobe with peripheral nodules measuring 8 mm anteromedially and 11 mm posteriorly.  These could represent soft tissue nodules are malignancy or findings of fungal infection or coalescent secretions.  No prior CT imaging is available for direct comparison.  In the absence of imaging documenting stability, nuclear medicine PET-CT would be recommended. 2. Bilateral bronchial wall thickening with areas of tree-in-bud nodularity likely reflecting infectious or inflammatory bronchiolitis.  This can be seen with chronic aspiration or atypical infection such as ROX.      YOANDY SIMS MD       Electronically Signed and Approved By: YOANDY SIMS MD on 11/14/2022 at 17:09             XR  Chest 1 View    Result Date: 11/15/2022  1. Hyperinflation consistent with COPD. 2. New left basilar opacity suggestive of atelectasis or pneumonia. Images reviewed, interpreted, and dictated by Dr. Lincoln Lindquist. Transcribed by Narda Lowery PA-C.    NM PET/CT Skull Base to Mid Thigh    Result Date: 12/14/2022    PET-CT demonstrating no abnormal uptake in pulmonary cyst with multiple soft tissue nodules in the right middle lobe.  Findings are most consistent with an inflammatory or infectious etiology.  Continued follow-up is recommended.     KERRIE DEJESUS MD       Electronically Signed and Approved By: KERRIE DEJESUS MD on 12/14/2022 at 11:19             CT chest for pulmonary embolus    Result Date: 11/21/2022  No pulmonary embolism or other acute abnormality. Stable air cyst lesion in the right upper lobe with mural nodularity. Images reviewed, interpreted, and dictated by Dr. Robinson Vasquez. Transcribed by Prosper Rodriguez.       Assessment & Plan     Diagnoses and all orders for this visit:    1. Panlobular emphysema (HCC) (Primary)    2. Chronic obstructive pulmonary disease with acute exacerbation (HCC)  Comments:  New medications discussed , recommended to take daily and follow up with PCP as scheduled   Orders:  -     albuterol (PROVENTIL) (2.5 MG/3ML) 0.083% nebulizer solution; Take 2.5 mg by nebulization Every 4 (Four) Hours As Needed for Wheezing.  Dispense: 100 each; Refill: 5    3. Lung nodules         Discussion/ Recommendations:   Patient is strongly counseled to stop smoking  Patient is advised to continue her home oxygen and nebulizer treatments  To continue her Advair  every 12 hours  High-protein diet  Vaccinations discussed and recommended    BMI is below normal parameters (malnutrition). Recommendations: referral to primary care        Return in about 3 months (around 5/9/2023).          This document has been electronically signed by Manjit Gray MD on February 9, 2023 15:10 EST

## 2023-02-14 ENCOUNTER — PATIENT OUTREACH (OUTPATIENT)
Dept: CASE MANAGEMENT | Facility: OTHER | Age: 78
End: 2023-02-14
Payer: MEDICARE

## 2023-02-14 DIAGNOSIS — R29.6 FREQUENT FALLS: Primary | ICD-10-CM

## 2023-02-14 NOTE — OUTREACH NOTE
AMBULATORY CASE MANAGEMENT NOTE    Name and Relationship of Patient/Support Person: BILLIEMARJAN - Emergency Contact    Marjan called. Still waiting on [facility] to stop to get the medicaid for [place] placement.  She did have a fall Sunday evening when she went out to smoke, no major injury, scraped knee, had to crawl back inside.  Home health is still seeing her, Chante. PT discharged due to she is not participating.  Informed Marjan that she may need a H&P within 14 days, she should clarify with facility and I can schedule an appointment.     Education Documentation  No documentation found.        Viv POWELL  Ambulatory Case Management    2/14/2023, 13:02 EST

## 2023-02-28 ENCOUNTER — PATIENT OUTREACH (OUTPATIENT)
Dept: CASE MANAGEMENT | Facility: OTHER | Age: 78
End: 2023-02-28
Payer: MEDICARE

## 2023-02-28 DIAGNOSIS — I10 ESSENTIAL (PRIMARY) HYPERTENSION: Primary | ICD-10-CM

## 2023-02-28 NOTE — OUTREACH NOTE
AMBULATORY CASE MANAGEMENT NOTE    Name and Relationship of Patient/Support Person: MARJAN WISE - Emergency Contact    Called to check in with Marjan.  No placement yet, insurance expires today.  She will keep me notified.     Education Documentation  No documentation found.        Viv POWELL  Ambulatory Case Management    2/28/2023, 12:30 EST

## 2023-03-02 ENCOUNTER — PATIENT OUTREACH (OUTPATIENT)
Dept: CASE MANAGEMENT | Facility: OTHER | Age: 78
End: 2023-03-02
Payer: MEDICARE

## 2023-03-02 DIAGNOSIS — J44.9 CHRONIC OBSTRUCTIVE PULMONARY DISEASE, UNSPECIFIED COPD TYPE: Primary | ICD-10-CM

## 2023-03-02 NOTE — PROGRESS NOTES
She needs to come in and be assessed as far as the need for steroids and or potentially antibiotics.  Her pulmonologist may feel differently about prescribing her steroids so she can reach out to them if she does not want to come in.   Dr. Harrell

## 2023-03-02 NOTE — OUTREACH NOTE
AMBULATORY CASE MANAGEMENT NOTE    Name and Relationship of Patient/Support Person: Chante, Rn - Home Health    Chante called to report that she was [place] today.  Samson stayed at [facility] over the weekend and returned home today.  She did not use any oxygen while at [facility].  She is now wheezing.  She has thick clear sputum.  O2 95% room air today.  Requesting steroid.    I suggested that she please use her nebulizer treatments (states that she has been) and use incentive spirometry since she is oxygenating well and she can hopefully clear wheezing with nebulizer use.  Also needs refill on Spiriva, will send to pulmonology to fill.    Education Documentation  No documentation found.        Viv POWELL  Ambulatory Case Management    3/2/2023, 14:51 EST

## 2023-03-06 ENCOUNTER — TELEPHONE (OUTPATIENT)
Dept: FAMILY MEDICINE CLINIC | Age: 78
End: 2023-03-06
Payer: MEDICARE

## 2023-03-07 ENCOUNTER — PATIENT OUTREACH (OUTPATIENT)
Dept: CASE MANAGEMENT | Facility: OTHER | Age: 78
End: 2023-03-07
Payer: MEDICARE

## 2023-03-07 NOTE — OUTREACH NOTE
AMBULATORY CASE MANAGEMENT NOTE    Name and Relationship of Patient/Support Person:  -     Marjan Staton, daughter called to report that her mom is being transferred to [facility] tomorrow for admission.        Viv POWELL  Ambulatory Case Management    3/7/2023, 13:09 EST

## 2023-03-13 NOTE — TELEPHONE ENCOUNTER
2nd attempt- pt states she has no ride as of right now to complete these but will try to find a way here in the next couple of weeks./kitty

## 2023-03-20 ENCOUNTER — TELEPHONE (OUTPATIENT)
Dept: FAMILY MEDICINE CLINIC | Age: 78
End: 2023-03-20
Payer: MEDICARE

## 2023-03-20 NOTE — TELEPHONE ENCOUNTER
----- Message from Christiane Mendes MA sent at 3/15/2023  9:22 AM EDT -----  Can you check on Cologuard please    ----- Message -----  From: Radha Bowling RN  Sent: 3/15/2023   8:31 AM EDT  To: [facility]        ----- Message -----  From: SYSTEM  Sent: 3/15/2023   1:09 AM EDT  To: [facility] Clinical Pool

## 2023-05-02 ENCOUNTER — TELEPHONE (OUTPATIENT)
Dept: FAMILY MEDICINE CLINIC | Age: 78
End: 2023-05-02
Payer: MEDICARE

## 2023-09-30 NOTE — TELEPHONE ENCOUNTER
Medications sent to Kristen  Recommend follow up BP and BMP in 1 week or next visit  with home health    Dr. Hernandez

## 2024-02-13 ENCOUNTER — TELEPHONE (OUTPATIENT)
Dept: CASE MANAGEMENT | Facility: OTHER | Age: 79
End: 2024-02-13
Payer: MEDICARE

## 2024-11-08 ENCOUNTER — APPOINTMENT (OUTPATIENT)
Dept: GENERAL RADIOLOGY | Facility: HOSPITAL | Age: 79
DRG: 871 | End: 2024-11-08
Payer: MEDICARE

## 2024-11-08 ENCOUNTER — HOSPITAL ENCOUNTER (INPATIENT)
Facility: HOSPITAL | Age: 79
LOS: 7 days | Discharge: NURSING, REHAB OR LONG-TERM CARE | DRG: 871 | End: 2024-11-15
Attending: EMERGENCY MEDICINE | Admitting: FAMILY MEDICINE
Payer: MEDICARE

## 2024-11-08 DIAGNOSIS — R26.2 DIFFICULTY IN WALKING: ICD-10-CM

## 2024-11-08 DIAGNOSIS — J44.1 COPD EXACERBATION: Primary | ICD-10-CM

## 2024-11-08 DIAGNOSIS — R13.12 OROPHARYNGEAL DYSPHAGIA: ICD-10-CM

## 2024-11-08 PROBLEM — J96.21 ACUTE ON CHRONIC RESPIRATORY FAILURE WITH HYPOXIA: Status: ACTIVE | Noted: 2024-11-08

## 2024-11-08 LAB
ALBUMIN SERPL-MCNC: 3.9 G/DL (ref 3.5–5.2)
ALBUMIN/GLOB SERPL: 1.1 G/DL
ALP SERPL-CCNC: 58 U/L (ref 39–117)
ALT SERPL W P-5'-P-CCNC: <5 U/L (ref 1–33)
ANION GAP SERPL CALCULATED.3IONS-SCNC: 16.1 MMOL/L (ref 5–15)
ARTERIAL PATENCY WRIST A: ABNORMAL
AST SERPL-CCNC: 5 U/L (ref 1–32)
ATMOSPHERIC PRESS: 748.7 MMHG
BASE EXCESS BLDA CALC-SCNC: -2.3 MMOL/L (ref -2–2)
BASOPHILS # BLD AUTO: 0.09 10*3/MM3 (ref 0–0.2)
BASOPHILS NFR BLD AUTO: 0.6 % (ref 0–1.5)
BDY SITE: ABNORMAL
BILIRUB SERPL-MCNC: 0.3 MG/DL (ref 0–1.2)
BUN BLDA-MCNC: 18 MG/DL (ref 8–23)
BUN SERPL-MCNC: 18 MG/DL (ref 8–23)
BUN/CREAT SERPL: 15.7 (ref 7–25)
CA-I BLDA-SCNC: 1.15 MMOL/L (ref 1.13–1.32)
CALCIUM SPEC-SCNC: 9.5 MG/DL (ref 8.6–10.5)
CHLORIDE BLDA-SCNC: 104 MMOL/L (ref 98–107)
CHLORIDE SERPL-SCNC: 99 MMOL/L (ref 98–107)
CO2 BLDA-SCNC: 20 MMOL/L (ref 23–27)
CO2 SERPL-SCNC: 21.9 MMOL/L (ref 22–29)
CREAT BLDA-MCNC: 0.97 MG/DL (ref 0.6–1.3)
CREAT SERPL-MCNC: 1.15 MG/DL (ref 0.57–1)
D-LACTATE SERPL-SCNC: 4.8 MMOL/L
D-LACTATE SERPL-SCNC: 4.9 MMOL/L (ref 0.5–2)
DEPRECATED RDW RBC AUTO: 61.1 FL (ref 37–54)
EGFRCR SERPLBLD CKD-EPI 2021: 48 ML/MIN/1.73
EOSINOPHIL # BLD AUTO: 0.03 10*3/MM3 (ref 0–0.4)
EOSINOPHIL NFR BLD AUTO: 0.2 % (ref 0.3–6.2)
ERYTHROCYTE [DISTWIDTH] IN BLOOD BY AUTOMATED COUNT: 16.5 % (ref 12.3–15.4)
FLUAV SUBTYP SPEC NAA+PROBE: NOT DETECTED
FLUBV RNA ISLT QL NAA+PROBE: NOT DETECTED
GAS FLOW AIRWAY: 6 LPM
GLOBULIN UR ELPH-MCNC: 3.4 GM/DL
GLUCOSE BLDC GLUCOMTR-MCNC: 139 MG/DL (ref 65–99)
GLUCOSE SERPL-MCNC: 134 MG/DL (ref 65–99)
HCO3 BLDA-SCNC: 20.5 MMOL/L (ref 22–26)
HCT VFR BLD AUTO: 35.2 % (ref 34–46.6)
HCT VFR BLD CALC: 35 % (ref 38–51)
HEMODILUTION: NO
HGB BLD-MCNC: 11.1 G/DL (ref 12–15.9)
HGB BLDA-MCNC: 11.9 G/DL (ref 12–18)
HOLD SPECIMEN: NORMAL
HOLD SPECIMEN: NORMAL
IMM GRANULOCYTES # BLD AUTO: 0.07 10*3/MM3 (ref 0–0.05)
IMM GRANULOCYTES NFR BLD AUTO: 0.5 % (ref 0–0.5)
LYMPHOCYTES # BLD AUTO: 1.64 10*3/MM3 (ref 0.7–3.1)
LYMPHOCYTES NFR BLD AUTO: 11.8 % (ref 19.6–45.3)
Lab: ABNORMAL
MAGNESIUM SERPL-MCNC: 1.8 MG/DL (ref 1.6–2.4)
MCH RBC QN AUTO: 32.2 PG (ref 26.6–33)
MCHC RBC AUTO-ENTMCNC: 31.5 G/DL (ref 31.5–35.7)
MCV RBC AUTO: 102 FL (ref 79–97)
MODALITY: ABNORMAL
MONOCYTES # BLD AUTO: 0.78 10*3/MM3 (ref 0.1–0.9)
MONOCYTES NFR BLD AUTO: 5.6 % (ref 5–12)
NEUTROPHILS NFR BLD AUTO: 11.32 10*3/MM3 (ref 1.7–7)
NEUTROPHILS NFR BLD AUTO: 81.3 % (ref 42.7–76)
NOTIFIED WHO: ABNORMAL
NOTIFIED WHO: ABNORMAL
NRBC BLD AUTO-RTO: 0 /100 WBC (ref 0–0.2)
NT-PROBNP SERPL-MCNC: 3098 PG/ML (ref 0–1800)
PCO2 BLDA: 28.9 MM HG (ref 35–45)
PH BLDA: 7.46 PH UNITS (ref 7.35–7.45)
PLATELET # BLD AUTO: 583 10*3/MM3 (ref 140–450)
PMV BLD AUTO: 10.2 FL (ref 6–12)
PO2 BLDA: 59.1 MM HG (ref 80–100)
POTASSIUM BLDA-SCNC: 3.7 MMOL/L (ref 3.5–5)
POTASSIUM SERPL-SCNC: 3.8 MMOL/L (ref 3.5–5.2)
PROT SERPL-MCNC: 7.3 G/DL (ref 6–8.5)
QT INTERVAL: 261 MS
QTC INTERVAL: 413 MS
RBC # BLD AUTO: 3.45 10*6/MM3 (ref 3.77–5.28)
READ BACK: YES
READ BACK: YES
RSV RNA NPH QL NAA+NON-PROBE: NOT DETECTED
SAO2 % BLDCOA: 92.1 % (ref 95–99)
SARS-COV-2 RNA RESP QL NAA+PROBE: NOT DETECTED
SODIUM BLD-SCNC: 139 MMOL/L (ref 131–143)
SODIUM SERPL-SCNC: 137 MMOL/L (ref 136–145)
TROPONIN T SERPL HS-MCNC: 33 NG/L
WBC NRBC COR # BLD AUTO: 13.93 10*3/MM3 (ref 3.4–10.8)
WHOLE BLOOD HOLD COAG: NORMAL
WHOLE BLOOD HOLD SPECIMEN: NORMAL

## 2024-11-08 PROCEDURE — 83605 ASSAY OF LACTIC ACID: CPT

## 2024-11-08 PROCEDURE — 99223 1ST HOSP IP/OBS HIGH 75: CPT | Performed by: FAMILY MEDICINE

## 2024-11-08 PROCEDURE — 94660 CPAP INITIATION&MGMT: CPT

## 2024-11-08 PROCEDURE — 85025 COMPLETE CBC W/AUTO DIFF WBC: CPT | Performed by: EMERGENCY MEDICINE

## 2024-11-08 PROCEDURE — 94799 UNLISTED PULMONARY SVC/PX: CPT

## 2024-11-08 PROCEDURE — 82803 BLOOD GASES ANY COMBINATION: CPT

## 2024-11-08 PROCEDURE — 84484 ASSAY OF TROPONIN QUANT: CPT | Performed by: EMERGENCY MEDICINE

## 2024-11-08 PROCEDURE — 94640 AIRWAY INHALATION TREATMENT: CPT

## 2024-11-08 PROCEDURE — 25010000002 METHYLPREDNISOLONE PER 125 MG: Performed by: EMERGENCY MEDICINE

## 2024-11-08 PROCEDURE — 25010000002 MAGNESIUM SULFATE IN D5W 1G/100ML (PREMIX) 1-5 GM/100ML-% SOLUTION: Performed by: EMERGENCY MEDICINE

## 2024-11-08 PROCEDURE — 80047 BASIC METABLC PNL IONIZED CA: CPT

## 2024-11-08 PROCEDURE — 80053 COMPREHEN METABOLIC PANEL: CPT | Performed by: EMERGENCY MEDICINE

## 2024-11-08 PROCEDURE — 87040 BLOOD CULTURE FOR BACTERIA: CPT | Performed by: EMERGENCY MEDICINE

## 2024-11-08 PROCEDURE — 36415 COLL VENOUS BLD VENIPUNCTURE: CPT

## 2024-11-08 PROCEDURE — 25010000002 HEPARIN (PORCINE) PER 1000 UNITS: Performed by: FAMILY MEDICINE

## 2024-11-08 PROCEDURE — 99291 CRITICAL CARE FIRST HOUR: CPT

## 2024-11-08 PROCEDURE — 25810000003 SODIUM CHLORIDE 0.9 % SOLUTION: Performed by: EMERGENCY MEDICINE

## 2024-11-08 PROCEDURE — 36600 WITHDRAWAL OF ARTERIAL BLOOD: CPT

## 2024-11-08 PROCEDURE — 93005 ELECTROCARDIOGRAM TRACING: CPT

## 2024-11-08 PROCEDURE — 71045 X-RAY EXAM CHEST 1 VIEW: CPT

## 2024-11-08 PROCEDURE — 87637 SARSCOV2&INF A&B&RSV AMP PRB: CPT | Performed by: EMERGENCY MEDICINE

## 2024-11-08 PROCEDURE — 25010000002 LORAZEPAM PER 2 MG: Performed by: EMERGENCY MEDICINE

## 2024-11-08 PROCEDURE — 93005 ELECTROCARDIOGRAM TRACING: CPT | Performed by: EMERGENCY MEDICINE

## 2024-11-08 PROCEDURE — 83880 ASSAY OF NATRIURETIC PEPTIDE: CPT | Performed by: EMERGENCY MEDICINE

## 2024-11-08 PROCEDURE — 83735 ASSAY OF MAGNESIUM: CPT | Performed by: EMERGENCY MEDICINE

## 2024-11-08 PROCEDURE — 25010000002 CEFTRIAXONE PER 250 MG: Performed by: EMERGENCY MEDICINE

## 2024-11-08 RX ORDER — DIVALPROEX SODIUM 125 MG/1
3 CAPSULE, COATED PELLETS ORAL 3 TIMES DAILY
COMMUNITY

## 2024-11-08 RX ORDER — POTASSIUM CHLORIDE 750 MG/1
10 TABLET, EXTENDED RELEASE ORAL DAILY
COMMUNITY

## 2024-11-08 RX ORDER — MIRTAZAPINE 15 MG/1
15 TABLET, FILM COATED ORAL NIGHTLY
Status: DISCONTINUED | OUTPATIENT
Start: 2024-11-08 | End: 2024-11-15 | Stop reason: HOSPADM

## 2024-11-08 RX ORDER — METHYLPREDNISOLONE SODIUM SUCCINATE 40 MG/ML
40 INJECTION, POWDER, LYOPHILIZED, FOR SOLUTION INTRAMUSCULAR; INTRAVENOUS EVERY 12 HOURS
Status: DISCONTINUED | OUTPATIENT
Start: 2024-11-09 | End: 2024-11-10

## 2024-11-08 RX ORDER — FUROSEMIDE 40 MG/1
40 TABLET ORAL DAILY
COMMUNITY

## 2024-11-08 RX ORDER — METHYLPREDNISOLONE SODIUM SUCCINATE 125 MG/2ML
125 INJECTION, POWDER, LYOPHILIZED, FOR SOLUTION INTRAMUSCULAR; INTRAVENOUS ONCE
Status: COMPLETED | OUTPATIENT
Start: 2024-11-08 | End: 2024-11-08

## 2024-11-08 RX ORDER — FLUTICASONE PROPIONATE 50 MCG
2 SPRAY, SUSPENSION (ML) NASAL DAILY
COMMUNITY

## 2024-11-08 RX ORDER — SODIUM CHLORIDE 9 MG/ML
40 INJECTION, SOLUTION INTRAVENOUS AS NEEDED
Status: DISCONTINUED | OUTPATIENT
Start: 2024-11-08 | End: 2024-11-15 | Stop reason: HOSPADM

## 2024-11-08 RX ORDER — BISACODYL 5 MG/1
5 TABLET, DELAYED RELEASE ORAL DAILY PRN
Status: DISCONTINUED | OUTPATIENT
Start: 2024-11-08 | End: 2024-11-15 | Stop reason: HOSPADM

## 2024-11-08 RX ORDER — IPRATROPIUM BROMIDE AND ALBUTEROL SULFATE 2.5; .5 MG/3ML; MG/3ML
3 SOLUTION RESPIRATORY (INHALATION) EVERY 4 HOURS PRN
Status: DISCONTINUED | OUTPATIENT
Start: 2024-11-08 | End: 2024-11-15 | Stop reason: HOSPADM

## 2024-11-08 RX ORDER — ACETAMINOPHEN 325 MG/1
650 TABLET ORAL EVERY 6 HOURS PRN
Status: DISCONTINUED | OUTPATIENT
Start: 2024-11-08 | End: 2024-11-15 | Stop reason: HOSPADM

## 2024-11-08 RX ORDER — GABAPENTIN 300 MG/1
300 CAPSULE ORAL 2 TIMES DAILY
COMMUNITY
End: 2024-11-15 | Stop reason: HOSPADM

## 2024-11-08 RX ORDER — MAGNESIUM SULFATE 1 G/100ML
1 INJECTION INTRAVENOUS ONCE
Status: COMPLETED | OUTPATIENT
Start: 2024-11-08 | End: 2024-11-08

## 2024-11-08 RX ORDER — NITROGLYCERIN 0.4 MG/1
0.4 TABLET SUBLINGUAL
Status: DISCONTINUED | OUTPATIENT
Start: 2024-11-08 | End: 2024-11-15 | Stop reason: HOSPADM

## 2024-11-08 RX ORDER — LORAZEPAM 2 MG/ML
0.5 INJECTION INTRAMUSCULAR ONCE
Status: COMPLETED | OUTPATIENT
Start: 2024-11-08 | End: 2024-11-08

## 2024-11-08 RX ORDER — ALLOPURINOL 100 MG/1
100 TABLET ORAL DAILY
COMMUNITY

## 2024-11-08 RX ORDER — POLYETHYLENE GLYCOL 3350 17 G/17G
17 POWDER, FOR SOLUTION ORAL DAILY PRN
Status: DISCONTINUED | OUTPATIENT
Start: 2024-11-08 | End: 2024-11-15 | Stop reason: HOSPADM

## 2024-11-08 RX ORDER — GUAIFENESIN/DEXTROMETHORPHAN 100-10MG/5
10 SYRUP ORAL EVERY 6 HOURS PRN
Status: DISCONTINUED | OUTPATIENT
Start: 2024-11-08 | End: 2024-11-15 | Stop reason: HOSPADM

## 2024-11-08 RX ORDER — BISACODYL 10 MG
10 SUPPOSITORY, RECTAL RECTAL DAILY PRN
Status: DISCONTINUED | OUTPATIENT
Start: 2024-11-08 | End: 2024-11-15 | Stop reason: HOSPADM

## 2024-11-08 RX ORDER — SODIUM CHLORIDE 0.9 % (FLUSH) 0.9 %
10 SYRINGE (ML) INJECTION AS NEEDED
Status: DISCONTINUED | OUTPATIENT
Start: 2024-11-08 | End: 2024-11-15 | Stop reason: HOSPADM

## 2024-11-08 RX ORDER — MONTELUKAST SODIUM 10 MG/1
10 TABLET ORAL NIGHTLY
Status: DISCONTINUED | OUTPATIENT
Start: 2024-11-08 | End: 2024-11-15 | Stop reason: HOSPADM

## 2024-11-08 RX ORDER — UMECLIDINIUM 62.5 UG/1
AEROSOL, POWDER ORAL
COMMUNITY

## 2024-11-08 RX ORDER — FOLIC ACID 1 MG/1
1 TABLET ORAL DAILY
COMMUNITY
Start: 2024-04-18 | End: 2025-04-18

## 2024-11-08 RX ORDER — CETIRIZINE HYDROCHLORIDE 10 MG/1
10 TABLET ORAL DAILY
Status: DISCONTINUED | OUTPATIENT
Start: 2024-11-09 | End: 2024-11-15 | Stop reason: HOSPADM

## 2024-11-08 RX ORDER — AMOXICILLIN 250 MG
2 CAPSULE ORAL 2 TIMES DAILY
Status: DISCONTINUED | OUTPATIENT
Start: 2024-11-08 | End: 2024-11-15 | Stop reason: HOSPADM

## 2024-11-08 RX ORDER — GUAIFENESIN 600 MG/1
600 TABLET, EXTENDED RELEASE ORAL EVERY 12 HOURS SCHEDULED
Status: DISCONTINUED | OUTPATIENT
Start: 2024-11-08 | End: 2024-11-15 | Stop reason: HOSPADM

## 2024-11-08 RX ORDER — IPRATROPIUM BROMIDE AND ALBUTEROL SULFATE 2.5; .5 MG/3ML; MG/3ML
3 SOLUTION RESPIRATORY (INHALATION)
Status: COMPLETED | OUTPATIENT
Start: 2024-11-08 | End: 2024-11-08

## 2024-11-08 RX ORDER — IPRATROPIUM BROMIDE AND ALBUTEROL SULFATE 2.5; .5 MG/3ML; MG/3ML
3 SOLUTION RESPIRATORY (INHALATION)
Status: DISCONTINUED | OUTPATIENT
Start: 2024-11-09 | End: 2024-11-15 | Stop reason: HOSPADM

## 2024-11-08 RX ORDER — ONDANSETRON 2 MG/ML
4 INJECTION INTRAMUSCULAR; INTRAVENOUS EVERY 6 HOURS PRN
Status: DISCONTINUED | OUTPATIENT
Start: 2024-11-08 | End: 2024-11-15 | Stop reason: HOSPADM

## 2024-11-08 RX ORDER — HEPARIN SODIUM 5000 [USP'U]/ML
5000 INJECTION, SOLUTION INTRAVENOUS; SUBCUTANEOUS EVERY 12 HOURS SCHEDULED
Status: DISCONTINUED | OUTPATIENT
Start: 2024-11-08 | End: 2024-11-15 | Stop reason: HOSPADM

## 2024-11-08 RX ORDER — SODIUM CHLORIDE 0.9 % (FLUSH) 0.9 %
10 SYRINGE (ML) INJECTION EVERY 12 HOURS SCHEDULED
Status: DISCONTINUED | OUTPATIENT
Start: 2024-11-08 | End: 2024-11-15 | Stop reason: HOSPADM

## 2024-11-08 RX ORDER — MEGESTROL ACETATE 40 MG/ML
5 SUSPENSION ORAL DAILY
COMMUNITY

## 2024-11-08 RX ADMIN — IPRATROPIUM BROMIDE AND ALBUTEROL SULFATE 3 ML: .5; 3 SOLUTION RESPIRATORY (INHALATION) at 18:20

## 2024-11-08 RX ADMIN — SENNOSIDES AND DOCUSATE SODIUM 2 TABLET: 50; 8.6 TABLET ORAL at 22:55

## 2024-11-08 RX ADMIN — MAGNESIUM SULFATE HEPTAHYDRATE 1 G: 10 INJECTION, SOLUTION INTRAVENOUS at 19:24

## 2024-11-08 RX ADMIN — IPRATROPIUM BROMIDE AND ALBUTEROL SULFATE 3 ML: .5; 3 SOLUTION RESPIRATORY (INHALATION) at 18:47

## 2024-11-08 RX ADMIN — ACETAMINOPHEN 650 MG: 325 TABLET ORAL at 22:55

## 2024-11-08 RX ADMIN — METOPROLOL TARTRATE 5 MG: 1 INJECTION, SOLUTION INTRAVENOUS at 20:19

## 2024-11-08 RX ADMIN — Medication 10 ML: at 22:56

## 2024-11-08 RX ADMIN — GUAIFENESIN 600 MG: 600 TABLET ORAL at 22:56

## 2024-11-08 RX ADMIN — MONTELUKAST 10 MG: 10 TABLET, FILM COATED ORAL at 22:56

## 2024-11-08 RX ADMIN — LORAZEPAM 0.5 MG: 2 INJECTION INTRAMUSCULAR; INTRAVENOUS at 19:13

## 2024-11-08 RX ADMIN — MUPIROCIN 1 APPLICATION: 20 OINTMENT TOPICAL at 22:55

## 2024-11-08 RX ADMIN — HEPARIN SODIUM 5000 UNITS: 5000 INJECTION INTRAVENOUS; SUBCUTANEOUS at 22:55

## 2024-11-08 RX ADMIN — SODIUM CHLORIDE 1000 MG: 9 INJECTION INTRAMUSCULAR; INTRAVENOUS; SUBCUTANEOUS at 19:24

## 2024-11-08 RX ADMIN — MIRTAZAPINE 15 MG: 15 TABLET, FILM COATED ORAL at 22:55

## 2024-11-08 RX ADMIN — METHYLPREDNISOLONE SODIUM SUCCINATE 125 MG: 125 INJECTION, POWDER, FOR SOLUTION INTRAMUSCULAR; INTRAVENOUS at 19:23

## 2024-11-08 RX ADMIN — IPRATROPIUM BROMIDE AND ALBUTEROL SULFATE 3 ML: .5; 3 SOLUTION RESPIRATORY (INHALATION) at 18:26

## 2024-11-08 RX ADMIN — SODIUM CHLORIDE 500 ML: 9 INJECTION, SOLUTION INTRAVENOUS at 19:07

## 2024-11-09 ENCOUNTER — APPOINTMENT (OUTPATIENT)
Dept: CT IMAGING | Facility: HOSPITAL | Age: 79
DRG: 871 | End: 2024-11-09
Payer: MEDICARE

## 2024-11-09 ENCOUNTER — APPOINTMENT (OUTPATIENT)
Dept: CARDIOLOGY | Facility: HOSPITAL | Age: 79
DRG: 871 | End: 2024-11-09
Payer: MEDICARE

## 2024-11-09 PROBLEM — J18.9 PNEUMONIA: Status: ACTIVE | Noted: 2024-11-09

## 2024-11-09 LAB
ANION GAP SERPL CALCULATED.3IONS-SCNC: 20.2 MMOL/L (ref 5–15)
ARTERIAL PATENCY WRIST A: POSITIVE
ATMOSPHERIC PRESS: 746.6 MMHG
B PARAPERT DNA SPEC QL NAA+PROBE: NOT DETECTED
B PERT DNA SPEC QL NAA+PROBE: NOT DETECTED
BACTERIA UR QL AUTO: NORMAL /HPF
BASE EXCESS BLDA CALC-SCNC: -2.4 MMOL/L (ref -2–2)
BDY SITE: ABNORMAL
BILIRUB UR QL STRIP: NEGATIVE
BUN SERPL-MCNC: 18 MG/DL (ref 8–23)
BUN/CREAT SERPL: 15.5 (ref 7–25)
C PNEUM DNA NPH QL NAA+NON-PROBE: NOT DETECTED
CA-I BLDA-SCNC: 1.12 MMOL/L (ref 1.13–1.32)
CALCIUM SPEC-SCNC: 9.3 MG/DL (ref 8.6–10.5)
CHLORIDE BLDA-SCNC: 105 MMOL/L (ref 98–107)
CHLORIDE SERPL-SCNC: 97 MMOL/L (ref 98–107)
CLARITY UR: CLEAR
CO2 SERPL-SCNC: 18.8 MMOL/L (ref 22–29)
COLOR UR: YELLOW
CREAT SERPL-MCNC: 1.16 MG/DL (ref 0.57–1)
D-LACTATE SERPL-SCNC: 3.9 MMOL/L (ref 0.5–2)
D-LACTATE SERPL-SCNC: 4.7 MMOL/L
D-LACTATE SERPL-SCNC: 5.5 MMOL/L (ref 0.5–2)
D-LACTATE SERPL-SCNC: 6.7 MMOL/L (ref 0.5–2)
D-LACTATE SERPL-SCNC: 6.8 MMOL/L (ref 0.5–2)
DEPRECATED RDW RBC AUTO: 61 FL (ref 37–54)
EGFRCR SERPLBLD CKD-EPI 2021: 47.5 ML/MIN/1.73
ERYTHROCYTE [DISTWIDTH] IN BLOOD BY AUTOMATED COUNT: 16.3 % (ref 12.3–15.4)
FLUAV SUBTYP SPEC NAA+PROBE: NOT DETECTED
FLUBV RNA ISLT QL NAA+PROBE: NOT DETECTED
GLUCOSE BLDC GLUCOMTR-MCNC: 158 MG/DL (ref 65–99)
GLUCOSE SERPL-MCNC: 179 MG/DL (ref 65–99)
GLUCOSE UR STRIP-MCNC: NEGATIVE MG/DL
HADV DNA SPEC NAA+PROBE: NOT DETECTED
HCO3 BLDA-SCNC: 21.5 MMOL/L (ref 22–26)
HCOV 229E RNA SPEC QL NAA+PROBE: NOT DETECTED
HCOV HKU1 RNA SPEC QL NAA+PROBE: NOT DETECTED
HCOV NL63 RNA SPEC QL NAA+PROBE: NOT DETECTED
HCOV OC43 RNA SPEC QL NAA+PROBE: NOT DETECTED
HCT VFR BLD AUTO: 32.1 % (ref 34–46.6)
HCT VFR BLD CALC: 27 % (ref 38–51)
HEMODILUTION: NO
HGB BLD-MCNC: 10.1 G/DL (ref 12–15.9)
HGB BLDA-MCNC: 9.2 G/DL (ref 12–18)
HGB UR QL STRIP.AUTO: NEGATIVE
HMPV RNA NPH QL NAA+NON-PROBE: NOT DETECTED
HPIV1 RNA ISLT QL NAA+PROBE: NOT DETECTED
HPIV2 RNA SPEC QL NAA+PROBE: NOT DETECTED
HPIV3 RNA NPH QL NAA+PROBE: NOT DETECTED
HPIV4 P GENE NPH QL NAA+PROBE: NOT DETECTED
HYALINE CASTS UR QL AUTO: NORMAL /LPF
INHALED O2 CONCENTRATION: 30 %
KETONES UR QL STRIP: NEGATIVE
L PNEUMO1 AG UR QL IA: NEGATIVE
LEUKOCYTE ESTERASE UR QL STRIP.AUTO: NEGATIVE
Lab: ABNORMAL
M PNEUMO IGG SER IA-ACNC: NOT DETECTED
MAGNESIUM SERPL-MCNC: 2.1 MG/DL (ref 1.6–2.4)
MCH RBC QN AUTO: 32.3 PG (ref 26.6–33)
MCHC RBC AUTO-ENTMCNC: 31.5 G/DL (ref 31.5–35.7)
MCV RBC AUTO: 102.6 FL (ref 79–97)
MODALITY: ABNORMAL
MRSA DNA SPEC QL NAA+PROBE: NORMAL
NITRITE UR QL STRIP: NEGATIVE
NOTIFIED WHO: ABNORMAL
PAW @ PEAK INSP FLOW SETTING VENT: 12 CMH2O
PCO2 BLDA: 32.8 MM HG (ref 35–45)
PEEP RESPIRATORY: 6 CM[H2O]
PH BLDA: 7.42 PH UNITS (ref 7.35–7.45)
PH UR STRIP.AUTO: 5.5 [PH] (ref 5–8)
PHOSPHATE SERPL-MCNC: 4.7 MG/DL (ref 2.5–4.5)
PLATELET # BLD AUTO: 462 10*3/MM3 (ref 140–450)
PMV BLD AUTO: 10 FL (ref 6–12)
PO2 BLD: 391 MM[HG] (ref 0–500)
PO2 BLDA: 117.4 MM HG (ref 80–100)
POTASSIUM BLDA-SCNC: 3.9 MMOL/L (ref 3.5–5)
POTASSIUM SERPL-SCNC: 3.4 MMOL/L (ref 3.5–5.2)
PROCALCITONIN SERPL-MCNC: 7.58 NG/ML (ref 0–0.25)
PROT UR QL STRIP: ABNORMAL
RBC # BLD AUTO: 3.13 10*6/MM3 (ref 3.77–5.28)
RBC # UR STRIP: NORMAL /HPF
READ BACK: YES
REF LAB TEST METHOD: NORMAL
RHINOVIRUS RNA SPEC NAA+PROBE: NOT DETECTED
RSV RNA NPH QL NAA+NON-PROBE: NOT DETECTED
S PNEUM AG SPEC QL LA: NEGATIVE
SAO2 % BLDCOA: 98.7 % (ref 95–99)
SARS-COV-2 RNA RESP QL NAA+PROBE: NOT DETECTED
SODIUM BLD-SCNC: 141 MMOL/L (ref 131–143)
SODIUM SERPL-SCNC: 136 MMOL/L (ref 136–145)
SP GR UR STRIP: 1.01 (ref 1–1.03)
SQUAMOUS #/AREA URNS HPF: NORMAL /HPF
TSH SERPL DL<=0.05 MIU/L-ACNC: 1.3 UIU/ML (ref 0.27–4.2)
UROBILINOGEN UR QL STRIP: ABNORMAL
VENTILATOR MODE: ABNORMAL
WBC # UR STRIP: NORMAL /HPF
WBC NRBC COR # BLD AUTO: 10.77 10*3/MM3 (ref 3.4–10.8)

## 2024-11-09 PROCEDURE — 93306 TTE W/DOPPLER COMPLETE: CPT | Performed by: INTERNAL MEDICINE

## 2024-11-09 PROCEDURE — 85027 COMPLETE CBC AUTOMATED: CPT | Performed by: FAMILY MEDICINE

## 2024-11-09 PROCEDURE — 25010000002 HEPARIN (PORCINE) PER 1000 UNITS: Performed by: FAMILY MEDICINE

## 2024-11-09 PROCEDURE — 81001 URINALYSIS AUTO W/SCOPE: CPT | Performed by: PHYSICIAN ASSISTANT

## 2024-11-09 PROCEDURE — 94799 UNLISTED PULMONARY SVC/PX: CPT

## 2024-11-09 PROCEDURE — 99233 SBSQ HOSP IP/OBS HIGH 50: CPT | Performed by: INTERNAL MEDICINE

## 2024-11-09 PROCEDURE — 93306 TTE W/DOPPLER COMPLETE: CPT

## 2024-11-09 PROCEDURE — 82803 BLOOD GASES ANY COMBINATION: CPT

## 2024-11-09 PROCEDURE — 94664 DEMO&/EVAL PT USE INHALER: CPT

## 2024-11-09 PROCEDURE — 25010000002 SODIUM CHLORIDE 0.9 % WITH KCL 20 MEQ 20-0.9 MEQ/L-% SOLUTION: Performed by: FAMILY MEDICINE

## 2024-11-09 PROCEDURE — 99291 CRITICAL CARE FIRST HOUR: CPT | Performed by: INTERNAL MEDICINE

## 2024-11-09 PROCEDURE — 94660 CPAP INITIATION&MGMT: CPT

## 2024-11-09 PROCEDURE — 74174 CTA ABD&PLVS W/CONTRAST: CPT

## 2024-11-09 PROCEDURE — 84100 ASSAY OF PHOSPHORUS: CPT | Performed by: PHYSICIAN ASSISTANT

## 2024-11-09 PROCEDURE — 36600 WITHDRAWAL OF ARTERIAL BLOOD: CPT

## 2024-11-09 PROCEDURE — 80048 BASIC METABOLIC PNL TOTAL CA: CPT | Performed by: FAMILY MEDICINE

## 2024-11-09 PROCEDURE — 80051 ELECTROLYTE PANEL: CPT

## 2024-11-09 PROCEDURE — 83605 ASSAY OF LACTIC ACID: CPT

## 2024-11-09 PROCEDURE — 84145 PROCALCITONIN (PCT): CPT | Performed by: NURSE PRACTITIONER

## 2024-11-09 PROCEDURE — 25810000003 LACTATED RINGERS PER 1000 ML: Performed by: INTERNAL MEDICINE

## 2024-11-09 PROCEDURE — 94761 N-INVAS EAR/PLS OXIMETRY MLT: CPT

## 2024-11-09 PROCEDURE — 94669 MECHANICAL CHEST WALL OSCILL: CPT

## 2024-11-09 PROCEDURE — 25010000002 CEFEPIME PER 500 MG: Performed by: PHYSICIAN ASSISTANT

## 2024-11-09 PROCEDURE — 87641 MR-STAPH DNA AMP PROBE: CPT | Performed by: PHYSICIAN ASSISTANT

## 2024-11-09 PROCEDURE — 25510000001 IOPAMIDOL PER 1 ML: Performed by: INTERNAL MEDICINE

## 2024-11-09 PROCEDURE — 87449 NOS EACH ORGANISM AG IA: CPT | Performed by: PHYSICIAN ASSISTANT

## 2024-11-09 PROCEDURE — 25010000002 METHYLPREDNISOLONE PER 40 MG: Performed by: FAMILY MEDICINE

## 2024-11-09 PROCEDURE — 25010000002 AZITHROMYCIN PER 500 MG: Performed by: FAMILY MEDICINE

## 2024-11-09 PROCEDURE — 25810000003 SODIUM CHLORIDE 0.9 % SOLUTION 250 ML FLEX CONT: Performed by: FAMILY MEDICINE

## 2024-11-09 PROCEDURE — 82330 ASSAY OF CALCIUM: CPT

## 2024-11-09 PROCEDURE — 84443 ASSAY THYROID STIM HORMONE: CPT | Performed by: NURSE PRACTITIONER

## 2024-11-09 PROCEDURE — 25810000003 SODIUM CHLORIDE 0.9 % SOLUTION: Performed by: FAMILY MEDICINE

## 2024-11-09 PROCEDURE — 83735 ASSAY OF MAGNESIUM: CPT | Performed by: PHYSICIAN ASSISTANT

## 2024-11-09 PROCEDURE — 82948 REAGENT STRIP/BLOOD GLUCOSE: CPT

## 2024-11-09 PROCEDURE — 25810000003 LACTATED RINGERS SOLUTION: Performed by: FAMILY MEDICINE

## 2024-11-09 PROCEDURE — 0202U NFCT DS 22 TRGT SARS-COV-2: CPT | Performed by: PHYSICIAN ASSISTANT

## 2024-11-09 RX ORDER — SODIUM CHLORIDE, SODIUM LACTATE, POTASSIUM CHLORIDE, CALCIUM CHLORIDE 600; 310; 30; 20 MG/100ML; MG/100ML; MG/100ML; MG/100ML
75 INJECTION, SOLUTION INTRAVENOUS CONTINUOUS
Status: DISCONTINUED | OUTPATIENT
Start: 2024-11-09 | End: 2024-11-10

## 2024-11-09 RX ORDER — POTASSIUM CHLORIDE 750 MG/1
40 CAPSULE, EXTENDED RELEASE ORAL ONCE
Status: COMPLETED | OUTPATIENT
Start: 2024-11-09 | End: 2024-11-09

## 2024-11-09 RX ORDER — BUDESONIDE 0.5 MG/2ML
0.5 INHALANT ORAL
Status: DISCONTINUED | OUTPATIENT
Start: 2024-11-09 | End: 2024-11-15 | Stop reason: HOSPADM

## 2024-11-09 RX ORDER — SODIUM CHLORIDE AND POTASSIUM CHLORIDE 150; 900 MG/100ML; MG/100ML
75 INJECTION, SOLUTION INTRAVENOUS CONTINUOUS
Status: DISCONTINUED | OUTPATIENT
Start: 2024-11-09 | End: 2024-11-09

## 2024-11-09 RX ORDER — ARFORMOTEROL TARTRATE 15 UG/2ML
15 SOLUTION RESPIRATORY (INHALATION)
Status: DISCONTINUED | OUTPATIENT
Start: 2024-11-09 | End: 2024-11-15 | Stop reason: HOSPADM

## 2024-11-09 RX ORDER — SODIUM CHLORIDE AND POTASSIUM CHLORIDE 150; 900 MG/100ML; MG/100ML
50 INJECTION, SOLUTION INTRAVENOUS CONTINUOUS
Status: DISCONTINUED | OUTPATIENT
Start: 2024-11-09 | End: 2024-11-09

## 2024-11-09 RX ORDER — SACCHAROMYCES BOULARDII 250 MG
250 CAPSULE ORAL 2 TIMES DAILY
Status: DISCONTINUED | OUTPATIENT
Start: 2024-11-09 | End: 2024-11-15 | Stop reason: HOSPADM

## 2024-11-09 RX ORDER — IOPAMIDOL 755 MG/ML
100 INJECTION, SOLUTION INTRAVASCULAR
Status: COMPLETED | OUTPATIENT
Start: 2024-11-09 | End: 2024-11-09

## 2024-11-09 RX ORDER — AZITHROMYCIN 250 MG/1
250 TABLET, FILM COATED ORAL EVERY 24 HOURS
Status: COMPLETED | OUTPATIENT
Start: 2024-11-10 | End: 2024-11-13

## 2024-11-09 RX ORDER — SODIUM CHLORIDE, SODIUM LACTATE, POTASSIUM CHLORIDE, CALCIUM CHLORIDE 600; 310; 30; 20 MG/100ML; MG/100ML; MG/100ML; MG/100ML
75 INJECTION, SOLUTION INTRAVENOUS CONTINUOUS
Status: DISCONTINUED | OUTPATIENT
Start: 2024-11-09 | End: 2024-11-09

## 2024-11-09 RX ORDER — FAMOTIDINE 10 MG/ML
20 INJECTION, SOLUTION INTRAVENOUS DAILY
Status: DISCONTINUED | OUTPATIENT
Start: 2024-11-09 | End: 2024-11-11

## 2024-11-09 RX ORDER — POTASSIUM CHLORIDE 7.45 MG/ML
10 INJECTION INTRAVENOUS
Status: DISCONTINUED | OUTPATIENT
Start: 2024-11-09 | End: 2024-11-09

## 2024-11-09 RX ADMIN — Medication 250 MG: at 20:37

## 2024-11-09 RX ADMIN — ARFORMOTEROL TARTRATE 15 MCG: 15 SOLUTION RESPIRATORY (INHALATION) at 18:30

## 2024-11-09 RX ADMIN — HEPARIN SODIUM 5000 UNITS: 5000 INJECTION INTRAVENOUS; SUBCUTANEOUS at 20:37

## 2024-11-09 RX ADMIN — CETIRIZINE HYDROCHLORIDE 10 MG: 10 TABLET, FILM COATED ORAL at 10:11

## 2024-11-09 RX ADMIN — MUPIROCIN 1 APPLICATION: 20 OINTMENT TOPICAL at 10:11

## 2024-11-09 RX ADMIN — HEPARIN SODIUM 5000 UNITS: 5000 INJECTION INTRAVENOUS; SUBCUTANEOUS at 10:10

## 2024-11-09 RX ADMIN — IOPAMIDOL 100 ML: 755 INJECTION, SOLUTION INTRAVENOUS at 10:46

## 2024-11-09 RX ADMIN — BUDESONIDE 0.5 MG: 0.5 SUSPENSION RESPIRATORY (INHALATION) at 12:49

## 2024-11-09 RX ADMIN — IPRATROPIUM BROMIDE AND ALBUTEROL SULFATE 3 ML: .5; 3 SOLUTION RESPIRATORY (INHALATION) at 12:49

## 2024-11-09 RX ADMIN — MONTELUKAST 10 MG: 10 TABLET, FILM COATED ORAL at 20:39

## 2024-11-09 RX ADMIN — FAMOTIDINE 20 MG: 10 INJECTION INTRAVENOUS at 10:10

## 2024-11-09 RX ADMIN — POTASSIUM CHLORIDE AND SODIUM CHLORIDE 50 ML/HR: 900; 150 INJECTION, SOLUTION INTRAVENOUS at 03:39

## 2024-11-09 RX ADMIN — BUDESONIDE 0.5 MG: 0.5 SUSPENSION RESPIRATORY (INHALATION) at 18:30

## 2024-11-09 RX ADMIN — ARFORMOTEROL TARTRATE 15 MCG: 15 SOLUTION RESPIRATORY (INHALATION) at 12:49

## 2024-11-09 RX ADMIN — MUPIROCIN 1 APPLICATION: 20 OINTMENT TOPICAL at 20:37

## 2024-11-09 RX ADMIN — GUAIFENESIN 600 MG: 600 TABLET ORAL at 10:10

## 2024-11-09 RX ADMIN — SODIUM CHLORIDE, POTASSIUM CHLORIDE, SODIUM LACTATE AND CALCIUM CHLORIDE 75 ML/HR: 600; 310; 30; 20 INJECTION, SOLUTION INTRAVENOUS at 10:10

## 2024-11-09 RX ADMIN — MIRTAZAPINE 15 MG: 15 TABLET, FILM COATED ORAL at 20:37

## 2024-11-09 RX ADMIN — SENNOSIDES AND DOCUSATE SODIUM 2 TABLET: 50; 8.6 TABLET ORAL at 10:10

## 2024-11-09 RX ADMIN — SODIUM CHLORIDE, POTASSIUM CHLORIDE, SODIUM LACTATE AND CALCIUM CHLORIDE 500 ML: 600; 310; 30; 20 INJECTION, SOLUTION INTRAVENOUS at 01:37

## 2024-11-09 RX ADMIN — IPRATROPIUM BROMIDE AND ALBUTEROL SULFATE 3 ML: .5; 3 SOLUTION RESPIRATORY (INHALATION) at 06:27

## 2024-11-09 RX ADMIN — Medication 10 ML: at 10:11

## 2024-11-09 RX ADMIN — CEFEPIME 2000 MG: 2 INJECTION, POWDER, FOR SOLUTION INTRAVENOUS at 05:06

## 2024-11-09 RX ADMIN — Medication 250 MG: at 10:10

## 2024-11-09 RX ADMIN — Medication 10 ML: at 20:41

## 2024-11-09 RX ADMIN — GUAIFENESIN 600 MG: 600 TABLET ORAL at 20:37

## 2024-11-09 RX ADMIN — IPRATROPIUM BROMIDE AND ALBUTEROL SULFATE 3 ML: .5; 3 SOLUTION RESPIRATORY (INHALATION) at 18:26

## 2024-11-09 RX ADMIN — METHYLPREDNISOLONE SODIUM SUCCINATE 40 MG: 40 INJECTION, POWDER, FOR SOLUTION INTRAMUSCULAR; INTRAVENOUS at 20:37

## 2024-11-09 RX ADMIN — AZITHROMYCIN 500 MG: 500 INJECTION, POWDER, LYOPHILIZED, FOR SOLUTION INTRAVENOUS at 02:48

## 2024-11-09 RX ADMIN — SODIUM CHLORIDE 500 ML: 9 INJECTION, SOLUTION INTRAVENOUS at 03:00

## 2024-11-09 RX ADMIN — IPRATROPIUM BROMIDE AND ALBUTEROL SULFATE 3 ML: .5; 3 SOLUTION RESPIRATORY (INHALATION) at 00:00

## 2024-11-09 RX ADMIN — POTASSIUM CHLORIDE 40 MEQ: 750 CAPSULE, EXTENDED RELEASE ORAL at 05:06

## 2024-11-09 NOTE — ED NOTES
Spoke with patient's daughter, Marjan Staton, who states that she is patient's power of . Per Power of , patient is not to be intubated and has dementia. Dr Rudolph aware.

## 2024-11-09 NOTE — PROGRESS NOTES
RT EQUIPMENT DEVICE RELATED - SKIN ASSESSMENT    RT Medical Equipment/Device:     NIV Mask:  Full-face    size: xs    Skin Assessment:      Nose:  Intact  Mouth:  Intact    Device Skin Pressure Protection:  Skin-to skin areas padded    Nurse Notification:  Leticia Zambrano, RRT

## 2024-11-09 NOTE — PROGRESS NOTES
RT EQUIPMENT DEVICE RELATED - SKIN ASSESSMENT    RT Medical Equipment/Device:     NIV Mask:  Full-face    size: XS    Skin Assessment:      Cheek:  Intact  Nose:  Intact  Mouth:  Intact    Device Skin Pressure Protection:  Pressure points protected    Nurse Notification:  Leticia Castillo, RRT

## 2024-11-09 NOTE — PLAN OF CARE
Goal Outcome Evaluation:  Plan of Care Reviewed With: patient           Outcome Evaluation: Pt new arrival, tachycardic (now resolved), febrile (now resolved), on bipap 12/6 rate of 4 weaned down to 30% and tolerating well. Answers most questions correctly with some confusion. Lactic trending up; see MD orders. Posey placed for retention, sample collection, and monitoring output. Cultures pending.

## 2024-11-09 NOTE — PLAN OF CARE
Goal Outcome Evaluation:   PT HAS BEEN ON BIPAP SINCE SHE CAME UP FROM ER. PT IS RESPONSIVE AND TOLERATING BIPAP WELL. SETTINGS ARE 12/6 30%

## 2024-11-09 NOTE — H&P
[facility]   HISTORY AND PHYSICAL    Patient Name: Amada Bentley  : 1945  MRN: [id number]  Primary Care Physician:  Jennifer Estes MD  Date of admission: 2024    Subjective   Subjective     Chief Complaint: Respiratory distress    HPI:    Amada Bentley is a 79 y.o. female with past medical history of advanced dementia, hypertension, COPD, depression, CKD, anxiety, frailty, chronic anemia, and GERD presented to the ED from a nursing home and respiratory distress.  Seen patient was on BiPAP and lethargic so history was taken via chart review and physician handoff.  Patient had been having worsening shortness of breath at her nursing home along with altered mental status, lethargy, and weakness.  Patient was brought to the ED for further evaluation.  Of note patient is DNR/DNI with your POA confirmed via phone call.  In the ED patient was febrile, tachycardic, tachypneic, and hypoxic on arrival requiring oxygen mentation via the cannula and eventually BiPAP.  Labs showed that she had leukocytosis with an elevated proBNP, lactic acidosis, and ABG showing significant hypoxemia.  Given her DNR/DNI she was not intubated.  Her COVID flu RSV was negative.  Chest x-ray showed left lower lobe pneumonia.  When seen patient was lethargic but was responding to verbal and painful stimuli.  Patient admitted for further evaluation and treatment.    Review of Systems   All systems were reviewed and negative except for: As per HPI    Personal History     Past Medical History:   Diagnosis Date    Abnormal weight loss     Anxiety     Cardiac murmur, unspecified     Chronic kidney disease, stage 2 (mild)     Condition not found     LBP    COPD (chronic obstructive pulmonary disease)     Depression     loss of daughter    Diarrhea, unspecified     Dysphagia, oropharyngeal phase     Essential (primary) hypertension     Gastritis     Gastro-esophageal reflux disease without esophagitis     Generalized anxiety disorder      Hiatal hernia     History of falling     Hypercholesterolemia     Hypothyroid     Infected abrasion of scalp, initial encounter     Long term (current) use of opiate analgesic     Major depressive disorder, recurrent, moderate     Muscle weakness (generalized)     Nicotine dependence, unspecified, uncomplicated     Occlusion and stenosis of bilateral carotid arteries     Other allergy, subsequent encounter     Other long term (current) drug therapy     Pain     LEFT-knee, shoulder RIGHT-hip, knee    Pernicious anemia     Primary insomnia     Solitary pulmonary nodule        Past Surgical History:   Procedure Laterality Date    APPENDECTOMY      CATARACT EXTRACTION, BILATERAL      FOOT SURGERY Bilateral     secondary bone spurs    HYSTERECTOMY  1976, 1987    LAPAROSCOPIC CHOLECYSTECTOMY      OTHER SURGICAL HISTORY Right     Ear Surger; unspecified       Family History: family history includes Breast cancer in her daughter; COPD in her father; Coronary artery disease in her mother; Diabetes type I in her brother, mother, and sister; Emphysema in her brother and father; Heart attack in her brother, mother, and sister; Hyperlipidemia in her mother; Hypertension in her brother, mother, and sister; Kidney failure in her niece; Lung cancer in her father. Otherwise pertinent FHx was reviewed and not pertinent to current issue.    Social History:  reports that she has been smoking cigarettes. She started smoking about 63 years ago. She has a 60 pack-year smoking history. She has never used smokeless tobacco. She reports that she does not drink alcohol and does not use drugs.    Home Medications:  DULoxetine, HYDROcodone-acetaminophen, O2, Syringe, albuterol, albuterol sulfate HFA, busPIRone, cetirizine, cyanocobalamin, fluticasone-salmeterol, gabapentin, levothyroxine, lisinopril, megestrol, metoprolol succinate XL, mirtazapine, montelukast, omeprazole, potassium chloride, and tiotropium      Allergies:  No Known  Allergies    Objective   Objective     Vitals:   Temp:  [100.2 °F (37.9 °C)] 100.2 °F (37.9 °C)  Heart Rate:  [144-165] 144  Resp:  [34-44] 39  BP: (115-165)/(78-99) 148/78  Flow (L/min) (Oxygen Therapy):  [4-5] 4  Physical Exam    Constitutional: Deep sleep, lethargic, response to painful stimuli   Eyes: PERRLA, sclerae anicteric, no conjunctival injection   HENT: NCAT, mucous membranes moist   Neck: Supple, no thyromegaly, no lymphadenopathy, trachea midline   Respiratory: Wheezing, decreased breath sounds, on BiPAP support   Cardiovascular: Tachycardia, no murmurs, rubs, or gallops, palpable pedal pulses bilaterally   Gastrointestinal: Positive bowel sounds, soft, nontender, nondistended   Musculoskeletal: No bilateral ankle edema, no clubbing or cyanosis to extremities   Psychiatric: Unable to evaluate   Neurologic: Pupils reactive   Skin: No rashes     Result Review    Result Review:  I have personally reviewed the results from the time of this admission to 11/8/2024 20:45 EST and agree with these findings:  [x]  Laboratory list / accordion  []  Microbiology  [x]  Radiology  [x]  EKG/Telemetry   []  Cardiology/Vascular   []  Pathology  []  Old records  []  Other:  Most notable findings include: Leukocytosis, lactic acidosis, elevated proBNP, ABG with hypoxemic respiratory failure, COVID flu RSV negative, chest x-ray with pneumonia      Assessment & Plan   Assessment / Plan     Brief Patient Summary:  Amada Bentley is a 79 y.o. female with past medical history of advanced dementia, hypertension, COPD, depression, CKD, anxiety, frailty, chronic anemia, and GERD presented to the ED from a nursing home and respiratory distress    Active Hospital Problems:  Active Hospital Problems    Diagnosis     **Acute on chronic respiratory failure with hypoxia     Pneumonia     Nicotine dependence, unspecified, uncomplicated     Essential (primary) hypertension     COPD exacerbation     High blood pressure      Plan:      Acute on chronic respiratory failure with hypoxia  -Admit to intensive care unit  -Secondary to COPD exacerbation with pneumonia  -Chest x-ray reviewed  -ABG with acute failure with hypoxia  -DNR/DNI  -Continuous BiPAP for now  -Supplemental oxygen as needed, wean down as tolerated  -IV Solu-Medrol  -Duo nebs 3 times daily and as needed  -Empiric antibiotics with Rocephin and azithromycin  -Mucinex, Tessalon Perles  -Incentive spirometry  -Consult pulmonology/intensivist  -Patient may benefit from hospice  -Supportive care    HTN  -Currently well controlled  -PRN BP meds  -Resume home meds when available  -Titrate if needed    CKD  Anxiety  Advanced Dementia   Anemia    GI ppx  DVT ppx    VTE Prophylaxis:  Pharmacologic VTE prophylaxis orders are signed & held.          CODE STATUS:    Medical Intervention Limits: No intubation (DNI)  Level Of Support Discussed With: Health Care Surrogate; Next of Kin (If No Surrogate)  Code Status (Patient has no pulse and is not breathing): No CPR (Do Not Attempt to Resuscitate)  Medical Interventions (Patient has pulse or is breathing): Limited Support    Admission Status:  I believe this patient meets inpatient status.      Electronically signed by Harry Ivory MD, 11/08/24, 8:45 PM EST.

## 2024-11-09 NOTE — PROGRESS NOTES
RT EQUIPMENT DEVICE RELATED - SKIN ASSESSMENT    RT Medical Equipment/Device:     NIV Mask:  Full-face    size: XS    Skin Assessment:      Cheek:  Intact  Chin:  Intact  Forehead:  Intact  Nose:  Intact  Mouth:  Intact    Device Skin Pressure Protection:  Skin-to-device areas padded:  None Required    Nurse Notification:  Leticia Lee, RRT

## 2024-11-09 NOTE — PROGRESS NOTES
Respiratory Therapist Broncho-Pulmonary Hygiene Progress Note      Patient Name:  Amada Bentley  YOB: 1945    Amada Bentley meets the qualification for Level 1 of the Bronco-Pulmonary Hygiene Protocol. This was based on my daily patient assessment and includes review of chest x-ray results, cough ability and quality, oxygenation, secretions or risk for secretion development and patient mobility.     Broncho-Pulmonary Hygiene Assessment:    Level of Movement: Actively changing positions without assistance  Alert/ oriented/ cooperative    Breath Sounds: Diminished and/or coarse rhonchi    Cough: Strong, effective and/or frequent    Chest X-Ray: Mild consolidation and/or atelectasis.    Sputum Productions: None or small amount of thin or watery secretions with effective cough    History and Physical: Chronic condition    SpO2 to Oxygen Need: greater than 90% on  greater than 6L, Vapotherm, oxygen mask greater than 40% or ventilator    Current SpO2 is: 98% on Bipap 12/6 30%    Based on this information, I have completed the following interventions: Teach/Instruct patient on cough and deep breathe      Electronically signed by Pinky Castillo RRT, 11/09/24, 4:47 AM EST.

## 2024-11-09 NOTE — ED PROVIDER NOTES
Time: 7:00 PM EST  Date of encounter:  11/8/2024  Independent Historian/Clinical History and Information was obtained by:   Patient    History is limited by:  Respiratory distress    Chief Complaint: Dyspnea      History of Present Illness:  Patient is a 79 y.o. year old female who presents to the emergency department for evaluation of difficulty breathing.  Patient has history of COPD and states she has been much worse today.  Is not having any active chest pain but does complain of palpitations.  Denies any fever.  No vomiting or abdominal pain.      Patient Care Team  Primary Care Provider: Jennifer Estes MD    Past Medical History:     No Known Allergies  Past Medical History:   Diagnosis Date    Abnormal weight loss     Anxiety     Cardiac murmur, unspecified     Chronic kidney disease, stage 2 (mild)     Condition not found     LBP    COPD (chronic obstructive pulmonary disease)     Depression     loss of daughter    Diarrhea, unspecified     Dysphagia, oropharyngeal phase     Essential (primary) hypertension     Gastritis     Gastro-esophageal reflux disease without esophagitis     Generalized anxiety disorder     Hiatal hernia     History of falling     Hypercholesterolemia     Hypothyroid     Infected abrasion of scalp, initial encounter     Long term (current) use of opiate analgesic     Major depressive disorder, recurrent, moderate     Muscle weakness (generalized)     Nicotine dependence, unspecified, uncomplicated     Occlusion and stenosis of bilateral carotid arteries     Other allergy, subsequent encounter     Other long term (current) drug therapy     Pain     LEFT-knee, shoulder RIGHT-hip, knee    Pernicious anemia     Primary insomnia     Solitary pulmonary nodule      Past Surgical History:   Procedure Laterality Date    APPENDECTOMY      CATARACT EXTRACTION, BILATERAL      FOOT SURGERY Bilateral     secondary bone spurs    HYSTERECTOMY  1976, 1987    LAPAROSCOPIC CHOLECYSTECTOMY      OTHER  SURGICAL HISTORY Right     Ear Surger; unspecified     Family History   Problem Relation Age of Onset    Coronary artery disease Mother     Hyperlipidemia Mother     Hypertension Mother     Heart attack Mother     Diabetes type I Mother     Lung cancer Father         no history of smoking    COPD Father     Emphysema Father     Hypertension Sister     Heart attack Sister     Diabetes type I Sister     Hypertension Brother     Heart attack Brother         cardiac death    Emphysema Brother     Diabetes type I Brother     Breast cancer Daughter     Kidney failure Niece        Home Medications:  Prior to Admission medications    Medication Sig Start Date End Date Taking? Authorizing Provider   albuterol (PROVENTIL) (2.5 MG/3ML) 0.083% nebulizer solution Take 2.5 mg by nebulization Every 4 (Four) Hours As Needed for Wheezing. 2/9/23   Manjit Gray MD   albuterol sulfate  (90 Base) MCG/ACT inhaler Inhale 2 puffs Every 6 (Six) Hours As Needed for Wheezing or Shortness of Air. 2/9/23   Manjti Gray MD   busPIRone (BUSPAR) 30 MG tablet Take 1 tablet by mouth 2 (Two) Times a Day. 10/31/22   Maricarmen Harrell MD   cetirizine (zyrTEC) 10 MG tablet Take 1 tablet by mouth Daily. 8/29/22   Maricarmen Harrell MD   cyanocobalamin 1000 MCG/ML injection Inject 1 mL into the appropriate muscle as directed by prescriber 1 (One) Time Per Week. Weekly x 4 weeks, then monthly 11/29/22   Maricarmen Harrell MD   DULoxetine (CYMBALTA) 60 MG capsule Take 1 capsule by mouth Daily. 12/30/22   Maricarmen Harrell MD   fluticasone-salmeterol (Advair HFA) 230-21 MCG/ACT inhaler Inhale 2 puffs 2 (Two) Times a Day. 2/9/23   Manjit Gray MD   gabapentin (NEURONTIN) 600 MG tablet Take 1 tablet by mouth 3 (Three) Times a Day. 6/20/22   Maricarmen Harrell MD   HYDROcodone-acetaminophen (NORCO) 7.5-325 MG per tablet Take 1 tablet by mouth Every 8 (Eight) Hours As Needed for Moderate Pain . 6/20/22   Marciarmen Harrell MD  "  levothyroxine (SYNTHROID, LEVOTHROID) 75 MCG tablet Take 75 mcg by mouth Daily. 11/16/22   ProviderKarely MD   lisinopril (PRINIVIL,ZESTRIL) 20 MG tablet Take 1 tablet by mouth Daily. 10/31/22   Maricarmen Harrell MD   megestrol (Megace ES) 625 MG/5ML suspension 2.5 ml bid 1/13/23   Maricarmen Harrell MD   metoprolol succinate XL (TOPROL-XL) 50 MG 24 hr tablet Take 1 tablet by mouth 2 (Two) Times a Day. 11/28/22   Maricarmen Harrell MD   mirtazapine (Remeron) 15 MG tablet Take 1 tablet by mouth Every Night. 1/13/23   Maricarmen Harrell MD   montelukast (Singulair) 10 MG tablet Take 1 tablet by mouth Every Night. 10/31/22   Maricarmen Harrell MD   O2 (OXYGEN) Inhale 2 L Continuous.    Karely Crawley MD   omeprazole (priLOSEC) 20 MG capsule Take 1 capsule by mouth Daily. 12/30/22   Maricarmen Harrell MD   potassium chloride (K-DUR,KLOR-CON) 10 MEQ CR tablet Take 2 tablets once daily 1/31/23   Maricarmen Harrell MD   Syringe 25G X 1\" 3 ML misc Inject 1,000 mcg into the appropriate muscle as directed by prescriber 1 (One) Time Per Week. Inject 1cc weekly x 4 weeks, then inject 1 cc monthly 11/29/22   Maricarmen Harrell MD   tiotropium (SPIRIVA) 18 MCG per inhalation capsule Place 1 capsule into inhaler and inhale Daily. 3/3/23   Manjit Gray MD        Social History:   Social History     Tobacco Use    Smoking status: Every Day     Current packs/day: 0.00     Average packs/day: 1 pack/day for 60.0 years (60.0 ttl pk-yrs)     Types: Cigarettes     Start date: 6/15/1961     Last attempt to quit: 6/15/2021     Years since quitting: 3.4    Smokeless tobacco: Never   Vaping Use    Vaping status: Never Used   Substance Use Topics    Alcohol use: Never    Drug use: Never         Review of Systems:  Review of Systems   Constitutional:  Negative for chills and fever.   HENT:  Negative for congestion, rhinorrhea and sore throat.    Eyes:  Negative for photophobia.   Respiratory:  Positive for " "shortness of breath and wheezing. Negative for apnea, cough and chest tightness.    Cardiovascular:  Positive for palpitations. Negative for chest pain.   Gastrointestinal:  Negative for abdominal pain, diarrhea, nausea and vomiting.   Endocrine: Negative.    Genitourinary:  Negative for difficulty urinating and dysuria.   Musculoskeletal:  Negative for back pain, joint swelling and myalgias.   Skin:  Negative for color change and wound.   Allergic/Immunologic: Negative.    Neurological:  Negative for seizures and headaches.   Psychiatric/Behavioral: Negative.     All other systems reviewed and are negative.       Physical Exam:  /78   Pulse (!) 144   Temp 100.2 °F (37.9 °C) (Oral)   Resp (!) 39   Ht 152.4 cm (60\")   Wt 49.7 kg (109 lb 9.1 oz)   SpO2 93%   BMI 21.40 kg/m²     Physical Exam  Vitals and nursing note reviewed.   Constitutional:       General: She is awake. She is in acute distress.      Appearance: Normal appearance.   HENT:      Head: Normocephalic and atraumatic.      Nose: Nose normal.      Mouth/Throat:      Mouth: Mucous membranes are moist.   Eyes:      Extraocular Movements: Extraocular movements intact.      Pupils: Pupils are equal, round, and reactive to light.   Cardiovascular:      Rate and Rhythm: Normal rate and regular rhythm.      Heart sounds: Normal heart sounds.   Pulmonary:      Effort: Tachypnea, accessory muscle usage and respiratory distress present.      Breath sounds: Examination of the right-upper field reveals wheezing. Examination of the left-upper field reveals wheezing. Examination of the right-middle field reveals wheezing. Examination of the left-middle field reveals wheezing. Examination of the right-lower field reveals decreased breath sounds. Examination of the left-lower field reveals decreased breath sounds. Decreased breath sounds and wheezing present. No rhonchi or rales.   Abdominal:      General: Bowel sounds are normal.      Palpations: Abdomen is " soft.      Tenderness: There is no abdominal tenderness. There is no guarding or rebound.      Comments: No rigidity   Musculoskeletal:         General: No tenderness. Normal range of motion.      Cervical back: Normal range of motion and neck supple.      Right lower leg: No edema.      Left lower leg: No edema.   Skin:     General: Skin is warm and dry.      Coloration: Skin is not jaundiced.   Neurological:      General: No focal deficit present.      Mental Status: She is alert. Mental status is at baseline.   Psychiatric:         Mood and Affect: Mood normal.                  Procedures:  Procedures      Medical Decision Making:      Comorbidities that affect care:    COPD, depression/anxiety, hiatal hernia, chronic kidney disease    External Notes reviewed:    Hospital Discharge Summary: [facility] [place] ICU admission.  8/31/2024 note.  Pneumonia of both lower lobes.  Discharge Summaries  - documented in this encounter   Robinson Holden MD - 09/03/2024 3:08 PM EDT  Formatting of this note is different from the original.  Images from the original note were not included.  Medical Record #:[id number]  Admission Date: 8/31/24  Discharge date and time: 9/3/2024  Admitting Physician: Armando Hernandez MD  Discharge Physician: Robinson Holden MD    Diagnoses at Discharge:      -Healthcare associate pneumonia  The CT showed evidence of bilateral lower lobe pneumonia  Day of treatment #3 cefepime. She will need to receive 13 more doses of IV cefepime to complete the course. 2 g every 12 hours for 6 more days.  She has completed a course of p.o. Zithromax  Vancomycin because negative MRSA screen    -Acute hypoxemic respiratory failure  Her oxygen requirement has been decreased and is currently on 2 L of nasal cannula with a saturation greater than 96%.  Continue supplemental oxygen at 2 L/min per nasal cannula    -COPD exacerbation  She was treated with bronchodilators, and receiving DuoNebs on a scheduled every 6  hours regimen..  I have ordered DuoNebs for her every 6 hours as needed at [facility]    -Chronic anemia  Last hemoglobin was around ten 1 month ago  Currently is 8.7 likely secondary to fluid expansion  She has history of vitamin B12 deficiency    -Severe sepsis  Resolved    -Hypokalemia  Resolved  Continue with her home dose of 10 mEq daily    -Heart failure  Compensated    -Dementia  The past admissions the patient was agitated and confused  She will continue with Depakote  There is no evidence of behavioral disturbance    -History of C. Difficile  Received vancomycin orally prophylactically. I have DC'd the oral vancomycin. It will be up to her primary provider at [place] if they want to continue prophylaxis 6.    -Advance directives  DNR-DNI    Moderate protein malnourishment: Continue supplemental protein drinks 3 times daily at the nursing home.    Hospitalization Summary:  She presented on August 31, 2020 for to the emergency department because of difficulty breathing. She was short of breath and had a productive cough. She was placed on BiPAP and later switched to nasal cannula and weaned down to 2 L/min per nasal cannula. Initially admitted to ICU. The patient was treated with IV antibiotics and IV corticosteroids along with aggressive nebulized bronchodilator treatments. Her respiratory status improved. Continue neb treatments and IV antibiotics at [facility].    On the day of discharge the patient was alert, awake, well oriented, and maintaining attention well; we discussed return precautions in detail and they demonstrated understanding and agreement.    Discharge Physical Exam:    The following orders were placed and all results were independently analyzed by me:  Orders Placed This Encounter   Procedures    Blood Culture - Blood,    Blood Culture - Blood,    COVID-19, FLU A/B, RSV PCR 1 HR TAT - Swab, Nasopharynx    XR Chest 1 View    [place] Draw    Comprehensive Metabolic  Panel    BNP    Single High Sensitivity Troponin T    CBC Auto Differential    Arterial Blood Gas + Electrolytes    Blood Gas, Arterial -    STAT Lactic Acid, Reflex    Magnesium    CBC (No Diff)    Basic Metabolic Panel    STAT Lactic Acid, Reflex    Diet: Liquid; Clear Liquid; Fluid Consistency: Thin (IDDSI 0)    Undress & Gown    Vital Signs    Check Temperature    Vital Signs Every Hour and Per Hospital Policy Based on Patient Condition    Telemetry - Place Orders & Notify Provider of Results When Patient Experiences Acute Chest Pain, Dysrhythmia or Respiratory Distress    Continuous Pulse Oximetry    Height & Weight    Daily Weights    Intake & Output    Oral Care - Patient Not on NPPV & Not Intubated    Target Arousal Level RASS 0 to -2    Use Mobility Guidelines for Advancement of Activity    Saline Lock & Maintain IV Access    Daily CHG Bath While in Critical Care    Reason for COPD Admission: New Oxygen Requirements; Indicate COPD Diagnosis For Problem List: Acute on chronic respiratory failure with hypoxia    Tobacco Cessation Education    Respiratory Treatment Education (MDI / Spacer / Nebulizer)    COPD Education    Cough / Deep Breathe    Code Status and Medical Interventions: No CPR (Do Not Attempt to Resuscitate); Limited Support; No intubation (DNI)    Hospitalist (on-call MD unless specified)    Oxygen Therapy- Nasal Cannula; Titrate 1-6 LPM Per SpO2; 90 - 95%    Document Pulse Oximetry - On Room Air / Home O2 Level    Oscillating Positive Expiratory Pressure (OPEP)    Incentive Spirometry    Oxygen Therapy- Nasal Cannula; Titrate 1-6 LPM Per SpO2; 90 - 95%    NIPPV-IPPV / BIPAP / CPAP    POC Chem Panel    POC Lactate    ECG 12 Lead ED Triage Standing Order; SOA    ECG 12 Lead Dyspnea    Wound Ostomy Eval & Treat    Insert Peripheral IV    Insert Peripheral IV    Inpatient Admission    CBC & Differential    Green Top (Gel)    Lavender Top    Gold Top - SST    Light Blue Top       Medications Given  in the Emergency Department:  Medications   sodium chloride 0.9 % flush 10 mL (has no administration in time range)   nitroglycerin (NITROSTAT) SL tablet 0.4 mg (has no administration in time range)   sodium chloride 0.9 % flush 10 mL (has no administration in time range)   sodium chloride 0.9 % flush 10 mL (has no administration in time range)   sodium chloride 0.9 % infusion 40 mL (has no administration in time range)   mupirocin (BACTROBAN) 2 % nasal ointment 1 Application (has no administration in time range)   sennosides-docusate (PERICOLACE) 8.6-50 MG per tablet 2 tablet (has no administration in time range)     And   polyethylene glycol (MIRALAX) packet 17 g (has no administration in time range)     And   bisacodyl (DULCOLAX) EC tablet 5 mg (has no administration in time range)     And   bisacodyl (DULCOLAX) suppository 10 mg (has no administration in time range)   cetirizine (zyrTEC) tablet 10 mg (has no administration in time range)   mirtazapine (REMERON) tablet 15 mg (has no administration in time range)   montelukast (SINGULAIR) tablet 10 mg (has no administration in time range)   heparin (porcine) 5000 UNIT/ML injection 5,000 Units (has no administration in time range)   ipratropium-albuterol (DUO-NEB) nebulizer solution 3 mL (has no administration in time range)   ipratropium-albuterol (DUO-NEB) nebulizer solution 3 mL (has no administration in time range)   methylPREDNISolone sodium succinate (SOLU-Medrol) injection 40 mg (has no administration in time range)   ondansetron (ZOFRAN) injection 4 mg (has no administration in time range)   guaiFENesin (MUCINEX) 12 hr tablet 600 mg (has no administration in time range)   guaiFENesin-dextromethorphan (ROBITUSSIN DM) 100-10 MG/5ML syrup 10 mL (has no administration in time range)   acetaminophen (TYLENOL) tablet 650 mg (has no administration in time range)   ipratropium-albuterol (DUO-NEB) nebulizer solution 3 mL (3 mL Nebulization Given 11/8/24 8099)    LORazepam (ATIVAN) injection 0.5 mg (0.5 mg Intravenous Given 11/8/24 1913)   cefTRIAXone (ROCEPHIN) in NS 1 gram/10ml IV PUSH syringe (1,000 mg Intravenous Given 11/8/24 1924)   sodium chloride 0.9 % bolus 500 mL (0 mL Intravenous Stopped 11/8/24 2050)   methylPREDNISolone sodium succinate (SOLU-Medrol) injection 125 mg (125 mg Intravenous Given 11/8/24 1923)   magnesium sulfate in D5W 1g/100mL (PREMIX) (0 g Intravenous Stopped 11/8/24 2050)   metoprolol tartrate (LOPRESSOR) injection 5 mg (5 mg Intravenous Given 11/8/24 2019)        ED Course:    ED Course as of 11/08/24 2251 Fri Nov 08, 2024 1758 I have personally interpreted the EKG today and it shows no evidence of any acute ischemia or heart arrhythmia.  Rapid tachycardia, appears to be sinus rhythm.  Heart rate 151. [RP]      ED Course User Index  [RP] Albaro Rudolph MD       Labs:    Lab Results (last 24 hours)       Procedure Component Value Units Date/Time    CBC & Differential [[id number]]  (Abnormal) Collected: 11/08/24 1756    Specimen: Blood Updated: 11/08/24 1807    Narrative:      The following orders were created for panel order CBC & Differential.  Procedure                               Abnormality         Status                     ---------                               -----------         ------                     CBC Auto Differential[[id number]]        Abnormal            Final result                 Please view results for these tests on the individual orders.    BNP [[id number]]  (Abnormal) Collected: 11/08/24 1756    Specimen: Blood Updated: 11/08/24 1826     proBNP 3,098.0 pg/mL     Narrative:      This assay is used as an aid in the diagnosis of individuals suspected of having heart failure. It can be used as an aid in the diagnosis of acute decompensated heart failure (ADHF) in patients presenting with signs and symptoms of ADHF to the emergency department (ED). In addition, NT-proBNP of <300 pg/mL indicates ADHF is not  likely.    Age Range Result Interpretation  NT-proBNP Concentration (pg/mL:      <50             Positive            >450                   Gray                 300-450                    Negative             <300    50-75           Positive            >900                  Gray                300-900                  Negative            <300      >75             Positive            >1800                  Gray                300-1800                  Negative            <300    CBC Auto Differential [[id number]]  (Abnormal) Collected: 11/08/24 1756    Specimen: Blood Updated: 11/08/24 1807     WBC 13.93 10*3/mm3      RBC 3.45 10*6/mm3      Hemoglobin 11.1 g/dL      Hematocrit 35.2 %      .0 fL      MCH 32.2 pg      MCHC 31.5 g/dL      RDW 16.5 %      RDW-SD 61.1 fl      MPV 10.2 fL      Platelets 583 10*3/mm3      Neutrophil % 81.3 %      Lymphocyte % 11.8 %      Monocyte % 5.6 %      Eosinophil % 0.2 %      Basophil % 0.6 %      Immature Grans % 0.5 %      Neutrophils, Absolute 11.32 10*3/mm3      Lymphocytes, Absolute 1.64 10*3/mm3      Monocytes, Absolute 0.78 10*3/mm3      Eosinophils, Absolute 0.03 10*3/mm3      Basophils, Absolute 0.09 10*3/mm3      Immature Grans, Absolute 0.07 10*3/mm3      nRBC 0.0 /100 WBC     Blood Culture - Blood, Arm, Left [[id number]] Collected: 11/08/24 1756    Specimen: Blood from Arm, Left Updated: 11/08/24 1804    Blood Culture - Blood, Arm, Right [[id number]] Collected: 11/08/24 1756    Specimen: Blood from Arm, Right Updated: 11/08/24 1804    POC Chem Panel [[id number]]  (Abnormal) Collected: 11/08/24 1804    Specimen: Arterial Blood Updated: 11/08/24 1808     Glucose 139 mg/dL      Comment: Serial Number: [id number]:  [id number]        Sodium 139 mmol/L      POC Potassium 3.7 mmol/L      Ionized Calcium 1.15 mmol/L      Chloride 104 mmol/L      Creatinine 0.97 mg/dL      BUN 18 mg/dL      CO2 Content 20.0 mmol/L      Notified Who Dr Rudolph     Read Back Yes    Blood Gas,  Arterial - [[id number]]  (Abnormal) Collected: 11/08/24 1804    Specimen: Arterial Blood Updated: 11/08/24 1809     Site Arterial: right brachial     Eran's Test N/A     pH, Arterial 7.460 pH units      pCO2, Arterial 28.9 mm Hg      pO2, Arterial 59.1 mm Hg      HCO3, Arterial 20.5 mmol/L      Base Excess, Arterial -2.3 mmol/L      Comment: Serial Number: [id number]:  [id number]        O2 Saturation, Arterial 92.1 %      Hemoglobin, Blood Gas 11.9 g/dL      Hematocrit, Blood Gas 35.0 %      Barometric Pressure for Blood Gas 748.7000 mmHg      Modality Cannula - Nasal     Flow Rate 6 lpm      Hemodilution No    POC Lactate [[id number]]  (Abnormal) Collected: 11/08/24 1804    Specimen: Arterial Blood Updated: 11/08/24 1808     Lactate 4.8 mmol/L      Comment: Serial Number: [id number]:  [id number]        Notified Time --     Notified Who Dr Rudolph     Read Back Yes    COVID-19, FLU A/B, RSV PCR 1 HR TAT - Swab, Nasopharynx [[id number]]  (Normal) Collected: 11/08/24 1843    Specimen: Swab from Nasopharynx Updated: 11/08/24 2027     COVID19 Not Detected     Influenza A PCR Not Detected     Influenza B PCR Not Detected     RSV, PCR Not Detected    Narrative:      Fact sheet for providers: https://www.fda.gov/media/360517/download    Fact sheet for patients: https://www.fda.gov/media/113482/download    Test performed by PCR.    Comprehensive Metabolic Panel [[id number]]  (Abnormal) Collected: 11/08/24 1850    Specimen: Blood Updated: 11/08/24 1941     Glucose 134 mg/dL      BUN 18 mg/dL      Creatinine 1.15 mg/dL      Sodium 137 mmol/L      Potassium 3.8 mmol/L      Chloride 99 mmol/L      CO2 21.9 mmol/L      Calcium 9.5 mg/dL      Total Protein 7.3 g/dL      Albumin 3.9 g/dL      ALT (SGPT) <5 U/L      AST (SGOT) 5 U/L      Alkaline Phosphatase 58 U/L      Total Bilirubin 0.3 mg/dL      Globulin 3.4 gm/dL      A/G Ratio 1.1 g/dL      BUN/Creatinine Ratio 15.7     Anion Gap 16.1 mmol/L      eGFR 48.0 mL/min/1.73      Narrative:      GFR Normal >60  Chronic Kidney Disease <60  Kidney Failure <15    The GFR formula is only valid for adults with stable renal function between ages 18 and 70.    Single High Sensitivity Troponin T [[id number]]  (Abnormal) Collected: 11/08/24 1850    Specimen: Blood Updated: 11/08/24 1924     HS Troponin T 33 ng/L     Narrative:      High Sensitive Troponin T Reference Range:  <14.0 ng/L- Negative Female for AMI  <22.0 ng/L- Negative Male for AMI  >=14 - Abnormal Female indicating possible myocardial injury.  >=22 - Abnormal Male indicating possible myocardial injury.   Clinicians would have to utilize clinical acumen, EKG, Troponin, and serial changes to determine if it is an Acute Myocardial Infarction or myocardial injury due to an underlying chronic condition.         Magnesium [[id number]]  (Normal) Collected: 11/08/24 1850    Specimen: Blood Updated: 11/08/24 1933     Magnesium 1.8 mg/dL     STAT Lactic Acid, Reflex [[id number]]  (Abnormal) Collected: 11/08/24 2200    Specimen: Blood Updated: 11/08/24 2239     Lactate 4.9 mmol/L              Imaging:    XR Chest 1 View    Result Date: 11/8/2024  XR CHEST 1 VW Date of Exam: 11/8/2024 5:55 PM EST Indication: SOA Triage Protocol Comparison: Chest radiograph performed on September 8, 2020 Findings: Mild left basilar atelectasis versus early pneumonia is visualized. There is no evidence of pneumothorax.  The pulmonary vasculature appears within normal limits. Atherosclerotic calcifications of the aortic arch are visualized. The cardiac and mediastinal silhouette appear unremarkable.  No acute osseous abnormality identified.     Impression: Mild left basilar atelectasis versus early pneumonia. Electronically Signed: Maury Kerr MD  11/8/2024 6:38 PM EST  Workstation ID: [id number]       Differential Diagnosis and Discussion:    Dyspnea: Differential diagnosis includes but is not limited to metabolic acidosis, neurological disorders, psychogenic, asthma,  pneumothorax, upper airway obstruction, COPD, pneumonia, noncardiogenic pulmonary edema, interstitial lung disease, anemia, congestive heart failure, and pulmonary embolism    All labs were reviewed and interpreted by me.  All X-rays impressions were independently interpreted by me.  EKG was interpreted by me.    MDM                 Sepsis criteria was met in the emergency department and the Sepsis protocol (including antibiotic administration) was initiated.      SIRS criteria considered:   1.  Temperature > 100.4 or <96.8    2.  Heart Rate > 90    3.  Respiratory Rate > 22    4.  WBC > 12K or <4K.             Severe Sepsis:     Respiratory: Mechanical Ventilation or Bipap  Hypotension: SBP > 90 or MAP < 65  Renal: Creatinine > 2  Metabolic: Lactic Acid > 2  Hematologic: Platelets < 100K or INR > 1.5  Hepatic: BILI  >  2  CNS: Sudden AMS     Septic Shock:     Severe Sepsis + Persistent hypotension or Lactic Acid > 4     Normal saline bolus, Antibiotics, and final disposition was based on these definitions.        Sepsis was recognized at 19:00 EST      Antibiotics were ordered.     30 mL/kg bolus was not indicated.       Patient did not receive the recommended 30 mL/kg fluid bolus for sepsis because it would be harmful or detrimental to the patient.    The patient has Heart Failure.   The patient was ordered of 500 mL of fluids.    The patient presents with 2 out of the 4 SIRS criteria and a suspected source for sepsis.  Patient was evaluated and placed on a cardiac monitor for fear of worsening tachycardia and life-threatening hypotension.  Patient was monitored for shock and signs of end-organ damage.  Mental status was repeatedly checked throughout the ED stay.  Medications were ordered by me which includes Rocephin.  The case was discussed at length with admitting physician.    Total Critical Care time of 35 minutes. Total critical care time documented does not include time spent on separately billed procedures  for services of nurses or physician assistants. I personally saw and examined the patient. I have reviewed all diagnostic interpretations and treatment plans as written. I was present for the key portions of any procedures performed and the inclusive time noted in any critical care statement. Critical care time includes patient management by me, time spent at the patients bedside,  time to review lab and imaging results, discussing patient care, documentation in the medical record, and time spent with family or caregiver.    Patient Care Considerations:          Consultants/Shared Management Plan:    Hospitalist: I have discussed the case with Dr. Khoury who agrees to accept the patient for admission.    Social Determinants of Health:    Patient is independent, reliable, and has access to care.       Disposition and Care Coordination:    Admit:   Through independent evaluation of the patient's history, physical, and imperical data, the patient meets criteria for inpatient admission to the hospital.        Final diagnoses:   COPD exacerbation        ED Disposition       ED Disposition   Decision to Admit    Condition   --    Comment   Level of Care: Critical Care [6]   Diagnosis: Acute on chronic respiratory failure with hypoxia [[id number]]   Admitting Physician: JUAN KHOURY [[id number]]   Certification: I Certify That Inpatient Hospital Services Are Medically Necessary For Greater Than 2 Midnights                 This medical record created using voice recognition software.             Albaro Rudolph MD  11/08/24 [id number]       Albaro Rudolph MD  11/08/24 [id number]

## 2024-11-09 NOTE — PROGRESS NOTES
[facility]   Hospitalist Progress Note  Date: 2024  Patient Name: Amada Bentley  : 1945  MRN: [id number]  Date of admission: 2024  Room/Bed: 02      Subjective   Subjective     Chief Complaint: Respiratory distress     Summary:Amada Bentley is a 79 y.o. female with advanced dementia, COPD, depression, CKD and other medical problems presented from a nursing home with respiratory distress.  She had apparently been having worsening shortness of breath at the nursing facility along with altered mental status, lethargy, weakness.  Brought to the ED.  CODE STATUS was confirmed as DNR/DNI.  She was admitted to the ICU and placed on NIPPV.  COVID and flu and RSV were negative.  Chest x-ray showed left lower lobe pneumonia.  She was admitted to the medicine service, seen by critical care in consultation.  Started on antibiotics.    Interval Followup: Went for CT abdomen pelvis, result pending.          Objective   Objective     Vitals:   Temp:  [98 °F (36.7 °C)-101.1 °F (38.4 °C)] 98 °F (36.7 °C)  Heart Rate:  [] 102  Resp:  [21-44] 26  BP: (114-167)/() 114/79  Flow (L/min) (Oxygen Therapy):  [4-5] 4    Physical Exam   General: Awake, alert, NAD  HENT: NCAT, MMM  Eyes: pupils equal, no scleral icterus  Cardiovascular: RRR, no murmurs   Pulmonary: CTA bilaterally; no wheezes; no conversational dyspnea  Gastrointestinal: S/ND/NT, +BS  Musculoskeletal: No gross deformities  Skin: No jaundice, no rash on exposed skin appreciated      Result Review    Result Review:  I have personally reviewed these results:  [x]  Laboratory      Lab 24  0930 24  0626 24  0357 24  0102 24  1804 24  1756   WBC  --   --   --  10.77  --  13.93*   HEMOGLOBIN  --   --   --  10.1*  --  11.1*   HEMATOCRIT  --   --   --  32.1*  --  35.2   PLATELETS  --   --   --  462*  --  583*   NEUTROS ABS  --   --   --   --   --  11.32*   IMMATURE GRANS (ABS)  --   --   --   --   --  0.07*    LYMPHS ABS  --   --   --   --   --  1.64   MONOS ABS  --   --   --   --   --  0.78   EOS ABS  --   --   --   --   --  0.03   MCV  --   --   --  102.6*  --  102.0*   PROCALCITONIN  --   --   --  7.58*  --   --    LACTATE 5.5* 4.7* 6.8* 6.7*   < >  --     < > = values in this interval not displayed.         Lab 11/09/24  0102 11/08/24  1850 11/08/24  1804   SODIUM 136 137  --    POTASSIUM 3.4* 3.8  --    CHLORIDE 97* 99  --    CO2 18.8* 21.9*  --    ANION GAP 20.2* 16.1*  --    BUN 18 18  --    CREATININE 1.16* 1.15* 0.97   EGFR 47.5* 48.0*  --    GLUCOSE 179* 134*  --    CALCIUM 9.3 9.5  --    MAGNESIUM 2.1 1.8  --    PHOSPHORUS 4.7*  --   --    TSH 1.300  --   --          Lab 11/08/24  1850   TOTAL PROTEIN 7.3   ALBUMIN 3.9   GLOBULIN 3.4   ALT (SGPT) <5   AST (SGOT) 5   BILIRUBIN 0.3   ALK PHOS 58         Lab 11/08/24  1850 11/08/24  1756   PROBNP  --  3,098.0*   HSTROP T 33*  --                  Lab 11/09/24  0626 11/08/24  1804   PH, ARTERIAL 7.425 7.460*   PCO2, ARTERIAL 32.8* 28.9*   PO2 .4* 59.1*   O2 SATURATION ART 98.7 92.1*   FIO2 30  --    HCO3 ART 21.5* 20.5*   BASE EXCESS ART -2.4* -2.3*     Brief Urine Lab Results  (Last result in the past 365 days)        Color   Clarity   Blood   Leuk Est   Nitrite   Protein   CREAT   Urine HCG        11/09/24 0004 Yellow   Clear   Negative   Negative   Negative   30 mg/dL (1+)                 [x]  Microbiology   Microbiology Results (last 10 days)       Procedure Component Value - Date/Time    Respiratory Panel PCR w/COVID-19(SARS-CoV-2) FE/[facility] In-House, NP Swab in [facility]/VTM, 2 HR TAT - Swab, Nasopharynx [[id number]]  (Normal) Collected: 11/09/24 1113    Lab Status: Final result Specimen: Swab from Nasopharynx Updated: 11/09/24 1216     ADENOVIRUS, PCR Not Detected     Coronavirus 229E Not Detected     Coronavirus HKU1 Not Detected     Coronavirus NL63 Not Detected     Coronavirus OC43 Not Detected     COVID19 Not Detected     Human  Metapneumovirus Not Detected     Human Rhinovirus/Enterovirus Not Detected     Influenza A PCR Not Detected     Influenza B PCR Not Detected     Parainfluenza Virus 1 Not Detected     Parainfluenza Virus 2 Not Detected     Parainfluenza Virus 3 Not Detected     Parainfluenza Virus 4 Not Detected     RSV, PCR Not Detected     Bordetella pertussis pcr Not Detected     Bordetella parapertussis PCR Not Detected     Chlamydophila pneumoniae PCR Not Detected     Mycoplasma pneumo by PCR Not Detected    Narrative:      In the setting of a positive respiratory panel with a viral infection PLUS a negative procalcitonin without other underlying concern for bacterial infection, consider observing off antibiotics or discontinuation of antibiotics and continue supportive care. If the respiratory panel is positive for atypical bacterial infection (Bordetella pertussis, Chlamydophila pneumoniae, or Mycoplasma pneumoniae), consider antibiotic de-escalation to target atypical bacterial infection.    MRSA Screen, PCR (Inpatient) - Swab, Nares [[id number]]  (Normal) Collected: 11/09/24 0004    Lab Status: Final result Specimen: Swab from Nares Updated: 11/09/24 0311     MRSA PCR No MRSA Detected    Narrative:      The negative predictive value of this diagnostic test is high and should only be used to consider de-escalating anti-MRSA therapy. A positive result may indicate colonization with MRSA and must be correlated clinically.    Legionella Antigen, Urine - Urine, Urine, Clean Catch [[id number]]  (Normal) Collected: 11/09/24 0004    Lab Status: Final result Specimen: Urine, Clean Catch Updated: 11/09/24 0816     LEGIONELLA ANTIGEN, URINE Negative    S. Pneumo Ag Urine or CSF - Urine, Urine, Clean Catch [[id number]]  (Normal) Collected: 11/09/24 0004    Lab Status: Final result Specimen: Urine, Clean Catch Updated: 11/09/24 0816     Strep Pneumo Ag Negative    COVID-19, FLU A/B, RSV PCR 1 HR TAT - Swab, Nasopharynx [[id number]]   (Normal) Collected: 11/08/24 1843    Lab Status: Final result Specimen: Swab from Nasopharynx Updated: 11/08/24 2027     COVID19 Not Detected     Influenza A PCR Not Detected     Influenza B PCR Not Detected     RSV, PCR Not Detected    Narrative:      Fact sheet for providers: https://www.fda.gov/media/138935/download    Fact sheet for patients: https://www.fda.gov/media/391371/download    Test performed by PCR.          [x]  Radiology  XR Chest 1 View    Result Date: 11/8/2024  Impression: Mild left basilar atelectasis versus early pneumonia. Electronically Signed: Maury Kerr MD  11/8/2024 6:38 PM EST  Workstation ID: [id number]   []  EKG/Telemetry   []  Cardiology/Vascular   []  Pathology  []  Old records  []  Other:    Assessment & Plan   Assessment / Plan     Assessment:  Acute on chronic hypoxic respiratory failure requiring NIPPV  Severe sepsis, likely from pneumonia  Pneumonia  COPD with acute exacerbation  CKD  Advanced dementia  Anemia  Lactic acidosis    Plan:  Admitted to medicine service to ICU bed  NIPPV, transition to cannula  Brovana, Pulmicort, albuterol  Continue antibiotics with cefepime and Zithromax  IV steroids  CT angio to eval for possible mesenteric ischemia per critical care  Discussed with Dr. Barnadr, appreciate the input  Can likely transfer out of the ICU today or tomorrow if she continues to do well     Discussed with RN.    VTE Prophylaxis:  Pharmacologic VTE prophylaxis orders are present.        CODE STATUS:   Medical Intervention Limits: No intubation (DNI)  Level Of Support Discussed With: Health Care Surrogate; Next of Kin (If No Surrogate)  Code Status (Patient has no pulse and is not breathing): No CPR (Do Not Attempt to Resuscitate)  Medical Interventions (Patient has pulse or is breathing): Limited Support      Electronically signed by Khoa Long MD, 11/9/2024, 15:41 EST.

## 2024-11-09 NOTE — PLAN OF CARE
Goal Outcome Evaluation:      Patient weaned off BIPAP today to 4L NC. AxOx3. LR infusing at 75 ml/hr. VSS. Clayton Purcell RN

## 2024-11-09 NOTE — CONSULTS
Pulmonary / Critical Care Consult Note        Patient Name: Amada Bentley  : 1945  MRN: [id number]  Primary Care Physician:  Jennifer Estes MD  Referring Physician: No ref. provider found  Date of admission: 2024        Subjective  Subjective      Reason for Consult/ Chief Complaint: Shortness of breath     HPI:  Amada Bentley is a 79 y.o. female with history of COPD, hypertension, atherosclerosis, CKD, dementia who presented from nursing facility for worsening shortness of breath.  In the ED patient did have fever 101.1 was tachypneic and tachycardic with rate up to 160s.  Chest x-ray showed mild left basilar atelectasis possibly representing early stages of pneumonia.  Laboratory findings showed WBC 13,000, elevated proBNP at 3000, creatinine 1.1, lactic acid 4.9 urinalysis was benign.  She was initiated on IV antibiotics, placed on NIPPV and given IV fluid resuscitation per sepsis guidelines she was admitted to the ICU for ongoing care our service was consulted for critical care management.     This morning patient is awake, no acute distress she is on 4 L nasal cannula.  Denies any nausea vomiting diarrhea or abdominal pain.        Personal History      Medical History        Past Medical History:   Diagnosis Date    Abnormal weight loss      Anxiety      Cardiac murmur, unspecified      Chronic kidney disease, stage 2 (mild)      Condition not found       LBP    COPD (chronic obstructive pulmonary disease)      Depression       loss of daughter    Diarrhea, unspecified      Dysphagia, oropharyngeal phase      Essential (primary) hypertension      Gastritis      Gastro-esophageal reflux disease without esophagitis      Generalized anxiety disorder      Hiatal hernia      History of falling      Hypercholesterolemia      Hypothyroid      Infected abrasion of scalp, initial encounter      Long term (current) use of opiate analgesic      Major depressive disorder, recurrent, moderate      Muscle  weakness (generalized)      Nicotine dependence, unspecified, uncomplicated      Occlusion and stenosis of bilateral carotid arteries      Other allergy, subsequent encounter      Other long term (current) drug therapy      Pain       LEFT-knee, shoulder RIGHT-hip, knee    Pernicious anemia      Primary insomnia      Solitary pulmonary nodule              Surgical History         Past Surgical History:   Procedure Laterality Date    APPENDECTOMY        CATARACT EXTRACTION, BILATERAL        FOOT SURGERY Bilateral       secondary bone spurs    HYSTERECTOMY   1976, 1987    LAPAROSCOPIC CHOLECYSTECTOMY        OTHER SURGICAL HISTORY Right       Ear Surger; unspecified            Family History: family history includes Breast cancer in her daughter; COPD in her father; Coronary artery disease in her mother; Diabetes type I in her brother, mother, and sister; Emphysema in her brother and father; Heart attack in her brother, mother, and sister; Hyperlipidemia in her mother; Hypertension in her brother, mother, and sister; Kidney failure in her niece; Lung cancer in her father. Otherwise pertinent FHx was reviewed and not pertinent to current issue.     Social History:  reports that she has been smoking cigarettes. She started smoking about 63 years ago. She has a 60 pack-year smoking history. She has never used smokeless tobacco. She reports that she does not drink alcohol and does not use drugs.     Home Medications:  Cyanocobalamin, DULoxetine, Diclofenac Sodium, Divalproex Sodium, Umeclidinium Bromide, albuterol, allopurinol, cetirizine, docusate sodium, fluticasone, folic acid, furosemide, gabapentin, levothyroxine, lisinopril, megestrol, metoprolol succinate XL, mirtazapine, montelukast, omeprazole, and potassium chloride     Allergies:  Allergies   No Known Allergies           Objective  Objective      Vitals:   Temp:  [98.2 °F (36.8 °C)-101.1 °F (38.4 °C)] 98.4 °F (36.9 °C)  Heart Rate:  [] 102  Resp:  [21-44]  21  BP: (115-167)/() 127/61  Flow (L/min) (Oxygen Therapy):  [4-5] 4     Physical Exam:  Vital Signs Reviewed   Elderly, awake, no acute distress on nasal cannula  HEENT:  PERRL, EOMI. on nasal cannula  Chest:  good aeration, clear to auscultation bilaterally, tympanic to percussion bilaterally, no work of breathing noted  CV: RRR, no MGR, pulses 2+, equal.  Abd:  Soft, NT, ND, + BS, no HSM  EXT:  no clubbing, no cyanosis, no edema, no joint tenderness  Neuro:  A&Ox2, CN grossly intact, no focal deficits.  Confused at baseline  Skin: No rashes or lesions noted           Result Review  Result Review:  I have personally reviewed the results from the time of this admission to 11/9/2024 04:26 EST and agree with these findings:  [x]  Laboratory  [x]  Microbiology  [x]  Radiology  [x]  EKG/Telemetry   [x]  Cardiology/Vascular   []  Pathology  [x]  Old records   []  Other:  Most notable findings include:              Lab 11/09/24  0102 11/08/24  1850 11/08/24  1804 11/08/24  1756   WBC 10.77  --   --  13.93*   HEMOGLOBIN 10.1*  --   --  11.1*   HEMATOCRIT 32.1*  --   --  35.2   PLATELETS 462*  --   --  583*   SODIUM 136 137  --   --    POTASSIUM 3.4* 3.8  --   --    CHLORIDE 97* 99  --   --    CO2 18.8* 21.9*  --   --    BUN 18 18  --   --    CREATININE 1.16* 1.15* 0.97  --    GLUCOSE 179* 134*  --   --    CALCIUM 9.3 9.5  --   --    TOTAL PROTEIN  --  7.3  --   --    ALBUMIN  --  3.9  --   --    GLOBULIN  --  3.4  --   --       MRSA PCR negative  COVID, RSV and flu negative              Assessment & Plan  Assessment / Plan      Active Hospital Problems:       Active Hospital Problems     Diagnosis      **Acute on chronic respiratory failure with hypoxia      Pneumonia      Nicotine dependence, unspecified, uncomplicated      Essential (primary) hypertension      COPD exacerbation      High blood pressure     Severe sepsis, present on admission  Concern for pneumonia, unspecified organism  Clinically significant  lactic acidosis  COPD with acute exacerbation  Leukocytosis  CKD  Dementia     Plan:    -Continue supplemental oxygen  -Okay to use NIPPV if needed  -On 4 L nasal cannula wean for SpO2 greater than 90  -Will add bronchopulmonary hygiene  -Add Brovana, Pulmicort, continue DuoNeb  -Will continue IV fluids at this time  -Trend lactic acid until clear  -Does have significant atherosclerosis of mesentery on previous CT scan 4/2023  -With worsening lactic acidosis we will do CT angiogram to evaluate for possible mesenteric ischemia  -Check procalcitonin  -Echocardiogram ordered/pending  -Continue cefepime, and azithromycin  -Send respiratory culture, RVP  -Has had recent hospitalization for pneumonia  -Continue clear liquid diet  -Check thyroid studies     DVT prophylaxis with heparin  GI prophylaxis with Pepcid     Pt is critically ill in ICU with acute respiratory failure with hypoxia requiring NIPPV, anion gap metabolic acidosis, sepsis likely secondary to pneumonia from unknown organism, lactic acidosis, TOMA.  I have spent 34 minutes of critical care time reviewing previous documentation, reviewing all pertinent telemetry, labs, and imaging studies, examining the patient, modifying the care plan and discussing the patient´s condition and care plan with any available family, the primary service and during multidisciplinary rounds this morning at bedside. This does not include any procedures performed.     Electronically signed by Iggy Barnard MD, 11/09/24, 3:00 PM EST.

## 2024-11-10 ENCOUNTER — APPOINTMENT (OUTPATIENT)
Dept: GENERAL RADIOLOGY | Facility: HOSPITAL | Age: 79
DRG: 871 | End: 2024-11-10
Payer: MEDICARE

## 2024-11-10 LAB
ALBUMIN SERPL-MCNC: 3 G/DL (ref 3.5–5.2)
ANION GAP SERPL CALCULATED.3IONS-SCNC: 9.5 MMOL/L (ref 5–15)
BASOPHILS # BLD AUTO: 0.04 10*3/MM3 (ref 0–0.2)
BASOPHILS NFR BLD AUTO: 0.3 % (ref 0–1.5)
BH CV ECHO MEAS - AO MAX PG: 8.6 MMHG
BH CV ECHO MEAS - AO MEAN PG: 4.2 MMHG
BH CV ECHO MEAS - AO ROOT DIAM: 3.4 CM
BH CV ECHO MEAS - AO V2 MAX: 146.6 CM/SEC
BH CV ECHO MEAS - AO V2 VTI: 25.2 CM
BH CV ECHO MEAS - AVA(I,D): 1.92 CM2
BH CV ECHO MEAS - EDV(CUBED): 64.7 ML
BH CV ECHO MEAS - EDV(MOD-SP2): 39.6 ML
BH CV ECHO MEAS - EDV(MOD-SP4): 37.3 ML
BH CV ECHO MEAS - EF(MOD-BP): 65 %
BH CV ECHO MEAS - EF(MOD-SP2): 68.7 %
BH CV ECHO MEAS - EF(MOD-SP4): 57.6 %
BH CV ECHO MEAS - ESV(CUBED): 15.8 ML
BH CV ECHO MEAS - ESV(MOD-SP2): 12.4 ML
BH CV ECHO MEAS - ESV(MOD-SP4): 15.8 ML
BH CV ECHO MEAS - FS: 37.5 %
BH CV ECHO MEAS - IVS/LVPW: 1.6 CM
BH CV ECHO MEAS - IVSD: 1.09 CM
BH CV ECHO MEAS - LA DIMENSION: 2.8 CM
BH CV ECHO MEAS - LAT PEAK E' VEL: 6.5 CM/SEC
BH CV ECHO MEAS - LV DIASTOLIC VOL/BSA (35-75): 25.9 CM2
BH CV ECHO MEAS - LV MASS(C)D: 107.8 GRAMS
BH CV ECHO MEAS - LV MAX PG: 2.45 MMHG
BH CV ECHO MEAS - LV MEAN PG: 1.4 MMHG
BH CV ECHO MEAS - LV SYSTOLIC VOL/BSA (12-30): 11 CM2
BH CV ECHO MEAS - LV V1 MAX: 78.2 CM/SEC
BH CV ECHO MEAS - LV V1 VTI: 16.3 CM
BH CV ECHO MEAS - LVIDD: 4 CM
BH CV ECHO MEAS - LVIDS: 2.5 CM
BH CV ECHO MEAS - LVOT AREA: 3 CM2
BH CV ECHO MEAS - LVOT DIAM: 1.94 CM
BH CV ECHO MEAS - LVPWD: 0.68 CM
BH CV ECHO MEAS - MED PEAK E' VEL: 6.3 CM/SEC
BH CV ECHO MEAS - MV A MAX VEL: 133.8 CM/SEC
BH CV ECHO MEAS - MV DEC SLOPE: 547.1 CM/SEC2
BH CV ECHO MEAS - MV DEC TIME: 0.15 SEC
BH CV ECHO MEAS - MV E MAX VEL: 80.9 CM/SEC
BH CV ECHO MEAS - MV E/A: 0.6
BH CV ECHO MEAS - MV MAX PG: 8.2 MMHG
BH CV ECHO MEAS - MV MEAN PG: 3.7 MMHG
BH CV ECHO MEAS - MV V2 VTI: 22.9 CM
BH CV ECHO MEAS - MVA(VTI): 2.11 CM2
BH CV ECHO MEAS - RVDD: 2.41 CM
BH CV ECHO MEAS - SV(LVOT): 48.3 ML
BH CV ECHO MEAS - SV(MOD-SP2): 27.2 ML
BH CV ECHO MEAS - SV(MOD-SP4): 21.5 ML
BH CV ECHO MEAS - SVI(LVOT): 33.5 ML/M2
BH CV ECHO MEAS - SVI(MOD-SP2): 18.9 ML/M2
BH CV ECHO MEAS - SVI(MOD-SP4): 14.9 ML/M2
BH CV ECHO MEASUREMENTS AVERAGE E/E' RATIO: 12.64
BUN SERPL-MCNC: 22 MG/DL (ref 8–23)
BUN/CREAT SERPL: 23.9 (ref 7–25)
CALCIUM SPEC-SCNC: 8.5 MG/DL (ref 8.6–10.5)
CHLORIDE SERPL-SCNC: 107 MMOL/L (ref 98–107)
CO2 SERPL-SCNC: 22.5 MMOL/L (ref 22–29)
CREAT SERPL-MCNC: 0.92 MG/DL (ref 0.57–1)
D-LACTATE SERPL-SCNC: 1.7 MMOL/L (ref 0.5–2)
DEPRECATED RDW RBC AUTO: 61.5 FL (ref 37–54)
EGFRCR SERPLBLD CKD-EPI 2021: 62.7 ML/MIN/1.73
EOSINOPHIL # BLD AUTO: 0 10*3/MM3 (ref 0–0.4)
EOSINOPHIL NFR BLD AUTO: 0 % (ref 0.3–6.2)
ERYTHROCYTE [DISTWIDTH] IN BLOOD BY AUTOMATED COUNT: 17 % (ref 12.3–15.4)
GLUCOSE SERPL-MCNC: 116 MG/DL (ref 65–99)
HCT VFR BLD AUTO: 25.5 % (ref 34–46.6)
HGB BLD-MCNC: 8.2 G/DL (ref 12–15.9)
IMM GRANULOCYTES # BLD AUTO: 0.07 10*3/MM3 (ref 0–0.05)
IMM GRANULOCYTES NFR BLD AUTO: 0.6 % (ref 0–0.5)
LEFT ATRIUM VOLUME INDEX: 21 ML/M2
LYMPHOCYTES # BLD AUTO: 0.71 10*3/MM3 (ref 0.7–3.1)
LYMPHOCYTES NFR BLD AUTO: 6.1 % (ref 19.6–45.3)
MAGNESIUM SERPL-MCNC: 2.2 MG/DL (ref 1.6–2.4)
MCH RBC QN AUTO: 32.2 PG (ref 26.6–33)
MCHC RBC AUTO-ENTMCNC: 32.2 G/DL (ref 31.5–35.7)
MCV RBC AUTO: 100 FL (ref 79–97)
MONOCYTES # BLD AUTO: 0.4 10*3/MM3 (ref 0.1–0.9)
MONOCYTES NFR BLD AUTO: 3.4 % (ref 5–12)
NEUTROPHILS NFR BLD AUTO: 10.43 10*3/MM3 (ref 1.7–7)
NEUTROPHILS NFR BLD AUTO: 89.6 % (ref 42.7–76)
NRBC BLD AUTO-RTO: 0 /100 WBC (ref 0–0.2)
PHOSPHATE SERPL-MCNC: 4 MG/DL (ref 2.5–4.5)
PLATELET # BLD AUTO: 358 10*3/MM3 (ref 140–450)
PMV BLD AUTO: 10.8 FL (ref 6–12)
POTASSIUM SERPL-SCNC: 4.1 MMOL/L (ref 3.5–5.2)
QT INTERVAL: 306 MS
QT INTERVAL: 383 MS
QTC INTERVAL: 504 MS
QTC INTERVAL: 512 MS
RBC # BLD AUTO: 2.55 10*6/MM3 (ref 3.77–5.28)
SODIUM SERPL-SCNC: 139 MMOL/L (ref 136–145)
WBC NRBC COR # BLD AUTO: 11.65 10*3/MM3 (ref 3.4–10.8)

## 2024-11-10 PROCEDURE — 25010000002 FUROSEMIDE PER 20 MG: Performed by: NURSE PRACTITIONER

## 2024-11-10 PROCEDURE — 83735 ASSAY OF MAGNESIUM: CPT | Performed by: PHYSICIAN ASSISTANT

## 2024-11-10 PROCEDURE — 25010000002 CEFEPIME PER 500 MG: Performed by: PHYSICIAN ASSISTANT

## 2024-11-10 PROCEDURE — 25010000002 LORAZEPAM PER 2 MG

## 2024-11-10 PROCEDURE — 25010000002 METHYLPREDNISOLONE PER 125 MG

## 2024-11-10 PROCEDURE — 99291 CRITICAL CARE FIRST HOUR: CPT | Performed by: INTERNAL MEDICINE

## 2024-11-10 PROCEDURE — 94664 DEMO&/EVAL PT USE INHALER: CPT

## 2024-11-10 PROCEDURE — 94799 UNLISTED PULMONARY SVC/PX: CPT

## 2024-11-10 PROCEDURE — 83605 ASSAY OF LACTIC ACID: CPT | Performed by: INTERNAL MEDICINE

## 2024-11-10 PROCEDURE — 85025 COMPLETE CBC W/AUTO DIFF WBC: CPT | Performed by: INTERNAL MEDICINE

## 2024-11-10 PROCEDURE — 99232 SBSQ HOSP IP/OBS MODERATE 35: CPT | Performed by: INTERNAL MEDICINE

## 2024-11-10 PROCEDURE — 25010000002 HEPARIN (PORCINE) PER 1000 UNITS: Performed by: FAMILY MEDICINE

## 2024-11-10 PROCEDURE — 80069 RENAL FUNCTION PANEL: CPT | Performed by: INTERNAL MEDICINE

## 2024-11-10 PROCEDURE — 94669 MECHANICAL CHEST WALL OSCILL: CPT

## 2024-11-10 PROCEDURE — 25010000002 LABETALOL 5 MG/ML SOLUTION

## 2024-11-10 PROCEDURE — 94761 N-INVAS EAR/PLS OXIMETRY MLT: CPT

## 2024-11-10 PROCEDURE — 63710000001 PREDNISONE PER 1 MG: Performed by: INTERNAL MEDICINE

## 2024-11-10 PROCEDURE — 93005 ELECTROCARDIOGRAM TRACING: CPT | Performed by: NURSE PRACTITIONER

## 2024-11-10 PROCEDURE — 71045 X-RAY EXAM CHEST 1 VIEW: CPT

## 2024-11-10 PROCEDURE — 25010000002 METHYLPREDNISOLONE PER 40 MG: Performed by: FAMILY MEDICINE

## 2024-11-10 RX ORDER — PREDNISONE 20 MG/1
40 TABLET ORAL
Status: DISCONTINUED | OUTPATIENT
Start: 2024-11-10 | End: 2024-11-15 | Stop reason: HOSPADM

## 2024-11-10 RX ORDER — METHYLPREDNISOLONE SODIUM SUCCINATE 125 MG/2ML
60 INJECTION, POWDER, LYOPHILIZED, FOR SOLUTION INTRAMUSCULAR; INTRAVENOUS ONCE
Status: COMPLETED | OUTPATIENT
Start: 2024-11-10 | End: 2024-11-10

## 2024-11-10 RX ORDER — LABETALOL HYDROCHLORIDE 5 MG/ML
10 INJECTION, SOLUTION INTRAVENOUS ONCE
Status: COMPLETED | OUTPATIENT
Start: 2024-11-10 | End: 2024-11-10

## 2024-11-10 RX ORDER — DILTIAZEM HCL IN NACL,ISO-OSM 125 MG/125
5-15 PLASTIC BAG, INJECTION (ML) INTRAVENOUS
Status: DISCONTINUED | OUTPATIENT
Start: 2024-11-10 | End: 2024-11-10

## 2024-11-10 RX ORDER — LORAZEPAM 2 MG/ML
INJECTION INTRAMUSCULAR
Status: COMPLETED
Start: 2024-11-10 | End: 2024-11-10

## 2024-11-10 RX ORDER — DILTIAZEM HYDROCHLORIDE 5 MG/ML
10 INJECTION INTRAVENOUS ONCE
Status: DISCONTINUED | OUTPATIENT
Start: 2024-11-10 | End: 2024-11-10

## 2024-11-10 RX ORDER — LORAZEPAM 2 MG/ML
1 INJECTION INTRAMUSCULAR ONCE
Status: COMPLETED | OUTPATIENT
Start: 2024-11-10 | End: 2024-11-10

## 2024-11-10 RX ORDER — FUROSEMIDE 10 MG/ML
40 INJECTION INTRAMUSCULAR; INTRAVENOUS ONCE
Status: COMPLETED | OUTPATIENT
Start: 2024-11-10 | End: 2024-11-10

## 2024-11-10 RX ORDER — LABETALOL HYDROCHLORIDE 5 MG/ML
INJECTION, SOLUTION INTRAVENOUS
Status: COMPLETED
Start: 2024-11-10 | End: 2024-11-10

## 2024-11-10 RX ORDER — METHYLPREDNISOLONE SODIUM SUCCINATE 125 MG/2ML
INJECTION, POWDER, LYOPHILIZED, FOR SOLUTION INTRAMUSCULAR; INTRAVENOUS
Status: COMPLETED
Start: 2024-11-10 | End: 2024-11-10

## 2024-11-10 RX ADMIN — FUROSEMIDE 40 MG: 10 INJECTION, SOLUTION INTRAMUSCULAR; INTRAVENOUS at 16:00

## 2024-11-10 RX ADMIN — CETIRIZINE HYDROCHLORIDE 10 MG: 10 TABLET, FILM COATED ORAL at 08:26

## 2024-11-10 RX ADMIN — HEPARIN SODIUM 5000 UNITS: 5000 INJECTION INTRAVENOUS; SUBCUTANEOUS at 08:27

## 2024-11-10 RX ADMIN — IPRATROPIUM BROMIDE AND ALBUTEROL SULFATE 3 ML: .5; 3 SOLUTION RESPIRATORY (INHALATION) at 15:59

## 2024-11-10 RX ADMIN — METHYLPREDNISOLONE SODIUM SUCCINATE 40 MG: 40 INJECTION, POWDER, FOR SOLUTION INTRAMUSCULAR; INTRAVENOUS at 08:27

## 2024-11-10 RX ADMIN — GUAIFENESIN 600 MG: 600 TABLET ORAL at 08:27

## 2024-11-10 RX ADMIN — METHYLPREDNISOLONE SODIUM SUCCINATE 60 MG: 125 INJECTION, POWDER, LYOPHILIZED, FOR SOLUTION INTRAMUSCULAR; INTRAVENOUS at 15:59

## 2024-11-10 RX ADMIN — AZITHROMYCIN DIHYDRATE 250 MG: 250 TABLET ORAL at 08:27

## 2024-11-10 RX ADMIN — MUPIROCIN 1 APPLICATION: 20 OINTMENT TOPICAL at 08:27

## 2024-11-10 RX ADMIN — LABETALOL HYDROCHLORIDE 10 MG: 5 INJECTION, SOLUTION INTRAVENOUS at 16:00

## 2024-11-10 RX ADMIN — LORAZEPAM 1 MG: 2 INJECTION INTRAMUSCULAR; INTRAVENOUS at 16:01

## 2024-11-10 RX ADMIN — ARFORMOTEROL TARTRATE 15 MCG: 15 SOLUTION RESPIRATORY (INHALATION) at 06:45

## 2024-11-10 RX ADMIN — Medication 250 MG: at 08:27

## 2024-11-10 RX ADMIN — MUPIROCIN 1 APPLICATION: 20 OINTMENT TOPICAL at 21:03

## 2024-11-10 RX ADMIN — CEFEPIME 2000 MG: 2 INJECTION, POWDER, FOR SOLUTION INTRAVENOUS at 05:53

## 2024-11-10 RX ADMIN — IPRATROPIUM BROMIDE AND ALBUTEROL SULFATE 3 ML: .5; 3 SOLUTION RESPIRATORY (INHALATION) at 06:46

## 2024-11-10 RX ADMIN — LORAZEPAM 1 MG: 2 INJECTION INTRAMUSCULAR at 16:01

## 2024-11-10 RX ADMIN — METHYLPREDNISOLONE SODIUM SUCCINATE 60 MG: 125 INJECTION, POWDER, FOR SOLUTION INTRAMUSCULAR; INTRAVENOUS at 15:59

## 2024-11-10 RX ADMIN — Medication 10 ML: at 21:04

## 2024-11-10 RX ADMIN — Medication 250 MG: at 21:03

## 2024-11-10 RX ADMIN — GUAIFENESIN 600 MG: 600 TABLET ORAL at 21:03

## 2024-11-10 RX ADMIN — MIRTAZAPINE 15 MG: 15 TABLET, FILM COATED ORAL at 21:03

## 2024-11-10 RX ADMIN — BUDESONIDE 0.5 MG: 0.5 SUSPENSION RESPIRATORY (INHALATION) at 18:07

## 2024-11-10 RX ADMIN — ARFORMOTEROL TARTRATE 15 MCG: 15 SOLUTION RESPIRATORY (INHALATION) at 18:07

## 2024-11-10 RX ADMIN — IPRATROPIUM BROMIDE AND ALBUTEROL SULFATE 3 ML: .5; 3 SOLUTION RESPIRATORY (INHALATION) at 00:15

## 2024-11-10 RX ADMIN — HEPARIN SODIUM 5000 UNITS: 5000 INJECTION INTRAVENOUS; SUBCUTANEOUS at 21:03

## 2024-11-10 RX ADMIN — CEFEPIME 2000 MG: 2 INJECTION, POWDER, FOR SOLUTION INTRAVENOUS at 17:46

## 2024-11-10 RX ADMIN — FAMOTIDINE 20 MG: 10 INJECTION INTRAVENOUS at 08:27

## 2024-11-10 RX ADMIN — BUDESONIDE 0.5 MG: 0.5 SUSPENSION RESPIRATORY (INHALATION) at 06:45

## 2024-11-10 RX ADMIN — PREDNISONE 40 MG: 20 TABLET ORAL at 09:41

## 2024-11-10 RX ADMIN — IPRATROPIUM BROMIDE AND ALBUTEROL SULFATE 3 ML: .5; 3 SOLUTION RESPIRATORY (INHALATION) at 12:21

## 2024-11-10 RX ADMIN — MONTELUKAST 10 MG: 10 TABLET, FILM COATED ORAL at 21:03

## 2024-11-10 RX ADMIN — Medication 10 ML: at 08:27

## 2024-11-10 NOTE — PROGRESS NOTES
[facility]   Hospitalist Progress Note  Date: 11/10/2024  Patient Name: Amada Bentley  : 1945  MRN: [id number]  Date of admission: 2024  Room/Bed: I02/      Subjective   Subjective     Chief Complaint: Respiratory distress     Summary:Amada Bentley is a 79 y.o. female with advanced dementia, COPD, depression, CKD and other medical problems presented from a nursing home with respiratory distress.  She had apparently been having worsening shortness of breath at the nursing facility along with altered mental status, lethargy, weakness.  Brought to the ED.  CODE STATUS was confirmed as DNR/DNI.  She was admitted to the ICU and placed on NIPPV.  COVID and flu and RSV were negative.  Chest x-ray showed left lower lobe pneumonia.  She was admitted to the medicine service, seen by critical care in consultation.  Started on antibiotics.    Interval Followup: Doing well today, off oxygen, not on any drips.          Objective   Objective     Vitals:   Temp:  [98 °F (36.7 °C)-99.9 °F (37.7 °C)] 98.2 °F (36.8 °C)  Heart Rate:  [] 88  Resp:  [16-29] 23  BP: ()/() 146/133  Flow (L/min) (Oxygen Therapy):  [1-4] 1    Physical Exam   General: Awake, alert, NAD  HENT: NCAT, MMM  Eyes: pupils equal, no scleral icterus  Cardiovascular: RRR, no murmurs   Pulmonary: Bilateral breath sounds  Gastrointestinal: S/ND/NT, +BS  Musculoskeletal: No gross deformities  Skin: No jaundice, no rash on exposed skin appreciated      Result Review    Result Review:  I have personally reviewed these results:  [x]  Laboratory      Lab 11/10/24  0210 24  1537 24  0930 24  0626 24  0357 24  0102 24  1804 24  1756   WBC 11.65*  --   --   --   --  10.77  --  13.93*   HEMOGLOBIN 8.2*  --   --   --   --  10.1*  --  11.1*   HEMATOCRIT 25.5*  --   --   --   --  32.1*  --  35.2   PLATELETS 358  --   --   --   --  462*  --  583*   NEUTROS ABS 10.43*  --   --   --   --   --   --  11.32*    IMMATURE GRANS (ABS) 0.07*  --   --   --   --   --   --  0.07*   LYMPHS ABS 0.71  --   --   --   --   --   --  1.64   MONOS ABS 0.40  --   --   --   --   --   --  0.78   EOS ABS 0.00  --   --   --   --   --   --  0.03   .0*  --   --   --   --  102.6*  --  102.0*   PROCALCITONIN  --   --   --   --   --  7.58*  --   --    LACTATE  --  3.9* 5.5* 4.7*   < > 6.7*   < >  --     < > = values in this interval not displayed.         Lab 11/10/24  0210 11/09/24  0102 11/08/24  1850   SODIUM 139 136 137   POTASSIUM 4.1 3.4* 3.8   CHLORIDE 107 97* 99   CO2 22.5 18.8* 21.9*   ANION GAP 9.5 20.2* 16.1*   BUN 22 18 18   CREATININE 0.92 1.16* 1.15*   EGFR 62.7 47.5* 48.0*   GLUCOSE 116* 179* 134*   CALCIUM 8.5* 9.3 9.5   MAGNESIUM 2.2 2.1 1.8   PHOSPHORUS 4.0 4.7*  --    TSH  --  1.300  --          Lab 11/10/24  0210 11/08/24  1850   TOTAL PROTEIN  --  7.3   ALBUMIN 3.0* 3.9   GLOBULIN  --  3.4   ALT (SGPT)  --  <5   AST (SGOT)  --  5   BILIRUBIN  --  0.3   ALK PHOS  --  58         Lab 11/08/24  1850 11/08/24  1756   PROBNP  --  3,098.0*   HSTROP T 33*  --                  Lab 11/09/24  0626 11/08/24  1804   PH, ARTERIAL 7.425 7.460*   PCO2, ARTERIAL 32.8* 28.9*   PO2 .4* 59.1*   O2 SATURATION ART 98.7 92.1*   FIO2 30  --    HCO3 ART 21.5* 20.5*   BASE EXCESS ART -2.4* -2.3*     Brief Urine Lab Results  (Last result in the past 365 days)        Color   Clarity   Blood   Leuk Est   Nitrite   Protein   CREAT   Urine HCG        11/09/24 0004 Yellow   Clear   Negative   Negative   Negative   30 mg/dL (1+)                 [x]  Microbiology   Microbiology Results (last 10 days)       Procedure Component Value - Date/Time    Respiratory Panel PCR w/COVID-19(SARS-CoV-2) [facility] In-House, NP Swab in UTM/VTM, 2 HR TAT - Swab, Nasopharynx [[id number]]  (Normal) Collected: 11/09/24 1113    Lab Status: Final result Specimen: Swab from Nasopharynx Updated: 11/09/24 1216     ADENOVIRUS, PCR Not Detected      Coronavirus 229E Not Detected     Coronavirus HKU1 Not Detected     Coronavirus NL63 Not Detected     Coronavirus OC43 Not Detected     COVID19 Not Detected     Human Metapneumovirus Not Detected     Human Rhinovirus/Enterovirus Not Detected     Influenza A PCR Not Detected     Influenza B PCR Not Detected     Parainfluenza Virus 1 Not Detected     Parainfluenza Virus 2 Not Detected     Parainfluenza Virus 3 Not Detected     Parainfluenza Virus 4 Not Detected     RSV, PCR Not Detected     Bordetella pertussis pcr Not Detected     Bordetella parapertussis PCR Not Detected     Chlamydophila pneumoniae PCR Not Detected     Mycoplasma pneumo by PCR Not Detected    Narrative:      In the setting of a positive respiratory panel with a viral infection PLUS a negative procalcitonin without other underlying concern for bacterial infection, consider observing off antibiotics or discontinuation of antibiotics and continue supportive care. If the respiratory panel is positive for atypical bacterial infection (Bordetella pertussis, Chlamydophila pneumoniae, or Mycoplasma pneumoniae), consider antibiotic de-escalation to target atypical bacterial infection.    MRSA Screen, PCR (Inpatient) - Swab, Nares [[id number]]  (Normal) Collected: 11/09/24 0004    Lab Status: Final result Specimen: Swab from Nares Updated: 11/09/24 0311     MRSA PCR No MRSA Detected    Narrative:      The negative predictive value of this diagnostic test is high and should only be used to consider de-escalating anti-MRSA therapy. A positive result may indicate colonization with MRSA and must be correlated clinically.    Legionella Antigen, Urine - Urine, Urine, Clean Catch [[id number]]  (Normal) Collected: 11/09/24 0004    Lab Status: Final result Specimen: Urine, Clean Catch Updated: 11/09/24 0816     LEGIONELLA ANTIGEN, URINE Negative    S. Pneumo Ag Urine or CSF - Urine, Urine, Clean Catch [[id number]]  (Normal) Collected: 11/09/24 0004    Lab Status: Final  result Specimen: Urine, Clean Catch Updated: 11/09/24 0816     Strep Pneumo Ag Negative    COVID-19, FLU A/B, RSV PCR 1 HR TAT - Swab, Nasopharynx [[id number]]  (Normal) Collected: 11/08/24 1843    Lab Status: Final result Specimen: Swab from Nasopharynx Updated: 11/08/24 2027     COVID19 Not Detected     Influenza A PCR Not Detected     Influenza B PCR Not Detected     RSV, PCR Not Detected    Narrative:      Fact sheet for providers: https://www.fda.gov/media/443620/download    Fact sheet for patients: https://www.fda.gov/media/038579/download    Test performed by PCR.    Blood Culture - Blood, Arm, Left [[id number]]  (Normal) Collected: 11/08/24 1756    Lab Status: Preliminary result Specimen: Blood from Arm, Left Updated: 11/09/24 1815     Blood Culture No growth at 24 hours    Narrative:      Less than seven (7) mL's of blood was collected.  Insufficient quantity may yield false negative results.    Blood Culture - Blood, Arm, Right [[id number]]  (Normal) Collected: 11/08/24 1756    Lab Status: Preliminary result Specimen: Blood from Arm, Right Updated: 11/09/24 1815     Blood Culture No growth at 24 hours    Narrative:      Less than seven (7) mL's of blood was collected.  Insufficient quantity may yield false negative results.          [x]  Radiology  CT Angiogram Abdomen Pelvis    Result Date: 11/10/2024  1. Extensive atherosclerotic disease as described in detail above. No aortic aneurysm or dissection. No evidence of mesenteric ischemia. 2. Evidence of small bowel ileus without obstruction. 3. Diminished enhancement in the lower pole of the right kidney with some associated cortical thinning likely reflects a chronic infarct. Pyelonephritis should be excluded clinically. 4. Occluded left common iliac artery with reconstitution of the internal and external iliac arteries. 5. Unusual appearance in the upper medial spleen that could reflect a mass versus unusual enhancement. Initial evaluation with ultrasound is  recommended to exclude a mass. 6. Additional findings as above. Electronically Signed: Ronny Davis MD  11/10/2024 12:43 AM EST  Workstation ID: [id number]    XR Chest 1 View    Result Date: 11/8/2024  Impression: Mild left basilar atelectasis versus early pneumonia. Electronically Signed: Maury Kerr MD  11/8/2024 6:38 PM EST  Workstation ID: [id number]   []  EKG/Telemetry   []  Cardiology/Vascular   []  Pathology  []  Old records  []  Other:    Assessment & Plan   Assessment / Plan     Assessment:  Acute on chronic hypoxic respiratory failure requiring NIPPV  Severe sepsis, likely from pneumonia  Pneumonia  COPD with acute exacerbation  CKD  Advanced dementia  Anemia  Lactic acidosis    Plan:  Admitted to medicine service to ICU bed  NIPPV, transition to cannula  Brovana, Pulmicort, albuterol  Continue antibiotics with cefepime and Zithromax  IV steroids  CT angio to eval for possible mesenteric ischemia per critical care  Transfer out of the ICU     Discussed with RN.    VTE Prophylaxis:  Pharmacologic VTE prophylaxis orders are present.        CODE STATUS:   Medical Intervention Limits: No intubation (DNI)  Level Of Support Discussed With: Health Care Surrogate; Next of Kin (If No Surrogate)  Code Status (Patient has no pulse and is not breathing): No CPR (Do Not Attempt to Resuscitate)  Medical Interventions (Patient has pulse or is breathing): Limited Support      Electronically signed by Khoa Long MD, 11/10/2024, 07:23 EST.

## 2024-11-10 NOTE — PROGRESS NOTES
Pulmonary / Critical Care Progress Note      Patient Name: Amada Bentley  : 1945  MRN: [id number]  Attending:  Khoa Long MD   Date of admission: 2024    Subjective   Subjective   Patient critically ill with dyspnea    Over past 24 hours: Patient admitted to the ICU, had fever, on antibiotics, and steroids, did have lactic acidosis on IV fluids, CT angiogram of abdomen pelvis negative for mesenteric ischemia    No acute events overnight    This morning  Patient is on 1 L nasal cannula next time, fever trend improved, remains on antibiotics, lactic acid trending down, renal function improving  Procalcitonin is 7        Objective   Objective     Vitals:   Vital signs for last 24 hours:  Temp:  [98 °F (36.7 °C)-99.7 °F (37.6 °C)] 98.8 °F (37.1 °C)  Heart Rate:  [] 88  Resp:  [16-29] 23  BP: ()/() 146/133    Intake/Output last 3 shifts:  I/O last 3 completed shifts:  In: 3900.1 [P.O.:615; I.V.:2085.1; IV Piggyback:1200]  Out: 1950 [Urine:1950]    Physical Exam   Vital Signs Reviewed   General: Elderly, awake eating breakfast no acute distress on nasal cannula  HEENT:  PERRL, EOMI.  OP, nares clear  Chest:  good aeration, clear to auscultation bilaterally, tympanic to percussion bilaterally, no work of breathing noted  CV: RRR, no MGR, pulses 2+, equal.  Abd:  Soft, NT, ND, + BS, no HSM  EXT:  no clubbing, no cyanosis, no edema  Neuro:  A&Ox3, CN grossly intact, no focal deficits.  Demented pleasantly  Skin: No rashes or lesions noted      Result Review    Result Review:  I have personally reviewed the results from the time of this admission to 11/10/2024 08:34 EST and agree with these findings:  [x]  Laboratory  [x]  Microbiology  [x]  Radiology  [x]  EKG/Telemetry   [x]  Cardiology/Vascular   []  Pathology  [x]  Old records  []  Other:  Most notable findings include:       Lab 11/10/24  0210 24  0102 24  1850 24  1804 24  1756   WBC 11.65* 10.77  --   --   13.93*   HEMOGLOBIN 8.2* 10.1*  --   --  11.1*   HEMATOCRIT 25.5* 32.1*  --   --  35.2   PLATELETS 358 462*  --   --  583*   SODIUM 139 136 137  --   --    POTASSIUM 4.1 3.4* 3.8  --   --    CHLORIDE 107 97* 99  --   --    CO2 22.5 18.8* 21.9*  --   --    BUN 22 18 18  --   --    CREATININE 0.92 1.16* 1.15* 0.97  --    GLUCOSE 116* 179* 134*  --   --    CALCIUM 8.5* 9.3 9.5  --   --    PHOSPHORUS 4.0 4.7*  --   --   --    TOTAL PROTEIN  --   --  7.3  --   --    ALBUMIN 3.0*  --  3.9  --   --    GLOBULIN  --   --  3.4  --   --          Assessment & Plan   Assessment / Plan     Active Hospital Problems:  Active Hospital Problems    Diagnosis     **Acute on chronic respiratory failure with hypoxia     Pneumonia     Nicotine dependence, unspecified, uncomplicated     Essential (primary) hypertension     COPD exacerbation     High blood pressure        Impression:  Severe sepsis, present on admission  Concern for pneumonia, unspecified organism  Clinically significant lactic acidosis  COPD with acute exacerbation  Leukocytosis  CKD  Dementia     Plan:  -Continue supplemental oxygen, 1 L nasal cannula  -Wean for SpO2 greater than 90  -Okay to use NIPPV if needed  -Continue bronchopulmonary hygiene  -Continue Brovana, Pulmicort, continue DuoNeb  -Transition steroids to oral prednisone  -Check lactic acid this morning  -Will discontinue IV fluids  -Does have significant atherosclerosis of mesentery, no evidence of acute ischemia, avoid hypotension  -Pro-Abiodun is elevated at 7  -CT reviewed does have bronchiectasis, previously treated for pneumonia  -Will continue azithromycin and cefepime to complete for possible pneumonia  -Echocardiogram ordered/pending  -Continue clear liquid diet, advance to regular diet today     Peptic ulcer prophylaxis-famotidine  VTE Prophylaxis: Subcutaneous heparin  Pharmacologic VTE prophylaxis orders are present.    CODE STATUS:   Medical Intervention Limits: No intubation (DNI)  Level Of Support  Discussed With: Health Care Surrogate; Next of Kin (If No Surrogate)  Code Status (Patient has no pulse and is not breathing): No CPR (Do Not Attempt to Resuscitate)  Medical Interventions (Patient has pulse or is breathing): Limited Support    Okay to transfer out of ICU      Patient is critically ill with acute respiratory failure with hypoxia requiring NIPPV, anion gap metabolic acidosis, sepsis likely secondary to pneumonia from unknown organism, lactic acidosis, TOMA. We have personally reviewed all pertinent labs, imaging, microbiology and documentation. We have discussed care with the primary service as well as at multidisciplinary critical care rounds with the bedside nurse, respiratory therapist, pharmacist and all other ancillary services.32 minutes of critical care time was spent managing this patient, excluding procedures. Of this time, I spent 20 minutes in accordance with split shared billing.    I, ALBERTO Paez, spent 12 minutes critical care time.    Electronically signed by ALBERTO Leblanc, 11/10/24, 10:22 AM EST.  Electronically signed by Iggy Barnard MD, 11/10/24, 11:19 AM EST.      Patient decompensated and went into afib w/ rvr and hypoxemia now. Give lasix and start cardizem gtt. Will start heparin gtt, especially in the setting of a tight SMA with partial occlusion    Electronically signed by Iggy Barnard MD, 11/10/24, 4:34 PM EST.

## 2024-11-10 NOTE — PROGRESS NOTES
RT EQUIPMENT DEVICE RELATED - SKIN ASSESSMENT    RT Medical Equipment/Device:     NIV Mask:  Full-face    size: xs    Skin Assessment:      Mouth:  Intact    Device Skin Pressure Protection:  Skin-to skin areas padded    Nurse Notification:  Leticia Zambrano, RRT

## 2024-11-10 NOTE — PLAN OF CARE
Goal Outcome Evaluation:      Pt refused to wear bipap last night. Pt is on 2lnc

## 2024-11-10 NOTE — PLAN OF CARE
Goal Outcome Evaluation:  Plan of Care Reviewed With: patient, child           Outcome Evaluation: Pt stable overnight; on 2 L NC, pt placed on BiPap for short period of time, then requested to be remove it. Mtpl episodes of stool and urine, good output maintained post-costa removal. Pt had episodes of impulsivity, bed alarm alerted staff for assistance; pt re-educated mtpl times on safety practices and staff assistance.

## 2024-11-10 NOTE — PLAN OF CARE
Goal Outcome Evaluation:   Patient requested for BiPAP to be removed after approximately 20 minutes of therapy. States she does not feel she needs it any longer and refused to wear. Currently resting on 2lpm nasal cannula with oxygen saturations of %. Patient states she wears 1lpm at home PRN.

## 2024-11-11 LAB
ANION GAP SERPL CALCULATED.3IONS-SCNC: 13.4 MMOL/L (ref 5–15)
BUN SERPL-MCNC: 30 MG/DL (ref 8–23)
BUN/CREAT SERPL: 24.2 (ref 7–25)
CALCIUM SPEC-SCNC: 8.9 MG/DL (ref 8.6–10.5)
CHLORIDE SERPL-SCNC: 102 MMOL/L (ref 98–107)
CO2 SERPL-SCNC: 22.6 MMOL/L (ref 22–29)
CREAT SERPL-MCNC: 1.24 MG/DL (ref 0.57–1)
DEPRECATED RDW RBC AUTO: 60.6 FL (ref 37–54)
EGFRCR SERPLBLD CKD-EPI 2021: 43.8 ML/MIN/1.73
ERYTHROCYTE [DISTWIDTH] IN BLOOD BY AUTOMATED COUNT: 16.7 % (ref 12.3–15.4)
GLUCOSE SERPL-MCNC: 116 MG/DL (ref 65–99)
HCT VFR BLD AUTO: 29.5 % (ref 34–46.6)
HGB BLD-MCNC: 9.3 G/DL (ref 12–15.9)
MAGNESIUM SERPL-MCNC: 2.2 MG/DL (ref 1.6–2.4)
MCH RBC QN AUTO: 31.4 PG (ref 26.6–33)
MCHC RBC AUTO-ENTMCNC: 31.5 G/DL (ref 31.5–35.7)
MCV RBC AUTO: 99.7 FL (ref 79–97)
PHOSPHATE SERPL-MCNC: 3.5 MG/DL (ref 2.5–4.5)
PLATELET # BLD AUTO: 415 10*3/MM3 (ref 140–450)
PMV BLD AUTO: 11.1 FL (ref 6–12)
POTASSIUM SERPL-SCNC: 3.1 MMOL/L (ref 3.5–5.2)
RBC # BLD AUTO: 2.96 10*6/MM3 (ref 3.77–5.28)
SODIUM SERPL-SCNC: 138 MMOL/L (ref 136–145)
WBC NRBC COR # BLD AUTO: 7.71 10*3/MM3 (ref 3.4–10.8)

## 2024-11-11 PROCEDURE — 25010000002 CEFEPIME PER 500 MG: Performed by: PHYSICIAN ASSISTANT

## 2024-11-11 PROCEDURE — 94799 UNLISTED PULMONARY SVC/PX: CPT

## 2024-11-11 PROCEDURE — 94660 CPAP INITIATION&MGMT: CPT

## 2024-11-11 PROCEDURE — 84100 ASSAY OF PHOSPHORUS: CPT | Performed by: PHYSICIAN ASSISTANT

## 2024-11-11 PROCEDURE — 80048 BASIC METABOLIC PNL TOTAL CA: CPT | Performed by: FAMILY MEDICINE

## 2024-11-11 PROCEDURE — 94664 DEMO&/EVAL PT USE INHALER: CPT

## 2024-11-11 PROCEDURE — 83735 ASSAY OF MAGNESIUM: CPT | Performed by: PHYSICIAN ASSISTANT

## 2024-11-11 PROCEDURE — 63710000001 PREDNISONE PER 1 MG: Performed by: INTERNAL MEDICINE

## 2024-11-11 PROCEDURE — 85027 COMPLETE CBC AUTOMATED: CPT | Performed by: FAMILY MEDICINE

## 2024-11-11 PROCEDURE — 99233 SBSQ HOSP IP/OBS HIGH 50: CPT | Performed by: INTERNAL MEDICINE

## 2024-11-11 PROCEDURE — 94761 N-INVAS EAR/PLS OXIMETRY MLT: CPT

## 2024-11-11 PROCEDURE — 94669 MECHANICAL CHEST WALL OSCILL: CPT

## 2024-11-11 PROCEDURE — 25010000002 HEPARIN (PORCINE) PER 1000 UNITS: Performed by: FAMILY MEDICINE

## 2024-11-11 PROCEDURE — 94760 N-INVAS EAR/PLS OXIMETRY 1: CPT

## 2024-11-11 RX ORDER — METOPROLOL SUCCINATE 50 MG/1
50 TABLET, EXTENDED RELEASE ORAL 2 TIMES DAILY
Status: DISCONTINUED | OUTPATIENT
Start: 2024-11-11 | End: 2024-11-15 | Stop reason: HOSPADM

## 2024-11-11 RX ORDER — LEVOTHYROXINE SODIUM 75 UG/1
75 TABLET ORAL EVERY MORNING
Status: DISCONTINUED | OUTPATIENT
Start: 2024-11-11 | End: 2024-11-15 | Stop reason: HOSPADM

## 2024-11-11 RX ORDER — FAMOTIDINE 20 MG/1
10 TABLET, FILM COATED ORAL DAILY
Status: DISCONTINUED | OUTPATIENT
Start: 2024-11-12 | End: 2024-11-15 | Stop reason: HOSPADM

## 2024-11-11 RX ORDER — POTASSIUM CHLORIDE 750 MG/1
40 CAPSULE, EXTENDED RELEASE ORAL ONCE
Status: COMPLETED | OUTPATIENT
Start: 2024-11-11 | End: 2024-11-11

## 2024-11-11 RX ADMIN — CEFEPIME 2000 MG: 2 INJECTION, POWDER, FOR SOLUTION INTRAVENOUS at 04:30

## 2024-11-11 RX ADMIN — GUAIFENESIN 600 MG: 600 TABLET ORAL at 10:15

## 2024-11-11 RX ADMIN — MIRTAZAPINE 15 MG: 15 TABLET, FILM COATED ORAL at 20:46

## 2024-11-11 RX ADMIN — Medication 10 ML: at 20:46

## 2024-11-11 RX ADMIN — HEPARIN SODIUM 5000 UNITS: 5000 INJECTION INTRAVENOUS; SUBCUTANEOUS at 20:46

## 2024-11-11 RX ADMIN — Medication 10 ML: at 10:43

## 2024-11-11 RX ADMIN — IPRATROPIUM BROMIDE AND ALBUTEROL SULFATE 3 ML: .5; 3 SOLUTION RESPIRATORY (INHALATION) at 06:27

## 2024-11-11 RX ADMIN — LEVOTHYROXINE SODIUM 75 MCG: 75 TABLET ORAL at 10:15

## 2024-11-11 RX ADMIN — IPRATROPIUM BROMIDE AND ALBUTEROL SULFATE 3 ML: .5; 3 SOLUTION RESPIRATORY (INHALATION) at 00:09

## 2024-11-11 RX ADMIN — Medication 250 MG: at 20:46

## 2024-11-11 RX ADMIN — ARFORMOTEROL TARTRATE 15 MCG: 15 SOLUTION RESPIRATORY (INHALATION) at 06:27

## 2024-11-11 RX ADMIN — AZITHROMYCIN DIHYDRATE 250 MG: 250 TABLET ORAL at 10:30

## 2024-11-11 RX ADMIN — HEPARIN SODIUM 5000 UNITS: 5000 INJECTION INTRAVENOUS; SUBCUTANEOUS at 10:16

## 2024-11-11 RX ADMIN — POTASSIUM CHLORIDE 40 MEQ: 750 CAPSULE, EXTENDED RELEASE ORAL at 10:15

## 2024-11-11 RX ADMIN — MUPIROCIN 1 APPLICATION: 20 OINTMENT TOPICAL at 10:16

## 2024-11-11 RX ADMIN — IPRATROPIUM BROMIDE AND ALBUTEROL SULFATE 3 ML: .5; 3 SOLUTION RESPIRATORY (INHALATION) at 19:35

## 2024-11-11 RX ADMIN — MUPIROCIN 1 APPLICATION: 20 OINTMENT TOPICAL at 20:46

## 2024-11-11 RX ADMIN — MONTELUKAST 10 MG: 10 TABLET, FILM COATED ORAL at 20:46

## 2024-11-11 RX ADMIN — IPRATROPIUM BROMIDE AND ALBUTEROL SULFATE 3 ML: .5; 3 SOLUTION RESPIRATORY (INHALATION) at 13:06

## 2024-11-11 RX ADMIN — BUDESONIDE 0.5 MG: 0.5 SUSPENSION RESPIRATORY (INHALATION) at 19:36

## 2024-11-11 RX ADMIN — METOPROLOL SUCCINATE 50 MG: 50 TABLET, EXTENDED RELEASE ORAL at 10:16

## 2024-11-11 RX ADMIN — PREDNISONE 40 MG: 20 TABLET ORAL at 10:15

## 2024-11-11 RX ADMIN — ARFORMOTEROL TARTRATE 15 MCG: 15 SOLUTION RESPIRATORY (INHALATION) at 19:35

## 2024-11-11 RX ADMIN — BUDESONIDE 0.5 MG: 0.5 SUSPENSION RESPIRATORY (INHALATION) at 06:27

## 2024-11-11 RX ADMIN — GUAIFENESIN 600 MG: 600 TABLET ORAL at 20:45

## 2024-11-11 RX ADMIN — Medication 250 MG: at 10:15

## 2024-11-11 RX ADMIN — SENNOSIDES AND DOCUSATE SODIUM 2 TABLET: 50; 8.6 TABLET ORAL at 20:46

## 2024-11-11 RX ADMIN — CETIRIZINE HYDROCHLORIDE 10 MG: 10 TABLET, FILM COATED ORAL at 10:15

## 2024-11-11 RX ADMIN — FAMOTIDINE 20 MG: 10 INJECTION INTRAVENOUS at 10:44

## 2024-11-11 NOTE — PLAN OF CARE
Goal Outcome Evaluation:   Patient came off of bipap last night around 2000. Patient is not short of air or showing signs of increase in work of breathing. Patient's saturation is high 90s on 2lpm nasal cannula.

## 2024-11-11 NOTE — PLAN OF CARE
Goal Outcome Evaluation:         Patient has worn Bipap 12/6 periodically throughout the night. Currently on 3L nasal cannula and tolerating well.

## 2024-11-11 NOTE — THERAPY EVALUATION
RT EQUIPMENT DEVICE RELATED - SKIN ASSESSMENT    RT Medical Equipment/Device:     NIV Mask:  Under-the-nose   size:       Skin Assessment:      Head/neck/face:  Intact    Device Skin Pressure Protection:  Pressure points protected    Nurse Notification:  Leticia Pimentel, RRT

## 2024-11-11 NOTE — PLAN OF CARE
Goal Outcome Evaluation:  Plan of Care Reviewed With: patient           Outcome Evaluation: pt transfered from ICU, pt confused, attempting to get out of bed, safety watch ordred, pt resting comfortably

## 2024-11-11 NOTE — PLAN OF CARE
Goal Outcome Evaluation:  Plan of Care Reviewed With: patient           Outcome Evaluation: Pt alert, confused at baseline, on 2L NC and intermittently tolerating bipap. Pt highly impulsive, safety reinforced. One BM. Vitals stable.

## 2024-11-11 NOTE — PROGRESS NOTES
[facility]   Hospitalist Progress Note  Date: 2024  Patient Name: Amada Bentley  : 1945  MRN: [id number]  Date of admission: 2024  Room/Bed: 262/1      Subjective   Subjective     Chief Complaint: Respiratory distress     Summary:Amada Bentley is a 79 y.o. female with advanced dementia, COPD, depression, CKD and other medical problems presented from a nursing home with respiratory distress.  She had apparently been having worsening shortness of breath at the nursing facility along with altered mental status, lethargy, weakness.  Brought to the ED.  CODE STATUS was confirmed as DNR/DNI.  She was admitted to the ICU and placed on NIPPV.  COVID and flu and RSV were negative.  Chest x-ray showed left lower lobe pneumonia.  She was admitted to the medicine service, seen by critical care in consultation.  Started on antibiotics. Moved out of ICU on 2024.    Interval Followup: Was going to move out to the ICU yesterday but had some decompensation.  Rebounded quickly and stable to move out today.        Objective   Objective     Vitals:   Temp:  [97.4 °F (36.3 °C)-98.9 °F (37.2 °C)] 98.4 °F (36.9 °C)  Heart Rate:  [] 75  Resp:  [16-33] 20  BP: (104-186)/() 165/76  Flow (L/min) (Oxygen Therapy):  [2-4] 3    Physical Exam   General: Awake, alert, nad  HENT: NCAT, MMM  Eyes: pupils equal, no scleral icterus  Cardiovascular: RRR, no murmurs   Pulmonary: Bilateral breath sounds  Gastrointestinal: S/ND/NT, +BS  Musculoskeletal: No gross deformities  Skin: No jaundice, no rash on exposed skin appreciated      Result Review    Result Review:  I have personally reviewed these results:  [x]  Laboratory      Lab 24  0309 11/10/24  0817 11/10/24  0210 24  1537 24  0930 24  0357 24  0102 24  1804 24  1756   WBC 7.71  --  11.65*  --   --   --  10.77  --  13.93*   HEMOGLOBIN 9.3*  --  8.2*  --   --   --  10.1*  --  11.1*   HEMATOCRIT 29.5*  --  25.5*  --    --   --  32.1*  --  35.2   PLATELETS 415  --  358  --   --   --  462*  --  583*   NEUTROS ABS  --   --  10.43*  --   --   --   --   --  11.32*   IMMATURE GRANS (ABS)  --   --  0.07*  --   --   --   --   --  0.07*   LYMPHS ABS  --   --  0.71  --   --   --   --   --  1.64   MONOS ABS  --   --  0.40  --   --   --   --   --  0.78   EOS ABS  --   --  0.00  --   --   --   --   --  0.03   MCV 99.7*  --  100.0*  --   --   --  102.6*  --  102.0*   PROCALCITONIN  --   --   --   --   --   --  7.58*  --   --    LACTATE  --  1.7  --  3.9* 5.5*   < > 6.7*   < >  --     < > = values in this interval not displayed.         Lab 11/11/24  0309 11/10/24  0210 11/09/24  0102   SODIUM 138 139 136   POTASSIUM 3.1* 4.1 3.4*   CHLORIDE 102 107 97*   CO2 22.6 22.5 18.8*   ANION GAP 13.4 9.5 20.2*   BUN 30* 22 18   CREATININE 1.24* 0.92 1.16*   EGFR 43.8* 62.7 47.5*   GLUCOSE 116* 116* 179*   CALCIUM 8.9 8.5* 9.3   MAGNESIUM 2.2 2.2 2.1   PHOSPHORUS 3.5 4.0 4.7*   TSH  --   --  1.300         Lab 11/10/24  0210 11/08/24  1850   TOTAL PROTEIN  --  7.3   ALBUMIN 3.0* 3.9   GLOBULIN  --  3.4   ALT (SGPT)  --  <5   AST (SGOT)  --  5   BILIRUBIN  --  0.3   ALK PHOS  --  58         Lab 11/08/24  1850 11/08/24  1756   PROBNP  --  3,098.0*   HSTROP T 33*  --                  Lab 11/09/24  0626 11/08/24  1804   PH, ARTERIAL 7.425 7.460*   PCO2, ARTERIAL 32.8* 28.9*   PO2 .4* 59.1*   O2 SATURATION ART 98.7 92.1*   FIO2 30  --    HCO3 ART 21.5* 20.5*   BASE EXCESS ART -2.4* -2.3*     Brief Urine Lab Results  (Last result in the past 365 days)        Color   Clarity   Blood   Leuk Est   Nitrite   Protein   CREAT   Urine HCG        11/09/24 0004 Yellow   Clear   Negative   Negative   Negative   30 mg/dL (1+)                 [x]  Microbiology   Microbiology Results (last 10 days)       Procedure Component Value - Date/Time    Respiratory Panel PCR w/COVID-19(SARS-CoV-2) FE/[facility]I/[facility] In-House, NP Swab in UTM/VTM, 2 HR TAT - Swab,  Nasopharynx [[id number]]  (Normal) Collected: 11/09/24 1113    Lab Status: Final result Specimen: Swab from Nasopharynx Updated: 11/09/24 1216     ADENOVIRUS, PCR Not Detected     Coronavirus 229E Not Detected     Coronavirus HKU1 Not Detected     Coronavirus NL63 Not Detected     Coronavirus OC43 Not Detected     COVID19 Not Detected     Human Metapneumovirus Not Detected     Human Rhinovirus/Enterovirus Not Detected     Influenza A PCR Not Detected     Influenza B PCR Not Detected     Parainfluenza Virus 1 Not Detected     Parainfluenza Virus 2 Not Detected     Parainfluenza Virus 3 Not Detected     Parainfluenza Virus 4 Not Detected     RSV, PCR Not Detected     Bordetella pertussis pcr Not Detected     Bordetella parapertussis PCR Not Detected     Chlamydophila pneumoniae PCR Not Detected     Mycoplasma pneumo by PCR Not Detected    Narrative:      In the setting of a positive respiratory panel with a viral infection PLUS a negative procalcitonin without other underlying concern for bacterial infection, consider observing off antibiotics or discontinuation of antibiotics and continue supportive care. If the respiratory panel is positive for atypical bacterial infection (Bordetella pertussis, Chlamydophila pneumoniae, or Mycoplasma pneumoniae), consider antibiotic de-escalation to target atypical bacterial infection.    MRSA Screen, PCR (Inpatient) - Swab, Nares [[id number]]  (Normal) Collected: 11/09/24 0004    Lab Status: Final result Specimen: Swab from Nares Updated: 11/09/24 0311     MRSA PCR No MRSA Detected    Narrative:      The negative predictive value of this diagnostic test is high and should only be used to consider de-escalating anti-MRSA therapy. A positive result may indicate colonization with MRSA and must be correlated clinically.    Legionella Antigen, Urine - Urine, Urine, Clean Catch [[id number]]  (Normal) Collected: 11/09/24 0004    Lab Status: Final result Specimen: Urine, Clean Catch Updated:  11/09/24 0816     LEGIONELLA ANTIGEN, URINE Negative    S. Pneumo Ag Urine or CSF - Urine, Urine, Clean Catch [[id number]]  (Normal) Collected: 11/09/24 0004    Lab Status: Final result Specimen: Urine, Clean Catch Updated: 11/09/24 0816     Strep Pneumo Ag Negative    COVID-19, FLU A/B, RSV PCR 1 HR TAT - Swab, Nasopharynx [[id number]]  (Normal) Collected: 11/08/24 1843    Lab Status: Final result Specimen: Swab from Nasopharynx Updated: 11/08/24 2027     COVID19 Not Detected     Influenza A PCR Not Detected     Influenza B PCR Not Detected     RSV, PCR Not Detected    Narrative:      Fact sheet for providers: https://www.fda.gov/media/980726/download    Fact sheet for patients: https://www.fda.gov/media/246097/download    Test performed by PCR.    Blood Culture - Blood, Arm, Left [[id number]]  (Normal) Collected: 11/08/24 1756    Lab Status: Preliminary result Specimen: Blood from Arm, Left Updated: 11/10/24 1815     Blood Culture No growth at 2 days    Narrative:      Less than seven (7) mL's of blood was collected.  Insufficient quantity may yield false negative results.    Blood Culture - Blood, Arm, Right [[id number]]  (Normal) Collected: 11/08/24 1756    Lab Status: Preliminary result Specimen: Blood from Arm, Right Updated: 11/10/24 1815     Blood Culture No growth at 2 days    Narrative:      Less than seven (7) mL's of blood was collected.  Insufficient quantity may yield false negative results.          [x]  Radiology  XR Chest 1 View    Result Date: 11/10/2024  Impression: 1. Persistent left basilar airspace disease compatible with pneumonia 2. COPD 3. Possible trace pleural effusions Electronically Signed: Darrick Spicer  11/10/2024 4:34 PM EST  Workstation ID: [id number]    CT Angiogram Abdomen Pelvis    Result Date: 11/10/2024  1. Extensive atherosclerotic disease as described in detail above. No aortic aneurysm or dissection. No evidence of mesenteric ischemia. 2. Evidence of small bowel ileus without  obstruction. 3. Diminished enhancement in the lower pole of the right kidney with some associated cortical thinning likely reflects a chronic infarct. Pyelonephritis should be excluded clinically. 4. Occluded left common iliac artery with reconstitution of the internal and external iliac arteries. 5. Unusual appearance in the upper medial spleen that could reflect a mass versus unusual enhancement. Initial evaluation with ultrasound is recommended to exclude a mass. 6. Additional findings as above. Electronically Signed: Ronny Davis MD  11/10/2024 12:43 AM EST  Workstation ID: [id number]    XR Chest 1 View    Result Date: 11/8/2024  Impression: Mild left basilar atelectasis versus early pneumonia. Electronically Signed: Maury Kerr MD  11/8/2024 6:38 PM EST  Workstation ID: [id number]   []  EKG/Telemetry   []  Cardiology/Vascular   []  Pathology  []  Old records  []  Other:    Assessment & Plan   Assessment / Plan     Assessment:  Acute on chronic hypoxic respiratory failure requiring NIPPV  Severe sepsis, likely from pneumonia  Pneumonia  COPD with acute exacerbation  CKD  Advanced dementia  Anemia  Lactic acidosis    Plan:  Admitted to medicine service to ICU bed  NIPPV, transition to cannula  Brovana, Pulmicort, albuterol  Continue antibiotics with cefepime and Zithromax  IV steroids  Transferred out of the ICU     Discussed with RN.    VTE Prophylaxis:  Pharmacologic VTE prophylaxis orders are present.        CODE STATUS:   Medical Intervention Limits: No intubation (DNI)  Level Of Support Discussed With: Health Care Surrogate; Next of Kin (If No Surrogate)  Code Status (Patient has no pulse and is not breathing): No CPR (Do Not Attempt to Resuscitate)  Medical Interventions (Patient has pulse or is breathing): Limited Support      Electronically signed by Khoa Long MD, 11/11/2024, 12:40 EST.

## 2024-11-11 NOTE — SIGNIFICANT NOTE
Wound Eval / Progress Noted    KEVON Morrison     Patient Name: Amada Bentley  : 1945  MRN: [id number]  Today's Date: 2024                 Admit Date: 2024    Visit Dx:    ICD-10-CM ICD-9-CM   1. COPD exacerbation  J44.1 491.21         Acute on chronic respiratory failure with hypoxia    High blood pressure    COPD exacerbation    Essential (primary) hypertension    Nicotine dependence, unspecified, uncomplicated    Pneumonia        Past Medical History:   Diagnosis Date    Abnormal weight loss     Anxiety     Cardiac murmur, unspecified     Chronic kidney disease, stage 2 (mild)     Condition not found     LBP    COPD (chronic obstructive pulmonary disease)     Depression     loss of daughter    Diarrhea, unspecified     Dysphagia, oropharyngeal phase     Essential (primary) hypertension     Gastritis     Gastro-esophageal reflux disease without esophagitis     Generalized anxiety disorder     Hiatal hernia     History of falling     Hypercholesterolemia     Hypothyroid     Infected abrasion of scalp, initial encounter     Long term (current) use of opiate analgesic     Major depressive disorder, recurrent, moderate     Muscle weakness (generalized)     Nicotine dependence, unspecified, uncomplicated     Occlusion and stenosis of bilateral carotid arteries     Other allergy, subsequent encounter     Other long term (current) drug therapy     Pain     LEFT-knee, shoulder RIGHT-hip, knee    Pernicious anemia     Primary insomnia     Solitary pulmonary nodule         Past Surgical History:   Procedure Laterality Date    APPENDECTOMY      CATARACT EXTRACTION, BILATERAL      FOOT SURGERY Bilateral     secondary bone spurs    HYSTERECTOMY  ,     LAPAROSCOPIC CHOLECYSTECTOMY      OTHER SURGICAL HISTORY Right     Ear Surger; unspecified         Physical Assessment:       24 0950   Skin   Skin WDL X;color;temperature;moisture;elasticity;integrity;all   Skin Color/Characteristics without  discoloration;other (see comments)  (buttocks / heels. IVIS, Rn, WCC)   Skin Temperature warm   Skin Moisture dry   Skin Elasticity quick return to original state   Skin Integrity intact       Wound Check / Follow-up:  Patient seen today for a wound consult. Patient is awake and alert, agreeable to skin check at this time. Patient with a primafit in place.    Buttocks and heels without discoloration; all tissue is intact. Recommending to provide quality skin care and hygiene. Implement Q2H turns and offload at all times. Keep the patient clean and free from all moisture, apply blue top moisture barrier TID / PRN.    Abdominal / groin folds are slightly pink, no fungal presentation present. Recommending quality skin care and hygiene and apply a light dusting of corn starch powder daily.     Impression: intact tissue to buttocks / heels    Short term goals:  regain skin integrity, skin protection, topical treatment, pressure reduction, moisture prevention, quality skin care and hygiene.    Betty Araujo RN    11/11/2024    11:30 EST

## 2024-11-11 NOTE — PROGRESS NOTES
[facility]     Progress Note    Patient Name: Amada Bentley  : 1945  MRN: [id number]  Primary Care Physician:  Jennifer Estes MD  Date of admission: 2024    Subjective   Subjective     Chief Complaint: For consult sepsis    History of Present Illness  Patient Reports in the ICU  On 2 L of oxygen  Limited    Review of Systems    Objective   Objective     Vitals:   Temp:  [97.4 °F (36.3 °C)-98.9 °F (37.2 °C)] 97.4 °F (36.3 °C)  Heart Rate:  [] 91  Resp:  [16-33] 21  BP: ()/() 145/74  Flow (L/min) (Oxygen Therapy):  [2-4] 2    Physical Exam   Frail  Elderly  Chronically ill in appearance  Result Review    Result Review:  I have personally reviewed the results from the time of this admission to 2024 08:39 EST and agree with these findings:  [x]  Laboratory list / accordion  [x]  Microbiology  [x]  Radiology  [x]  EKG/Telemetry   [x]  Cardiology/Vascular   []  Pathology  []  Old records  []  Other:        Assessment & Plan   Assessment / Plan     Brief Patient Summary:  Amada Bentley is a 79 y.o. female who in the intensive care unit with sepsis    Active Hospital Problems:  Active Hospital Problems    Diagnosis     **Acute on chronic respiratory failure with hypoxia     Pneumonia     Nicotine dependence, unspecified, uncomplicated     Essential (primary) hypertension     COPD exacerbation     High blood pressure    Severe sepsis  Concern for bibasilar pneumonia  COPD with acute exacerbation  Bronchiectasis with acute exacerbation  Dementia    Plan:   Okay from a standpoint to transfer patient out of the ICU  CT scan reviewed patient does appear to have some basilar pneumonia  Continue cefepime for total of 5 days  Continue LABA LAMA inhaled corticosteroids  Resume home metoprolol  Resume home Synthroid  VTE Prophylaxis:  Pharmacologic VTE prophylaxis orders are present.        CODE STATUS:    Medical Intervention Limits: No intubation (DNI)  Level Of Support Discussed With:  Health Care Surrogate; Next of Kin (If No Surrogate)  Code Status (Patient has no pulse and is not breathing): No CPR (Do Not Attempt to Resuscitate)  Medical Interventions (Patient has pulse or is breathing): Limited Support    Okay from a standpoint to transfer patient out of the intensive care unit    Labs reviewed, imaging reviewed, case discussed with hospitalist and nursing regarding plan of care  Telemetry reviewed showing no evidence of ventricular arrhythmias    Juan Steiner DO    Electronically signed by Juan Steiner DO, 11/11/24, 12:04 PM EST.

## 2024-11-12 LAB
ALBUMIN SERPL-MCNC: 3.5 G/DL (ref 3.5–5.2)
ANION GAP SERPL CALCULATED.3IONS-SCNC: 10.3 MMOL/L (ref 5–15)
BASOPHILS # BLD AUTO: 0.01 10*3/MM3 (ref 0–0.2)
BASOPHILS NFR BLD AUTO: 0.1 % (ref 0–1.5)
BUN SERPL-MCNC: 31 MG/DL (ref 8–23)
BUN/CREAT SERPL: 23.8 (ref 7–25)
CALCIUM SPEC-SCNC: 8.9 MG/DL (ref 8.6–10.5)
CHLORIDE SERPL-SCNC: 105 MMOL/L (ref 98–107)
CO2 SERPL-SCNC: 24.7 MMOL/L (ref 22–29)
CREAT SERPL-MCNC: 1.3 MG/DL (ref 0.57–1)
DEPRECATED RDW RBC AUTO: 61.2 FL (ref 37–54)
EGFRCR SERPLBLD CKD-EPI 2021: 41.4 ML/MIN/1.73
EOSINOPHIL # BLD AUTO: 0 10*3/MM3 (ref 0–0.4)
EOSINOPHIL NFR BLD AUTO: 0 % (ref 0.3–6.2)
ERYTHROCYTE [DISTWIDTH] IN BLOOD BY AUTOMATED COUNT: 16.5 % (ref 12.3–15.4)
GLUCOSE SERPL-MCNC: 106 MG/DL (ref 65–99)
HCT VFR BLD AUTO: 31.1 % (ref 34–46.6)
HGB BLD-MCNC: 9.7 G/DL (ref 12–15.9)
IMM GRANULOCYTES # BLD AUTO: 0.04 10*3/MM3 (ref 0–0.05)
IMM GRANULOCYTES NFR BLD AUTO: 0.4 % (ref 0–0.5)
LYMPHOCYTES # BLD AUTO: 2.33 10*3/MM3 (ref 0.7–3.1)
LYMPHOCYTES NFR BLD AUTO: 25.9 % (ref 19.6–45.3)
MAGNESIUM SERPL-MCNC: 2.3 MG/DL (ref 1.6–2.4)
MCH RBC QN AUTO: 31.5 PG (ref 26.6–33)
MCHC RBC AUTO-ENTMCNC: 31.2 G/DL (ref 31.5–35.7)
MCV RBC AUTO: 101 FL (ref 79–97)
MONOCYTES # BLD AUTO: 0.7 10*3/MM3 (ref 0.1–0.9)
MONOCYTES NFR BLD AUTO: 7.8 % (ref 5–12)
NEUTROPHILS NFR BLD AUTO: 5.9 10*3/MM3 (ref 1.7–7)
NEUTROPHILS NFR BLD AUTO: 65.8 % (ref 42.7–76)
NRBC BLD AUTO-RTO: 0 /100 WBC (ref 0–0.2)
PHOSPHATE SERPL-MCNC: 2.8 MG/DL (ref 2.5–4.5)
PLATELET # BLD AUTO: 419 10*3/MM3 (ref 140–450)
PMV BLD AUTO: 11 FL (ref 6–12)
POTASSIUM SERPL-SCNC: 3.9 MMOL/L (ref 3.5–5.2)
RBC # BLD AUTO: 3.08 10*6/MM3 (ref 3.77–5.28)
SODIUM SERPL-SCNC: 140 MMOL/L (ref 136–145)
WBC NRBC COR # BLD AUTO: 8.98 10*3/MM3 (ref 3.4–10.8)

## 2024-11-12 PROCEDURE — 94669 MECHANICAL CHEST WALL OSCILL: CPT

## 2024-11-12 PROCEDURE — 97161 PT EVAL LOW COMPLEX 20 MIN: CPT

## 2024-11-12 PROCEDURE — 83735 ASSAY OF MAGNESIUM: CPT | Performed by: PHYSICIAN ASSISTANT

## 2024-11-12 PROCEDURE — 25010000002 HEPARIN (PORCINE) PER 1000 UNITS: Performed by: FAMILY MEDICINE

## 2024-11-12 PROCEDURE — 94799 UNLISTED PULMONARY SVC/PX: CPT

## 2024-11-12 PROCEDURE — 99233 SBSQ HOSP IP/OBS HIGH 50: CPT | Performed by: INTERNAL MEDICINE

## 2024-11-12 PROCEDURE — 99232 SBSQ HOSP IP/OBS MODERATE 35: CPT | Performed by: INTERNAL MEDICINE

## 2024-11-12 PROCEDURE — 63710000001 PREDNISONE PER 1 MG: Performed by: INTERNAL MEDICINE

## 2024-11-12 PROCEDURE — 85025 COMPLETE CBC W/AUTO DIFF WBC: CPT | Performed by: INTERNAL MEDICINE

## 2024-11-12 PROCEDURE — 80069 RENAL FUNCTION PANEL: CPT | Performed by: INTERNAL MEDICINE

## 2024-11-12 PROCEDURE — 25010000002 CEFEPIME PER 500 MG: Performed by: INTERNAL MEDICINE

## 2024-11-12 RX ORDER — PANTOPRAZOLE SODIUM 40 MG/1
40 TABLET, DELAYED RELEASE ORAL
Status: DISCONTINUED | OUTPATIENT
Start: 2024-11-13 | End: 2024-11-15 | Stop reason: HOSPADM

## 2024-11-12 RX ORDER — MEGESTROL ACETATE 40 MG/ML
200 SUSPENSION ORAL DAILY
Status: DISCONTINUED | OUTPATIENT
Start: 2024-11-12 | End: 2024-11-15 | Stop reason: HOSPADM

## 2024-11-12 RX ORDER — DIVALPROEX SODIUM 125 MG/1
375 CAPSULE, COATED PELLETS ORAL 3 TIMES DAILY
Status: DISCONTINUED | OUTPATIENT
Start: 2024-11-12 | End: 2024-11-15 | Stop reason: HOSPADM

## 2024-11-12 RX ADMIN — HEPARIN SODIUM 5000 UNITS: 5000 INJECTION INTRAVENOUS; SUBCUTANEOUS at 08:13

## 2024-11-12 RX ADMIN — Medication 10 ML: at 19:56

## 2024-11-12 RX ADMIN — IPRATROPIUM BROMIDE AND ALBUTEROL SULFATE 3 ML: .5; 3 SOLUTION RESPIRATORY (INHALATION) at 13:40

## 2024-11-12 RX ADMIN — PREDNISONE 40 MG: 20 TABLET ORAL at 14:33

## 2024-11-12 RX ADMIN — MONTELUKAST 10 MG: 10 TABLET, FILM COATED ORAL at 19:56

## 2024-11-12 RX ADMIN — HEPARIN SODIUM 5000 UNITS: 5000 INJECTION INTRAVENOUS; SUBCUTANEOUS at 19:54

## 2024-11-12 RX ADMIN — DIVALPROEX SODIUM 375 MG: 125 CAPSULE, COATED PELLETS ORAL at 20:15

## 2024-11-12 RX ADMIN — MIRTAZAPINE 15 MG: 15 TABLET, FILM COATED ORAL at 19:56

## 2024-11-12 RX ADMIN — CEFEPIME 2000 MG: 2 INJECTION, POWDER, FOR SOLUTION INTRAVENOUS at 08:24

## 2024-11-12 RX ADMIN — GUAIFENESIN 600 MG: 600 TABLET ORAL at 19:55

## 2024-11-12 RX ADMIN — MUPIROCIN 1 APPLICATION: 20 OINTMENT TOPICAL at 08:25

## 2024-11-12 RX ADMIN — IPRATROPIUM BROMIDE AND ALBUTEROL SULFATE 3 ML: .5; 3 SOLUTION RESPIRATORY (INHALATION) at 07:32

## 2024-11-12 RX ADMIN — MUPIROCIN 1 APPLICATION: 20 OINTMENT TOPICAL at 19:55

## 2024-11-12 RX ADMIN — METOPROLOL SUCCINATE 50 MG: 50 TABLET, EXTENDED RELEASE ORAL at 19:56

## 2024-11-12 RX ADMIN — Medication 250 MG: at 19:56

## 2024-11-12 RX ADMIN — MEGESTROL ACETATE 200 MG: 400 SUSPENSION ORAL at 16:06

## 2024-11-12 RX ADMIN — Medication 10 ML: at 08:22

## 2024-11-12 RX ADMIN — BUDESONIDE 0.5 MG: 0.5 SUSPENSION RESPIRATORY (INHALATION) at 07:32

## 2024-11-12 RX ADMIN — DIVALPROEX SODIUM 375 MG: 125 CAPSULE, COATED PELLETS ORAL at 15:06

## 2024-11-12 RX ADMIN — BUDESONIDE 0.5 MG: 0.5 SUSPENSION RESPIRATORY (INHALATION) at 19:19

## 2024-11-12 RX ADMIN — ARFORMOTEROL TARTRATE 15 MCG: 15 SOLUTION RESPIRATORY (INHALATION) at 19:19

## 2024-11-12 RX ADMIN — AZITHROMYCIN DIHYDRATE 250 MG: 250 TABLET ORAL at 14:32

## 2024-11-12 RX ADMIN — IPRATROPIUM BROMIDE AND ALBUTEROL SULFATE 3 ML: .5; 3 SOLUTION RESPIRATORY (INHALATION) at 19:19

## 2024-11-12 RX ADMIN — ARFORMOTEROL TARTRATE 15 MCG: 15 SOLUTION RESPIRATORY (INHALATION) at 07:32

## 2024-11-12 NOTE — PLAN OF CARE
Goal Outcome Evaluation:  Plan of Care Reviewed With: patient        Progress: improving  Outcome Evaluation: no acute event, pt more oriented today, sitter at bedside, vitals stable

## 2024-11-12 NOTE — PROGRESS NOTES
RT EQUIPMENT DEVICE RELATED - SKIN ASSESSMENT    RT Medical Equipment/Device:     Nasal Cannula    Skin Assessment:      Cheek:  Intact  Ears:  Intact  Nares:  Intact    Device Skin Pressure Protection:  Positioning supports utilized    Nurse Notification:  Leticia Zarate, RRT

## 2024-11-12 NOTE — THERAPY EVALUATION
Acute Care - Physical Therapy Initial Evaluation  KEVON Morrison     Patient Name: Amada Bentley  : 1945  MRN: [id number]  Today's Date: 2024      Visit Dx:     ICD-10-CM ICD-9-CM   1. COPD exacerbation  J44.1 491.21   2. Difficulty in walking  R26.2 719.7     Patient Active Problem List   Diagnosis    Degeneration of lumbosacral intervertebral disc    High blood pressure    High cholesterol    Kidney disorder    Lumbar spinal stenosis    Murmur, cardiac    Other specified chronic obstructive pulmonary disease    Thoracic or lumbosacral neuritis or radiculitis    Thyroid disorder    Solitary pulmonary nodule    Primary insomnia    Pernicious anemia    Other long term (current) drug therapy    Chronic kidney disease, stage 2 (mild)    COPD exacerbation    Essential (primary) hypertension    Nicotine dependence, unspecified, uncomplicated    Encounter for examination for admission to nursing home    Encounter for annual wellness exam in Medicare patient    Acute on chronic respiratory failure with hypoxia    Pneumonia     Past Medical History:   Diagnosis Date    Abnormal weight loss     Anxiety     Cardiac murmur, unspecified     Chronic kidney disease, stage 2 (mild)     Condition not found     LBP    COPD (chronic obstructive pulmonary disease)     Depression     loss of daughter    Diarrhea, unspecified     Dysphagia, oropharyngeal phase     Essential (primary) hypertension     Gastritis     Gastro-esophageal reflux disease without esophagitis     Generalized anxiety disorder     Hiatal hernia     History of falling     Hypercholesterolemia     Hypothyroid     Infected abrasion of scalp, initial encounter     Long term (current) use of opiate analgesic     Major depressive disorder, recurrent, moderate     Muscle weakness (generalized)     Nicotine dependence, unspecified, uncomplicated     Occlusion and stenosis of bilateral carotid arteries     Other allergy, subsequent encounter     Other long term  (current) drug therapy     Pain     LEFT-knee, shoulder RIGHT-hip, knee    Pernicious anemia     Primary insomnia     Solitary pulmonary nodule      Past Surgical History:   Procedure Laterality Date    APPENDECTOMY      CATARACT EXTRACTION, BILATERAL      FOOT SURGERY Bilateral     secondary bone spurs    HYSTERECTOMY  1976, 1987    LAPAROSCOPIC CHOLECYSTECTOMY      OTHER SURGICAL HISTORY Right     Ear Surger; unspecified     PT Assessment (Last 12 Hours)       PT Evaluation and Treatment       Row Name 11/12/24 1150          Physical Therapy Time and Intention    Subjective Information complains of;weakness;fatigue;pain (P)   -KL     Document Type evaluation (P)   -KL     Mode of Treatment individual therapy;physical therapy (P)   -KL     Total Minutes, Physical Therapy 25 (P)   -KL     Patient Effort fair (P)   -KL     Symptoms Noted During/After Treatment increased pain;fatigue (P)   -KL       Row Name 11/12/24 1150          General Information    Patient Profile Reviewed yes (P)   -KL     Patient Observations agree to therapy;lethargic;poorly cooperative (P)   -KL     Prior Level of Function mod assist: (P)   -KL     Existing Precautions/Restrictions no known precautions/restrictions (P)   -KL     Risks Reviewed patient: (P)   -KL     Benefits Reviewed patient: (P)   -KL     Barriers to Rehab none identified (P)   -KL       Row Name 11/12/24 1150          Previous Level of Function/Home Environm    Bathing, Previous Functional Level partial assistance (P)   -KL     Grooming, Previous Functional Level partial assistance (P)   -KL     Dressing, Previous Functional Level partial assistance (P)   -KL     Eating/Feeding, Previous Functional Level independent (P)   -KL     Toileting, Previous Functional Level partial assistance (P)   -KL     BADLs, Previous Functional Level partial assistance (P)   -KL     IADLs, Previous Functional Level partial assistance (P)   -KL     Bed Mobility, Previous Functional Level  partial assistance (P)   -     Transfers, Previous Functional Level partial assistance (P)   -     Household Ambulation, Previous Functional Level uses device or equipment (P)   -     Stairs, Previous Functional Level partial assistance (P)   -     Community Ambulation, Previous Functional Level uses device or equipment (P)   -       Row Name 11/12/24 1150          Living Environment    Current Living Arrangements assisted living facility (P)   -     People in Home child(yudelka), adult (P)   -     Primary Care Provided by self;other (see comments) (P)   Assisted living facility  -       Row Name 11/12/24 1150          Home Use of Assistive/Adaptive Equipment    Equipment Currently Used at Home walker, rolling (P)   -       Row Name 11/12/24 1150          Range of Motion (ROM)    Range of Motion ROM is WFL (P)   -[facility] Name 11/12/24 1150          Strength (Manual Muscle Testing)    Strength (Manual Muscle Testing) other (see comments) (P)   Pt scored 3/5 for bilateral hip flex, knee flex, knee ext, and ankle DF MMTs  -[facility] Name 11/12/24 1150          Bed Mobility    Bed Mobility bed mobility (all) activities (P)   -     All Activities, [place] (Bed Mobility) maximum assist (25% patient effort) (P)   -     Assistive Device (Bed Mobility) head of bed elevated;lift device (P)   -       Row Name 11/12/24 1150          Transfers    Transfers other (see comments) (P)   Unable to perform d/t AMS  -       Row Name 11/12/24 1150          Safety Issues/Impairments Affecting Functional Mobility    Impairments Affecting Function (Mobility) balance;coordination;cognition;motor control;motor planning;pain;range of motion (ROM);shortness of breath;strength (P)   -     Cognitive Impairments, Mobility Safety/Performance attention;impulsivity (P)   -       Row Name 11/12/24 1150          Balance    Balance Assessment sitting dynamic balance (P)   -     Dynamic Sitting Balance moderate  assist;1-person assist (P)   -KL     Position, Sitting Balance sitting edge of bed (P)   -KL     Balance Interventions sitting (P)   -       Row Name 11/12/24 1150          Plan of Care Review    Plan of Care Reviewed With patient (P)   -KL     Progress no change (P)   -KL     Outcome Evaluation Pt presents to treatment with significant weakness and muscular endurance deficits in BLE. Was unable to transfer or ambulate but was able to sit EOB for 5 min. Very confused and unable to follow most commands. Pt would benefit from the use of skilled physical therapy to address BLE strength and muscular endurance deficits required for ambulation. (P)   -       Row Name 11/12/24 1150          Positioning and Restraints    Pre-Treatment Position in bed (P)   -KL     Post Treatment Position bed (P)   -KL     In Bed supine (P)   -       Row Name 11/12/24 1150          Therapy Assessment/Plan (PT)    Rehab Potential (PT) limited (P)   -KL     Criteria for Skilled Interventions Met (PT) yes (P)   -KL     Therapy Frequency (PT) daily (P)   -KL     Predicted Duration of Therapy Intervention (PT) 10 days (P)   -KL     Problem List (PT) problems related to;balance;coordination;cognition;mobility;range of motion (ROM);strength;pain (P)   -KL     Activity Limitations Related to Problem List (PT) unable to ambulate safely;unable to transfer safely (P)   -       Row Name 11/12/24 1150          Therapy Plan Review/Discharge Plan (PT)    Therapy Plan Review (PT) evaluation/treatment results reviewed (P)   -       Row Name 11/12/24 1150          Physical Therapy Goals    Bed Mobility Goal Selection (PT) bed mobility, PT goal 1 (P)   -KL     Transfer Goal Selection (PT) transfer, PT goal 1 (P)   -KL     Gait Training Goal Selection (PT) gait training, PT goal 1 (P)   -       Row Name 11/12/24 1150          Bed Mobility Goal 1 (PT)    Activity/Assistive Device (Bed Mobility Goal 1, PT) bed mobility activities, all (P)   -KL      [place] Level/Cues Needed (Bed Mobility Goal 1, PT) independent (P)   -KL     Time Frame (Bed Mobility Goal 1, PT) long term goal (LTG);10 days (P)   -       Row Name 11/12/24 1150          Transfer Goal 1 (PT)    Activity/Assistive Device (Transfer Goal 1, PT) transfers, all (P)   -KL     [place] Level/Cues Needed (Transfer Goal 1, PT) independent (P)   -KL     Time Frame (Transfer Goal 1, PT) long term goal (LTG);10 days (P)   -       Row Name 11/12/24 1150          Gait Training Goal 1 (PT)    Activity/Assistive Device (Gait Training Goal 1, PT) assistive device use (P)   -KL     [place] Level (Gait Training Goal 1, PT) contact guard required (P)   -KL     Distance (Gait Training Goal 1, PT) 50 ft (P)   -KL     Time Frame (Gait Training Goal 1, PT) long term goal (LTG);10 days (P)   -               User Key  (r) = Recorded By, (t) = Taken By, (c) = Cosigned By      Initials Name Provider Type    Samantha Luna, PT Student PT Student                    Physical Therapy Education       Title: PT OT SLP Therapies (Done)       Topic: Physical Therapy (Done)       Point: Mobility training (Done)       Learning Progress Summary            Patient Acceptance, E,TB, VU by  at 11/12/2024 1159                      Point: Home exercise program (Done)       Learning Progress Summary            Patient Acceptance, E,TB, VU by  at 11/12/2024 1159                      Point: Body mechanics (Done)       Learning Progress Summary            Patient Acceptance, E,TB, VU by  at 11/12/2024 1159                      Point: Precautions (Done)       Learning Progress Summary            Patient Acceptance, E,TB, VU by  at 11/12/2024 1159                                      User Key       Initials Effective Dates Name Provider Type [facility] 08/27/24 -  Samantha Patel PT Student PT Student PT                  PT Recommendation and Plan  Anticipated Discharge Disposition (PT): (P) sub acute care  setting  Planned Therapy Interventions (PT): (P) balance training, bed mobility training, gait training, home exercise program, neuromuscular re-education, patient/family education, postural re-education, ROM (range of motion), stair training, strengthening, stretching, transfer training  Therapy Frequency (PT): (P) daily  Plan of Care Reviewed With: (P) patient  Progress: (P) no change  Outcome Evaluation: (P) Pt presents to treatment with significant weakness and muscular endurance deficits in BLE. Was unable to transfer or ambulate but was able to sit EOB for 5 min. Very confused and unable to follow most commands. Pt would benefit from the use of skilled physical therapy to address BLE strength and muscular endurance deficits required for ambulation.   Outcome Measures       Row Name 11/12/24 1100             How much help from another person do you currently need...    Turning from your back to your side while in flat bed without using bedrails? 2 (P)   -KL      Moving from lying on back to sitting on the side of a flat bed without bedrails? 2 (P)   -KL      Moving to and from a bed to a chair (including a wheelchair)? 2 (P)   -KL      Standing up from a chair using your arms (e.g., wheelchair, bedside chair)? 1 (P)   -KL      Climbing 3-5 steps with a railing? 1 (P)   -KL      To walk in hospital room? 1 (P)   -KL      AM-PAC 6 Clicks Score (PT) 9 (P)   -KL         Functional Assessment    Outcome Measure Options AM-PAC 6 Clicks Basic Mobility (PT) (P)   -                User Key  (r) = Recorded By, (t) = Taken By, (c) = Cosigned By      Initials Name Provider Type    Samantha Luna, PT Student PT Student                     Time Calculation:    PT Charges       Row Name 11/12/24 2740             Time Calculation    PT Received On 11/12/24 (P)   -LEYDI      PT Goal Re-Cert Due Date 11/21/24 (P)   -LEYDI         Time Calculation- PT    Total Timed Code Minutes- PT 25 minute(s) (P)   -LEYDI         Untimed Charges     PT Eval/Re-eval Minutes 25 (P)   -KL         Total Minutes    Untimed Charges Total Minutes 25 (P)   -KL       Total Minutes 25 (P)   -KL                User Key  (r) = Recorded By, (t) = Taken By, (c) = Cosigned By      Initials Name Provider Type    Samantha Luna PT Student PT Student                  Therapy Charges for Today       Code Description Service Date Service Provider Modifiers Qty    84112448840 HC PT EVAL LOW COMPLEXITY 2 11/12/2024 Samantha Patel PT Student GP 1            PT G-Codes  Outcome Measure Options: (P) AM-PAC 6 Clicks Basic Mobility (PT)  AM-PAC 6 Clicks Score (PT): (P) 9    BROOKE Chirinos  11/12/2024

## 2024-11-12 NOTE — NURSING NOTE
Purpose of the visit was to evaluate for: goals of care/advanced care planning and support for patient/family. Spoke with RN, family, and POA and discussed goals of care, resuscitation status, and clarify code status.      Assessment:  RN, CCM, Palliative Care spoke with daughter/POA, Marjan, over the phone to complete consult.  Medical history of advanced dementia, hypertension, COPD, depression, CKD, anxiety, frailty, chronic anemia, and GERD presented to the ED from a nursing home and respiratory distress. Daughter reports patient is a DRN/DNI.  Reports POA paperwork was given to ED upon arrival.  Palliative Care informed daughter I would check for paperwork in patients file.  Daughter reports patient should not have been transferred to [facility] due to EMS not receiving paperwork that informed patient was a DNR/DNI.  Plans are for patients return to [facility] at discharge.  Daughter will provide transportation at discharge if patient does not qualify for EMS transport.  Will continue to monitor.    Recommendations/Plan: LTAC referral made by .    Tasks Completed: Code Status clarification and Emotional Support.    Yamilet TOTH RN, CCM, Palliative Care

## 2024-11-12 NOTE — PLAN OF CARE
Goal Outcome Evaluation:   Patient refused to wear Bipap tonight. She remained on 3L nasal cannula and had no respiratory distress through the night.

## 2024-11-12 NOTE — PROGRESS NOTES
[facility]     Progress Note    Patient Name: Amada Bentley  : 1945  MRN: [id number]  Primary Care Physician:  Jennifer Estes MD  Date of admission: 2024    Subjective   Subjective     Chief Complaint: For consult sepsis    Over the last 24 hours, transferred out of ICU.  Remains on azithromycin.  Remains on Brovana, Pulmicort, DuoNebs.  Remains on prednisone 40 mg oral daily    No acute events overnight    This morning,  On 3 L nasal cannula  Resting in bed  No fever or chills  Dyspnea improving  Cough improving  Denies any chest pain or chest tightness  Micro negative to date    Objective   Objective     Vitals:   Temp:  [97.2 °F (36.2 °C)-98.8 °F (37.1 °C)] 97.9 °F (36.6 °C)  Heart Rate:  [74-97] 87  Resp:  [18-20] 18  BP: ()/(60-76) 120/70  Flow (L/min) (Oxygen Therapy):  [2-3] 3    Physical Exam   Vital Signs Reviewed   General: WDWN, Alert, NAD.  Chronically ill-appearing female, lying in bed  HEENT:  PERRL, EOMI.  OP, nares clear, no sinus tenderness  Neck:  Supple, no JVD, no thyromegaly  Chest:  good aeration, scattered rhonchi with diminished breath sounds bilaterally, no increased work of breathing on 3 L while at rest  CV: RRR, no MGR, pulses 2+, equal.  Abd:  Soft, NT, ND, + BS, no HSM  EXT:  no clubbing, no cyanosis, no edema  Neuro:  A&Ox3, CN grossly intact, no focal deficits.  Skin: No rashes or lesions noted      Result Review    Result Review:  I have personally reviewed the results from the time of this admission to 2024 07:33 EST and agree with these findings:  [x]  Laboratory list / accordion  [x]  Microbiology  [x]  Radiology  [x]  EKG/Telemetry   [x]  Cardiology/Vascular   []  Pathology  []  Old records  []  Other:        Assessment & Plan   Assessment / Plan     Brief Patient Summary:  Amada Bentley is a 79 y.o. female who in the intensive care unit with sepsis    Active Hospital Problems:  Active Hospital Problems    Diagnosis     **Acute on chronic  respiratory failure with hypoxia     Pneumonia     Nicotine dependence, unspecified, uncomplicated     Essential (primary) hypertension     COPD exacerbation     High blood pressure      Severe sepsis  Concern for bibasilar pneumonia  COPD with acute exacerbation  Bronchiectasis with acute exacerbation  Dementia    Plan:   Continue to wean supplemental oxygen maintain SpO2 greater than 90%  Continue azithromycin for pneumonia for total of 4 days  Continue Brovana, Pulmicort, DuoNebs  Continue prednisone 40 mg oral daily  Bronchopulmonary bronchodilator protocols.  Encourage activity and incentive spirometer use  Chest physiotherapy available  Continue home metoprolol  Continue home Synthroid  Palliative care on board.  Appreciate assistance    VTE Prophylaxis:  Pharmacologic VTE prophylaxis orders are present.    CODE STATUS:    Medical Intervention Limits: No intubation (DNI)  Level Of Support Discussed With: Health Care Surrogate; Next of Kin (If No Surrogate)  Code Status (Patient has no pulse and is not breathing): No CPR (Do Not Attempt to Resuscitate)  Medical Interventions (Patient has pulse or is breathing): Limited Support    Labs reviewed, imaging reviewed, case discussed with hospitalist and nursing regarding plan of care  Telemetry reviewed showing no evidence of ventricular arrhythmias    Electronically signed by ALBERTO Davenport, 11/12/24, 3:14 PM EST.  Electronically signed by Nhan Blank MD, 11/12/24, 7:34 AM EST.    This visit was performed by BOTH a physician and an APC. I personally evaluated and examined the patient. I performed all aspects of MDM as documented. , I have reviewed and confirmed the accuracy of the patient's history as documented in this note., and I have reexamined the patient and the results are consistent with the previously documented exam. I have updated the documentation as necessary.     Electronically signed by Nhan Blank MD, 11/12/24, 4:24 PM EST.

## 2024-11-12 NOTE — PROGRESS NOTES
[facility]   Hospitalist Progress Note  Date: 2024  Patient Name: Amada Bentley  : 1945  MRN: [id number]  Date of admission: 2024  Room/Bed: 262/1      Subjective   Subjective     Chief Complaint: Respiratory distress     Summary:Amada Bentley is a 79 y.o. female with advanced dementia, COPD, depression, CKD and other medical problems presented from a nursing home with respiratory distress.  She had apparently been having worsening shortness of breath at the nursing facility along with altered mental status, lethargy, weakness.  Brought to the ED.  CODE STATUS was confirmed as DNR/DNI.  She was admitted to the ICU and placed on NIPPV.  COVID and flu and RSV were negative.  Chest x-ray showed left lower lobe pneumonia.  She was admitted to the medicine service, seen by critical care in consultation.  Started on antibiotics. Moved out of ICU on 2024.    Interval Followup:   Patient states that she is feeling better today.  Patient denies any shortness of breath or coughing  Patient remains on 2 L of oxygen  Patient is afebrile  Blood pressure is well-controlled        Objective   Objective     Vitals:   Temp:  [97.2 °F (36.2 °C)-98.8 °F (37.1 °C)] 97.7 °F (36.5 °C)  Heart Rate:  [65-97] 79  Resp:  [16-20] 18  BP: ()/(60-70) 116/69  Flow (L/min) (Oxygen Therapy):  [2-3] 2    Physical Exam   General: Awake, alert, nad.  Patient is resting in bed eating lunch  HENT: NCAT, MMM  Eyes: pupils equal, no scleral icterus  Cardiovascular: RRR, no murmurs   Pulmonary: Bilateral breath sounds.  No wheezing  Gastrointestinal: S/ND/NT, +BS  Musculoskeletal: No gross deformities  Skin: No jaundice, no rash on exposed skin appreciated      Result Review    Result Review:  I have personally reviewed these results:  [x]  Laboratory      Lab 24  0419 24  0309 11/10/24  0817 11/10/24  0210 24  1537 24  0930 24  0357 24  0102 24  1804 24  1756   WBC 8.98  7.71  --  11.65*  --   --   --  10.77  --  13.93*   HEMOGLOBIN 9.7* 9.3*  --  8.2*  --   --   --  10.1*  --  11.1*   HEMATOCRIT 31.1* 29.5*  --  25.5*  --   --   --  32.1*  --  35.2   PLATELETS 419 415  --  358  --   --   --  462*  --  583*   NEUTROS ABS 5.90  --   --  10.43*  --   --   --   --   --  11.32*   IMMATURE GRANS (ABS) 0.04  --   --  0.07*  --   --   --   --   --  0.07*   LYMPHS ABS 2.33  --   --  0.71  --   --   --   --   --  1.64   MONOS ABS 0.70  --   --  0.40  --   --   --   --   --  0.78   EOS ABS 0.00  --   --  0.00  --   --   --   --   --  0.03   .0* 99.7*  --  100.0*  --   --   --  102.6*  --  102.0*   PROCALCITONIN  --   --   --   --   --   --   --  7.58*  --   --    LACTATE  --   --  1.7  --  3.9* 5.5*   < > 6.7*   < >  --     < > = values in this interval not displayed.         Lab 11/12/24  0419 11/11/24  0309 11/10/24  0210 11/09/24  0102   SODIUM 140 138 139 136   POTASSIUM 3.9 3.1* 4.1 3.4*   CHLORIDE 105 102 107 97*   CO2 24.7 22.6 22.5 18.8*   ANION GAP 10.3 13.4 9.5 20.2*   BUN 31* 30* 22 18   CREATININE 1.30* 1.24* 0.92 1.16*   EGFR 41.4* 43.8* 62.7 47.5*   GLUCOSE 106* 116* 116* 179*   CALCIUM 8.9 8.9 8.5* 9.3   MAGNESIUM 2.3 2.2 2.2 2.1   PHOSPHORUS 2.8 3.5 4.0 4.7*   TSH  --   --   --  1.300         Lab 11/12/24  0419 11/10/24  0210 11/08/24  1850   TOTAL PROTEIN  --   --  7.3   ALBUMIN 3.5 3.0* 3.9   GLOBULIN  --   --  3.4   ALT (SGPT)  --   --  <5   AST (SGOT)  --   --  5   BILIRUBIN  --   --  0.3   ALK PHOS  --   --  58         Lab 11/08/24  1850 11/08/24  1756   PROBNP  --  3,098.0*   HSTROP T 33*  --                  Lab 11/09/24  0626 11/08/24  1804   PH, ARTERIAL 7.425 7.460*   PCO2, ARTERIAL 32.8* 28.9*   PO2 .4* 59.1*   O2 SATURATION ART 98.7 92.1*   FIO2 30  --    HCO3 ART 21.5* 20.5*   BASE EXCESS ART -2.4* -2.3*     Brief Urine Lab Results  (Last result in the past 365 days)        Color   Clarity   Blood   Leuk Est   Nitrite   Protein   CREAT   Urine HCG         11/09/24 0004 Yellow   Clear   Negative   Negative   Negative   30 mg/dL (1+)                 [x]  Microbiology   Microbiology Results (last 10 days)       Procedure Component Value - Date/Time    Respiratory Panel PCR w/COVID-19(SARS-CoV-2) FE/BERTRAM/LOI/PAD/COR/TRINIDAD In-House, NP Swab in UTM/VTM, 2 HR TAT - Swab, Nasopharynx [[id number]]  (Normal) Collected: 11/09/24 1113    Lab Status: Final result Specimen: Swab from Nasopharynx Updated: 11/09/24 1216     ADENOVIRUS, PCR Not Detected     Coronavirus 229E Not Detected     Coronavirus HKU1 Not Detected     Coronavirus NL63 Not Detected     Coronavirus OC43 Not Detected     COVID19 Not Detected     Human Metapneumovirus Not Detected     Human Rhinovirus/Enterovirus Not Detected     Influenza A PCR Not Detected     Influenza B PCR Not Detected     Parainfluenza Virus 1 Not Detected     Parainfluenza Virus 2 Not Detected     Parainfluenza Virus 3 Not Detected     Parainfluenza Virus 4 Not Detected     RSV, PCR Not Detected     Bordetella pertussis pcr Not Detected     Bordetella parapertussis PCR Not Detected     Chlamydophila pneumoniae PCR Not Detected     Mycoplasma pneumo by PCR Not Detected    Narrative:      In the setting of a positive respiratory panel with a viral infection PLUS a negative procalcitonin without other underlying concern for bacterial infection, consider observing off antibiotics or discontinuation of antibiotics and continue supportive care. If the respiratory panel is positive for atypical bacterial infection (Bordetella pertussis, Chlamydophila pneumoniae, or Mycoplasma pneumoniae), consider antibiotic de-escalation to target atypical bacterial infection.    MRSA Screen, PCR (Inpatient) - Swab, Nares [[id number]]  (Normal) Collected: 11/09/24 0004    Lab Status: Final result Specimen: Swab from Nares Updated: 11/09/24 0311     MRSA PCR No MRSA Detected    Narrative:      The negative predictive value of this diagnostic test is high and should  only be used to consider de-escalating anti-MRSA therapy. A positive result may indicate colonization with MRSA and must be correlated clinically.    Legionella Antigen, Urine - Urine, Urine, Clean Catch [[id number]]  (Normal) Collected: 11/09/24 0004    Lab Status: Final result Specimen: Urine, Clean Catch Updated: 11/09/24 0816     LEGIONELLA ANTIGEN, URINE Negative    S. Pneumo Ag Urine or CSF - Urine, Urine, Clean Catch [[id number]]  (Normal) Collected: 11/09/24 0004    Lab Status: Final result Specimen: Urine, Clean Catch Updated: 11/09/24 0816     Strep Pneumo Ag Negative    COVID-19, FLU A/B, RSV PCR 1 HR TAT - Swab, Nasopharynx [[id number]]  (Normal) Collected: 11/08/24 1843    Lab Status: Final result Specimen: Swab from Nasopharynx Updated: 11/08/24 2027     COVID19 Not Detected     Influenza A PCR Not Detected     Influenza B PCR Not Detected     RSV, PCR Not Detected    Narrative:      Fact sheet for providers: https://www.fda.gov/media/337927/download    Fact sheet for patients: https://www.fda.gov/media/400787/download    Test performed by PCR.    Blood Culture - Blood, Arm, Left [[id number]]  (Normal) Collected: 11/08/24 1756    Lab Status: Preliminary result Specimen: Blood from Arm, Left Updated: 11/11/24 1815     Blood Culture No growth at 3 days    Narrative:      Less than seven (7) mL's of blood was collected.  Insufficient quantity may yield false negative results.    Blood Culture - Blood, Arm, Right [[id number]]  (Normal) Collected: 11/08/24 1756    Lab Status: Preliminary result Specimen: Blood from Arm, Right Updated: 11/11/24 1815     Blood Culture No growth at 3 days    Narrative:      Less than seven (7) mL's of blood was collected.  Insufficient quantity may yield false negative results.          [x]  Radiology  XR Chest 1 View    Result Date: 11/10/2024  Impression: 1. Persistent left basilar airspace disease compatible with pneumonia 2. COPD 3. Possible trace pleural effusions  Electronically Signed: Darrick Spicer  11/10/2024 4:34 PM EST  Workstation ID: [id number]    CT Angiogram Abdomen Pelvis    Result Date: 11/10/2024  1. Extensive atherosclerotic disease as described in detail above. No aortic aneurysm or dissection. No evidence of mesenteric ischemia. 2. Evidence of small bowel ileus without obstruction. 3. Diminished enhancement in the lower pole of the right kidney with some associated cortical thinning likely reflects a chronic infarct. Pyelonephritis should be excluded clinically. 4. Occluded left common iliac artery with reconstitution of the internal and external iliac arteries. 5. Unusual appearance in the upper medial spleen that could reflect a mass versus unusual enhancement. Initial evaluation with ultrasound is recommended to exclude a mass. 6. Additional findings as above. Electronically Signed: Ronny Davis MD  11/10/2024 12:43 AM EST  Workstation ID: [id number]    XR Chest 1 View    Result Date: 11/8/2024  Impression: Mild left basilar atelectasis versus early pneumonia. Electronically Signed: Maury Kerr MD  11/8/2024 6:38 PM EST  Workstation ID: [id number]   []  EKG/Telemetry   []  Cardiology/Vascular   []  Pathology  []  Old records  []  Other:    Assessment & Plan   Assessment / Plan     Assessment:  Acute on chronic hypoxic respiratory failure requiring NIPPV  Severe sepsis, likely from pneumonia  Pneumonia  COPD with acute exacerbation  CKD  Advanced dementia  Anemia  Lactic acidosis    Plan:  Admitted to medicine service t  NIPPV, transition to cannula  Brovana, Pulmicort, albuterol  Continue antibiotics with cefepime and Zithromax  Can transition over to oral steroids  Resume home medication  Repeat labs in am      Discussed with RN.    VTE Prophylaxis:  Pharmacologic VTE prophylaxis orders are present.        CODE STATUS:   Medical Intervention Limits: No intubation (DNI)  Level Of Support Discussed With: Health Care Surrogate; Next of Kin (If No  Surrogate)  Code Status (Patient has no pulse and is not breathing): No CPR (Do Not Attempt to Resuscitate)  Medical Interventions (Patient has pulse or is breathing): Limited Support      Electronically signed by John Hernandez DO, 11/12/2024, 11:36 EST.

## 2024-11-12 NOTE — PLAN OF CARE
Goal Outcome Evaluation:  Plan of Care Reviewed With: (P) patient        Progress: (P) no change  Outcome Evaluation: (P) Pt presents to treatment with significant weakness and muscular endurance deficits in BLE. Was unable to transfer or ambulate but was able to sit EOB for 5 min. Very confused and unable to follow most commands. Pt would benefit from the use of skilled physical therapy to address BLE strength and muscular endurance deficits required for ambulation.    Anticipated Discharge Disposition (PT): (P) sub acute care setting

## 2024-11-12 NOTE — PLAN OF CARE
Goal Outcome Evaluation:  Plan of Care Reviewed With: patient        Progress: no change  Outcome Evaluation: patient did not wear bipap last night. She is on 2l nasal cannula, no signs of distress.

## 2024-11-12 NOTE — CONSULTS
Attempted call to daughter/POA.  Message left to return call when message received.    Yamilet TOTH RN, CCM  Palliative Care

## 2024-11-13 LAB
ANION GAP SERPL CALCULATED.3IONS-SCNC: 11 MMOL/L (ref 5–15)
BACTERIA SPEC AEROBE CULT: NORMAL
BACTERIA SPEC AEROBE CULT: NORMAL
BUN SERPL-MCNC: 30 MG/DL (ref 8–23)
BUN/CREAT SERPL: 27.5 (ref 7–25)
CALCIUM SPEC-SCNC: 9 MG/DL (ref 8.6–10.5)
CHLORIDE SERPL-SCNC: 107 MMOL/L (ref 98–107)
CO2 SERPL-SCNC: 21 MMOL/L (ref 22–29)
CREAT SERPL-MCNC: 1.09 MG/DL (ref 0.57–1)
DEPRECATED RDW RBC AUTO: 59 FL (ref 37–54)
EGFRCR SERPLBLD CKD-EPI 2021: 51.1 ML/MIN/1.73
ERYTHROCYTE [DISTWIDTH] IN BLOOD BY AUTOMATED COUNT: 16.2 % (ref 12.3–15.4)
GLUCOSE SERPL-MCNC: 120 MG/DL (ref 65–99)
HCT VFR BLD AUTO: 33.1 % (ref 34–46.6)
HGB BLD-MCNC: 10.5 G/DL (ref 12–15.9)
MCH RBC QN AUTO: 31.6 PG (ref 26.6–33)
MCHC RBC AUTO-ENTMCNC: 31.7 G/DL (ref 31.5–35.7)
MCV RBC AUTO: 99.7 FL (ref 79–97)
PLATELET # BLD AUTO: 411 10*3/MM3 (ref 140–450)
PMV BLD AUTO: 10.6 FL (ref 6–12)
POTASSIUM SERPL-SCNC: 3.6 MMOL/L (ref 3.5–5.2)
PROCALCITONIN SERPL-MCNC: 1.41 NG/ML (ref 0–0.25)
RBC # BLD AUTO: 3.32 10*6/MM3 (ref 3.77–5.28)
SODIUM SERPL-SCNC: 139 MMOL/L (ref 136–145)
WBC NRBC COR # BLD AUTO: 5.71 10*3/MM3 (ref 3.4–10.8)

## 2024-11-13 PROCEDURE — 94799 UNLISTED PULMONARY SVC/PX: CPT

## 2024-11-13 PROCEDURE — 99233 SBSQ HOSP IP/OBS HIGH 50: CPT | Performed by: INTERNAL MEDICINE

## 2024-11-13 PROCEDURE — 80048 BASIC METABOLIC PNL TOTAL CA: CPT | Performed by: FAMILY MEDICINE

## 2024-11-13 PROCEDURE — 94669 MECHANICAL CHEST WALL OSCILL: CPT

## 2024-11-13 PROCEDURE — 25010000002 HEPARIN (PORCINE) PER 1000 UNITS: Performed by: FAMILY MEDICINE

## 2024-11-13 PROCEDURE — 25010000002 HYDRALAZINE PER 20 MG: Performed by: STUDENT IN AN ORGANIZED HEALTH CARE EDUCATION/TRAINING PROGRAM

## 2024-11-13 PROCEDURE — 85027 COMPLETE CBC AUTOMATED: CPT | Performed by: FAMILY MEDICINE

## 2024-11-13 PROCEDURE — 25010000002 AMPICILLIN-SULBACTAM PER 1.5 G: Performed by: NURSE PRACTITIONER

## 2024-11-13 PROCEDURE — 84145 PROCALCITONIN (PCT): CPT | Performed by: NURSE PRACTITIONER

## 2024-11-13 PROCEDURE — 63710000001 PREDNISONE PER 1 MG: Performed by: INTERNAL MEDICINE

## 2024-11-13 PROCEDURE — 99232 SBSQ HOSP IP/OBS MODERATE 35: CPT | Performed by: INTERNAL MEDICINE

## 2024-11-13 PROCEDURE — 94664 DEMO&/EVAL PT USE INHALER: CPT

## 2024-11-13 RX ORDER — HYDRALAZINE HYDROCHLORIDE 20 MG/ML
10 INJECTION INTRAMUSCULAR; INTRAVENOUS EVERY 6 HOURS PRN
Status: DISCONTINUED | OUTPATIENT
Start: 2024-11-13 | End: 2024-11-15 | Stop reason: HOSPADM

## 2024-11-13 RX ADMIN — MONTELUKAST 10 MG: 10 TABLET, FILM COATED ORAL at 20:09

## 2024-11-13 RX ADMIN — AMPICILLIN AND SULBACTAM 3 G: 2; 1 INJECTION, POWDER, FOR SOLUTION INTRAVENOUS at 12:41

## 2024-11-13 RX ADMIN — FAMOTIDINE 10 MG: 20 TABLET ORAL at 08:36

## 2024-11-13 RX ADMIN — LEVOTHYROXINE SODIUM 75 MCG: 75 TABLET ORAL at 06:14

## 2024-11-13 RX ADMIN — HYDRALAZINE HYDROCHLORIDE 10 MG: 20 INJECTION INTRAMUSCULAR; INTRAVENOUS at 00:07

## 2024-11-13 RX ADMIN — BUDESONIDE 0.5 MG: 0.5 SUSPENSION RESPIRATORY (INHALATION) at 07:20

## 2024-11-13 RX ADMIN — MUPIROCIN 1 APPLICATION: 20 OINTMENT TOPICAL at 08:37

## 2024-11-13 RX ADMIN — BUDESONIDE 0.5 MG: 0.5 SUSPENSION RESPIRATORY (INHALATION) at 18:53

## 2024-11-13 RX ADMIN — IPRATROPIUM BROMIDE AND ALBUTEROL SULFATE 3 ML: .5; 3 SOLUTION RESPIRATORY (INHALATION) at 00:41

## 2024-11-13 RX ADMIN — Medication 250 MG: at 20:09

## 2024-11-13 RX ADMIN — METOPROLOL SUCCINATE 50 MG: 50 TABLET, EXTENDED RELEASE ORAL at 08:36

## 2024-11-13 RX ADMIN — IPRATROPIUM BROMIDE AND ALBUTEROL SULFATE 3 ML: .5; 3 SOLUTION RESPIRATORY (INHALATION) at 18:53

## 2024-11-13 RX ADMIN — METOPROLOL SUCCINATE 50 MG: 50 TABLET, EXTENDED RELEASE ORAL at 20:10

## 2024-11-13 RX ADMIN — PANTOPRAZOLE SODIUM 40 MG: 40 TABLET, DELAYED RELEASE ORAL at 06:14

## 2024-11-13 RX ADMIN — ARFORMOTEROL TARTRATE 15 MCG: 15 SOLUTION RESPIRATORY (INHALATION) at 07:20

## 2024-11-13 RX ADMIN — Medication 10 ML: at 20:10

## 2024-11-13 RX ADMIN — HEPARIN SODIUM 5000 UNITS: 5000 INJECTION INTRAVENOUS; SUBCUTANEOUS at 08:50

## 2024-11-13 RX ADMIN — AZITHROMYCIN DIHYDRATE 250 MG: 250 TABLET ORAL at 08:36

## 2024-11-13 RX ADMIN — DIVALPROEX SODIUM 375 MG: 125 CAPSULE, COATED PELLETS ORAL at 20:09

## 2024-11-13 RX ADMIN — HEPARIN SODIUM 5000 UNITS: 5000 INJECTION INTRAVENOUS; SUBCUTANEOUS at 20:09

## 2024-11-13 RX ADMIN — MEGESTROL ACETATE 200 MG: 400 SUSPENSION ORAL at 08:37

## 2024-11-13 RX ADMIN — Medication 10 ML: at 08:37

## 2024-11-13 RX ADMIN — IPRATROPIUM BROMIDE AND ALBUTEROL SULFATE 3 ML: .5; 3 SOLUTION RESPIRATORY (INHALATION) at 23:39

## 2024-11-13 RX ADMIN — IPRATROPIUM BROMIDE AND ALBUTEROL SULFATE 3 ML: .5; 3 SOLUTION RESPIRATORY (INHALATION) at 07:20

## 2024-11-13 RX ADMIN — DIVALPROEX SODIUM 375 MG: 125 CAPSULE, COATED PELLETS ORAL at 08:38

## 2024-11-13 RX ADMIN — IPRATROPIUM BROMIDE AND ALBUTEROL SULFATE 3 ML: .5; 3 SOLUTION RESPIRATORY (INHALATION) at 12:41

## 2024-11-13 RX ADMIN — Medication 250 MG: at 08:36

## 2024-11-13 RX ADMIN — PREDNISONE 40 MG: 20 TABLET ORAL at 08:36

## 2024-11-13 RX ADMIN — DIVALPROEX SODIUM 375 MG: 125 CAPSULE, COATED PELLETS ORAL at 15:07

## 2024-11-13 RX ADMIN — GUAIFENESIN 600 MG: 600 TABLET ORAL at 20:09

## 2024-11-13 RX ADMIN — ARFORMOTEROL TARTRATE 15 MCG: 15 SOLUTION RESPIRATORY (INHALATION) at 18:53

## 2024-11-13 RX ADMIN — CETIRIZINE HYDROCHLORIDE 10 MG: 10 TABLET, FILM COATED ORAL at 08:36

## 2024-11-13 RX ADMIN — GUAIFENESIN 600 MG: 600 TABLET ORAL at 08:36

## 2024-11-13 RX ADMIN — MIRTAZAPINE 15 MG: 15 TABLET, FILM COATED ORAL at 20:09

## 2024-11-13 NOTE — PLAN OF CARE
Problem: Adult Inpatient Plan of Care  Goal: Plan of Care Review  Outcome: Progressing  Flowsheets (Taken 11/13/2024 0543)  Progress: no change  Outcome Evaluation: Patient alert to self and place, sitter at bedside, VSS, 1-2 LPM via NC prn, refusing BiPAP, no s/s of distress, call light within reach, care ongoing  Plan of Care Reviewed With: patient   Goal Outcome Evaluation:  Plan of Care Reviewed With: patient        Progress: no change  Outcome Evaluation: Patient alert to self and place, sitter at bedside, VSS, 1-2 LPM via NC prn, refusing BiPAP, no s/s of distress, call light within reach, care ongoing

## 2024-11-13 NOTE — NURSING NOTE
Patient confused and trying get out of bed multiple times. Patient is being reoriented but still trying to get out of bed. Dr. Hernandez notified and sitter ordered per MD.

## 2024-11-13 NOTE — PROGRESS NOTES
RT EQUIPMENT DEVICE RELATED - SKIN ASSESSMENT    RT Medical Equipment/Device:     Nasal Cannula    Skin Assessment:      Cheek:  Intact  Nares:  Intact  Neck:  Intact    Device Skin Pressure Protection:  Positioning supports utilized    Nurse Notification:  Leticia Verma, RRT

## 2024-11-13 NOTE — SIGNIFICANT NOTE
11/13/24 1100   Physical Therapy Time and Intention   Comment, Session Not Performed Unable to arouse

## 2024-11-13 NOTE — PLAN OF CARE
Goal Outcome Evaluation:   Patient was sleeping soundly and since she has not been wearing the bipap I did not want to wake her.

## 2024-11-13 NOTE — PLAN OF CARE
Goal Outcome Evaluation:  Plan of Care Reviewed With: patient      Patient has some concerns for aspiration, MD notified and speech therapy consulted. IV antibiotics x1 given. Patient's safety watch d/c, patient reoriented when needed. No other complaints from patient during shift.

## 2024-11-13 NOTE — PLAN OF CARE
Goal Outcome Evaluation:  Plan of Care Reviewed With: patient        Progress: no change  Outcome Evaluation: Patient is currently on a 1lpm nasal cannula and doing well.  Patient did not wear BIPAP last night.  Encouraged patient to continue to wear BIPAP as needed and tolerated.

## 2024-11-13 NOTE — PROGRESS NOTES
[facility]   Hospitalist Progress Note  Date: 2024  Patient Name: Amada Bentley  : 1945  MRN: [id number]  Date of admission: 2024  Room/Bed: 262/1      Subjective   Subjective     Chief Complaint: Respiratory distress     Summary:Amada Bentley is a 79 y.o. female with advanced dementia, COPD, depression, CKD and other medical problems presented from a nursing home with respiratory distress.  She had apparently been having worsening shortness of breath at the nursing facility along with altered mental status, lethargy, weakness.  Brought to the ED.  CODE STATUS was confirmed as DNR/DNI.  She was admitted to the ICU and placed on NIPPV.  COVID and flu and RSV were negative.  Chest x-ray showed left lower lobe pneumonia.  She was admitted to the medicine service, seen by critical care in consultation.  Started on antibiotics. Moved out of ICU on 2024.    Interval Followup:   Patient states that she is feeling better today.  Patient denies any shortness of breath or coughing  Patient remains on 1 L of oxygen  Patient is afebrile  Blood pressure is well-controlled        Objective   Objective     Vitals:   Temp:  [97.2 °F (36.2 °C)-98.6 °F (37 °C)] 98.1 °F (36.7 °C)  Heart Rate:  [69-91] 76  Resp:  [18] 18  BP: (101-188)/(48-95) 101/48  Flow (L/min) (Oxygen Therapy):  [1] 1    Physical Exam   General: Awake, alert, nad.  Patient is resting in bed eating lunch  HENT: NCAT, MMM  Eyes: pupils equal, no scleral icterus  Cardiovascular: RRR, no murmurs   Pulmonary: Bilateral breath sounds.  No wheezing  Gastrointestinal: S/ND/NT, +BS  Musculoskeletal: No gross deformities  Skin: No jaundice, no rash on exposed skin appreciated      Result Review    Result Review:  I have personally reviewed these results:  [x]  Laboratory      Lab 24  0417 24  0419 24  0309 11/10/24  0817 11/10/24  0210 24  1537 24  0930 24  0357 24  0102 24  1804 24  1756    WBC 5.71 8.98 7.71  --  11.65*  --   --   --  10.77  --  13.93*   HEMOGLOBIN 10.5* 9.7* 9.3*  --  8.2*  --   --   --  10.1*  --  11.1*   HEMATOCRIT 33.1* 31.1* 29.5*  --  25.5*  --   --   --  32.1*  --  35.2   PLATELETS 411 419 415  --  358  --   --   --  462*  --  583*   NEUTROS ABS  --  5.90  --   --  10.43*  --   --   --   --   --  11.32*   IMMATURE GRANS (ABS)  --  0.04  --   --  0.07*  --   --   --   --   --  0.07*   LYMPHS ABS  --  2.33  --   --  0.71  --   --   --   --   --  1.64   MONOS ABS  --  0.70  --   --  0.40  --   --   --   --   --  0.78   EOS ABS  --  0.00  --   --  0.00  --   --   --   --   --  0.03   MCV 99.7* 101.0* 99.7*  --  100.0*  --   --   --  102.6*  --  102.0*   PROCALCITONIN 1.41*  --   --   --   --   --   --   --  7.58*  --   --    LACTATE  --   --   --  1.7  --  3.9* 5.5*   < > 6.7*   < >  --     < > = values in this interval not displayed.         Lab 11/13/24  0417 11/12/24  0419 11/11/24  0309 11/10/24  0210 11/09/24  0102   SODIUM 139 140 138 139 136   POTASSIUM 3.6 3.9 3.1* 4.1 3.4*   CHLORIDE 107 105 102 107 97*   CO2 21.0* 24.7 22.6 22.5 18.8*   ANION GAP 11.0 10.3 13.4 9.5 20.2*   BUN 30* 31* 30* 22 18   CREATININE 1.09* 1.30* 1.24* 0.92 1.16*   EGFR 51.1* 41.4* 43.8* 62.7 47.5*   GLUCOSE 120* 106* 116* 116* 179*   CALCIUM 9.0 8.9 8.9 8.5* 9.3   MAGNESIUM  --  2.3 2.2 2.2 2.1   PHOSPHORUS  --  2.8 3.5 4.0 4.7*   TSH  --   --   --   --  1.300         Lab 11/12/24  0419 11/10/24  0210 11/08/24  1850   TOTAL PROTEIN  --   --  7.3   ALBUMIN 3.5 3.0* 3.9   GLOBULIN  --   --  3.4   ALT (SGPT)  --   --  <5   AST (SGOT)  --   --  5   BILIRUBIN  --   --  0.3   ALK PHOS  --   --  58         Lab 11/08/24  1850 11/08/24  1756   PROBNP  --  3,098.0*   HSTROP T 33*  --                  Lab 11/09/24  0626 11/08/24  1804   PH, ARTERIAL 7.425 7.460*   PCO2, ARTERIAL 32.8* 28.9*   PO2 .4* 59.1*   O2 SATURATION ART 98.7 92.1*   FIO2 30  --    HCO3 ART 21.5* 20.5*   BASE EXCESS ART -2.4*  -2.3*     Brief Urine Lab Results  (Last result in the past 365 days)        Color   Clarity   Blood   Leuk Est   Nitrite   Protein   CREAT   Urine HCG        11/09/24 0004 Yellow   Clear   Negative   Negative   Negative   30 mg/dL (1+)                 [x]  Microbiology   Microbiology Results (last 10 days)       Procedure Component Value - Date/Time    Respiratory Panel PCR w/COVID-19(SARS-CoV-2) FE/BERTRAM/LOI/PAD/COR/TRINIDAD In-House, NP Swab in UTM/VTM, 2 HR TAT - Swab, Nasopharynx [[id number]]  (Normal) Collected: 11/09/24 1113    Lab Status: Final result Specimen: Swab from Nasopharynx Updated: 11/09/24 1216     ADENOVIRUS, PCR Not Detected     Coronavirus 229E Not Detected     Coronavirus HKU1 Not Detected     Coronavirus NL63 Not Detected     Coronavirus OC43 Not Detected     COVID19 Not Detected     Human Metapneumovirus Not Detected     Human Rhinovirus/Enterovirus Not Detected     Influenza A PCR Not Detected     Influenza B PCR Not Detected     Parainfluenza Virus 1 Not Detected     Parainfluenza Virus 2 Not Detected     Parainfluenza Virus 3 Not Detected     Parainfluenza Virus 4 Not Detected     RSV, PCR Not Detected     Bordetella pertussis pcr Not Detected     Bordetella parapertussis PCR Not Detected     Chlamydophila pneumoniae PCR Not Detected     Mycoplasma pneumo by PCR Not Detected    Narrative:      In the setting of a positive respiratory panel with a viral infection PLUS a negative procalcitonin without other underlying concern for bacterial infection, consider observing off antibiotics or discontinuation of antibiotics and continue supportive care. If the respiratory panel is positive for atypical bacterial infection (Bordetella pertussis, Chlamydophila pneumoniae, or Mycoplasma pneumoniae), consider antibiotic de-escalation to target atypical bacterial infection.    MRSA Screen, PCR (Inpatient) - Swab, Nares [[id number]]  (Normal) Collected: 11/09/24 0004    Lab Status: Final result Specimen: Swab  from Nares Updated: 11/09/24 0311     MRSA PCR No MRSA Detected    Narrative:      The negative predictive value of this diagnostic test is high and should only be used to consider de-escalating anti-MRSA therapy. A positive result may indicate colonization with MRSA and must be correlated clinically.    Legionella Antigen, Urine - Urine, Urine, Clean Catch [[id number]]  (Normal) Collected: 11/09/24 0004    Lab Status: Final result Specimen: Urine, Clean Catch Updated: 11/09/24 0816     LEGIONELLA ANTIGEN, URINE Negative    S. Pneumo Ag Urine or CSF - Urine, Urine, Clean Catch [[id number]]  (Normal) Collected: 11/09/24 0004    Lab Status: Final result Specimen: Urine, Clean Catch Updated: 11/09/24 0816     Strep Pneumo Ag Negative    COVID-19, FLU A/B, RSV PCR 1 HR TAT - Swab, Nasopharynx [[id number]]  (Normal) Collected: 11/08/24 1843    Lab Status: Final result Specimen: Swab from Nasopharynx Updated: 11/08/24 2027     COVID19 Not Detected     Influenza A PCR Not Detected     Influenza B PCR Not Detected     RSV, PCR Not Detected    Narrative:      Fact sheet for providers: https://www.fda.gov/media/328305/download    Fact sheet for patients: https://www.fda.gov/media/732380/download    Test performed by PCR.    Blood Culture - Blood, Arm, Left [[id number]]  (Normal) Collected: 11/08/24 1756    Lab Status: Preliminary result Specimen: Blood from Arm, Left Updated: 11/12/24 1815     Blood Culture No growth at 4 days    Narrative:      Less than seven (7) mL's of blood was collected.  Insufficient quantity may yield false negative results.    Blood Culture - Blood, Arm, Right [[id number]]  (Normal) Collected: 11/08/24 1756    Lab Status: Preliminary result Specimen: Blood from Arm, Right Updated: 11/12/24 1815     Blood Culture No growth at 4 days    Narrative:      Less than seven (7) mL's of blood was collected.  Insufficient quantity may yield false negative results.          [x]  Radiology  XR Chest 1 View    Result  Date: 11/10/2024  Impression: 1. Persistent left basilar airspace disease compatible with pneumonia 2. COPD 3. Possible trace pleural effusions Electronically Signed: Darrick Nayan  11/10/2024 4:34 PM EST  Workstation ID: [id number]    CT Angiogram Abdomen Pelvis    Result Date: 11/10/2024  1. Extensive atherosclerotic disease as described in detail above. No aortic aneurysm or dissection. No evidence of mesenteric ischemia. 2. Evidence of small bowel ileus without obstruction. 3. Diminished enhancement in the lower pole of the right kidney with some associated cortical thinning likely reflects a chronic infarct. Pyelonephritis should be excluded clinically. 4. Occluded left common iliac artery with reconstitution of the internal and external iliac arteries. 5. Unusual appearance in the upper medial spleen that could reflect a mass versus unusual enhancement. Initial evaluation with ultrasound is recommended to exclude a mass. 6. Additional findings as above. Electronically Signed: Ronny Davis MD  11/10/2024 12:43 AM EST  Workstation ID: [id number]    XR Chest 1 View    Result Date: 11/8/2024  Impression: Mild left basilar atelectasis versus early pneumonia. Electronically Signed: Maury Kerr MD  11/8/2024 6:38 PM EST  Workstation ID: [id number]   []  EKG/Telemetry   []  Cardiology/Vascular   []  Pathology  []  Old records  []  Other:    Assessment & Plan   Assessment / Plan     Assessment:  Acute on chronic hypoxic respiratory failure requiring NIPPV  Severe sepsis, likely from pneumonia  Pneumonia  COPD with acute exacerbation  CKD  Advanced dementia  Anemia  Lactic acidosis    Plan:  Admitted to medicine service   Continue to wean oxygen  Brovana, Pulmicort, albuterol  Continue antibiotics with cefepime and Zithromax  Continue oral steroids  Resume home medication  Repeat labs in am   Can discontinue bedside sitter     Discussed with RN.    VTE Prophylaxis:  Pharmacologic VTE prophylaxis orders are  present.        CODE STATUS:   Medical Intervention Limits: No intubation (DNI)  Level Of Support Discussed With: Health Care Surrogate; Next of Kin (If No Surrogate)  Code Status (Patient has no pulse and is not breathing): No CPR (Do Not Attempt to Resuscitate)  Medical Interventions (Patient has pulse or is breathing): Limited Support      Electronically signed by John Hernandez DO, 11/13/2024, 16:06 EST.

## 2024-11-13 NOTE — PROGRESS NOTES
[facility]     Progress Note    Patient Name: Amada Bentley  : 1945  MRN: [id number]  Primary Care Physician:  Jennifer Estes MD  Date of admission: 2024    Subjective   Subjective     Chief Complaint: For consult sepsis    Over the last 24 hours. Remains on azithromycin..  Continues on cefepime.  Remains on Brovana, Pulmicort, DuoNebs.  Remains on prednisone 40 mg oral daily    No acute events overnight.  Refused NIPPV.    This morning,  Lying in bed  Currently on 1 L nasal cannula  Sitter remains at bedside  Mentation somewhat improved  Continues to have intermittent confusion  Somnolent but arousable  Cough and dyspnea improving  No fevers  Remains weak and fatigued      Objective   Objective     Vitals:   Temp:  [97.2 °F (36.2 °C)-97.9 °F (36.6 °C)] 97.4 °F (36.3 °C)  Heart Rate:  [65-91] 87  Resp:  [16-18] 18  BP: (106-188)/(49-95) 158/61  Flow (L/min) (Oxygen Therapy):  [1-3] 1    Physical Exam   Vital Signs Reviewed   General: WDWN, Alert, NAD.  Chronically ill-appearing female, lying in bed  HEENT:  PERRL, EOMI.  OP, nares clear, no sinus tenderness  Neck:  Supple, no JVD, no thyromegaly  Chest:  good aeration, scattered rhonchi with diminished breath sounds bilaterally, no increased work of breathing on 1 L while at rest  CV: RRR, no MGR, pulses 2+, equal.  Abd:  Soft, NT, ND, + BS, no HSM  EXT:  no clubbing, no cyanosis, no edema  Neuro:  A&Ox3, CN grossly intact, no focal deficits.  Skin: No rashes or lesions noted      Result Review    Result Review:  I have personally reviewed the results from the time of this admission to 2024 07:13 EST and agree with these findings:  [x]  Laboratory list / accordion  [x]  Microbiology  [x]  Radiology  [x]  EKG/Telemetry   [x]  Cardiology/Vascular   []  Pathology  []  Old records  []  Other:  Procalcitonin 7.58 --> 1.41      Lab 24  0417 24  0419 24  0309 11/10/24  0210 24  0102 24  1850 24  1808  11/08/24  1756   WBC 5.71 8.98 7.71 11.65* 10.77  --   --  13.93*   HEMOGLOBIN 10.5* 9.7* 9.3* 8.2* 10.1*  --   --  11.1*   HEMATOCRIT 33.1* 31.1* 29.5* 25.5* 32.1*  --   --  35.2   PLATELETS 411 419 415 358 462*  --   --  583*   SODIUM 139 140 138 139 136 137  --   --    POTASSIUM 3.6 3.9 3.1* 4.1 3.4* 3.8  --   --    CHLORIDE 107 105 102 107 97* 99  --   --    CO2 21.0* 24.7 22.6 22.5 18.8* 21.9*  --   --    BUN 30* 31* 30* 22 18 18  --   --    CREATININE 1.09* 1.30* 1.24* 0.92 1.16* 1.15* 0.97  --    GLUCOSE 120* 106* 116* 116* 179* 134*  --   --    CALCIUM 9.0 8.9 8.9 8.5* 9.3 9.5  --   --    PHOSPHORUS  --  2.8 3.5 4.0 4.7*  --   --   --    TOTAL PROTEIN  --   --   --   --   --  7.3  --   --    ALBUMIN  --  3.5  --  3.0*  --  3.9  --   --    GLOBULIN  --   --   --   --   --  3.4  --   --      Assessment & Plan   Assessment / Plan     Brief Patient Summary:  Amada Bentley is a 79 y.o. female who in the intensive care unit with sepsis    Active Hospital Problems:  Active Hospital Problems    Diagnosis     **Acute on chronic respiratory failure with hypoxia     Pneumonia     Nicotine dependence, unspecified, uncomplicated     Essential (primary) hypertension     COPD exacerbation     High blood pressure      Impression:  Severe sepsis  Concern for bibasilar pneumonia  Concern for aspiration pneumonia  COPD with acute exacerbation  Bronchiectasis with acute exacerbation  Dementia    Plan:   -Continue to wean supplemental oxygen maintain SpO2 greater than 90%  -Completed course of azithromycin.  -Cefepime de-escalated to Unasyn to complete 7 days total antibiotics.  -Micro negative to date  -Recheck procalcitonin --> 1.41  -Continue Brovana, Pulmicort, DuoNebs  -Continue prednisone 40 mg oral daily  -Continue bronchopulmonary and bronchodilator protocols.  -Encourage activity and incentive spirometer use  -Chest physiotherapy available  -Continue aspiration precautions.  Elevate head of bed.  -Consult SLP.   Appreciate assistance.  -Palliative care on board.  Appreciate assistance    VTE Prophylaxis:  Pharmacologic VTE prophylaxis orders are present.    CODE STATUS:    Medical Intervention Limits: No intubation (DNI)  Level Of Support Discussed With: Health Care Surrogate; Next of Kin (If No Surrogate)  Code Status (Patient has no pulse and is not breathing): No CPR (Do Not Attempt to Resuscitate)  Medical Interventions (Patient has pulse or is breathing): Limited Support    I have reviewed labs, imaging, pertinent clinical data and provider notes.   I have discussed with bedside nurse and primary service.     Electronically signed by Nhan Blank MD, 11/13/24, 7:13 AM EST.    Electronically signed by ALBERTO Alvarenga, 11/13/24, 2:51 PM EST.      This visit was performed by BOTH a physician and an APC. I personally evaluated and examined the patient. I performed all aspects of MDM as documented. , I have reviewed and confirmed the accuracy of the patient's history as documented in this note., and I have reexamined the patient and the results are consistent with the previously documented exam. I have updated the documentation as necessary.     Electronically signed by Nhan Blank MD, 11/13/24, 5:34 PM EST.

## 2024-11-14 ENCOUNTER — APPOINTMENT (OUTPATIENT)
Dept: GENERAL RADIOLOGY | Facility: HOSPITAL | Age: 79
DRG: 871 | End: 2024-11-14
Payer: MEDICARE

## 2024-11-14 LAB
ANION GAP SERPL CALCULATED.3IONS-SCNC: 9.5 MMOL/L (ref 5–15)
BUN SERPL-MCNC: 30 MG/DL (ref 8–23)
BUN/CREAT SERPL: 28.8 (ref 7–25)
CALCIUM SPEC-SCNC: 8.3 MG/DL (ref 8.6–10.5)
CHLORIDE SERPL-SCNC: 110 MMOL/L (ref 98–107)
CO2 SERPL-SCNC: 23.5 MMOL/L (ref 22–29)
CREAT SERPL-MCNC: 1.04 MG/DL (ref 0.57–1)
DEPRECATED RDW RBC AUTO: 59.5 FL (ref 37–54)
EGFRCR SERPLBLD CKD-EPI 2021: 54.1 ML/MIN/1.73
ERYTHROCYTE [DISTWIDTH] IN BLOOD BY AUTOMATED COUNT: 16.2 % (ref 12.3–15.4)
GLUCOSE SERPL-MCNC: 107 MG/DL (ref 65–99)
HCT VFR BLD AUTO: 29.4 % (ref 34–46.6)
HGB BLD-MCNC: 9.1 G/DL (ref 12–15.9)
MAGNESIUM SERPL-MCNC: 2.2 MG/DL (ref 1.6–2.4)
MCH RBC QN AUTO: 31.2 PG (ref 26.6–33)
MCHC RBC AUTO-ENTMCNC: 31 G/DL (ref 31.5–35.7)
MCV RBC AUTO: 100.7 FL (ref 79–97)
PLATELET # BLD AUTO: 405 10*3/MM3 (ref 140–450)
PMV BLD AUTO: 10.7 FL (ref 6–12)
POTASSIUM SERPL-SCNC: 3.2 MMOL/L (ref 3.5–5.2)
QT INTERVAL: 432 MS
QTC INTERVAL: 488 MS
RBC # BLD AUTO: 2.92 10*6/MM3 (ref 3.77–5.28)
SODIUM SERPL-SCNC: 143 MMOL/L (ref 136–145)
WBC NRBC COR # BLD AUTO: 7.53 10*3/MM3 (ref 3.4–10.8)

## 2024-11-14 PROCEDURE — 80048 BASIC METABOLIC PNL TOTAL CA: CPT | Performed by: FAMILY MEDICINE

## 2024-11-14 PROCEDURE — 25010000002 AMPICILLIN-SULBACTAM PER 1.5 G: Performed by: NURSE PRACTITIONER

## 2024-11-14 PROCEDURE — 85027 COMPLETE CBC AUTOMATED: CPT | Performed by: FAMILY MEDICINE

## 2024-11-14 PROCEDURE — 94664 DEMO&/EVAL PT USE INHALER: CPT

## 2024-11-14 PROCEDURE — 74230 X-RAY XM SWLNG FUNCJ C+: CPT

## 2024-11-14 PROCEDURE — 25010000002 HALOPERIDOL LACTATE PER 5 MG: Performed by: PHYSICIAN ASSISTANT

## 2024-11-14 PROCEDURE — 99232 SBSQ HOSP IP/OBS MODERATE 35: CPT | Performed by: INTERNAL MEDICINE

## 2024-11-14 PROCEDURE — 92610 EVALUATE SWALLOWING FUNCTION: CPT

## 2024-11-14 PROCEDURE — 93005 ELECTROCARDIOGRAM TRACING: CPT | Performed by: INTERNAL MEDICINE

## 2024-11-14 PROCEDURE — 94799 UNLISTED PULMONARY SVC/PX: CPT

## 2024-11-14 PROCEDURE — 83735 ASSAY OF MAGNESIUM: CPT | Performed by: INTERNAL MEDICINE

## 2024-11-14 PROCEDURE — 97116 GAIT TRAINING THERAPY: CPT

## 2024-11-14 PROCEDURE — 94669 MECHANICAL CHEST WALL OSCILL: CPT

## 2024-11-14 PROCEDURE — 63710000001 PREDNISONE PER 1 MG: Performed by: INTERNAL MEDICINE

## 2024-11-14 PROCEDURE — 99233 SBSQ HOSP IP/OBS HIGH 50: CPT | Performed by: INTERNAL MEDICINE

## 2024-11-14 PROCEDURE — 25010000002 HEPARIN (PORCINE) PER 1000 UNITS: Performed by: FAMILY MEDICINE

## 2024-11-14 PROCEDURE — 92611 MOTION FLUOROSCOPY/SWALLOW: CPT

## 2024-11-14 RX ORDER — POTASSIUM CHLORIDE 750 MG/1
40 CAPSULE, EXTENDED RELEASE ORAL ONCE
Status: COMPLETED | OUTPATIENT
Start: 2024-11-14 | End: 2024-11-14

## 2024-11-14 RX ORDER — QUETIAPINE FUMARATE 25 MG/1
25 TABLET, FILM COATED ORAL NIGHTLY
Status: DISCONTINUED | OUTPATIENT
Start: 2024-11-14 | End: 2024-11-15 | Stop reason: HOSPADM

## 2024-11-14 RX ORDER — HALOPERIDOL 5 MG/ML
2 INJECTION INTRAMUSCULAR ONCE
Status: COMPLETED | OUTPATIENT
Start: 2024-11-14 | End: 2024-11-14

## 2024-11-14 RX ADMIN — BARIUM SULFATE 20 ML: 400 PASTE ORAL at 13:25

## 2024-11-14 RX ADMIN — PANTOPRAZOLE SODIUM 40 MG: 40 TABLET, DELAYED RELEASE ORAL at 05:17

## 2024-11-14 RX ADMIN — AMPICILLIN AND SULBACTAM 3 G: 2; 1 INJECTION, POWDER, FOR SOLUTION INTRAVENOUS at 14:57

## 2024-11-14 RX ADMIN — POTASSIUM CHLORIDE 40 MEQ: 750 CAPSULE, EXTENDED RELEASE ORAL at 14:59

## 2024-11-14 RX ADMIN — METOPROLOL SUCCINATE 50 MG: 50 TABLET, EXTENDED RELEASE ORAL at 14:59

## 2024-11-14 RX ADMIN — MONTELUKAST 10 MG: 10 TABLET, FILM COATED ORAL at 20:06

## 2024-11-14 RX ADMIN — IPRATROPIUM BROMIDE AND ALBUTEROL SULFATE 3 ML: .5; 3 SOLUTION RESPIRATORY (INHALATION) at 06:23

## 2024-11-14 RX ADMIN — BUDESONIDE 0.5 MG: 0.5 SUSPENSION RESPIRATORY (INHALATION) at 19:12

## 2024-11-14 RX ADMIN — GUAIFENESIN 600 MG: 600 TABLET ORAL at 14:59

## 2024-11-14 RX ADMIN — ARFORMOTEROL TARTRATE 15 MCG: 15 SOLUTION RESPIRATORY (INHALATION) at 19:12

## 2024-11-14 RX ADMIN — Medication 10 ML: at 09:24

## 2024-11-14 RX ADMIN — METOPROLOL SUCCINATE 50 MG: 50 TABLET, EXTENDED RELEASE ORAL at 20:06

## 2024-11-14 RX ADMIN — Medication 250 MG: at 14:59

## 2024-11-14 RX ADMIN — PREDNISONE 40 MG: 20 TABLET ORAL at 15:00

## 2024-11-14 RX ADMIN — IPRATROPIUM BROMIDE AND ALBUTEROL SULFATE 3 ML: .5; 3 SOLUTION RESPIRATORY (INHALATION) at 14:46

## 2024-11-14 RX ADMIN — MEGESTROL ACETATE 200 MG: 400 SUSPENSION ORAL at 15:00

## 2024-11-14 RX ADMIN — BUDESONIDE 0.5 MG: 0.5 SUSPENSION RESPIRATORY (INHALATION) at 06:23

## 2024-11-14 RX ADMIN — MIRTAZAPINE 15 MG: 15 TABLET, FILM COATED ORAL at 20:06

## 2024-11-14 RX ADMIN — ARFORMOTEROL TARTRATE 15 MCG: 15 SOLUTION RESPIRATORY (INHALATION) at 06:23

## 2024-11-14 RX ADMIN — HEPARIN SODIUM 5000 UNITS: 5000 INJECTION INTRAVENOUS; SUBCUTANEOUS at 09:24

## 2024-11-14 RX ADMIN — BARIUM SULFATE 50 ML: 400 SUSPENSION ORAL at 13:24

## 2024-11-14 RX ADMIN — QUETIAPINE FUMARATE 25 MG: 25 TABLET ORAL at 20:06

## 2024-11-14 RX ADMIN — DIVALPROEX SODIUM 375 MG: 125 CAPSULE, COATED PELLETS ORAL at 14:58

## 2024-11-14 RX ADMIN — HALOPERIDOL LACTATE 2 MG: 5 INJECTION, SOLUTION INTRAMUSCULAR at 20:51

## 2024-11-14 RX ADMIN — SENNOSIDES AND DOCUSATE SODIUM 2 TABLET: 50; 8.6 TABLET ORAL at 15:01

## 2024-11-14 RX ADMIN — LEVOTHYROXINE SODIUM 75 MCG: 75 TABLET ORAL at 05:17

## 2024-11-14 RX ADMIN — Medication 10 ML: at 20:07

## 2024-11-14 RX ADMIN — IPRATROPIUM BROMIDE AND ALBUTEROL SULFATE 3 ML: .5; 3 SOLUTION RESPIRATORY (INHALATION) at 19:12

## 2024-11-14 RX ADMIN — BARIUM SULFATE 55 ML: 0.81 POWDER, FOR SUSPENSION ORAL at 13:25

## 2024-11-14 RX ADMIN — AMPICILLIN AND SULBACTAM 3 G: 2; 1 INJECTION, POWDER, FOR SOLUTION INTRAVENOUS at 01:03

## 2024-11-14 RX ADMIN — FAMOTIDINE 10 MG: 20 TABLET ORAL at 15:00

## 2024-11-14 RX ADMIN — CETIRIZINE HYDROCHLORIDE 10 MG: 10 TABLET, FILM COATED ORAL at 15:00

## 2024-11-14 RX ADMIN — DIVALPROEX SODIUM 375 MG: 125 CAPSULE, COATED PELLETS ORAL at 20:06

## 2024-11-14 NOTE — PROGRESS NOTES
[facility]     Progress Note    Patient Name: Amada Bentley  : 1945  MRN: [id number]  Primary Care Physician:  Jennifer Estes MD  Date of admission: 2024    Subjective   Subjective     Follow up for pneumonia, respiratory failure, sepsis    Over the last 24 hours. Remains on azithromycin..  Continues on cefepime.  Remains on Brovana, Pulmicort, DuoNebs.  Remains on prednisone 40 mg oral daily    No acute events overnight.  Refused NIPPV.    This morning,  Lying in bed  Currently on 1 L nasal cannula  Safety sitter at bedside.  Mentation improving, thinks that she is in nursing home.  Continues to have intermittent confusion  Somnolent but arousable  Cough and dyspnea improving  No fevers  Remains weak and fatigued      Objective   Objective     Vitals:   Temp:  [98.1 °F (36.7 °C)-98.6 °F (37 °C)] 98.1 °F (36.7 °C)  Heart Rate:  [63-99] 63  Resp:  [18-20] 18  BP: (101-178)/(48-82) 170/69  Flow (L/min) (Oxygen Therapy):  [1] 1    Physical Exam   Vital Signs Reviewed   General: WDWN, Alert, NAD.  Chronically ill-appearing female, lying in bed  HEENT:  PERRL, EOMI.  OP, nares clear, no sinus tenderness  Neck:  Supple, no JVD, no thyromegaly  Chest:  good aeration, scattered rhonchi with diminished breath sounds bilaterally, no increased work of breathing on 1 L while at rest  CV: RRR, no MGR, pulses 2+, equal.  Abd:  Soft, NT, ND, + BS, no HSM  EXT:  no clubbing, no cyanosis, no edema  Neuro:  A&Ox3, CN grossly intact, no focal deficits.  Skin: No rashes or lesions noted      Result Review    Result Review:  I have personally reviewed the results from the time of this admission to 2024 07:19 EST and agree with these findings:  [x]  Laboratory list / accordion  [x]  Microbiology  [x]  Radiology  [x]  EKG/Telemetry   [x]  Cardiology/Vascular   []  Pathology  []  Old records  []  Other:  Procalcitonin 7.58 --> 1.41      Lab 24  0414 24  0417 24  0419 24  0309  11/10/24  0210 11/09/24  0102 11/08/24  1850 11/08/24  1804 11/08/24  1756   WBC 7.53 5.71 8.98 7.71 11.65* 10.77  --   --  13.93*   HEMOGLOBIN 9.1* 10.5* 9.7* 9.3* 8.2* 10.1*  --   --  11.1*   HEMATOCRIT 29.4* 33.1* 31.1* 29.5* 25.5* 32.1*  --   --  35.2   PLATELETS 405 411 419 415 358 462*  --   --  583*   SODIUM 143 139 140 138 139 136 137  --   --    POTASSIUM 3.2* 3.6 3.9 3.1* 4.1 3.4* 3.8  --   --    CHLORIDE 110* 107 105 102 107 97* 99  --   --    CO2 23.5 21.0* 24.7 22.6 22.5 18.8* 21.9*  --   --    BUN 30* 30* 31* 30* 22 18 18  --   --    CREATININE 1.04* 1.09* 1.30* 1.24* 0.92 1.16* 1.15*   < >  --    GLUCOSE 107* 120* 106* 116* 116* 179* 134*  --   --    CALCIUM 8.3* 9.0 8.9 8.9 8.5* 9.3 9.5  --   --    PHOSPHORUS  --   --  2.8 3.5 4.0 4.7*  --   --   --    TOTAL PROTEIN  --   --   --   --   --   --  7.3  --   --    ALBUMIN  --   --  3.5  --  3.0*  --  3.9  --   --    GLOBULIN  --   --   --   --   --   --  3.4  --   --     < > = values in this interval not displayed.     Assessment & Plan   Assessment / Plan     Brief Patient Summary:  Amada Bentley is a 79 y.o. female who in the intensive care unit with sepsis    Active Hospital Problems:  Active Hospital Problems    Diagnosis     **Acute on chronic respiratory failure with hypoxia     Pneumonia     Nicotine dependence, unspecified, uncomplicated     Essential (primary) hypertension     COPD exacerbation     High blood pressure      Impression:  Severe sepsis  Concern for bibasilar pneumonia  Concern for aspiration pneumonia  COPD with acute exacerbation  Bronchiectasis with acute exacerbation  Dementia    Plan:   -Continue to wean supplemental oxygen maintain SpO2 greater than 90%  -Completed course of azithromycin.  -Switch antibiotics to Unasyn.  -Aspiration precautions.  Keep the head of bed elevated.  -Micro negative to date  -Recheck procalcitonin --> 1.41  -Continue Brovana, Pulmicort, DuoNebs  -Continue prednisone 40 mg oral daily  -Continue  bronchopulmonary and bronchodilator protocols.  -Encourage activity and incentive spirometer use  -Chest physiotherapy available  -Consult SLP.  Appreciate assistance.  -Palliative care on board.  Appreciate assistance    VTE Prophylaxis:  Pharmacologic VTE prophylaxis orders are present.    CODE STATUS:    Medical Intervention Limits: No intubation (DNI)  Level Of Support Discussed With: Health Care Surrogate; Next of Kin (If No Surrogate)  Code Status (Patient has no pulse and is not breathing): No CPR (Do Not Attempt to Resuscitate)  Medical Interventions (Patient has pulse or is breathing): Limited Support    I have reviewed labs, imaging, pertinent clinical data and provider notes.   I have discussed with bedside nurse and primary service.     Electronically signed by Nhan Blank MD, 11/14/24, 7:19 AM EST.

## 2024-11-14 NOTE — PROGRESS NOTES
[facility]   Hospitalist Progress Note  Date: 2024  Patient Name: Amada Bentley  : 1945  MRN: [id number]  Date of admission: 2024  Room/Bed: 262/1      Subjective   Subjective     Chief Complaint: Respiratory distress     Summary:Amada Bentley is a 79 y.o. female with advanced dementia, COPD, depression, CKD and other medical problems presented from a nursing home with respiratory distress.  She had apparently been having worsening shortness of breath at the nursing facility along with altered mental status, lethargy, weakness.  Brought to the ED.  CODE STATUS was confirmed as DNR/DNI.  She was admitted to the ICU and placed on NIPPV.  COVID and flu and RSV were negative.  Chest x-ray showed left lower lobe pneumonia.  She was admitted to the medicine service, seen by critical care in consultation.  Started on antibiotics. Moved out of ICU on 2024.    Interval Followup:   Patient states that she is feeling better today.  Patient denies any shortness of breath or coughing  Having a MBS today.   Patient remains on 1 L of oxygen  Patient is afebrile  Blood pressure is well-controlled  Patient had to have a sitter again overnight.  It appears that during the night patient gets very agitated        Objective   Objective     Vitals:   Temp:  [97.9 °F (36.6 °C)-98.6 °F (37 °C)] 97.9 °F (36.6 °C)  Heart Rate:  [63-99] 66  Resp:  [16-20] 16  BP: (105-178)/(64-82) 124/74  Flow (L/min) (Oxygen Therapy):  [1] 1    Physical Exam   General: Awake, alert, nad.  Patient is resting in bed with sitter at the bedside  HENT: NCAT, MMM  Eyes: pupils equal, no scleral icterus  Cardiovascular: RRR, no murmurs   Pulmonary: Bilateral breath sounds.  No wheezing  Gastrointestinal: S/ND/NT, +BS  Musculoskeletal: No gross deformities  Skin: No jaundice, no rash on exposed skin appreciated  Psych: Mood is currently calm      Result Review    Result Review:  I have personally reviewed these results:  [x]   Laboratory      Lab 11/14/24  0414 11/13/24  0417 11/12/24  0419 11/11/24  0309 11/10/24  0817 11/10/24  0210 11/09/24  1537 11/09/24  0930 11/09/24  0357 11/09/24  0102 11/08/24  1804 11/08/24  1756   WBC 7.53 5.71 8.98   < >  --  11.65*  --   --   --  10.77  --  13.93*   HEMOGLOBIN 9.1* 10.5* 9.7*   < >  --  8.2*  --   --   --  10.1*  --  11.1*   HEMATOCRIT 29.4* 33.1* 31.1*   < >  --  25.5*  --   --   --  32.1*  --  35.2   PLATELETS 405 411 419   < >  --  358  --   --   --  462*  --  583*   NEUTROS ABS  --   --  5.90  --   --  10.43*  --   --   --   --   --  11.32*   IMMATURE GRANS (ABS)  --   --  0.04  --   --  0.07*  --   --   --   --   --  0.07*   LYMPHS ABS  --   --  2.33  --   --  0.71  --   --   --   --   --  1.64   MONOS ABS  --   --  0.70  --   --  0.40  --   --   --   --   --  0.78   EOS ABS  --   --  0.00  --   --  0.00  --   --   --   --   --  0.03   .7* 99.7* 101.0*   < >  --  100.0*  --   --   --  102.6*  --  102.0*   PROCALCITONIN  --  1.41*  --   --   --   --   --   --   --  7.58*  --   --    LACTATE  --   --   --   --  1.7  --  3.9* 5.5*   < > 6.7*   < >  --     < > = values in this interval not displayed.         Lab 11/14/24 0414 11/13/24 0417 11/12/24 0419 11/11/24 0309 11/10/24  0210 11/09/24  0102   SODIUM 143 139 140 138 139 136   POTASSIUM 3.2* 3.6 3.9 3.1* 4.1 3.4*   CHLORIDE 110* 107 105 102 107 97*   CO2 23.5 21.0* 24.7 22.6 22.5 18.8*   ANION GAP 9.5 11.0 10.3 13.4 9.5 20.2*   BUN 30* 30* 31* 30* 22 18   CREATININE 1.04* 1.09* 1.30* 1.24* 0.92 1.16*   EGFR 54.1* 51.1* 41.4* 43.8* 62.7 47.5*   GLUCOSE 107* 120* 106* 116* 116* 179*   CALCIUM 8.3* 9.0 8.9 8.9 8.5* 9.3   MAGNESIUM 2.2  --  2.3 2.2 2.2 2.1   PHOSPHORUS  --   --  2.8 3.5 4.0 4.7*   TSH  --   --   --   --   --  1.300         Lab 11/12/24  0419 11/10/24  0210 11/08/24  1850   TOTAL PROTEIN  --   --  7.3   ALBUMIN 3.5 3.0* 3.9   GLOBULIN  --   --  3.4   ALT (SGPT)  --   --  <5   AST (SGOT)  --   --  5   BILIRUBIN  --    --  0.3   ALK PHOS  --   --  58         Lab 11/08/24  1850 11/08/24  1756   PROBNP  --  3,098.0*   HSTROP T 33*  --                  Lab 11/09/24  0626 11/08/24  1804   PH, ARTERIAL 7.425 7.460*   PCO2, ARTERIAL 32.8* 28.9*   PO2 .4* 59.1*   O2 SATURATION ART 98.7 92.1*   FIO2 30  --    HCO3 ART 21.5* 20.5*   BASE EXCESS ART -2.4* -2.3*     Brief Urine Lab Results  (Last result in the past 365 days)        Color   Clarity   Blood   Leuk Est   Nitrite   Protein   CREAT   Urine HCG        11/09/24 0004 Yellow   Clear   Negative   Negative   Negative   30 mg/dL (1+)                 [x]  Microbiology   Microbiology Results (last 10 days)       Procedure Component Value - Date/Time    Respiratory Panel PCR w/COVID-19(SARS-CoV-2) FE/[facility]I/[facility] In-House, NP Swab in UTM/VTM, 2 HR TAT - Swab, Nasopharynx [[id number]]  (Normal) Collected: 11/09/24 1113    Lab Status: Final result Specimen: Swab from Nasopharynx Updated: 11/09/24 1216     ADENOVIRUS, PCR Not Detected     Coronavirus 229E Not Detected     Coronavirus HKU1 Not Detected     Coronavirus NL63 Not Detected     Coronavirus OC43 Not Detected     COVID19 Not Detected     Human Metapneumovirus Not Detected     Human Rhinovirus/Enterovirus Not Detected     Influenza A PCR Not Detected     Influenza B PCR Not Detected     Parainfluenza Virus 1 Not Detected     Parainfluenza Virus 2 Not Detected     Parainfluenza Virus 3 Not Detected     Parainfluenza Virus 4 Not Detected     RSV, PCR Not Detected     Bordetella pertussis pcr Not Detected     Bordetella parapertussis PCR Not Detected     Chlamydophila pneumoniae PCR Not Detected     Mycoplasma pneumo by PCR Not Detected    Narrative:      In the setting of a positive respiratory panel with a viral infection PLUS a negative procalcitonin without other underlying concern for bacterial infection, consider observing off antibiotics or discontinuation of antibiotics and continue supportive care. If the  respiratory panel is positive for atypical bacterial infection (Bordetella pertussis, Chlamydophila pneumoniae, or Mycoplasma pneumoniae), consider antibiotic de-escalation to target atypical bacterial infection.    MRSA Screen, PCR (Inpatient) - Swab, Nares [[id number]]  (Normal) Collected: 11/09/24 0004    Lab Status: Final result Specimen: Swab from Nares Updated: 11/09/24 0311     MRSA PCR No MRSA Detected    Narrative:      The negative predictive value of this diagnostic test is high and should only be used to consider de-escalating anti-MRSA therapy. A positive result may indicate colonization with MRSA and must be correlated clinically.    Legionella Antigen, Urine - Urine, Urine, Clean Catch [[id number]]  (Normal) Collected: 11/09/24 0004    Lab Status: Final result Specimen: Urine, Clean Catch Updated: 11/09/24 0816     LEGIONELLA ANTIGEN, URINE Negative    S. Pneumo Ag Urine or CSF - Urine, Urine, Clean Catch [[id number]]  (Normal) Collected: 11/09/24 0004    Lab Status: Final result Specimen: Urine, Clean Catch Updated: 11/09/24 0816     Strep Pneumo Ag Negative    COVID-19, FLU A/B, RSV PCR 1 HR TAT - Swab, Nasopharynx [[id number]]  (Normal) Collected: 11/08/24 1843    Lab Status: Final result Specimen: Swab from Nasopharynx Updated: 11/08/24 2027     COVID19 Not Detected     Influenza A PCR Not Detected     Influenza B PCR Not Detected     RSV, PCR Not Detected    Narrative:      Fact sheet for providers: https://www.fda.gov/media/464612/download    Fact sheet for patients: https://www.fda.gov/media/306995/download    Test performed by PCR.    Blood Culture - Blood, Arm, Left [[id number]]  (Normal) Collected: 11/08/24 1756    Lab Status: Final result Specimen: Blood from Arm, Left Updated: 11/13/24 1815     Blood Culture No growth at 5 days    Narrative:      Less than seven (7) mL's of blood was collected.  Insufficient quantity may yield false negative results.    Blood Culture - Blood, Arm, Right  [[id number]]  (Normal) Collected: 11/08/24 1756    Lab Status: Final result Specimen: Blood from Arm, Right Updated: 11/13/24 1815     Blood Culture No growth at 5 days    Narrative:      Less than seven (7) mL's of blood was collected.  Insufficient quantity may yield false negative results.          [x]  Radiology  XR Chest 1 View    Result Date: 11/10/2024  Impression: 1. Persistent left basilar airspace disease compatible with pneumonia 2. COPD 3. Possible trace pleural effusions Electronically Signed: Darrick Spicer  11/10/2024 4:34 PM EST  Workstation ID: [id number]    CT Angiogram Abdomen Pelvis    Result Date: 11/10/2024  1. Extensive atherosclerotic disease as described in detail above. No aortic aneurysm or dissection. No evidence of mesenteric ischemia. 2. Evidence of small bowel ileus without obstruction. 3. Diminished enhancement in the lower pole of the right kidney with some associated cortical thinning likely reflects a chronic infarct. Pyelonephritis should be excluded clinically. 4. Occluded left common iliac artery with reconstitution of the internal and external iliac arteries. 5. Unusual appearance in the upper medial spleen that could reflect a mass versus unusual enhancement. Initial evaluation with ultrasound is recommended to exclude a mass. 6. Additional findings as above. Electronically Signed: Ronny Davis MD  11/10/2024 12:43 AM EST  Workstation ID: [id number]    XR Chest 1 View    Result Date: 11/8/2024  Impression: Mild left basilar atelectasis versus early pneumonia. Electronically Signed: Maury Kerr MD  11/8/2024 6:38 PM EST  Workstation ID: [id number]   []  EKG/Telemetry   []  Cardiology/Vascular   []  Pathology  []  Old records  []  Other:    Assessment & Plan   Assessment / Plan     Assessment:  Acute on chronic hypoxic respiratory failure requiring NIPPV  Severe sepsis, likely from pneumonia  Pneumonia  COPD with acute exacerbation  CKD  Advanced dementia  Anemia  Lactic  acidosis    Plan:  Admitted to medicine service   Continue to wean oxygen  Brovana, Pulmicort, albuterol  Continue antibiotics with cefepime and Zithromax  Continue oral steroids  Resume home medication  Plan for modified barium swallow today per speech recommendation  Will discontinue sitter again today  Will start Seroquel at night to help with sleep and agitation  Repeat labs in am   Can discontinue bedside sitter     Discussed with RN.    VTE Prophylaxis:  Pharmacologic VTE prophylaxis orders are present.        CODE STATUS:   Medical Intervention Limits: No intubation (DNI)  Level Of Support Discussed With: Health Care Surrogate; Next of Kin (If No Surrogate)  Code Status (Patient has no pulse and is not breathing): No CPR (Do Not Attempt to Resuscitate)  Medical Interventions (Patient has pulse or is breathing): Limited Support      Electronically signed by John Hernandez DO, 11/14/2024, 12:16 EST.

## 2024-11-14 NOTE — PLAN OF CARE
Problem: Adult Inpatient Plan of Care  Goal: Plan of Care Review  Outcome: Progressing  Flowsheets (Taken 11/14/2024 0500)  Progress: no change  Outcome Evaluation: Patient alert to self and/ place, sitter at bedside, VSS, 1 LPM via NC prn, refusing BiPAP, no s/s of distress, sitter at bedside, slept poorly during the night, tolerating IV abx therapy, call light within reach, care ongoing  Plan of Care Reviewed With: patient   Goal Outcome Evaluation:  Plan of Care Reviewed With: patient        Progress: no change  Outcome Evaluation: Patient alert to self and/ place, sitter at bedside, VSS, 1 LPM via NC prn, refusing BiPAP, no s/s of distress, sitter at bedside, slept poorly during the night, tolerating IV abx therapy, call light within reach, care ongoing

## 2024-11-14 NOTE — PLAN OF CARE
Goal Outcome Evaluation:              Outcome Evaluation: Pt is alert to self, plesantly confused to place and time, VSS. No complaints of pain or discomfort.

## 2024-11-14 NOTE — SIGNIFICANT NOTE
11/14/24 1200   Physical Therapy Time and Intention   Comment, Session Not Performed Unable to arouse

## 2024-11-14 NOTE — THERAPY TREATMENT NOTE
Acute Care - Physical Therapy Treatment Note  KEVON Morrison     Patient Name: Amada Bentley  : 1945  MRN: [id number]  Today's Date: 2024      Visit Dx:     ICD-10-CM ICD-9-CM   1. COPD exacerbation  J44.1 491.21   2. Difficulty in walking  R26.2 719.7   3. Oropharyngeal dysphagia  R13.12 787.22     Patient Active Problem List   Diagnosis    Degeneration of lumbosacral intervertebral disc    High blood pressure    High cholesterol    Kidney disorder    Lumbar spinal stenosis    Murmur, cardiac    Other specified chronic obstructive pulmonary disease    Thoracic or lumbosacral neuritis or radiculitis    Thyroid disorder    Solitary pulmonary nodule    Primary insomnia    Pernicious anemia    Other long term (current) drug therapy    Chronic kidney disease, stage 2 (mild)    COPD exacerbation    Essential (primary) hypertension    Nicotine dependence, unspecified, uncomplicated    Encounter for examination for admission to nursing home    Encounter for annual wellness exam in Medicare patient    Acute on chronic respiratory failure with hypoxia    Pneumonia     Past Medical History:   Diagnosis Date    Abnormal weight loss     Anxiety     Cardiac murmur, unspecified     Chronic kidney disease, stage 2 (mild)     Condition not found     LBP    COPD (chronic obstructive pulmonary disease)     Depression     loss of daughter    Diarrhea, unspecified     Dysphagia, oropharyngeal phase     Essential (primary) hypertension     Gastritis     Gastro-esophageal reflux disease without esophagitis     Generalized anxiety disorder     Hiatal hernia     History of falling     Hypercholesterolemia     Hypothyroid     Infected abrasion of scalp, initial encounter     Long term (current) use of opiate analgesic     Major depressive disorder, recurrent, moderate     Muscle weakness (generalized)     Nicotine dependence, unspecified, uncomplicated     Occlusion and stenosis of bilateral carotid arteries     Other allergy,  subsequent encounter     Other long term (current) drug therapy     Pain     LEFT-knee, shoulder RIGHT-hip, knee    Pernicious anemia     Primary insomnia     Solitary pulmonary nodule      Past Surgical History:   Procedure Laterality Date    APPENDECTOMY      CATARACT EXTRACTION, BILATERAL      FOOT SURGERY Bilateral     secondary bone spurs    HYSTERECTOMY  1976, 1987    LAPAROSCOPIC CHOLECYSTECTOMY      OTHER SURGICAL HISTORY Right     Ear Surger; unspecified     PT Assessment (Last 12 Hours)       PT Evaluation and Treatment       Row Name 11/14/24 1500 11/14/24 1200       Physical Therapy Time and Intention    Subjective Information complains of;weakness;fatigue (P)   -KL --    Document Type therapy note (daily note) (P)   -KL --    Mode of Treatment individual therapy;physical therapy (P)   -KL --    Total Minutes, Physical Therapy 15 (P)   -KL --    Comment, Session Not Performed -- Unable to arouse  -CANELO (r) KL (t) CANELO (c)    Patient Effort good (P)   -KL --    Symptoms Noted During/After Treatment fatigue (P)   -KL --      Row Name 11/14/24 1500          Bed Mobility    Bed Mobility bed mobility (all) activities (P)   -KL     All Activities, [place] (Bed Mobility) contact guard (P)   -KL       Row Name 11/14/24 1500          Transfers    Transfers sit-stand transfer;stand-sit transfer (P)   -KL     Maintains Weight-bearing Status (Transfers) able to maintain (P)   -KL       Row Name 11/14/24 1500          Sit-Stand Transfer    Sit-Stand [place] (Transfers) contact guard (P)   -KL     Assistive Device (Sit-Stand Transfers) walker, front-wheeled (P)   -KL       Row Name 11/14/24 1500          Stand-Sit Transfer    Stand-Sit [place] (Transfers) contact guard (P)   -KL     Assistive Device (Stand-Sit Transfers) walker, front-wheeled (P)   -KL       Row Name 11/14/24 1500          Gait/Stairs (Locomotion)    Gait/Stairs Locomotion gait/ambulation assistive device (P)   -KL     [place] Level  (Gait) contact guard (P)   -KL     Assistive Device (Gait) walker, front-wheeled (P)   -KL     Patient was able to Ambulate yes (P)   -KL     Distance in Feet (Gait) 10 (P)   -KL     Pattern (Gait) 2-point (P)   -KL     Deviations/Abnormal Patterns (Gait) stride length decreased;weight shifting decreased (P)   -       Row Name 11/14/24 1500          Safety Issues/Impairments Affecting Functional Mobility    Impairments Affecting Function (Mobility) balance;endurance/activity tolerance;strength;cognition (P)   -KL     Cognitive Impairments, Mobility Safety/Performance attention;impulsivity;judgment;sequencing abilities;awareness, need for assistance (P)   -       Row Name 11/14/24 1500          Balance    Balance Assessment standing dynamic balance (P)   -KL     Dynamic Sitting Balance contact guard (P)   -KL     Dynamic Standing Balance contact guard (P)   -KL     Position/Device Used, Standing Balance walker, front-wheeled (P)   -KL     Balance Interventions standing (P)   -KL       Row Name 11/14/24 1500          Plan of Care Review    Plan of Care Reviewed With patient (P)   -KL     Progress improving (P)   -KL       Row Name 11/14/24 1500          Positioning and Restraints    Pre-Treatment Position in bed (P)   -KL     Post Treatment Position chair (P)   -KL     In Chair sitting (P)   -KL       Row Name 11/14/24 1500          Therapy Assessment/Plan (PT)    Rehab Potential (PT) good (P)   -KL     Criteria for Skilled Interventions Met (PT) yes;skilled treatment is necessary (P)   -KL     Therapy Frequency (PT) daily (P)   -KL     Problem List (PT) problems related to;balance;coordination;mobility;strength;postural control (P)   -KL     Activity Limitations Related to Problem List (PT) unable to ambulate safely (P)   -       Row Name 11/14/24 1500          Progress Summary (PT)    Progress Toward Functional Goals (PT) progress toward functional goals is good (P)   -KL     Daily Progress Summary (PT) Pt  performed treatment with moderate difficulty and slight complaints of fatigue. Was very confused and agitated but was able to sit in recliner. Pt would continue to benefit from the use of skilled physical therapy to address BLE strength and muscular endurance deficits required for ambulation. (P)   -LEYDI       Row Name 11/14/24 1500          Therapy Plan Review/Discharge Plan (PT)    Therapy Plan Review (PT) evaluation/treatment results reviewed (P)   -               User Key  (r) = Recorded By, (t) = Taken By, (c) = Cosigned By      Initials Name Provider Type    Miguel Wilson, PT Physical Therapist    Samantha Luna, PT Student PT Student                    Physical Therapy Education       Title: PT OT SLP Therapies (Done)       Topic: Physical Therapy (Done)       Point: Mobility training (Done)       Learning Progress Summary            Patient Acceptance, E,TB, VU by LEYDI at 11/12/2024 1159                      Point: Home exercise program (Done)       Learning Progress Summary            Patient Acceptance, E,TB, VU by  at 11/12/2024 1159                      Point: Body mechanics (Done)       Learning Progress Summary            Patient Acceptance, E,TB, VU by  at 11/12/2024 1159                      Point: Precautions (Done)       Learning Progress Summary            Patient Acceptance, E,TB, VU by  at 11/12/2024 1159                                      User Key       Initials Effective Dates Name Provider Type Discipline    LEYDI 08/27/24 -  Samantha Patel, PT Student PT Student PT                  PT Recommendation and Plan  Anticipated Discharge Disposition (PT): (P) sub acute care setting  Planned Therapy Interventions (PT): (P) balance training, bed mobility training, gait training, home exercise program, motor coordination training, patient/family education, ROM (range of motion), stair training, strengthening, stretching, transfer training  Therapy Frequency (PT): (P) daily  Progress Summary  (PT)  Progress Toward Functional Goals (PT): (P) progress toward functional goals is good  Daily Progress Summary (PT): (P) Pt performed treatment with moderate difficulty and slight complaints of fatigue. Was very confused and agitated but was able to sit in recliner. Pt would continue to benefit from the use of skilled physical therapy to address BLE strength and muscular endurance deficits required for ambulation.  Plan of Care Reviewed With: (P) patient  Progress: (P) improving  Outcome Evaluation: Pt presents to treatment with significant weakness and muscular endurance deficits in BLE. Was unable to transfer or ambulate but was able to sit EOB for 5 min. Very confused and unable to follow most commands. Pt would benefit from the use of skilled physical therapy to address BLE strength and muscular endurance deficits required for ambulation.   Outcome Measures       Row Name 11/14/24 1500 11/12/24 1100          How much help from another person do you currently need...    Turning from your back to your side while in flat bed without using bedrails? 3 (P)   -KL 2  -CANELO (r) KL (t) CANELO (c)     Moving from lying on back to sitting on the side of a flat bed without bedrails? 3 (P)   -KL 2  -CANELO (r) KL (t) CANELO (c)     Moving to and from a bed to a chair (including a wheelchair)? 2 (P)   -KL 2  -CANELO (r) KL (t) CANELO (c)     Standing up from a chair using your arms (e.g., wheelchair, bedside chair)? 3 (P)   -KL 1  -CANELO (r) KL (t) CANELO (c)     Climbing 3-5 steps with a railing? 1 (P)   -KL 1  -CANELO (r) KL (t) CANELO (c)     To walk in hospital room? 2 (P)   -KL 1  -CANELO (r) KL (t) CANELO (c)     AM-PAC 6 Clicks Score (PT) 14 (P)   -KL 9  -CANELO (r) KL (t)        Functional Assessment    Outcome Measure Options AM-PAC 6 Clicks Basic Mobility (PT) (P)   -KL AM-PAC 6 Clicks Basic Mobility (PT)  -CANELO (r) KL (t) CANELO (c)               User Key  (r) = Recorded By, (t) = Taken By, (c) = Cosigned By      Initials Name Provider Type    Miguel Wilson,  PT Physical Therapist    Samantha Luna, PT Student PT Student                     Time Calculation:    PT Charges       Row Name 11/14/24 1521 11/14/24 1206          Time Calculation    PT Received On 11/14/24 (P)   -KL 11/14/24  -CANELO (r) KL (t) CANELO (c)        Time Calculation- PT    Total Timed Code Minutes- PT 15 minute(s) (P)   -KL --        Timed Charges    65046 - Gait Training Minutes  15 (P)   -KL --        Total Minutes    Timed Charges Total Minutes 15 (P)   -KL --      Total Minutes 15 (P)   -KL --               User Key  (r) = Recorded By, (t) = Taken By, (c) = Cosigned By      Initials Name Provider Type    Miguel Wilson, PT Physical Therapist    Samantha Luna, PT Student PT Student                  Therapy Charges for Today       Code Description Service Date Service Provider Modifiers Qty    40337675123 HC GAIT TRAINING EA 15 MIN 11/14/2024 Samantha Patel, PT Student GP 1            PT G-Codes  Outcome Measure Options: (P) AM-PAC 6 Clicks Basic Mobility (PT)  AM-PAC 6 Clicks Score (PT): (P) 14    BROOKE Chirinos  11/14/2024

## 2024-11-14 NOTE — THERAPY EVALUATION
Acute Care - Speech Language Pathology   Swallow Initial Evaluation KEVON Morrison     Patient Name: Amada Bentley  : 1945  MRN: [id number]  Today's Date: 2024               Admit Date: 2024    Visit Dx:     ICD-10-CM ICD-9-CM   1. COPD exacerbation  J44.1 491.21   2. Difficulty in walking  R26.2 719.7   3. Oropharyngeal dysphagia  R13.12 787.22     Patient Active Problem List   Diagnosis    Degeneration of lumbosacral intervertebral disc    High blood pressure    High cholesterol    Kidney disorder    Lumbar spinal stenosis    Murmur, cardiac    Other specified chronic obstructive pulmonary disease    Thoracic or lumbosacral neuritis or radiculitis    Thyroid disorder    Solitary pulmonary nodule    Primary insomnia    Pernicious anemia    Other long term (current) drug therapy    Chronic kidney disease, stage 2 (mild)    COPD exacerbation    Essential (primary) hypertension    Nicotine dependence, unspecified, uncomplicated    Encounter for examination for admission to nursing home    Encounter for annual wellness exam in Medicare patient    Acute on chronic respiratory failure with hypoxia    Pneumonia     Past Medical History:   Diagnosis Date    Abnormal weight loss     Anxiety     Cardiac murmur, unspecified     Chronic kidney disease, stage 2 (mild)     Condition not found     LBP    COPD (chronic obstructive pulmonary disease)     Depression     loss of daughter    Diarrhea, unspecified     Dysphagia, oropharyngeal phase     Essential (primary) hypertension     Gastritis     Gastro-esophageal reflux disease without esophagitis     Generalized anxiety disorder     Hiatal hernia     History of falling     Hypercholesterolemia     Hypothyroid     Infected abrasion of scalp, initial encounter     Long term (current) use of opiate analgesic     Major depressive disorder, recurrent, moderate     Muscle weakness (generalized)     Nicotine dependence, unspecified, uncomplicated     Occlusion and  "stenosis of bilateral carotid arteries     Other allergy, subsequent encounter     Other long term (current) drug therapy     Pain     LEFT-knee, shoulder RIGHT-hip, knee    Pernicious anemia     Primary insomnia     Solitary pulmonary nodule      Past Surgical History:   Procedure Laterality Date    APPENDECTOMY      CATARACT EXTRACTION, BILATERAL      FOOT SURGERY Bilateral     secondary bone spurs    HYSTERECTOMY  1976, 1987    LAPAROSCOPIC CHOLECYSTECTOMY      OTHER SURGICAL HISTORY Right     Ear Surger; unspecified       SLP Recommendation and Plan          Inpatient Speech Pathology Dysphagia Evaluation        PAIN SCALE: Patient did not indicate that she was having any pain.    PRECAUTIONS/CONTRAINDICATIONS:  Aspiration precautions. Standard precautions.    SUSPECTED ABUSE/NEGLECT/EXPLOITATION:  No    PRIOR FUNCTION:  Patient is poor historian but was on level 0 thin liquids and level 7 regular solids. Patient could not identify if she was having trouble with her swallow prior to hospitalization.     PATIENT GOALS/EXPECTATIONS:  to consume least restrictive diet without signs/symptoms of aspiration.     HISTORY: Patient is an 79-year-old female that presented to the ED for evaluation of shortness of breath.  Patient has a history of COPD and reported it has been worse lately.  Patient reported no chest pain but endorsed palpitations.  On admission 11/8/2024, patient was reportedly febrile, tachycardic, tachypneic, and hypoxic.  Per note, patient was lethargic but responded to verbal and painful stimuli.  Patient was transported from a SNF and plans to return there at discharge.  Patient's chest x-ray reported, \"1.  Persistent left basilar airspace disease compatible with pneumonia. 2.  COPD. 3.  Possible trace pleural effusions.\"  In reviewing the chart, patient was seen at [facility] by an SLP on 4/16/2024 who recommended purée and thin with meds crushed in purée.\"    CURRENT DIET LEVEL:  Level 7 " regular solids and level 0 thin liquids.     OBJECTIVE:    TEST ADMINISTERED:  Oral Mechanisms Exam and Clinical Swallow Evaluation.    COGNITION/SAFETY AWARENESS:  Patient appears to have  cognitive decline at baseline. Patient is easily confused and has difficulty following conversation at times.    BEHAVIORAL OBSERVATIONS:  Patient was pleasantly confused. Patient did well interacting with SLP but often required visual cues to follow one step directions for oral mechanisms exam.    ORAL MOTOR EXAM:  No one sided weakness noted. Patient has mild weakness of the oral mechanisms specifically the labial and lingual muscles. This is likely age related.    VOICE QUALITY:  possible presbyphonia but otherwise normal.    REFLEX EXAM:  velopharyngeal function appears to be within normal limits.     POSTURE:  upright for oral care and when eating or drinking.     FEEDING/SWALLOWING FUNCTION:  Patient participated in clinical swallow evaluation and trialed level 0 thin liquids via cup and level 4 pureed solids. Patient had gurgly sounding voice with both consistencies and throat cleared x 3 with level 0 thin liquids.     CLINICAL OBSERVATIONS:  Patient's swallow appeared weak as evident by reduced hyolaryngeal excursion. Patient tongue pumping with solids. Patient required cue to produce a strong cough.     DYSPHAGIA CRITERIA:  high aspiration risk. COPD. Pneumonia.    FUNCTIONAL ASSESSMENT INSTRUMENT: Patient currently scored a level 1 of 7 on Functional Communication Measures for swallowing indicating a 100% limitation in function.    ASSESSMENT/ PLAN OF CARE:  Pt presents with limitations, noted below, that impede her ability to swallow. The skills of a therapist will be required to safely and effectively implement the following treatment plan to restore maximal level of function.    PROBLEMS:  1.  Dysphagia     LTG 1: 30 days: The patient will maintain adequate hydration/nutrition with optimum safety and efficiency of  swallowing function on P.O. intake without overt signs and symptoms of aspiration for the highest appropriate diet level     STG 1a: 14 days: The client will demonstrate the ability to adequately self-monitor swallowing skills and perform appropriate compensatory techniques to reduce signs and symptoms of aspiration in 100% of trials with no cues.     STG 1b: 14 days: The patient will tolerate recommended diet without signs/symptoms of aspiration.     TREATMENT: Patient will continue to be monitored to ensure that he is tolerating the recommended diet without signs/symptoms of aspiration.    FREQUENCY/DURATION:  once daily/ 5 times a week.    REHAB POTENTIAL:  Pt has fair rehab potential.  The following limitations may influence improvement/ length of tx  medical status/mental status.    RECOMMENDATIONS:   1.   DIET: NPO until MBSS.    2.  POSITION: upright for oral care.    Pt/responsible party agrees with plan of care and has been informed of all alternatives, risks and benefits.                                                                                    EDUCATION  The patient has been educated in the following areas:   Dysphagia (Swallowing Impairment).                Time Calculation:    Time Calculation- SLP       Row Name 11/14/24 0745             Time Calculation- SLP    SLP Start Time 0745  -AW      SLP Stop Time 0845  -AW      SLP Time Calculation (min) 60 min  -AW         Untimed Charges    36000-VN Eval Oral Pharyng Swallow Minutes 60  -AW         Total Minutes    Untimed Charges Total Minutes 60  -AW       Total Minutes 60  -AW                User Key  (r) = Recorded By, (t) = Taken By, (c) = Cosigned By      Initials Name Provider Type    Shanna Aaron SLP Speech and Language Pathologist                    Therapy Charges for Today       Code Description Service Date Service Provider Modifiers Qty    [id number]  ST EVAL ORAL PHARYNG SWALLOW 4 11/14/2024 Shanna Ramirez, SLP GN 1                  Shanna Ramirez, SLP  11/14/2024

## 2024-11-14 NOTE — MBS/VFSS/FEES
Acute Care - Speech Language Pathology   Swallow  Modified Barium Swallow Study  KEVON Morrison     Patient Name: Amada Bentley  : 1945  MRN: [id number]  Today's Date: 2024               Admit Date: 2024    Visit Dx:     ICD-10-CM ICD-9-CM   1. COPD exacerbation  J44.1 491.21   2. Difficulty in walking  R26.2 719.7   3. Oropharyngeal dysphagia  R13.12 787.22     Patient Active Problem List   Diagnosis    Degeneration of lumbosacral intervertebral disc    High blood pressure    High cholesterol    Kidney disorder    Lumbar spinal stenosis    Murmur, cardiac    Other specified chronic obstructive pulmonary disease    Thoracic or lumbosacral neuritis or radiculitis    Thyroid disorder    Solitary pulmonary nodule    Primary insomnia    Pernicious anemia    Other long term (current) drug therapy    Chronic kidney disease, stage 2 (mild)    COPD exacerbation    Essential (primary) hypertension    Nicotine dependence, unspecified, uncomplicated    Encounter for examination for admission to nursing home    Encounter for annual wellness exam in Medicare patient    Acute on chronic respiratory failure with hypoxia    Pneumonia     Past Medical History:   Diagnosis Date    Abnormal weight loss     Anxiety     Cardiac murmur, unspecified     Chronic kidney disease, stage 2 (mild)     Condition not found     LBP    COPD (chronic obstructive pulmonary disease)     Depression     loss of daughter    Diarrhea, unspecified     Dysphagia, oropharyngeal phase     Essential (primary) hypertension     Gastritis     Gastro-esophageal reflux disease without esophagitis     Generalized anxiety disorder     Hiatal hernia     History of falling     Hypercholesterolemia     Hypothyroid     Infected abrasion of scalp, initial encounter     Long term (current) use of opiate analgesic     Major depressive disorder, recurrent, moderate     Muscle weakness (generalized)     Nicotine dependence, unspecified, uncomplicated      Occlusion and stenosis of bilateral carotid arteries     Other allergy, subsequent encounter     Other long term (current) drug therapy     Pain     LEFT-knee, shoulder RIGHT-hip, knee    Pernicious anemia     Primary insomnia     Solitary pulmonary nodule      Past Surgical History:   Procedure Laterality Date    APPENDECTOMY      CATARACT EXTRACTION, BILATERAL      FOOT SURGERY Bilateral     secondary bone spurs    HYSTERECTOMY  1976, 1987    LAPAROSCOPIC CHOLECYSTECTOMY      OTHER SURGICAL HISTORY Right     Ear Surger; unspecified       SLP Recommendation and Plan      MODIFIED BARIUM SWALLOW STUDY: SPEECH PATHOLOGY REPORT        DATE OF SERVICE:  11/14/2024    PERTINENT INFORMATION:  Amada Plaza is an 79 year old female with a history of COPD and pneumonia.    Amada Plaza was referred for an MBSS by Dr. John Hernandez to rule out aspiration as well as to determine appropriate treatment plan for this patient.      PROCEDURE:    Amada Plaza was alert and cooperative.  The patient was viewed in lateral position.  The following Ba consistencies were administered:  level 0 thin liquids, level 2 mildly thick/nectar-thick liquids, level 4 pureed solids, level 6 soft and bite sized/mech-soft solids, and level 7 regular solids.      RESULTS:    Patient was positioned upright in chair. Patient was given trials of level 0 thin liquids, level 2 mildly thick/nectar-thick liquids, level 4 pureed solids, level 6 soft and bite sized/mech-soft solids, and level 7 regular solids mixed with barium.     Level 0 Thin barium: Patient was given trial of thin barium by cup. Premature spillage to the pyriform sinuses. Reduced  epiglottic inversion. After initial swallow patient had mild residue in her mouth and vallecula. Patient aspirated the residue from the vallecula which spilled downward as she attempted to speak. Patient did cough with aspiration but cough was weak. Second trial of thin had similar presentation but patient did not aspirate the  residue in the oral cavity and vallecula. However, she did require a cue to dry swallow the residue remaining in her mouth.    Level 2 Mildly thick/Nectar-thick barium: Patient was given nectar-thick barium by cup and straw. Premature spillage to the vallecula. Reduced epiglottic inversion. Tongue pumping noted. Trace residue in the oral cavity, vallecula, and pyriform sinuses. Flash/Minimal penetration noted. No aspiration noted.     Level 4 Puree solids (Applesauce): Patient was given trials of puree solids mixed with barium. Premature spillage to the vallecula. Tongue pumping noted. Trace residue in the oral cavity, vallecula, and pyriform sinuses. No penetration or aspiration noted.     Mech soft solids (Softened cracker): Patient was given trials of mech soft solids mixed with barium. Premature spillage to the vallecula. Tongue pumping noted. Trace residue in the oral cavity, vallecula, and pyriform sinuses. No penetration or aspiration noted.     Regular solids (Sae cracker): Patient was given trial of regular solid mixed with barium. Premature spillage slightly past the vallecula but did not reach the pyriform sinuses. Tongue pumping noted. Trace residue in the oral cavity. Flash/Minimal penetration noted. No aspiration noted.       IMPRESSIONS:    Amada Plaza demonstrated oropharyngeal characterized by aspiration of thin liquids after the swallow, premature spillage, tongue pumping, and residue after the swallow.     FUNCTIONAL DEFICIT: Patient scored level 5 of 7 on Functional Communication Measures for swallowing indicating a 25% limitation in function for current status, goal status, and discharge status.      RECOMMENDATIONS:   1.  SLP recommends the patient consume level 2 mildly thick liquids and level 7 regular solids. Patient unlikely to remember strategy of double swallow which would be necessary to consume thin liquids and prevent aspiration.  2.  Patient should remain in an upright position when  completing oral care or eating or drinking.  3.  Patient should complete oral care frequently.   4.  Patient should eat at a slow rate and take small bites and sips.   5.  Patient should continue to be monitored by SLP to ensure tolerance of recommended diet.      Patient/responsible party agrees with the plan of care and has been informed of all alternatives, risks and benefits.    Thank you for this referral.                                                                                    EDUCATION  The patient has been educated in the following areas:   Dysphagia (Swallowing Impairment).                Time Calculation:    Time Calculation- SLP       Row Name 11/14/24 1441 11/14/24 0745          Time Calculation- SLP    SLP Start Time 1215  -AW 0745  -AW     SLP Stop Time 1345  -AW 0845  -AW     SLP Time Calculation (min) 90 min  -AW 60 min  -AW     SLP Received On -- 11/14/24  -AW        Untimed Charges    80317-EY Eval Oral Pharyng Swallow Minutes -- 60  -AW     03535-RP Motion Fluoro Eval Swallow Minutes 90  -AW --        Total Minutes    Untimed Charges Total Minutes 90  -AW 60  -AW      Total Minutes 90  -AW 60  -AW               User Key  (r) = Recorded By, (t) = Taken By, (c) = Cosigned By      Initials Name Provider Type    Shanna Aaron SLP Speech and Language Pathologist                    Therapy Charges for Today       Code Description Service Date Service Provider Modifiers Qty    36215250551 HC ST EVAL ORAL PHARYNG SWALLOW 4 11/14/2024 Shanna Rmairez SLP GN 1    42532439337 HC ST MOTION FLUORO EVAL SWALLOW 6 11/14/2024 Shanna Ramirez SLP GN 1                 ELLA Valencia  11/14/2024

## 2024-11-15 VITALS
SYSTOLIC BLOOD PRESSURE: 110 MMHG | RESPIRATION RATE: 16 BRPM | HEIGHT: 63 IN | DIASTOLIC BLOOD PRESSURE: 71 MMHG | WEIGHT: 111.55 LBS | BODY MASS INDEX: 19.77 KG/M2 | HEART RATE: 76 BPM | TEMPERATURE: 98.5 F | OXYGEN SATURATION: 98 %

## 2024-11-15 LAB
ANION GAP SERPL CALCULATED.3IONS-SCNC: 10.9 MMOL/L (ref 5–15)
BUN SERPL-MCNC: 27 MG/DL (ref 8–23)
BUN/CREAT SERPL: 24.1 (ref 7–25)
CALCIUM SPEC-SCNC: 8.3 MG/DL (ref 8.6–10.5)
CHLORIDE SERPL-SCNC: 109 MMOL/L (ref 98–107)
CO2 SERPL-SCNC: 19.1 MMOL/L (ref 22–29)
CREAT SERPL-MCNC: 1.12 MG/DL (ref 0.57–1)
DEPRECATED RDW RBC AUTO: 62.6 FL (ref 37–54)
EGFRCR SERPLBLD CKD-EPI 2021: 49.5 ML/MIN/1.73
ERYTHROCYTE [DISTWIDTH] IN BLOOD BY AUTOMATED COUNT: 16.5 % (ref 12.3–15.4)
GLUCOSE SERPL-MCNC: 123 MG/DL (ref 65–99)
HCT VFR BLD AUTO: 32.9 % (ref 34–46.6)
HGB BLD-MCNC: 10.1 G/DL (ref 12–15.9)
MCH RBC QN AUTO: 32.1 PG (ref 26.6–33)
MCHC RBC AUTO-ENTMCNC: 30.7 G/DL (ref 31.5–35.7)
MCV RBC AUTO: 104.4 FL (ref 79–97)
PLATELET # BLD AUTO: 423 10*3/MM3 (ref 140–450)
PMV BLD AUTO: 10.4 FL (ref 6–12)
POTASSIUM SERPL-SCNC: 3.8 MMOL/L (ref 3.5–5.2)
RBC # BLD AUTO: 3.15 10*6/MM3 (ref 3.77–5.28)
SODIUM SERPL-SCNC: 139 MMOL/L (ref 136–145)
WBC NRBC COR # BLD AUTO: 6.04 10*3/MM3 (ref 3.4–10.8)

## 2024-11-15 PROCEDURE — 94664 DEMO&/EVAL PT USE INHALER: CPT

## 2024-11-15 PROCEDURE — 99239 HOSP IP/OBS DSCHRG MGMT >30: CPT | Performed by: INTERNAL MEDICINE

## 2024-11-15 PROCEDURE — 25010000002 HEPARIN (PORCINE) PER 1000 UNITS: Performed by: FAMILY MEDICINE

## 2024-11-15 PROCEDURE — 63710000001 PREDNISONE PER 1 MG: Performed by: INTERNAL MEDICINE

## 2024-11-15 PROCEDURE — 99233 SBSQ HOSP IP/OBS HIGH 50: CPT | Performed by: INTERNAL MEDICINE

## 2024-11-15 PROCEDURE — 94799 UNLISTED PULMONARY SVC/PX: CPT

## 2024-11-15 PROCEDURE — 80048 BASIC METABOLIC PNL TOTAL CA: CPT | Performed by: FAMILY MEDICINE

## 2024-11-15 PROCEDURE — 25010000002 AMPICILLIN-SULBACTAM PER 1.5 G: Performed by: NURSE PRACTITIONER

## 2024-11-15 PROCEDURE — 92526 ORAL FUNCTION THERAPY: CPT

## 2024-11-15 PROCEDURE — 85027 COMPLETE CBC AUTOMATED: CPT | Performed by: FAMILY MEDICINE

## 2024-11-15 RX ORDER — PREDNISONE 20 MG/1
40 TABLET ORAL
Start: 2024-11-16 | End: 2024-11-21

## 2024-11-15 RX ORDER — QUETIAPINE FUMARATE 25 MG/1
25 TABLET, FILM COATED ORAL NIGHTLY
Start: 2024-11-15

## 2024-11-15 RX ADMIN — HEPARIN SODIUM 5000 UNITS: 5000 INJECTION INTRAVENOUS; SUBCUTANEOUS at 11:34

## 2024-11-15 RX ADMIN — MEGESTROL ACETATE 200 MG: 400 SUSPENSION ORAL at 11:21

## 2024-11-15 RX ADMIN — BUDESONIDE 0.5 MG: 0.5 SUSPENSION RESPIRATORY (INHALATION) at 07:37

## 2024-11-15 RX ADMIN — DIVALPROEX SODIUM 375 MG: 125 CAPSULE, COATED PELLETS ORAL at 11:20

## 2024-11-15 RX ADMIN — FAMOTIDINE 10 MG: 20 TABLET ORAL at 11:20

## 2024-11-15 RX ADMIN — LEVOTHYROXINE SODIUM 75 MCG: 75 TABLET ORAL at 06:30

## 2024-11-15 RX ADMIN — GUAIFENESIN 600 MG: 600 TABLET ORAL at 11:20

## 2024-11-15 RX ADMIN — METOPROLOL SUCCINATE 50 MG: 50 TABLET, EXTENDED RELEASE ORAL at 11:21

## 2024-11-15 RX ADMIN — AMPICILLIN AND SULBACTAM 3 G: 2; 1 INJECTION, POWDER, FOR SOLUTION INTRAVENOUS at 01:12

## 2024-11-15 RX ADMIN — Medication 250 MG: at 11:19

## 2024-11-15 RX ADMIN — PANTOPRAZOLE SODIUM 40 MG: 40 TABLET, DELAYED RELEASE ORAL at 06:30

## 2024-11-15 RX ADMIN — Medication 10 ML: at 11:22

## 2024-11-15 RX ADMIN — ARFORMOTEROL TARTRATE 15 MCG: 15 SOLUTION RESPIRATORY (INHALATION) at 07:37

## 2024-11-15 RX ADMIN — PREDNISONE 40 MG: 20 TABLET ORAL at 11:21

## 2024-11-15 RX ADMIN — CETIRIZINE HYDROCHLORIDE 10 MG: 10 TABLET, FILM COATED ORAL at 11:21

## 2024-11-15 RX ADMIN — IPRATROPIUM BROMIDE AND ALBUTEROL SULFATE 3 ML: .5; 3 SOLUTION RESPIRATORY (INHALATION) at 07:37

## 2024-11-15 NOTE — PROGRESS NOTES
[facility]     Progress Note    Patient Name: Amada Bentley  : 1945  MRN: [id number]  Primary Care Physician:  Jennifer Estes MD  Date of admission: 2024    Subjective   Subjective     Follow up for pneumonia, respiratory failure, sepsis    Over the last 24 hours.  Completed course of azithromycin.  De-escalated to Unasyn.  Remains on Brovana, Pulmicort, DuoNebs.  Remains on prednisone 40 mg oral daily.  Safety sitter discontinued.    No acute events overnight.  Refused NIPPV.    This morning,  Lying in bed  Currently on 1 L nasal cannula  Mentation improving  Alert to self  Remains with intermittent confusion  Somnolent but arousable  Cough and dyspnea improving  No fevers  Remains weak and fatigued      Objective   Objective     Vitals:   Temp:  [97.2 °F (36.2 °C)-98.1 °F (36.7 °C)] 98.1 °F (36.7 °C)  Heart Rate:  [66-87] 87  Resp:  [16-18] 18  BP: (106-182)/(62-79) 106/62  Flow (L/min) (Oxygen Therapy):  [1-2] 2    Physical Exam   Vital Signs Reviewed   General: WDWN, Alert, NAD.  Chronically ill-appearing female, lying in bed  HEENT:  PERRL, EOMI.  OP, nares clear, no sinus tenderness  Neck:  Supple, no JVD, no thyromegaly  Chest: Improved aeration, scattered rhonchi with diminished breath sounds bilaterally, no increased work of breathing on 1 L while at rest  CV: RRR, no MGR, pulses 2+, equal.  Abd:  Soft, NT, ND, + BS, no HSM  EXT:  no clubbing, no cyanosis, no edema  Neuro:  A&Ox3, CN grossly intact, no focal deficits.  Skin: No rashes or lesions noted      Result Review    Result Review:  I have personally reviewed the results from the time of this admission to 11/15/2024 07:20 EST and agree with these findings:  [x]  Laboratory list / accordion  [x]  Microbiology  [x]  Radiology  [x]  EKG/Telemetry   [x]  Cardiology/Vascular   []  Pathology  []  Old records  []  Other:  Procalcitonin 7.58 --> 1.41      Lab 11/15/24  0419 24  0414 24  0417 24  0419 24  0309  11/10/24  0210 11/09/24  0102 11/08/24  1850   WBC 6.04 7.53 5.71 8.98 7.71 11.65* 10.77  --    HEMOGLOBIN 10.1* 9.1* 10.5* 9.7* 9.3* 8.2* 10.1*  --    HEMATOCRIT 32.9* 29.4* 33.1* 31.1* 29.5* 25.5* 32.1*  --    PLATELETS 423 405 411 419 415 358 462*  --    SODIUM 139 143 139 140 138 139 136 137   POTASSIUM 3.8 3.2* 3.6 3.9 3.1* 4.1 3.4* 3.8   CHLORIDE 109* 110* 107 105 102 107 97* 99   CO2 19.1* 23.5 21.0* 24.7 22.6 22.5 18.8* 21.9*   BUN 27* 30* 30* 31* 30* 22 18 18   CREATININE 1.12* 1.04* 1.09* 1.30* 1.24* 0.92 1.16* 1.15*   GLUCOSE 123* 107* 120* 106* 116* 116* 179* 134*   CALCIUM 8.3* 8.3* 9.0 8.9 8.9 8.5* 9.3 9.5   PHOSPHORUS  --   --   --  2.8 3.5 4.0 4.7*  --    TOTAL PROTEIN  --   --   --   --   --   --   --  7.3   ALBUMIN  --   --   --  3.5  --  3.0*  --  3.9   GLOBULIN  --   --   --   --   --   --   --  3.4     Assessment & Plan   Assessment / Plan     Brief Patient Summary:  Amada Bentley is a 79 y.o. female who in the intensive care unit with sepsis    Active Hospital Problems:  Active Hospital Problems    Diagnosis     **Acute on chronic respiratory failure with hypoxia     Pneumonia     Nicotine dependence, unspecified, uncomplicated     Essential (primary) hypertension     COPD exacerbation     High blood pressure      Impression:  Severe sepsis  Concern for bibasilar pneumonia  Concern for aspiration pneumonia  COPD with acute exacerbation  Bronchiectasis with acute exacerbation  Dementia    Plan:   -Continue to wean supplemental oxygen maintain SpO2 greater than 90%  -Completed course of azithromycin and Unasyn.  -Aspiration precautions.  Keep the head of bed elevated.  -Micro negative to date  -Procalcitonin trending down  -Continue Brovana, Pulmicort, DuoNebs  -Continue prednisone 40 mg oral daily  -Continue bronchopulmonary and bronchodilator protocols.  -Encourage activity and incentive spirometer use  -Chest physiotherapy available  -SLP on board.  Appreciate assistance.  -Palliative care on  board.  Appreciate assistance  -Okay to return to LTC from pulmonary standpoint.    VTE Prophylaxis:  Pharmacologic VTE prophylaxis orders are present.    CODE STATUS:    Medical Intervention Limits: No intubation (DNI)  Level Of Support Discussed With: Health Care Surrogate; Next of Kin (If No Surrogate)  Code Status (Patient has no pulse and is not breathing): No CPR (Do Not Attempt to Resuscitate)  Medical Interventions (Patient has pulse or is breathing): Limited Support    I have reviewed labs, imaging, pertinent clinical data and provider notes.   I have discussed with bedside nurse and primary service.     Electronically signed by Nhan Blank MD, 11/15/24, 7:20 AM EST.    Electronically signed by ALBERTO Alvarenga, 11/15/24, 2:45 PM EST.    This visit was performed by BOTH a physician and an APC. I personally evaluated and examined the patient. I performed all aspects of MDM as documented. , I have reviewed and confirmed the accuracy of the patient's history as documented in this note., and I have reexamined the patient and the results are consistent with the previously documented exam. I have updated the documentation as necessary.     Electronically signed by Nhan Blank MD, 11/15/24, 2:55 PM EST.

## 2024-11-15 NOTE — PLAN OF CARE
Problem: Adult Inpatient Plan of Care  Goal: Plan of Care Review  Outcome: Progressing  Flowsheets (Taken 11/15/2024 0431)  Progress: no change  Outcome Evaluation: Pt is alert to self, confused, VSS, refuses some medications, repeatly gets out of the bed without calling, bed alarm on, one dose of haldol given per order, No complaints of pain or discomfort. care ongoing  Plan of Care Reviewed With: patient   Goal Outcome Evaluation:  Plan of Care Reviewed With: patient        Progress: no change  Outcome Evaluation: Pt is alert to self, confused, VSS, refuses some medications, repeatly gets out of the bed without calling, bed alarm on, one dose of haldol given per order, No complaints of pain or discomfort. care ongoing

## 2024-11-15 NOTE — THERAPY TREATMENT NOTE
Acute Care - Speech Language Pathology   Swallow Treatment Note KEVON Morrison     Patient Name: Amada Bentley  : 1945  MRN: [id number]  Today's Date: 11/15/2024               Admit Date: 2024    Visit Dx:     ICD-10-CM ICD-9-CM   1. COPD exacerbation  J44.1 491.21   2. Difficulty in walking  R26.2 719.7   3. Oropharyngeal dysphagia  R13.12 787.22     Patient Active Problem List   Diagnosis    Degeneration of lumbosacral intervertebral disc    High blood pressure    High cholesterol    Kidney disorder    Lumbar spinal stenosis    Murmur, cardiac    Other specified chronic obstructive pulmonary disease    Thoracic or lumbosacral neuritis or radiculitis    Thyroid disorder    Solitary pulmonary nodule    Primary insomnia    Pernicious anemia    Other long term (current) drug therapy    Chronic kidney disease, stage 2 (mild)    COPD exacerbation    Essential (primary) hypertension    Nicotine dependence, unspecified, uncomplicated    Encounter for examination for admission to nursing home    Encounter for annual wellness exam in Medicare patient    Acute on chronic respiratory failure with hypoxia    Pneumonia     Past Medical History:   Diagnosis Date    Abnormal weight loss     Anxiety     Cardiac murmur, unspecified     Chronic kidney disease, stage 2 (mild)     Condition not found     LBP    COPD (chronic obstructive pulmonary disease)     Depression     loss of daughter    Diarrhea, unspecified     Dysphagia, oropharyngeal phase     Essential (primary) hypertension     Gastritis     Gastro-esophageal reflux disease without esophagitis     Generalized anxiety disorder     Hiatal hernia     History of falling     Hypercholesterolemia     Hypothyroid     Infected abrasion of scalp, initial encounter     Long term (current) use of opiate analgesic     Major depressive disorder, recurrent, moderate     Muscle weakness (generalized)     Nicotine dependence, unspecified, uncomplicated     Occlusion and stenosis  of bilateral carotid arteries     Other allergy, subsequent encounter     Other long term (current) drug therapy     Pain     LEFT-knee, shoulder RIGHT-hip, knee    Pernicious anemia     Primary insomnia     Solitary pulmonary nodule      Past Surgical History:   Procedure Laterality Date    APPENDECTOMY      CATARACT EXTRACTION, BILATERAL      FOOT SURGERY Bilateral     secondary bone spurs    HYSTERECTOMY  1976, 1987    LAPAROSCOPIC CHOLECYSTECTOMY      OTHER SURGICAL HISTORY Right     Ear Surger; unspecified       SLP Recommendation and Plan      SPEECH PATHOLOGY DYSPHAGIA TREATMENT    Subjective/Behavioral Observations: Patient was pleasant and cooperative. Per nursing, patient has been eating and drinking well and does not seem to be having any trouble with her recommended diet.         Day/time of Treatment: Afternoon/lunch        Current Diet: Level 2 mildly thick liquids and level 7 regular solids.         Current Strategies: Eat at a slow rate and take small bites and sips. Complete oral care frequently.        Treatment received: Patient trialed level 2 mildly thick liquids, level 4 pureed solids, and level 7 regular solids.        Results of treatment: Patient did not demonstrate any overt signs/symptoms of aspiration. Patient demonstrated good mastication and good clearance of bolus. Patient's swallow does appear a little weak with slightly reduced hyolaryngeal excursion; however, her swallow is functional with this diet. Patient would not likely be a good candidate for follow up swallowing exercises due to increased difficulty following complex directions.         Progress toward goals: progressing        Barriers to Achieving goals: Medical status/ Mental status        Plan of care:/changes in plan: No change to plan of care at this time. Patient to be discharged to SNF today.                                                                                Reason for Discharge: discharge from this  facility (11/15/24 4343)               EDUCATION  The patient has been educated in the following areas:   Dysphagia (Swallowing Impairment).                Time Calculation:    Time Calculation- SLP       Row Name 11/15/24 1215             Time Calculation- SLP    SLP Start Time 1245  -AW      SLP Stop Time 1330  -AW      SLP Time Calculation (min) 45 min  -AW         Untimed Charges    96001-SP Treatment Swallow Minutes 45  -AW         Total Minutes    Untimed Charges Total Minutes 45  -AW       Total Minutes 45  -AW                User Key  (r) = Recorded By, (t) = Taken By, (c) = Cosigned By      Initials Name Provider Type    Shanna Aaron SLP Speech and Language Pathologist                    Therapy Charges for Today       Code Description Service Date Service Provider Modifiers Qty    74054433461 HC ST EVAL ORAL PHARYNG SWALLOW 4 11/14/2024 Shanna Ramirez SLP GN 1    60506988319 HC ST MOTION FLUORO EVAL SWALLOW 6 11/14/2024 Shanna Ramirez SLP GN 1    87972216084 HC ST TREATMENT SWALLOW 3 11/15/2024 Shanna Ramirez SLP GN 1                 ELLA Valencia  11/15/2024

## 2024-11-15 NOTE — DISCHARGE SUMMARY
[facility]         HOSPITALIST  DISCHARGE SUMMARY    Patient Name: Amada Bentley  : 1945  MRN: [id number]    Date of Admission: 2024  Date of Discharge:  11/15/2024    Primary Care Physician: Jennifer Estes MD    Consults       Date and Time Order Name Status Description    2024  8:11 PM Hospitalist (on-call MD unless specified)              Active and Resolved Hospital Problems:  Active Hospital Problems    Diagnosis POA    **Acute on chronic respiratory failure with hypoxia [J96.21] Yes    Pneumonia [J18.9] Unknown    Nicotine dependence, unspecified, uncomplicated [F17.200] Yes    Essential (primary) hypertension [I10] Yes    COPD exacerbation [J44.1] Yes    High blood pressure [I10] Yes      Resolved Hospital Problems   No resolved problems to display.       Hospital Course     Hospital Course:  Amada Bentley is a 79 y.o. female  with advanced dementia, COPD, depression, CKD and other medical problems presented from a nursing home with respiratory distress.  She had apparently been having worsening shortness of breath at the nursing facility along with altered mental status, lethargy, weakness.  Brought to the ED.  CODE STATUS was confirmed as DNR/DNI.  She was admitted to the ICU and placed on NIPPV.  COVID and flu and RSV were negative.  Chest x-ray showed left lower lobe pneumonia.  She was admitted to the medicine service, seen by critical care in consultation.  Started on antibiotics. Moved out of ICU on 2024.  Patient completed a course of IV antibiotics.  Patient was continued on bronchodilators.  Patient was continued on oral steroids.  Patient had Seroquel started at night to help with sleep and agitation.  Patient had modified barium swallow which demonstrated aspiration of thin liquids after swallow, premature spillage, tongue pumping and residue after swallow.  At this time speech is recommending patient have level 2 mildly thick liquids, and level 7  regular solids.  Patient should remain in an upright position.  Patient should eat at a slow right and take small bites and sips.  Patient should continue with speech therapy at her nursing home.  Patient will be discharged back to her nursing home today in stable condition.  Patient will be transported by her daughter          Day of Discharge     Vital Signs:  Temp:  [97.2 °F (36.2 °C)-98.5 °F (36.9 °C)] 98.5 °F (36.9 °C)  Heart Rate:  [68-87] 76  Resp:  [16-18] 16  BP: (106-182)/(62-79) 110/71  Flow (L/min) (Oxygen Therapy):  [1-2] 1  Physical Exam:   General: Awake, alert, nad.   HENT: NCAT, MMM  Eyes: pupils equal, no scleral icterus  Cardiovascular: RRR, no murmurs   Pulmonary: Bilateral breath sounds.  No wheezing  Gastrointestinal: S/ND/NT, +BS  Musculoskeletal: No gross deformities  Skin: No jaundice, no rash on exposed skin appreciated  Psych: Mood is currently calm. Patient remains confused          Discharge Details        Discharge Medications        New Medications        Instructions Start Date   predniSONE 20 MG tablet  Commonly known as: DELTASONE   40 mg, Oral, Daily With Breakfast   Start Date: November 16, 2024     QUEtiapine 25 MG tablet  Commonly known as: SEROquel   25 mg, Oral, Nightly             Changes to Medications        Instructions Start Date   mirtazapine 15 MG tablet  Commonly known as: Remeron  What changed: how much to take   15 mg, Oral, Nightly             Continue These Medications        Instructions Start Date   albuterol (2.5 MG/3ML) 0.083% nebulizer solution  Commonly known as: PROVENTIL   2.5 mg, Nebulization, Every 4 Hours PRN      allopurinol 100 MG tablet  Commonly known as: ZYLOPRIM   100 mg, Oral, Daily      cetirizine 10 MG tablet  Commonly known as: zyrTEC   10 mg, Oral, Daily      Diclofenac Sodium 1 % gel gel  Commonly known as: VOLTAREN   4 g, Topical, 4 Times Daily PRN      Divalproex Sodium 125 MG capsule  Commonly known as: DEPAKOTE SPRINKLE   3 capsules,  Oral, 3 Times Daily      docusate sodium 50 MG capsule  Commonly known as: COLACE   50 mg, Oral, 2 Times Daily      DULoxetine 60 MG capsule  Commonly known as: CYMBALTA   60 mg, Oral, Daily      fluticasone 50 MCG/ACT nasal spray  Commonly known as: FLONASE   2 sprays, Nasal, Daily      folic acid 1 MG tablet  Commonly known as: FOLVITE   1 mg, Oral, Daily      furosemide 40 MG tablet  Commonly known as: LASIX   40 mg, Oral, Daily      Incruse Ellipta 62.5 MCG/ACT aerosol powder   Generic drug: Umeclidinium Bromide   Inhalation      levothyroxine 75 MCG tablet  Commonly known as: SYNTHROID, LEVOTHROID   75 mcg, Oral, Daily      lisinopril 20 MG tablet  Commonly known as: PRINIVIL,ZESTRIL   20 mg, Oral, Daily      megestrol 40 MG/ML suspension  Commonly known as: MEGACE   5 mL, Oral, Daily      metoprolol succinate XL 50 MG 24 hr tablet  Commonly known as: TOPROL-XL   50 mg, Oral, 2 Times Daily      montelukast 10 MG tablet  Commonly known as: Singulair   10 mg, Oral, Nightly      omeprazole 20 MG capsule  Commonly known as: priLOSEC   20 mg, Oral, Daily      potassium chloride 10 MEQ CR tablet   10 mEq, Oral, Daily      VITAMIN B 12 PO   500 mcg, Oral, Daily             Stop These Medications      gabapentin 300 MG capsule  Commonly known as: NEURONTIN              No Known Allergies    Discharge Disposition:  Long Term Care (DC - External)    Diet:  Hospital:  Diet Order   Procedures    Diet: Regular/House; Texture: Regular (IDDSI 7); Fluid Consistency: Nectar Thick       Discharge Activity: up with assistance       CODE STATUS:  Code Status and Medical Interventions: No CPR (Do Not Attempt to Resuscitate); Limited Support; No intubation (DNI)   Ordered at: 11/08/24 2045     Medical Intervention Limits:    No intubation (DNI)     Level Of Support Discussed With:    Health Care Surrogate    Next of Kin (If No Surrogate)     Code Status (Patient has no pulse and is not breathing):    No CPR (Do Not Attempt to  Resuscitate)     Medical Interventions (Patient has pulse or is breathing):    Limited Support         No future appointments.        Pertinent  and/or Most Recent Results     PROCEDURES:   Modified barium swallow     LAB RESULTS:      Lab 11/15/24  0419 11/14/24  0414 11/13/24  0417 11/12/24  0419 11/11/24  0309 11/10/24  0817 11/10/24  0210 11/09/24  1537 11/09/24  0930 11/09/24  0626 11/09/24  0357 11/09/24  0102 11/08/24  1804 11/08/24  1756   WBC 6.04 7.53 5.71 8.98 7.71  --  11.65*  --   --   --   --  10.77  --  13.93*   HEMOGLOBIN 10.1* 9.1* 10.5* 9.7* 9.3*  --  8.2*  --   --   --   --  10.1*  --  11.1*   HEMATOCRIT 32.9* 29.4* 33.1* 31.1* 29.5*  --  25.5*  --   --   --   --  32.1*  --  35.2   PLATELETS 423 405 411 419 415  --  358  --   --   --   --  462*  --  583*   NEUTROS ABS  --   --   --  5.90  --   --  10.43*  --   --   --   --   --   --  11.32*   IMMATURE GRANS (ABS)  --   --   --  0.04  --   --  0.07*  --   --   --   --   --   --  0.07*   LYMPHS ABS  --   --   --  2.33  --   --  0.71  --   --   --   --   --   --  1.64   MONOS ABS  --   --   --  0.70  --   --  0.40  --   --   --   --   --   --  0.78   EOS ABS  --   --   --  0.00  --   --  0.00  --   --   --   --   --   --  0.03   .4* 100.7* 99.7* 101.0* 99.7*  --  100.0*  --   --   --   --  102.6*  --  102.0*   PROCALCITONIN  --   --  1.41*  --   --   --   --   --   --   --   --  7.58*  --   --    LACTATE  --   --   --   --   --  1.7  --  3.9* 5.5* 4.7* 6.8* 6.7*   < >  --     < > = values in this interval not displayed.         Lab 11/15/24  0419 11/14/24  0414 11/13/24  0417 11/12/24  0419 11/11/24  0309 11/10/24  0210 11/09/24  0102   SODIUM 139 143 139 140 138 139 136   POTASSIUM 3.8 3.2* 3.6 3.9 3.1* 4.1 3.4*   CHLORIDE 109* 110* 107 105 102 107 97*   CO2 19.1* 23.5 21.0* 24.7 22.6 22.5 18.8*   ANION GAP 10.9 9.5 11.0 10.3 13.4 9.5 20.2*   BUN 27* 30* 30* 31* 30* 22 18   CREATININE 1.12* 1.04* 1.09* 1.30* 1.24* 0.92 1.16*   EGFR 49.5*  54.1* 51.1* 41.4* 43.8* 62.7 47.5*   GLUCOSE 123* 107* 120* 106* 116* 116* 179*   CALCIUM 8.3* 8.3* 9.0 8.9 8.9 8.5* 9.3   MAGNESIUM  --  2.2  --  2.3 2.2 2.2 2.1   PHOSPHORUS  --   --   --  2.8 3.5 4.0 4.7*   TSH  --   --   --   --   --   --  1.300         Lab 11/12/24  0419 11/10/24  0210 11/08/24  1850   TOTAL PROTEIN  --   --  7.3   ALBUMIN 3.5 3.0* 3.9   GLOBULIN  --   --  3.4   ALT (SGPT)  --   --  <5   AST (SGOT)  --   --  5   BILIRUBIN  --   --  0.3   ALK PHOS  --   --  58         Lab 11/08/24  1850 11/08/24  1756   PROBNP  --  3,098.0*   HSTROP T 33*  --                  Lab 11/09/24  0626 11/08/24  1804   PH, ARTERIAL 7.425 7.460*   PCO2, ARTERIAL 32.8* 28.9*   PO2 .4* 59.1*   O2 SATURATION ART 98.7 92.1*   FIO2 30  --    HCO3 ART 21.5* 20.5*   BASE EXCESS ART -2.4* -2.3*     Brief Urine Lab Results  (Last result in the past 365 days)        Color   Clarity   Blood   Leuk Est   Nitrite   Protein   CREAT   Urine HCG        11/09/24 0004 Yellow   Clear   Negative   Negative   Negative   30 mg/dL (1+)                 Microbiology Results (last 10 days)       Procedure Component Value - Date/Time    Respiratory Panel PCR w/COVID-19(SARS-CoV-2) FE/[facility]/LOI/PAD/[facility] In-House, NP Swab in UTM/VTM, 2 HR TAT - Swab, Nasopharynx [[id number]]  (Normal) Collected: 11/09/24 1113    Lab Status: Final result Specimen: Swab from Nasopharynx Updated: 11/09/24 1216     ADENOVIRUS, PCR Not Detected     Coronavirus 229E Not Detected     Coronavirus HKU1 Not Detected     Coronavirus NL63 Not Detected     Coronavirus OC43 Not Detected     COVID19 Not Detected     Human Metapneumovirus Not Detected     Human Rhinovirus/Enterovirus Not Detected     Influenza A PCR Not Detected     Influenza B PCR Not Detected     Parainfluenza Virus 1 Not Detected     Parainfluenza Virus 2 Not Detected     Parainfluenza Virus 3 Not Detected     Parainfluenza Virus 4 Not Detected     RSV, PCR Not Detected     Bordetella pertussis pcr Not  Detected     Bordetella parapertussis PCR Not Detected     Chlamydophila pneumoniae PCR Not Detected     Mycoplasma pneumo by PCR Not Detected    Narrative:      In the setting of a positive respiratory panel with a viral infection PLUS a negative procalcitonin without other underlying concern for bacterial infection, consider observing off antibiotics or discontinuation of antibiotics and continue supportive care. If the respiratory panel is positive for atypical bacterial infection (Bordetella pertussis, Chlamydophila pneumoniae, or Mycoplasma pneumoniae), consider antibiotic de-escalation to target atypical bacterial infection.    MRSA Screen, PCR (Inpatient) - Swab, Nares [[id number]]  (Normal) Collected: 11/09/24 0004    Lab Status: Final result Specimen: Swab from Nares Updated: 11/09/24 0311     MRSA PCR No MRSA Detected    Narrative:      The negative predictive value of this diagnostic test is high and should only be used to consider de-escalating anti-MRSA therapy. A positive result may indicate colonization with MRSA and must be correlated clinically.    Legionella Antigen, Urine - Urine, Urine, Clean Catch [[id number]]  (Normal) Collected: 11/09/24 0004    Lab Status: Final result Specimen: Urine, Clean Catch Updated: 11/09/24 0816     LEGIONELLA ANTIGEN, URINE Negative    S. Pneumo Ag Urine or CSF - Urine, Urine, Clean Catch [[id number]]  (Normal) Collected: 11/09/24 0004    Lab Status: Final result Specimen: Urine, Clean Catch Updated: 11/09/24 0816     Strep Pneumo Ag Negative    COVID-19, FLU A/B, RSV PCR 1 HR TAT - Swab, Nasopharynx [[id number]]  (Normal) Collected: 11/08/24 1843    Lab Status: Final result Specimen: Swab from Nasopharynx Updated: 11/08/24 2027     COVID19 Not Detected     Influenza A PCR Not Detected     Influenza B PCR Not Detected     RSV, PCR Not Detected    Narrative:      Fact sheet for providers: https://www.fda.gov/media/521744/download    Fact sheet for patients:  https://www.fda.gov/media/798060/download    Test performed by PCR.    Blood Culture - Blood, Arm, Left [[id number]]  (Normal) Collected: 11/08/24 1756    Lab Status: Final result Specimen: Blood from Arm, Left Updated: 11/13/24 1815     Blood Culture No growth at 5 days    Narrative:      Less than seven (7) mL's of blood was collected.  Insufficient quantity may yield false negative results.    Blood Culture - Blood, Arm, Right [[id number]]  (Normal) Collected: 11/08/24 1756    Lab Status: Final result Specimen: Blood from Arm, Right Updated: 11/13/24 1815     Blood Culture No growth at 5 days    Narrative:      Less than seven (7) mL's of blood was collected.  Insufficient quantity may yield false negative results.            XR Chest 1 View    Result Date: 11/10/2024  Impression: 1. Persistent left basilar airspace disease compatible with pneumonia 2. COPD 3. Possible trace pleural effusions Electronically Signed: Darrick Spicer  11/10/2024 4:34 PM EST  Workstation ID: [id number]    CT Angiogram Abdomen Pelvis    Result Date: 11/10/2024  1. Extensive atherosclerotic disease as described in detail above. No aortic aneurysm or dissection. No evidence of mesenteric ischemia. 2. Evidence of small bowel ileus without obstruction. 3. Diminished enhancement in the lower pole of the right kidney with some associated cortical thinning likely reflects a chronic infarct. Pyelonephritis should be excluded clinically. 4. Occluded left common iliac artery with reconstitution of the internal and external iliac arteries. 5. Unusual appearance in the upper medial spleen that could reflect a mass versus unusual enhancement. Initial evaluation with ultrasound is recommended to exclude a mass. 6. Additional findings as above. Electronically Signed: Ronny Davis MD  11/10/2024 12:43 AM EST  Workstation ID: [id number]    XR Chest 1 View    Result Date: 11/8/2024  Impression: Mild left basilar atelectasis versus early pneumonia.  Electronically Signed: Maury Kerr MD  11/8/2024 6:38 PM EST  Workstation ID: [id number]              Results for orders placed during the hospital encounter of 11/08/24    Adult Transthoracic Echo Complete W/ Cont if Necessary Per Protocol    Interpretation Summary    Technically difficult study.    Left ventricular systolic function is hyperdynamic (EF > 70%).  Subsequent intracavitary gradient noted due to the hyperdynamic state.    Left ventricular diastolic function is consistent with (grade I) impaired relaxation and age.    No significant valvular disease.      Labs Pending at Discharge:        Time spent on Discharge including face to face service:  more than 35  minutes    Electronically signed by John Hernandez DO, 11/15/24, 12:35 PM EST.

## 2024-11-15 NOTE — SIGNIFICANT NOTE
11/15/24 1143   Plan   Plan Pt will return to [facility] today.   Final Discharge Disposition Code 04 - intermediate care facility   Final Note [facility].

## 2024-11-30 LAB
QT INTERVAL: 432 MS
QTC INTERVAL: 488 MS

## 2025-03-04 ENCOUNTER — HOSPITAL ENCOUNTER (EMERGENCY)
Facility: HOSPITAL | Age: 80
Discharge: HOME | End: 2025-03-04
Attending: EMERGENCY MEDICINE | Admitting: EMERGENCY MEDICINE
Payer: MEDICARE

## 2025-03-04 ENCOUNTER — APPOINTMENT (OUTPATIENT)
Dept: GENERAL RADIOLOGY | Facility: HOSPITAL | Age: 80
End: 2025-03-04
Payer: MEDICARE

## 2025-03-04 VITALS
HEART RATE: 71 BPM | BODY MASS INDEX: 20.23 KG/M2 | WEIGHT: 114.2 LBS | TEMPERATURE: 98.5 F | SYSTOLIC BLOOD PRESSURE: 131 MMHG | OXYGEN SATURATION: 100 % | RESPIRATION RATE: 20 BRPM | HEIGHT: 63 IN | DIASTOLIC BLOOD PRESSURE: 52 MMHG

## 2025-03-04 DIAGNOSIS — J44.1 ACUTE EXACERBATION OF CHRONIC OBSTRUCTIVE PULMONARY DISEASE (COPD): Primary | ICD-10-CM

## 2025-03-04 DIAGNOSIS — Z13.9 ENCOUNTER FOR MEDICAL SCREENING EXAMINATION: ICD-10-CM

## 2025-03-04 DIAGNOSIS — Z99.81 CHRONIC HYPOXIC RESPIRATORY FAILURE, ON HOME OXYGEN THERAPY: ICD-10-CM

## 2025-03-04 DIAGNOSIS — J96.11 CHRONIC HYPOXIC RESPIRATORY FAILURE, ON HOME OXYGEN THERAPY: ICD-10-CM

## 2025-03-04 LAB
ALBUMIN SERPL-MCNC: 3 G/DL (ref 3.5–5.2)
ALBUMIN/GLOB SERPL: 1.1 G/DL
ALP SERPL-CCNC: 50 U/L (ref 39–117)
ALT SERPL W P-5'-P-CCNC: <5 U/L (ref 1–33)
ANION GAP SERPL CALCULATED.3IONS-SCNC: 9.8 MMOL/L (ref 5–15)
ANISOCYTOSIS BLD QL: NORMAL
AST SERPL-CCNC: 8 U/L (ref 1–32)
BASOPHILS # BLD AUTO: 0.08 10*3/MM3 (ref 0–0.2)
BASOPHILS NFR BLD AUTO: 0.8 % (ref 0–1.5)
BILIRUB SERPL-MCNC: <0.2 MG/DL (ref 0–1.2)
BUN SERPL-MCNC: 8 MG/DL (ref 8–23)
BUN/CREAT SERPL: 8.4 (ref 7–25)
BURR CELLS BLD QL SMEAR: NORMAL
CALCIUM SPEC-SCNC: 8.3 MG/DL (ref 8.6–10.5)
CHLORIDE SERPL-SCNC: 110 MMOL/L (ref 98–107)
CO2 SERPL-SCNC: 22.2 MMOL/L (ref 22–29)
CREAT SERPL-MCNC: 0.95 MG/DL (ref 0.57–1)
DEPRECATED RDW RBC AUTO: 59.3 FL (ref 37–54)
EGFRCR SERPLBLD CKD-EPI 2021: 59.9 ML/MIN/1.73
EOSINOPHIL # BLD AUTO: 0.39 10*3/MM3 (ref 0–0.4)
EOSINOPHIL NFR BLD AUTO: 3.7 % (ref 0.3–6.2)
ERYTHROCYTE [DISTWIDTH] IN BLOOD BY AUTOMATED COUNT: 15.5 % (ref 12.3–15.4)
FLUAV SUBTYP SPEC NAA+PROBE: NOT DETECTED
FLUBV RNA ISLT QL NAA+PROBE: NOT DETECTED
GLOBULIN UR ELPH-MCNC: 2.8 GM/DL
GLUCOSE BLDC GLUCOMTR-MCNC: 74 MG/DL (ref 70–99)
GLUCOSE SERPL-MCNC: 93 MG/DL (ref 65–99)
HCT VFR BLD AUTO: 29.7 % (ref 34–46.6)
HGB BLD-MCNC: 9.2 G/DL (ref 12–15.9)
HOLD SPECIMEN: NORMAL
IMM GRANULOCYTES # BLD AUTO: 0.14 10*3/MM3 (ref 0–0.05)
IMM GRANULOCYTES NFR BLD AUTO: 1.3 % (ref 0–0.5)
LARGE PLATELETS: NORMAL
LYMPHOCYTES # BLD AUTO: 2.92 10*3/MM3 (ref 0.7–3.1)
LYMPHOCYTES NFR BLD AUTO: 27.6 % (ref 19.6–45.3)
MACROCYTES BLD QL SMEAR: NORMAL
MCH RBC QN AUTO: 34.1 PG (ref 26.6–33)
MCHC RBC AUTO-ENTMCNC: 31 G/DL (ref 31.5–35.7)
MCV RBC AUTO: 110 FL (ref 79–97)
MONOCYTES # BLD AUTO: 0.88 10*3/MM3 (ref 0.1–0.9)
MONOCYTES NFR BLD AUTO: 8.3 % (ref 5–12)
NEUTROPHILS NFR BLD AUTO: 58.3 % (ref 42.7–76)
NEUTROPHILS NFR BLD AUTO: 6.17 10*3/MM3 (ref 1.7–7)
NRBC BLD AUTO-RTO: 0 /100 WBC (ref 0–0.2)
NT-PROBNP SERPL-MCNC: 4260 PG/ML (ref 0–1800)
OVALOCYTES BLD QL SMEAR: NORMAL
PLATELET # BLD AUTO: 557 10*3/MM3 (ref 140–450)
PMV BLD AUTO: 10.5 FL (ref 6–12)
POIKILOCYTOSIS BLD QL SMEAR: NORMAL
POTASSIUM SERPL-SCNC: 4 MMOL/L (ref 3.5–5.2)
PROT SERPL-MCNC: 5.8 G/DL (ref 6–8.5)
QT INTERVAL: 402 MS
QTC INTERVAL: 470 MS
RBC # BLD AUTO: 2.7 10*6/MM3 (ref 3.77–5.28)
RSV RNA NPH QL NAA+NON-PROBE: NOT DETECTED
SARS-COV-2 RNA RESP QL NAA+PROBE: NOT DETECTED
SMALL PLATELETS BLD QL SMEAR: NORMAL
SODIUM SERPL-SCNC: 142 MMOL/L (ref 136–145)
TROPONIN T SERPL HS-MCNC: 34 NG/L
WBC MORPH BLD: NORMAL
WBC NRBC COR # BLD AUTO: 10.58 10*3/MM3 (ref 3.4–10.8)
WHOLE BLOOD HOLD COAG: NORMAL
WHOLE BLOOD HOLD SPECIMEN: NORMAL

## 2025-03-04 PROCEDURE — 83880 ASSAY OF NATRIURETIC PEPTIDE: CPT | Performed by: EMERGENCY MEDICINE

## 2025-03-04 PROCEDURE — 94761 N-INVAS EAR/PLS OXIMETRY MLT: CPT

## 2025-03-04 PROCEDURE — 80053 COMPREHEN METABOLIC PANEL: CPT | Performed by: EMERGENCY MEDICINE

## 2025-03-04 PROCEDURE — 85007 BL SMEAR W/DIFF WBC COUNT: CPT | Performed by: EMERGENCY MEDICINE

## 2025-03-04 PROCEDURE — 94640 AIRWAY INHALATION TREATMENT: CPT

## 2025-03-04 PROCEDURE — 82948 REAGENT STRIP/BLOOD GLUCOSE: CPT

## 2025-03-04 PROCEDURE — 87637 SARSCOV2&INF A&B&RSV AMP PRB: CPT | Performed by: EMERGENCY MEDICINE

## 2025-03-04 PROCEDURE — 84484 ASSAY OF TROPONIN QUANT: CPT | Performed by: EMERGENCY MEDICINE

## 2025-03-04 PROCEDURE — 71045 X-RAY EXAM CHEST 1 VIEW: CPT

## 2025-03-04 PROCEDURE — 85025 COMPLETE CBC W/AUTO DIFF WBC: CPT | Performed by: EMERGENCY MEDICINE

## 2025-03-04 PROCEDURE — 93005 ELECTROCARDIOGRAM TRACING: CPT | Performed by: EMERGENCY MEDICINE

## 2025-03-04 PROCEDURE — 99284 EMERGENCY DEPT VISIT MOD MDM: CPT

## 2025-03-04 PROCEDURE — 94799 UNLISTED PULMONARY SVC/PX: CPT

## 2025-03-04 RX ORDER — SODIUM CHLORIDE 0.9 % (FLUSH) 0.9 %
10 SYRINGE (ML) INJECTION AS NEEDED
Status: DISCONTINUED | OUTPATIENT
Start: 2025-03-04 | End: 2025-03-04 | Stop reason: HOSPADM

## 2025-03-04 RX ORDER — IPRATROPIUM BROMIDE AND ALBUTEROL SULFATE 2.5; .5 MG/3ML; MG/3ML
3 SOLUTION RESPIRATORY (INHALATION) ONCE
Status: COMPLETED | OUTPATIENT
Start: 2025-03-04 | End: 2025-03-04

## 2025-03-04 RX ADMIN — IPRATROPIUM BROMIDE AND ALBUTEROL SULFATE 3 ML: .5; 3 SOLUTION RESPIRATORY (INHALATION) at 08:45

## 2025-03-04 NOTE — DISCHARGE INSTRUCTIONS
This patient was evaluated in the emergency department and her chest x-ray looks normal and she is oxygenating well on her nasal cannula oxygen and she was given a breathing treatment for COPD.    No signs of smoking elation or any significant respiratory distress were found today.    She is medically cleared now and looks stable to be discharged back to her facility at this time.
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
## 2025-03-11 LAB
QT INTERVAL: 402 MS
QTC INTERVAL: 470 MS